# Patient Record
Sex: FEMALE | Race: WHITE | NOT HISPANIC OR LATINO | Employment: FULL TIME | URBAN - METROPOLITAN AREA
[De-identification: names, ages, dates, MRNs, and addresses within clinical notes are randomized per-mention and may not be internally consistent; named-entity substitution may affect disease eponyms.]

---

## 2023-09-05 PROBLEM — M76.62 ACHILLES TENDINITIS OF LEFT LOWER EXTREMITY: Status: ACTIVE | Noted: 2023-09-05

## 2023-09-05 PROBLEM — M22.2X1 RIGHT PATELLOFEMORAL SYNDROME: Status: ACTIVE | Noted: 2023-09-05

## 2023-09-05 PROBLEM — G40.209 COMPLEX PARTIAL SEIZURE EVOLVING TO GENERALIZED SEIZURE (MULTI): Status: ACTIVE | Noted: 2023-09-05

## 2023-09-05 PROBLEM — S52.502A CLOSED FRACTURE OF LEFT DISTAL RADIUS: Status: RESOLVED | Noted: 2023-09-05 | Resolved: 2023-09-05

## 2023-09-05 PROBLEM — R92.30 DENSE BREAST TISSUE ON MAMMOGRAM: Status: ACTIVE | Noted: 2023-09-05

## 2023-09-05 PROBLEM — S82.832A CLOSED FRACTURE OF FIBULA, PROXIMAL, LEFT: Status: ACTIVE | Noted: 2023-09-05

## 2023-09-05 PROBLEM — M72.0 DUPUYTREN CONTRACTURE: Status: ACTIVE | Noted: 2023-09-05

## 2023-09-05 PROBLEM — F32.A CHRONIC DEPRESSION: Status: ACTIVE | Noted: 2023-09-05

## 2023-09-05 PROBLEM — M81.0 OSTEOPOROSIS: Status: ACTIVE | Noted: 2023-09-05

## 2023-09-05 PROBLEM — S82.002A LEFT PATELLA FRACTURE: Status: RESOLVED | Noted: 2023-09-05 | Resolved: 2023-09-05

## 2023-09-06 ENCOUNTER — OFFICE VISIT (OUTPATIENT)
Dept: PRIMARY CARE | Facility: CLINIC | Age: 63
End: 2023-09-06
Payer: COMMERCIAL

## 2023-09-06 VITALS
SYSTOLIC BLOOD PRESSURE: 134 MMHG | WEIGHT: 149.8 LBS | DIASTOLIC BLOOD PRESSURE: 75 MMHG | HEIGHT: 62 IN | BODY MASS INDEX: 27.57 KG/M2 | OXYGEN SATURATION: 97 % | HEART RATE: 62 BPM

## 2023-09-06 DIAGNOSIS — Z00.00 HEALTHCARE MAINTENANCE: Primary | ICD-10-CM

## 2023-09-06 DIAGNOSIS — M25.474 SWELLING OF FOOT JOINT, RIGHT: ICD-10-CM

## 2023-09-06 DIAGNOSIS — Z13.21 ENCOUNTER FOR VITAMIN DEFICIENCY SCREENING: ICD-10-CM

## 2023-09-06 DIAGNOSIS — Z13.6 SCREENING FOR CARDIOVASCULAR CONDITION: ICD-10-CM

## 2023-09-06 DIAGNOSIS — G40.209 COMPLEX PARTIAL SEIZURE EVOLVING TO GENERALIZED SEIZURE (MULTI): ICD-10-CM

## 2023-09-06 DIAGNOSIS — L03.039 PARONYCHIA OF GREAT TOE: ICD-10-CM

## 2023-09-06 DIAGNOSIS — M54.30 SCIATIC LEG PAIN: ICD-10-CM

## 2023-09-06 DIAGNOSIS — M81.0 OSTEOPOROSIS, UNSPECIFIED OSTEOPOROSIS TYPE, UNSPECIFIED PATHOLOGICAL FRACTURE PRESENCE: ICD-10-CM

## 2023-09-06 DIAGNOSIS — F32.A CHRONIC DEPRESSION: ICD-10-CM

## 2023-09-06 PROCEDURE — 1036F TOBACCO NON-USER: CPT | Performed by: INTERNAL MEDICINE

## 2023-09-06 PROCEDURE — 99386 PREV VISIT NEW AGE 40-64: CPT | Performed by: INTERNAL MEDICINE

## 2023-09-06 RX ORDER — IBUPROFEN 100 MG/5ML
SUSPENSION, ORAL (FINAL DOSE FORM) ORAL
COMMUNITY

## 2023-09-06 RX ORDER — MULTIVITAMIN/IRON/FOLIC ACID 18MG-0.4MG
TABLET ORAL 2 TIMES DAILY
COMMUNITY
End: 2023-10-25 | Stop reason: ENTERED-IN-ERROR

## 2023-09-06 RX ORDER — PHENOBARBITAL 64.8 MG/1
TABLET ORAL 2 TIMES DAILY
COMMUNITY
End: 2023-10-25 | Stop reason: ENTERED-IN-ERROR

## 2023-09-06 RX ORDER — MULTIVITAMIN/IRON/FOLIC ACID 18MG-0.4MG
TABLET ORAL
COMMUNITY
End: 2023-10-25 | Stop reason: ENTERED-IN-ERROR

## 2023-09-06 RX ORDER — CARBAMAZEPINE 100 MG/1
100 TABLET, EXTENDED RELEASE ORAL 2 TIMES DAILY
COMMUNITY
End: 2023-10-25 | Stop reason: ENTERED-IN-ERROR

## 2023-09-06 RX ORDER — CEPHALEXIN 500 MG/1
500 CAPSULE ORAL 2 TIMES DAILY
Qty: 20 CAPSULE | Refills: 0 | Status: SHIPPED | OUTPATIENT
Start: 2023-09-06 | End: 2023-09-06 | Stop reason: SDUPTHER

## 2023-09-06 RX ORDER — CEPHALEXIN 500 MG/1
500 CAPSULE ORAL 2 TIMES DAILY
Qty: 20 CAPSULE | Refills: 0 | Status: SHIPPED | OUTPATIENT
Start: 2023-09-06 | End: 2023-09-07 | Stop reason: SDUPTHER

## 2023-09-06 RX ORDER — CARBAMAZEPINE 400 MG/1
400 TABLET, EXTENDED RELEASE ORAL 2 TIMES DAILY
COMMUNITY
End: 2023-10-25 | Stop reason: ENTERED-IN-ERROR

## 2023-09-06 ASSESSMENT — ENCOUNTER SYMPTOMS
LOSS OF SENSATION IN FEET: 0
SEIZURES: 1
DEPRESSION: 0
ARTHRALGIAS: 1
CONSTITUTIONAL NEGATIVE: 1
MYALGIAS: 1
DYSPHORIC MOOD: 1
OCCASIONAL FEELINGS OF UNSTEADINESS: 0

## 2023-09-06 ASSESSMENT — PROMIS GLOBAL HEALTH SCALE
CARRYOUT_SOCIAL_ACTIVITIES: VERY GOOD
RATE_SOCIAL_SATISFACTION: GOOD
RATE_GENERAL_HEALTH: GOOD
RATE_QUALITY_OF_LIFE: VERY GOOD
EMOTIONAL_PROBLEMS: SOMETIMES
RATE_AVERAGE_FATIGUE: MODERATE
CARRYOUT_PHYSICAL_ACTIVITIES: MODERATELY
RATE_PHYSICAL_HEALTH: GOOD
RATE_AVERAGE_PAIN: 2
RATE_MENTAL_HEALTH: GOOD

## 2023-09-06 ASSESSMENT — COLUMBIA-SUICIDE SEVERITY RATING SCALE - C-SSRS
1. IN THE PAST MONTH, HAVE YOU WISHED YOU WERE DEAD OR WISHED YOU COULD GO TO SLEEP AND NOT WAKE UP?: NO
2. HAVE YOU ACTUALLY HAD ANY THOUGHTS OF KILLING YOURSELF?: NO
6. HAVE YOU EVER DONE ANYTHING, STARTED TO DO ANYTHING, OR PREPARED TO DO ANYTHING TO END YOUR LIFE?: NO

## 2023-09-06 ASSESSMENT — PATIENT HEALTH QUESTIONNAIRE - PHQ9
SUM OF ALL RESPONSES TO PHQ9 QUESTIONS 1 AND 2: 0
2. FEELING DOWN, DEPRESSED OR HOPELESS: NOT AT ALL
1. LITTLE INTEREST OR PLEASURE IN DOING THINGS: NOT AT ALL

## 2023-09-06 ASSESSMENT — LIFESTYLE VARIABLES
SKIP TO QUESTIONS 9-10: 0
HOW MANY STANDARD DRINKS CONTAINING ALCOHOL DO YOU HAVE ON A TYPICAL DAY: 1 OR 2
HOW OFTEN DO YOU HAVE A DRINK CONTAINING ALCOHOL: MONTHLY OR LESS
HOW OFTEN DO YOU HAVE SIX OR MORE DRINKS ON ONE OCCASION: LESS THAN MONTHLY
AUDIT-C TOTAL SCORE: 2

## 2023-09-06 NOTE — PROGRESS NOTES
"Subjective   Patient ID: Rhoda Pena is a 63 y.o. female who presents for Annual Exam (Physical ).    HPI     NPV.   FEELING WELL.   SAW  ORTHO ABOUT  BACK.  SHE    HAS  L5- S1  DEGEN. SPONDYLOLYSIS AND DJD SPINE.     SMOKER.     WORKS  [employer].      Lump   right  foot.       Review of Systems   Constitutional: Negative.    Musculoskeletal:  Positive for arthralgias and myalgias.   Neurological:  Positive for seizures.   Psychiatric/Behavioral:  Positive for dysphoric mood.    All other systems reviewed and are negative.      Objective   /75   Pulse 62   Ht 1.575 m (5' 2\")   Wt 67.9 kg (149 lb 12.8 oz)   SpO2 97%   BMI 27.40 kg/m²     Physical Exam  Vitals and nursing note reviewed.   Constitutional:       Appearance: Normal appearance.   HENT:      Head: Normocephalic.      Right Ear: External ear normal.      Left Ear: External ear normal.   Eyes:      Conjunctiva/sclera: Conjunctivae normal.   Cardiovascular:      Rate and Rhythm: Normal rate and regular rhythm.      Pulses: Normal pulses.      Heart sounds: Normal heart sounds.   Pulmonary:      Effort: Pulmonary effort is normal.      Breath sounds: Normal breath sounds.   Musculoskeletal:         General: Normal range of motion.      Cervical back: Normal range of motion and neck supple.   Skin:     General: Skin is warm and dry.      Capillary Refill: Capillary refill takes less than 2 seconds.   Neurological:      General: No focal deficit present.      Mental Status: She is alert and oriented to person, place, and time. Mental status is at baseline.   Psychiatric:         Mood and Affect: Mood normal.         Behavior: Behavior normal.         Assessment/Plan   Problem List Items Addressed This Visit          Medium    Chronic depression    Complex partial seizure evolving to generalized seizure (CMS/HCC)    Osteoporosis     Other Visit Diagnoses       Healthcare maintenance    -  Primary    Relevant Orders    CBC    Comprehensive " Metabolic Panel    Lipid Panel    Thyroxine, Free    Sciatic leg pain        Screening for cardiovascular condition        Relevant Orders    Lipid Panel    CT cardiac scoring wo IV contrast    Encounter for vitamin deficiency screening        Relevant Orders    Vitamin D 1,25 Dihydroxy (for eval of hypercalcemia)    Swelling of foot joint, right        Relevant Orders    XR foot right 3+ views (Completed)    Referral to Podiatry    Paronychia of great toe        Relevant Medications    cephalexin (Keflex) 500 mg capsule    Other Relevant Orders    Referral to Podiatry

## 2023-09-07 DIAGNOSIS — L03.039 PARONYCHIA OF GREAT TOE: ICD-10-CM

## 2023-09-07 RX ORDER — CEPHALEXIN 500 MG/1
500 CAPSULE ORAL 2 TIMES DAILY
Qty: 20 CAPSULE | Refills: 0 | Status: SHIPPED | OUTPATIENT
Start: 2023-09-07 | End: 2023-09-07

## 2023-09-13 ENCOUNTER — LAB (OUTPATIENT)
Dept: LAB | Facility: LAB | Age: 63
End: 2023-09-13
Payer: COMMERCIAL

## 2023-09-13 DIAGNOSIS — Z13.6 SCREENING FOR CARDIOVASCULAR CONDITION: ICD-10-CM

## 2023-09-13 DIAGNOSIS — R22.41 LOCALIZED SWELLING OF RIGHT FOOT: ICD-10-CM

## 2023-09-13 DIAGNOSIS — Z00.00 HEALTHCARE MAINTENANCE: ICD-10-CM

## 2023-09-13 DIAGNOSIS — D48.7: Primary | ICD-10-CM

## 2023-09-13 DIAGNOSIS — Z13.21 ENCOUNTER FOR VITAMIN DEFICIENCY SCREENING: ICD-10-CM

## 2023-09-13 LAB
ALANINE AMINOTRANSFERASE (SGPT) (U/L) IN SER/PLAS: 20 U/L (ref 7–45)
ALBUMIN (G/DL) IN SER/PLAS: 4.2 G/DL (ref 3.4–5)
ALKALINE PHOSPHATASE (U/L) IN SER/PLAS: 42 U/L (ref 33–136)
ANION GAP IN SER/PLAS: 12 MMOL/L (ref 10–20)
ASPARTATE AMINOTRANSFERASE (SGOT) (U/L) IN SER/PLAS: 19 U/L (ref 9–39)
BILIRUBIN TOTAL (MG/DL) IN SER/PLAS: 0.3 MG/DL (ref 0–1.2)
CALCIUM (MG/DL) IN SER/PLAS: 9.1 MG/DL (ref 8.6–10.3)
CARBON DIOXIDE, TOTAL (MMOL/L) IN SER/PLAS: 27 MMOL/L (ref 21–32)
CHLORIDE (MMOL/L) IN SER/PLAS: 101 MMOL/L (ref 98–107)
CHOLESTEROL (MG/DL) IN SER/PLAS: 223 MG/DL (ref 0–199)
CHOLESTEROL IN HDL (MG/DL) IN SER/PLAS: 77.5 MG/DL
CHOLESTEROL/HDL RATIO: 2.9
CREATININE (MG/DL) IN SER/PLAS: 0.73 MG/DL (ref 0.5–1.05)
ERYTHROCYTE DISTRIBUTION WIDTH (RATIO) BY AUTOMATED COUNT: 13.2 % (ref 11.5–14.5)
ERYTHROCYTE MEAN CORPUSCULAR HEMOGLOBIN CONCENTRATION (G/DL) BY AUTOMATED: 32.1 G/DL (ref 32–36)
ERYTHROCYTE MEAN CORPUSCULAR VOLUME (FL) BY AUTOMATED COUNT: 96 FL (ref 80–100)
ERYTHROCYTES (10*6/UL) IN BLOOD BY AUTOMATED COUNT: 3.93 X10E12/L (ref 4–5.2)
GFR FEMALE: >90 ML/MIN/1.73M2
GLUCOSE (MG/DL) IN SER/PLAS: 89 MG/DL (ref 74–99)
HEMATOCRIT (%) IN BLOOD BY AUTOMATED COUNT: 37.7 % (ref 36–46)
HEMOGLOBIN (G/DL) IN BLOOD: 12.1 G/DL (ref 12–16)
LDL: 133 MG/DL (ref 0–99)
LEUKOCYTES (10*3/UL) IN BLOOD BY AUTOMATED COUNT: 4.2 X10E9/L (ref 4.4–11.3)
PLATELETS (10*3/UL) IN BLOOD AUTOMATED COUNT: 232 X10E9/L (ref 150–450)
POTASSIUM (MMOL/L) IN SER/PLAS: 4.1 MMOL/L (ref 3.5–5.3)
PROTEIN TOTAL: 6.1 G/DL (ref 6.4–8.2)
SODIUM (MMOL/L) IN SER/PLAS: 136 MMOL/L (ref 136–145)
THYROXINE (T4) FREE (NG/DL) IN SER/PLAS: 0.82 NG/DL (ref 0.61–1.12)
TRIGLYCERIDE (MG/DL) IN SER/PLAS: 63 MG/DL (ref 0–149)
UREA NITROGEN (MG/DL) IN SER/PLAS: 19 MG/DL (ref 6–23)
VLDL: 13 MG/DL (ref 0–40)

## 2023-09-13 PROCEDURE — 84439 ASSAY OF FREE THYROXINE: CPT

## 2023-09-13 PROCEDURE — 85027 COMPLETE CBC AUTOMATED: CPT

## 2023-09-13 PROCEDURE — 36415 COLL VENOUS BLD VENIPUNCTURE: CPT

## 2023-09-13 PROCEDURE — 80053 COMPREHEN METABOLIC PANEL: CPT

## 2023-09-13 PROCEDURE — 83036 HEMOGLOBIN GLYCOSYLATED A1C: CPT

## 2023-09-13 PROCEDURE — 82652 VIT D 1 25-DIHYDROXY: CPT

## 2023-09-13 PROCEDURE — 80061 LIPID PANEL: CPT

## 2023-09-14 LAB
ESTIMATED AVERAGE GLUCOSE FOR HBA1C: 108 MG/DL
HEMOGLOBIN A1C/HEMOGLOBIN TOTAL IN BLOOD: 5.4 %

## 2023-09-16 LAB — VITAMIN D 1,25-DIHYDROXY: 21.3 PG/ML (ref 19.9–79.3)

## 2023-09-19 ENCOUNTER — TELEPHONE (OUTPATIENT)
Dept: PRIMARY CARE | Facility: CLINIC | Age: 63
End: 2023-09-19
Payer: COMMERCIAL

## 2023-09-19 DIAGNOSIS — R22.41 LOCALIZED SWELLING OF RIGHT FOOT: ICD-10-CM

## 2023-09-19 DIAGNOSIS — R22.42 LOCALIZED SWELLING OF LEFT FOOT: Primary | ICD-10-CM

## 2023-09-27 ENCOUNTER — HOSPITAL ENCOUNTER (OUTPATIENT)
Dept: DATA CONVERSION | Facility: HOSPITAL | Age: 63
Discharge: HOME | End: 2023-09-27
Payer: COMMERCIAL

## 2023-09-27 DIAGNOSIS — M46.1 SACROILIITIS, NOT ELSEWHERE CLASSIFIED (CMS-HCC): ICD-10-CM

## 2023-09-27 DIAGNOSIS — M54.16 RADICULOPATHY, LUMBAR REGION: ICD-10-CM

## 2023-09-27 DIAGNOSIS — M54.30 SCIATICA, UNSPECIFIED SIDE: ICD-10-CM

## 2023-09-27 DIAGNOSIS — M54.50 LOW BACK PAIN, UNSPECIFIED: ICD-10-CM

## 2023-10-25 ENCOUNTER — HOSPITAL ENCOUNTER (OUTPATIENT)
Dept: RADIOLOGY | Facility: HOSPITAL | Age: 63
Discharge: HOME | End: 2023-10-25
Payer: COMMERCIAL

## 2023-10-25 DIAGNOSIS — R22.41 LOCALIZED SWELLING OF RIGHT FOOT: ICD-10-CM

## 2023-10-25 DIAGNOSIS — M25.474 SWELLING OF FOOT JOINT, RIGHT: Primary | ICD-10-CM

## 2023-10-25 PROCEDURE — 76882 US LMTD JT/FCL EVL NVASC XTR: CPT

## 2023-10-28 PROBLEM — Z78.0 POST-MENOPAUSAL: Status: ACTIVE | Noted: 2023-10-28

## 2023-10-28 PROBLEM — K58.0 IRRITABLE BOWEL SYNDROME WITH DIARRHEA: Status: ACTIVE | Noted: 2023-10-28

## 2023-10-28 PROBLEM — R79.89 HIGH SERUM CHOLESTANOL: Status: ACTIVE | Noted: 2023-10-28

## 2023-10-28 PROBLEM — M46.1 SACROILIITIS (CMS-HCC): Status: ACTIVE | Noted: 2023-10-28

## 2023-10-28 PROBLEM — N95.2 POSTMENOPAUSAL ATROPHIC VAGINITIS: Status: ACTIVE | Noted: 2023-10-28

## 2023-10-28 PROBLEM — K21.9 ESOPHAGEAL REFLUX: Status: ACTIVE | Noted: 2023-10-28

## 2023-10-28 PROBLEM — S92.425A CLOSED NONDISPLACED FRACTURE OF DISTAL PHALANX OF LEFT GREAT TOE: Status: ACTIVE | Noted: 2023-10-28

## 2023-10-28 PROBLEM — S69.90XA INJURY OF WRIST: Status: ACTIVE | Noted: 2023-10-28

## 2023-10-28 PROBLEM — M70.22 OLECRANON BURSITIS OF LEFT ELBOW: Status: ACTIVE | Noted: 2023-10-28

## 2023-10-28 PROBLEM — Z04.9 CONDITION NOT FOUND: Status: ACTIVE | Noted: 2023-10-28

## 2023-10-28 PROBLEM — S52.552A: Status: ACTIVE | Noted: 2023-10-28

## 2023-10-28 PROBLEM — R11.0 NAUSEA IN ADULT: Status: ACTIVE | Noted: 2023-10-28

## 2023-10-28 PROBLEM — R07.9 CHEST PAIN: Status: ACTIVE | Noted: 2019-01-11

## 2023-10-28 PROBLEM — H43.813 POSTERIOR VITREOUS DETACHMENT, BOTH EYES: Status: ACTIVE | Noted: 2023-10-28

## 2023-10-28 PROBLEM — L82.1 OTHER SEBORRHEIC KERATOSIS: Status: ACTIVE | Noted: 2022-03-14

## 2023-10-28 PROBLEM — S83.422A SPRAIN OF LATERAL COLLATERAL LIGAMENT OF LEFT KNEE: Status: ACTIVE | Noted: 2023-10-28

## 2023-10-28 PROBLEM — S49.92XA INJURY OF LEFT SHOULDER: Status: ACTIVE | Noted: 2023-10-28

## 2023-10-28 PROBLEM — L91.8 OTHER HYPERTROPHIC DISORDERS OF THE SKIN: Status: ACTIVE | Noted: 2022-03-14

## 2023-10-28 PROBLEM — M54.16 LUMBAR RADICULOPATHY: Status: ACTIVE | Noted: 2023-10-28

## 2023-10-28 PROBLEM — S89.92XA LEFT KNEE INJURY: Status: ACTIVE | Noted: 2023-10-28

## 2023-10-28 PROBLEM — M12.812 ROTATOR CUFF ARTHROPATHY OF LEFT SHOULDER: Status: ACTIVE | Noted: 2023-10-28

## 2023-10-28 RX ORDER — ZOLEDRONIC ACID 5 MG/100ML
INJECTION, SOLUTION INTRAVENOUS
COMMUNITY
End: 2024-02-05 | Stop reason: ALTCHOICE

## 2023-10-28 RX ORDER — PNV NO.95/FERROUS FUM/FOLIC AC 28MG-0.8MG
TABLET ORAL
COMMUNITY

## 2023-10-28 RX ORDER — CHOLECALCIFEROL (VITAMIN D3) 50 MCG
50 TABLET ORAL DAILY
COMMUNITY

## 2023-10-30 ENCOUNTER — OFFICE VISIT (OUTPATIENT)
Dept: ORTHOPEDIC SURGERY | Facility: CLINIC | Age: 63
End: 2023-10-30
Payer: COMMERCIAL

## 2023-10-30 DIAGNOSIS — M25.474 SWELLING OF FOOT JOINT, RIGHT: ICD-10-CM

## 2023-10-30 DIAGNOSIS — R22.41 FOOT MASS, RIGHT: ICD-10-CM

## 2023-10-30 PROCEDURE — 1036F TOBACCO NON-USER: CPT | Performed by: ORTHOPAEDIC SURGERY

## 2023-10-30 PROCEDURE — 99214 OFFICE O/P EST MOD 30 MIN: CPT | Performed by: ORTHOPAEDIC SURGERY

## 2023-10-30 PROCEDURE — 2500000005 HC RX 250 GENERAL PHARMACY W/O HCPCS: Performed by: ORTHOPAEDIC SURGERY

## 2023-10-30 RX ORDER — LIDOCAINE HYDROCHLORIDE 10 MG/ML
2 INJECTION INFILTRATION; PERINEURAL
Status: COMPLETED | OUTPATIENT
Start: 2023-10-30 | End: 2023-10-30

## 2023-10-30 RX ADMIN — LIDOCAINE HYDROCHLORIDE 2 ML: 10 INJECTION, SOLUTION INFILTRATION; PERINEURAL at 16:52

## 2023-10-30 NOTE — LETTER
October 30, 2023     Elis Solares MD  [address]   [place], [address]  [place] [address]    Patient: Rhoda Pena   YOB: 1960   Date of Visit: 10/30/2023       Dear Dr. Elis Solares MD:    Thank you for referring Rhoda Pena to me for evaluation. Below are my notes for this consultation.  If you have questions, please do not hesitate to call me. I look forward to following your patient along with you.       Sincerely,     Mauro Gutierres MD      CC: No Recipients  ______________________________________________________________________________________    Patient ID: Rhoda Pena is a 63 y.o. female.    M Inj/Asp on 10/30/2023 4:52 PM  Indications: diagnostic evaluation  Medications: 2 mL lidocaine 10 mg/mL (1 %)        This 63-year-old woman complains of a mass over the right midfoot which she noted last spring.  She notes it can be tender to the touch but more importantly when she presses on the mass or anything rubs on it it causes tingling and numbness in her second and third toes.    The patient denies any trauma or any changes in the size of the mass.    The patient's past medical history further complicates her situation.  She has history of chronic depression, complex partial seizure evolving to generalized seizure, chest pain and irritable bowel syndrome.    Review of systems:  A complete review of the patient's past medical history and review of 30 systems and all medications and allergies was performed. Please see adult medical record for details.    A Family history for genetic or familial inheritance issues and Social history including smoking history, alcohol consumption and exercise activities was also reviewed and significant findings noted in the patients history of present illness.    Physical Exam:  The patient is well-nourished and well-developed and in no acute distress. The patient displayed normal mood and affect. The patient's pupils were  equal, round sclerae are white. Patient's respirations appear to be regular and unlabored.    Examination of the patient's right foot and ankle revealed the following. The patient's gait and stance revealed mild pes planus and pronation.    There was a 2 x 2 centimeter soft tissue mass over the medial midfoot area.  The mass appeared to be somewhat tender to touch.  The mass transilluminated.  There did not appear to be any other skin abnormalities or lymphangitis. There was no swelling noted and no erythema.  There was no deformity.    There was no other tenderness to palpation.  Ankle range of motion was full. Subtalar motion was full and midtarsal motion was full. The Silfverskiold test was positive.   The neurovascular examination was intact. The neurological exam including motor and sensory exam was performed. The vascular examination including palpation of pulses and capillary refill of the foot was performed and determined to be intact.    Imaging:  I personally reviewed and measured the radiographs including AP and lateral views of the extremity and they revealed essentially normal bony architecture with what appears to be a soft tissue mass over the medial midfoot.  Ultrasound examination revealed what appeared to be a cystic mass.    Impression & Plan:    It is my impression this probably is a cystic mass possibly ganglion cyst that may be compressing a sensory nerve.    After adequate informed consent I attempted to aspirate the cyst under local anesthesia and did not return any fluid.    Because this does not appear to be a ganglion cyst based on aspiration in spite of the transillumination I believe we need further imaging in order to determine the etiology of the mass.    I have ordered an MRI with and without contrast to further image this mass.  The patient should contact me following the MRI in order to plan her course of action.      Note dictated with [employer] software.   Completed without full type editing and sent to avoid delay.

## 2023-10-30 NOTE — PROGRESS NOTES
This 63-year-old woman complains of a mass over the right midfoot which she noted last spring.  She notes it can be tender to the touch but more importantly when she presses on the mass or anything rubs on it it causes tingling and numbness in her second and third toes.    The patient denies any trauma or any changes in the size of the mass.    The patient's past medical history further complicates her situation.  She has history of chronic depression, complex partial seizure evolving to generalized seizure, chest pain and irritable bowel syndrome.    Review of systems:  A complete review of the patient's past medical history and review of 30 systems and all medications and allergies was performed. Please see adult medical record for details.    A Family history for genetic or familial inheritance issues and Social history including smoking history, alcohol consumption and exercise activities was also reviewed and significant findings noted in the patients history of present illness.    Physical Exam:  The patient is well-nourished and well-developed and in no acute distress. The patient displayed normal mood and affect. The patient's pupils were equal, round sclerae are white. Patient's respirations appear to be regular and unlabored.    Examination of the patient's right foot and ankle revealed the following. The patient's gait and stance revealed mild pes planus and pronation.    There was a 2 x 2 centimeter soft tissue mass over the medial midfoot area.  The mass appeared to be somewhat tender to touch.  The mass transilluminated.  There did not appear to be any other skin abnormalities or lymphangitis. There was no swelling noted and no erythema.  There was no deformity.    There was no other tenderness to palpation.  Ankle range of motion was full. Subtalar motion was full and midtarsal motion was full. The Silfverskiold test was positive.   The neurovascular examination was intact. The neurological exam  including motor and sensory exam was performed. The vascular examination including palpation of pulses and capillary refill of the foot was performed and determined to be intact.    Imaging:  I personally reviewed and measured the radiographs including AP and lateral views of the extremity and they revealed essentially normal bony architecture with what appears to be a soft tissue mass over the medial midfoot.  Ultrasound examination revealed what appeared to be a cystic mass.    Impression & Plan:    It is my impression this probably is a cystic mass possibly ganglion cyst that may be compressing a sensory nerve.    After adequate informed consent I attempted to aspirate the cyst under local anesthesia and did not return any fluid.    Because this does not appear to be a ganglion cyst based on aspiration in spite of the transillumination I believe we need further imaging in order to determine the etiology of the mass.    I have ordered an MRI with and without contrast to further image this mass.  The patient should contact me following the MRI in order to plan her course of action.      Note dictated with [email or web address] software.  Completed without full type editing and sent to avoid delay.

## 2023-10-30 NOTE — PROGRESS NOTES
Patient ID: Rhoda Pena is a 63 y.o. female.    M Inj/Asp on 10/30/2023 4:52 PM  Indications: diagnostic evaluation  Medications: 2 mL lidocaine 10 mg/mL (1 %)

## 2023-11-13 ENCOUNTER — LAB (OUTPATIENT)
Dept: LAB | Facility: LAB | Age: 63
End: 2023-11-13
Payer: COMMERCIAL

## 2023-11-13 DIAGNOSIS — R22.41 FOOT MASS, RIGHT: ICD-10-CM

## 2023-11-13 DIAGNOSIS — M25.474 SWELLING OF FOOT JOINT, RIGHT: ICD-10-CM

## 2023-11-13 LAB
CREAT SERPL-MCNC: 0.71 MG/DL (ref 0.5–1.05)
GFR SERPL CREATININE-BSD FRML MDRD: >90 ML/MIN/1.73M*2

## 2023-11-13 PROCEDURE — 82565 ASSAY OF CREATININE: CPT

## 2023-11-13 PROCEDURE — 36415 COLL VENOUS BLD VENIPUNCTURE: CPT

## 2023-11-15 ENCOUNTER — HOSPITAL ENCOUNTER (OUTPATIENT)
Dept: RADIOLOGY | Facility: HOSPITAL | Age: 63
Discharge: HOME | End: 2023-11-15
Payer: COMMERCIAL

## 2023-11-15 DIAGNOSIS — R22.41 FOOT MASS, RIGHT: ICD-10-CM

## 2023-11-15 DIAGNOSIS — M25.474 SWELLING OF FOOT JOINT, RIGHT: ICD-10-CM

## 2023-11-15 PROCEDURE — A9575 INJ GADOTERATE MEGLUMI 0.1ML: HCPCS | Performed by: ORTHOPAEDIC SURGERY

## 2023-11-15 PROCEDURE — 73720 MRI LWR EXTREMITY W/O&W/DYE: CPT | Mod: RT

## 2023-11-15 PROCEDURE — 2550000001 HC RX 255 CONTRASTS: Performed by: ORTHOPAEDIC SURGERY

## 2023-11-15 RX ORDER — GADOTERATE MEGLUMINE 376.9 MG/ML
15 INJECTION INTRAVENOUS
Status: COMPLETED | OUTPATIENT
Start: 2023-11-15 | End: 2023-11-15

## 2023-11-15 RX ADMIN — GADOTERATE MEGLUMINE 14 ML: 376.9 INJECTION INTRAVENOUS at 15:27

## 2023-11-22 ENCOUNTER — TELEPHONE (OUTPATIENT)
Dept: ORTHOPEDIC SURGERY | Facility: CLINIC | Age: 63
End: 2023-11-22
Payer: COMMERCIAL

## 2023-11-22 NOTE — TELEPHONE ENCOUNTER
Telephone conversation with the patient November 22, 2023 regarding the patient's MRI.  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  Because of this enhancement of the lesion it is difficult to tell exactly what it is although it may be a plantar fibroma.  I have referred the patient to Dr. Faisal Boland our orthopedic oncology specialist for evaluation and treatment.  Patient should contact me if she has any problems getting into see Dr. Rowland.      Note dictated with [employer] software.  Completed without full type editing and sent to avoid delay.

## 2023-12-01 ENCOUNTER — PHARMACY VISIT (OUTPATIENT)
Dept: PHARMACY | Facility: CLINIC | Age: 63
End: 2023-12-01
Payer: COMMERCIAL

## 2023-12-01 ENCOUNTER — OFFICE VISIT (OUTPATIENT)
Dept: NEUROLOGY | Facility: CLINIC | Age: 63
End: 2023-12-01
Payer: COMMERCIAL

## 2023-12-01 DIAGNOSIS — G40.209 COMPLEX PARTIAL SEIZURE EVOLVING TO GENERALIZED SEIZURE (MULTI): Primary | ICD-10-CM

## 2023-12-01 PROCEDURE — 1036F TOBACCO NON-USER: CPT | Performed by: PSYCHIATRY & NEUROLOGY

## 2023-12-01 PROCEDURE — RXMED WILLOW AMBULATORY MEDICATION CHARGE

## 2023-12-01 PROCEDURE — 99214 OFFICE O/P EST MOD 30 MIN: CPT | Performed by: PSYCHIATRY & NEUROLOGY

## 2023-12-01 RX ORDER — CARBAMAZEPINE 400 MG/1
TABLET, EXTENDED RELEASE ORAL 2 TIMES DAILY
Qty: 180 TABLET | Refills: 1 | Status: SHIPPED | OUTPATIENT
Start: 2023-12-01 | End: 2024-02-05 | Stop reason: ALTCHOICE

## 2023-12-01 RX ORDER — CARBAMAZEPINE 100 MG/1
100 TABLET, EXTENDED RELEASE ORAL 2 TIMES DAILY
Qty: 180 TABLET | Refills: 1 | Status: SHIPPED | OUTPATIENT
Start: 2023-12-01 | End: 2024-11-30

## 2023-12-01 RX ORDER — PHENOBARBITAL 64.8 MG/1
TABLET ORAL 2 TIMES DAILY
Qty: 270 TABLET | Refills: 1 | Status: SHIPPED | OUTPATIENT
Start: 2023-12-01 | End: 2024-06-14

## 2023-12-01 NOTE — PATIENT INSTRUCTIONS
You continue to remain seizure-free on phenobarbital and tegretol. Please continue generic carbamazepine  mg twice a day and 3 phenobarbitals a day. Continue Tegretol  mg twice a day. Please get blood tests to check your phenobarbital and carbamazepine levels in the morning, ideally before your morning dose next week. After that, we will prescribe generic carbamazepine  mg, which you will take at night after continuing Tegretol  mg in the morning. Continue this for 6 months. Please call if you develop a seizure aura or side effects. If you feel fine after 6 months, we can switch you to generic carbamazepine entirely. I will see you back in July, 2024.

## 2023-12-01 NOTE — PROGRESS NOTES
Chief complaint:    Seizure disorder    History of Present Illness:   Rhoda continues to do well on phenobarbital and carbamazepine for her seizures. She has switched to generic carbamazepine  mg twice a day with no issues and continues name brand Tegretol  mg bid.     She noticed a bump on her right medial foot this summer. It does not hurt but if she presses on it she gets numbness of two toes. She had an MRI of the foot, and there was an enhancing lesion, so she will see an oncologist next week.      Physical examination:  She is a cheerful, talkative woman who looks well.     Neurologic Exam     Mental Status   Her speech was clear, fluent and appropriate.      Cranial Nerves     CN III, IV, VI   Extraocular motions are normal.     CN V   Facial sensation intact.     CN VII   Facial expression full, symmetric.     Motor Exam   Muscle bulk: normal    Sensory Exam   Light touch normal.   Vibration normal.   Pinprick normal.     Gait, Coordination, and Reflexes     Reflexes   Right brachioradialis: 1+  Left brachioradialis: 1+  Right biceps: 1+  Left biceps: 1+  Right triceps: 1+  Left triceps: 1+  Right patellar: 1+  Left patellar: 1+  Right achilles: 1+  Left achilles: 1+  Right plantar: normal  Left plantar: normal  Her gait was narrow-based and steady and she could perform a tandem gait.          No diagnosis found.     1. Complex partial seizure evolving to generalized seizure (CMS/HCC)  Phenobarbital level    Carbamazepine         No orders of the defined types were placed in this encounter.         Impression and Plan:  Rhoda continues to remain seizure-free on a combination of phenobarbital and carbamazepine. She will have her levels checked. She has switched to generic carbamazepine  mg bid and will see how she does with name brand Tegretol XR in the morning and generic carbamazepine  mg at night for 6 months. If she continues to do well, she will switch to generic carbamazepine  ER completely and recheck her carbamazepine level after that.     She has had some funny sensation on the balls of her feet and has developed a rubbery mass on the side of her right foot. On exam today she does not show signs of a neuropathy although pushing on the mass causes two toes to tingle. She will consult an oncologist and will let us know what is going on with her foot. Follow up in July, 2024.    Iman Baker MD

## 2023-12-02 ENCOUNTER — PHARMACY VISIT (OUTPATIENT)
Dept: PHARMACY | Facility: CLINIC | Age: 63
End: 2023-12-02
Payer: COMMERCIAL

## 2023-12-02 PROCEDURE — RXMED WILLOW AMBULATORY MEDICATION CHARGE

## 2023-12-07 ENCOUNTER — OFFICE VISIT (OUTPATIENT)
Dept: ORTHOPEDIC SURGERY | Facility: CLINIC | Age: 63
End: 2023-12-07
Payer: COMMERCIAL

## 2023-12-07 VITALS — HEIGHT: 63 IN | WEIGHT: 146 LBS | BODY MASS INDEX: 25.87 KG/M2

## 2023-12-07 DIAGNOSIS — R22.41 FOOT MASS, RIGHT: Primary | ICD-10-CM

## 2023-12-07 PROCEDURE — 1036F TOBACCO NON-USER: CPT | Performed by: STUDENT IN AN ORGANIZED HEALTH CARE EDUCATION/TRAINING PROGRAM

## 2023-12-07 PROCEDURE — 99214 OFFICE O/P EST MOD 30 MIN: CPT | Performed by: STUDENT IN AN ORGANIZED HEALTH CARE EDUCATION/TRAINING PROGRAM

## 2023-12-07 ASSESSMENT — ENCOUNTER SYMPTOMS
DEPRESSION: 0
LOSS OF SENSATION IN FEET: 0
OCCASIONAL FEELINGS OF UNSTEADINESS: 0

## 2023-12-07 ASSESSMENT — PAIN - FUNCTIONAL ASSESSMENT: PAIN_FUNCTIONAL_ASSESSMENT: NO/DENIES PAIN

## 2023-12-07 NOTE — PROGRESS NOTES
"Orthopaedic Surgery Clinic Progress Note:    CC: Right foot mass    S: 63 y.o. female with a history of a right foot mass which she originally noticed sometime in the spring 2023.  She states that it has been relatively stable  in terms of size over the last 9 months but she eventually got it worked up by her primary care physician who performed an ultrasound followed by an MRI.  She did see Dr. Gutierres who also tried to aspirate it assuming it was a cyst however got no fluid back.  She states is not on painful and nontender.  She does have some numbness in her middle toes however she is not sure if it actually related to the mass.  She states that she can touch or palpate the mass but when other people do it \"bothers her\" but does not necessarily cause pain.  Again, denies any significant growth.  No other significant medical history to note.    Objective:    Exam:  Gen: alert, appropriately conversational, no apparent distress  Chest: symmetric chest rise, non-labored breathing  Heart: RRR to peripheral palpation    Physical exam of the right lower extremity shows an approximately 2 cm mass over the medial foot near the midportion of the metatarsal.  It is nontender to palpation and has a negative Tinel's.  There is no obvious transillumination.  It mobile and relatively firm.  No obvious adenopathy.  No other plantar masses palpable.  Sensation is intact light touch in all distributions.  She has palpable pulses brisk capillary refill.  EHL, dorsiflexion and plantarflexion are intact.    Imaging:  I was able to review the patient's previous radiographs which were interpreted myself of the right foot demonstrate a soft tissue prominence over the medial forefoot near the midportion of the first metatarsal but no other obvious bony lesions or abnormalities.  MRI was performed also which shows some nonenhancing areas of what appears to be fibromatosis near the head of the first metatarsal as well as a secondary lesion " in the area of palpable prominence which appears to be gadolinium enhancing.    A/P:    Discussed the lesion as well as options we have at this point moving forward.  She states the right now she really is not unsure what to do but really she has not had much in terms of symptoms right now.  I told her if it is asymptomatic we can certainly observe with continued surveillance and a repeat MRI in the future to monitor for any sort of growth or change.  If she wants to be a bit more proactive and certainly we could either get a needle biopsy to tell us what the exact etiology of the mass is so she can make a better informed decision about which direction she would want to go in terms of observation or surgical excision or alternatively we can go directly to surgical excisional biopsy which I believe would be reasonable because we would not ultimately affect her long-term outcome or any future surgical options should this come back as a malignancy which I still think is less likely especially given the long-term stability over the last 9 months.  Ultimately after discussing continued observation, going directly to surgical excision or needle biopsy to obtain diagnosis she ultimately decided that he wanted to proceed with needle biopsy.  For that reason we placed an order for an ultrasound-guided biopsy of the lesion we can then have her follow-up with us afterwards to discuss the results as well as how she would like to proceed moving forward.

## 2023-12-09 ENCOUNTER — LAB (OUTPATIENT)
Dept: LAB | Facility: LAB | Age: 63
End: 2023-12-09
Payer: COMMERCIAL

## 2023-12-09 DIAGNOSIS — G40.209 COMPLEX PARTIAL SEIZURE EVOLVING TO GENERALIZED SEIZURE (MULTI): ICD-10-CM

## 2023-12-09 LAB
CARBAMAZEPINE SERPL-MCNC: 7.8 UG/ML (ref 4–12)
PHENOBARB SERPL-MCNC: 36.5 UG/ML (ref 10–40)

## 2023-12-09 PROCEDURE — 36415 COLL VENOUS BLD VENIPUNCTURE: CPT

## 2023-12-09 PROCEDURE — 80184 ASSAY OF PHENOBARBITAL: CPT

## 2023-12-09 PROCEDURE — 80156 ASSAY CARBAMAZEPINE TOTAL: CPT

## 2023-12-11 ENCOUNTER — TELEPHONE (OUTPATIENT)
Dept: NEUROLOGY | Facility: CLINIC | Age: 63
End: 2023-12-11
Payer: COMMERCIAL

## 2023-12-11 NOTE — TELEPHONE ENCOUNTER
I spoke to Rhoda after reviewing her carbamazepine and phenobarbital levels, which are steady and in the therapeutic range. I will send in a prescription for carbamazepine  mg, which she will alternate daily with brand name Tegretol  mg to see if she tolerates the generic form. I asked her to call if she notices side effects, or if she gets a hint of seizure aura.

## 2023-12-14 ENCOUNTER — ANCILLARY PROCEDURE (OUTPATIENT)
Dept: RADIOLOGY | Facility: CLINIC | Age: 63
End: 2023-12-14
Payer: COMMERCIAL

## 2023-12-14 DIAGNOSIS — Z13.6 ENCOUNTER FOR SCREENING FOR CARDIOVASCULAR DISORDERS: ICD-10-CM

## 2023-12-14 PROCEDURE — 75571 CT HRT W/O DYE W/CA TEST: CPT

## 2023-12-18 ENCOUNTER — ANCILLARY PROCEDURE (OUTPATIENT)
Dept: RADIOLOGY | Facility: HOSPITAL | Age: 63
End: 2023-12-18
Payer: COMMERCIAL

## 2023-12-18 VITALS
RESPIRATION RATE: 20 BRPM | HEART RATE: 78 BPM | DIASTOLIC BLOOD PRESSURE: 70 MMHG | OXYGEN SATURATION: 100 % | SYSTOLIC BLOOD PRESSURE: 140 MMHG

## 2023-12-18 DIAGNOSIS — R22.41 FOOT MASS, RIGHT: ICD-10-CM

## 2023-12-18 PROCEDURE — 76942 ECHO GUIDE FOR BIOPSY: CPT | Performed by: RADIOLOGY

## 2023-12-18 PROCEDURE — 20206 BIOPSY MUSCLE PERQ NEEDLE: CPT | Performed by: RADIOLOGY

## 2023-12-18 PROCEDURE — 76942 ECHO GUIDE FOR BIOPSY: CPT

## 2023-12-18 PROCEDURE — 88305 TISSUE EXAM BY PATHOLOGIST: CPT | Performed by: STUDENT IN AN ORGANIZED HEALTH CARE EDUCATION/TRAINING PROGRAM

## 2023-12-18 PROCEDURE — 88342 IMHCHEM/IMCYTCHM 1ST ANTB: CPT | Performed by: STUDENT IN AN ORGANIZED HEALTH CARE EDUCATION/TRAINING PROGRAM

## 2023-12-18 PROCEDURE — 88342 IMHCHEM/IMCYTCHM 1ST ANTB: CPT | Mod: TC,SUR,GEALAB | Performed by: STUDENT IN AN ORGANIZED HEALTH CARE EDUCATION/TRAINING PROGRAM

## 2023-12-18 PROCEDURE — 88341 IMHCHEM/IMCYTCHM EA ADD ANTB: CPT | Performed by: STUDENT IN AN ORGANIZED HEALTH CARE EDUCATION/TRAINING PROGRAM

## 2023-12-18 PROCEDURE — 2720000007 HC OR 272 NO HCPCS

## 2023-12-18 ASSESSMENT — PAIN SCALES - GENERAL: PAINLEVEL_OUTOF10: 0 - NO PAIN

## 2023-12-18 NOTE — PRE-PROCEDURE NOTE
Pre-procedure Note:    Allergies and medications reviewed.    ASA Classification: ASA 1 - Normal health patient    Sedation: Minimal

## 2023-12-18 NOTE — POST-PROCEDURE NOTE
Post Procedure Note:    Procedure Data and Time: [unfilled] at [unfilled]     Pre-Procedure Time Out Performed    Post-Procedure Hudmiya Performed    Pre-Procedure Diagnosis:  Right foot mass    Post-Procedure Diagnosis:  Right foot mass    Procedure:  Ultrasound guided biopsy of right foot mass    : Robin Mehta MD    Assistant: None    Blood Loss: Minimal    Specimen: Sent Three x 20 gauge core samples    Attestation: I performed the procedure and  without a residnet.

## 2023-12-19 DIAGNOSIS — R93.89 ABNORMAL CT OF THE CHEST: Primary | ICD-10-CM

## 2023-12-21 LAB
LAB AP ASR DISCLAIMER: NORMAL
LABORATORY COMMENT REPORT: NORMAL
PATH REPORT.COMMENTS IMP SPEC: NORMAL
PATH REPORT.FINAL DX SPEC: NORMAL
PATH REPORT.GROSS SPEC: NORMAL
PATH REPORT.TOTAL CANCER: NORMAL

## 2024-01-05 ENCOUNTER — OFFICE VISIT (OUTPATIENT)
Dept: ORTHOPEDIC SURGERY | Facility: HOSPITAL | Age: 64
End: 2024-01-05
Payer: COMMERCIAL

## 2024-01-05 VITALS — BODY MASS INDEX: 25.87 KG/M2 | WEIGHT: 146 LBS | HEIGHT: 63 IN

## 2024-01-05 DIAGNOSIS — R22.41 FOOT MASS, RIGHT: Primary | ICD-10-CM

## 2024-01-05 PROCEDURE — 99213 OFFICE O/P EST LOW 20 MIN: CPT | Performed by: STUDENT IN AN ORGANIZED HEALTH CARE EDUCATION/TRAINING PROGRAM

## 2024-01-05 PROCEDURE — 1036F TOBACCO NON-USER: CPT | Performed by: STUDENT IN AN ORGANIZED HEALTH CARE EDUCATION/TRAINING PROGRAM

## 2024-01-05 ASSESSMENT — ENCOUNTER SYMPTOMS
OCCASIONAL FEELINGS OF UNSTEADINESS: 0
LOSS OF SENSATION IN FEET: 0
DEPRESSION: 0

## 2024-01-05 NOTE — PROGRESS NOTES
Orthopaedic Surgery Clinic Progress Note:    CC: Follow-up biopsy results    S: 63 y.o. female with previously evaluated right foot mass who elected to receive a biopsy.  The biopsy was performed and she is here to go over the results.  She tolerated the biopsy well.  Denies any growth or change in size of the mass.  She is otherwise doing well.    Objective:    Exam:  Gen: alert, appropriately conversational, no apparent distress  Chest: symmetric chest rise, non-labored breathing  Heart: RRR to peripheral palpation    Physical exam of the right lower extremity shows an approximately 2 cm mass over the medial foot near the midportion of the metatarsal with healed biopsy tract.  Sensation is intact light touch in all distributions.  She has palpable pulses brisk capillary refill.  EHL, dorsiflexion and plantarflexion are intact.     Imaging:  I was able to review the patient's previous radiographs which were interpreted myself of the right foot demonstrate a soft tissue prominence over the medial forefoot near the midportion of the first metatarsal but no other obvious bony lesions or abnormalities.  MRI was performed also which shows some nonenhancing areas of what appears to be fibromatosis near the head of the first metatarsal as well as a secondary lesion in the area of palpable prominence which appears to be gadolinium enhancing.     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  Right now she states that the mass really is not bothering her all that much, at least on that that she would consider having surgery to remove it.  For that reason I believe this needs no further workup at this time.  If she does begin to have pain or issues from the lesion or if she feels that it is growing I told her she can call the office and we can actually  get a surgery scheduled on an outpatient basis for mass removal.  We went over with the surgery would entail and right now she wants to wait on that for now.  If she does develop any issues or symptoms beyond what she is experiencing she will call to them potentially schedule an operative date.

## 2024-01-08 ENCOUNTER — HOSPITAL ENCOUNTER (OUTPATIENT)
Dept: RADIOLOGY | Facility: HOSPITAL | Age: 64
Discharge: HOME | End: 2024-01-08
Payer: COMMERCIAL

## 2024-01-08 DIAGNOSIS — R93.89 ABNORMAL CT OF THE CHEST: ICD-10-CM

## 2024-01-08 PROCEDURE — 71250 CT THORAX DX C-: CPT

## 2024-01-08 PROCEDURE — 71250 CT THORAX DX C-: CPT | Performed by: RADIOLOGY

## 2024-01-09 ENCOUNTER — APPOINTMENT (OUTPATIENT)
Dept: ORTHOPEDIC SURGERY | Facility: HOSPITAL | Age: 64
End: 2024-01-09
Payer: COMMERCIAL

## 2024-01-09 ENCOUNTER — TELEPHONE (OUTPATIENT)
Dept: PRIMARY CARE | Facility: CLINIC | Age: 64
End: 2024-01-09
Payer: COMMERCIAL

## 2024-01-09 ENCOUNTER — TELEMEDICINE (OUTPATIENT)
Dept: PRIMARY CARE | Facility: CLINIC | Age: 64
End: 2024-01-09
Payer: COMMERCIAL

## 2024-01-09 DIAGNOSIS — R91.1 LUNG NODULE: Primary | ICD-10-CM

## 2024-01-09 DIAGNOSIS — R91.8 OPACITY OF LUNG ON IMAGING STUDY: ICD-10-CM

## 2024-01-09 PROCEDURE — 99442 PR PHYS/QHP TELEPHONE EVALUATION 11-20 MIN: CPT | Performed by: INTERNAL MEDICINE

## 2024-01-09 ASSESSMENT — PATIENT HEALTH QUESTIONNAIRE - PHQ9
SUM OF ALL RESPONSES TO PHQ9 QUESTIONS 1 AND 2: 0
1. LITTLE INTEREST OR PLEASURE IN DOING THINGS: NOT AT ALL
2. FEELING DOWN, DEPRESSED OR HOPELESS: NOT AT ALL

## 2024-01-09 ASSESSMENT — COLUMBIA-SUICIDE SEVERITY RATING SCALE - C-SSRS: 1. IN THE PAST MONTH, HAVE YOU WISHED YOU WERE DEAD OR WISHED YOU COULD GO TO SLEEP AND NOT WAKE UP?: NO

## 2024-01-09 NOTE — PROGRESS NOTES
Subjective   Patient ID: Rhoda Pena is a 63 y.o. female who presents for Follow-up.    HPI patient was requested to do a phone visit to discuss abnormal CT of the chest results.  She is otherwise asymptomatic and denies any hemoptysis, weight loss, fever chills and shortness of breath.  She has history of osteoporosis, depression and complex partial seizure.  CT of the chest done revealed mixed consolidation and groundglass opacity in the medial right lower lobe and additional 0.7 cm solid nodule in the left lower lung without any evidence of mediastinal lymphadenopathy or pleural effusion.    Review of Systems   Constitutional: Negative.    HENT: Negative.     Eyes: Negative.    Respiratory: Negative.     Cardiovascular: Negative.    Gastrointestinal: Negative.    Endocrine: Negative.    Genitourinary: Negative.    Musculoskeletal: Negative.    Skin: Negative.    Allergic/Immunologic: Negative.    Neurological: Negative.    Hematological: Negative.    Psychiatric/Behavioral: Negative.         Objective   There were no vitals taken for this visit.    Physical Examnot done    Assessment/Plan    patient will be referred to pulmonary clinic regarding further evaluation of abnormal CT of the chest.  She wants to discuss with pulmonologist regarding need for PET scan versus bronchoscopy.  She will continue other home medication and will follow-up with Dr. Solares for her scheduled appointment

## 2024-01-09 NOTE — PROGRESS NOTES
Subjective   Patient ID: Rhoda Pena is a 63 y.o. female who presents for Follow-up.    HPI     Review of Systems    Objective   There were no vitals taken for this visit.    Physical Exam    Assessment/Plan

## 2024-01-12 ENCOUNTER — TELEPHONE (OUTPATIENT)
Dept: PULMONOLOGY | Facility: HOSPITAL | Age: 64
End: 2024-01-12
Payer: COMMERCIAL

## 2024-01-12 ASSESSMENT — ENCOUNTER SYMPTOMS
CARDIOVASCULAR NEGATIVE: 1
MUSCULOSKELETAL NEGATIVE: 1
GASTROINTESTINAL NEGATIVE: 1
ALLERGIC/IMMUNOLOGIC NEGATIVE: 1
EYES NEGATIVE: 1
NEUROLOGICAL NEGATIVE: 1
PSYCHIATRIC NEGATIVE: 1
ENDOCRINE NEGATIVE: 1
CONSTITUTIONAL NEGATIVE: 1
RESPIRATORY NEGATIVE: 1
HEMATOLOGIC/LYMPHATIC NEGATIVE: 1

## 2024-01-16 ENCOUNTER — APPOINTMENT (OUTPATIENT)
Dept: PULMONOLOGY | Facility: HOSPITAL | Age: 64
End: 2024-01-16
Payer: COMMERCIAL

## 2024-01-17 ENCOUNTER — APPOINTMENT (OUTPATIENT)
Dept: PULMONOLOGY | Facility: HOSPITAL | Age: 64
End: 2024-01-17
Payer: COMMERCIAL

## 2024-01-17 ENCOUNTER — TELEPHONE (OUTPATIENT)
Dept: NEUROLOGY | Facility: CLINIC | Age: 64
End: 2024-01-17
Payer: COMMERCIAL

## 2024-01-17 DIAGNOSIS — G40.209 COMPLEX PARTIAL SEIZURE EVOLVING TO GENERALIZED SEIZURE (MULTI): ICD-10-CM

## 2024-01-17 RX ORDER — CARBAMAZEPINE 400 MG/1
400 TABLET, EXTENDED RELEASE ORAL 2 TIMES DAILY
Qty: 180 TABLET | Refills: 1 | Status: SHIPPED | OUTPATIENT
Start: 2024-01-17 | End: 2025-01-16

## 2024-01-24 ENCOUNTER — OFFICE VISIT (OUTPATIENT)
Dept: PULMONOLOGY | Facility: HOSPITAL | Age: 64
End: 2024-01-24
Payer: COMMERCIAL

## 2024-01-24 VITALS
DIASTOLIC BLOOD PRESSURE: 84 MMHG | WEIGHT: 147 LBS | TEMPERATURE: 97.3 F | OXYGEN SATURATION: 97 % | SYSTOLIC BLOOD PRESSURE: 127 MMHG | BODY MASS INDEX: 26.04 KG/M2

## 2024-01-24 DIAGNOSIS — R91.8 LUNG NODULE, MULTIPLE: Primary | ICD-10-CM

## 2024-01-24 PROCEDURE — 1036F TOBACCO NON-USER: CPT | Performed by: INTERNAL MEDICINE

## 2024-01-24 PROCEDURE — 99204 OFFICE O/P NEW MOD 45 MIN: CPT | Performed by: INTERNAL MEDICINE

## 2024-01-24 PROCEDURE — 99214 OFFICE O/P EST MOD 30 MIN: CPT | Performed by: INTERNAL MEDICINE

## 2024-01-24 SDOH — ECONOMIC STABILITY: FOOD INSECURITY: WITHIN THE PAST 12 MONTHS, THE FOOD YOU BOUGHT JUST DIDN'T LAST AND YOU DIDN'T HAVE MONEY TO GET MORE.: NEVER TRUE

## 2024-01-24 SDOH — ECONOMIC STABILITY: FOOD INSECURITY: WITHIN THE PAST 12 MONTHS, YOU WORRIED THAT YOUR FOOD WOULD RUN OUT BEFORE YOU GOT MONEY TO BUY MORE.: NEVER TRUE

## 2024-01-24 ASSESSMENT — LIFESTYLE VARIABLES: HOW OFTEN DO YOU HAVE A DRINK CONTAINING ALCOHOL: MONTHLY OR LESS

## 2024-01-24 ASSESSMENT — PATIENT HEALTH QUESTIONNAIRE - PHQ9
1. LITTLE INTEREST OR PLEASURE IN DOING THINGS: NOT AT ALL
SUM OF ALL RESPONSES TO PHQ9 QUESTIONS 1 & 2: 0
2. FEELING DOWN, DEPRESSED OR HOPELESS: NOT AT ALL

## 2024-01-24 ASSESSMENT — PAIN SCALES - GENERAL: PAINLEVEL: 0-NO PAIN

## 2024-01-24 NOTE — PATIENT INSTRUCTIONS
Close thanks for visiting with us in the pulmonary clinic today  We have to evaluate your lung nodules as well as the changes in the lung parenchyma on your CT scan of the chest  Pulmonary function tests ordered  6-minute walk  Lab work to screen for connective tissue disease and hypersensitivity pneumonitis  CT scan of the chest is to be repeated in 6 weeks from the last 1  Follow-up in this clinic in 6 weeks

## 2024-02-05 ENCOUNTER — OFFICE VISIT (OUTPATIENT)
Dept: RHEUMATOLOGY | Facility: CLINIC | Age: 64
End: 2024-02-05
Payer: COMMERCIAL

## 2024-02-05 VITALS
BODY MASS INDEX: 25.52 KG/M2 | WEIGHT: 144 LBS | SYSTOLIC BLOOD PRESSURE: 137 MMHG | DIASTOLIC BLOOD PRESSURE: 77 MMHG | HEIGHT: 63 IN | HEART RATE: 59 BPM

## 2024-02-05 DIAGNOSIS — M81.0 AGE RELATED OSTEOPOROSIS, UNSPECIFIED PATHOLOGICAL FRACTURE PRESENCE: Primary | ICD-10-CM

## 2024-02-05 PROCEDURE — 99213 OFFICE O/P EST LOW 20 MIN: CPT | Performed by: INTERNAL MEDICINE

## 2024-02-05 PROCEDURE — 1036F TOBACCO NON-USER: CPT | Performed by: INTERNAL MEDICINE

## 2024-02-05 RX ORDER — ZOLEDRONIC ACID 5 MG/100ML
5 INJECTION, SOLUTION INTRAVENOUS ONCE
Status: CANCELLED | OUTPATIENT
Start: 2024-02-14

## 2024-02-05 RX ORDER — HEPARIN 100 UNIT/ML
500 SYRINGE INTRAVENOUS AS NEEDED
OUTPATIENT
Start: 2024-02-26

## 2024-02-05 RX ORDER — HEPARIN SODIUM,PORCINE/PF 10 UNIT/ML
50 SYRINGE (ML) INTRAVENOUS AS NEEDED
OUTPATIENT
Start: 2024-02-26

## 2024-02-05 RX ORDER — DIPHENHYDRAMINE HYDROCHLORIDE 50 MG/ML
50 INJECTION INTRAMUSCULAR; INTRAVENOUS AS NEEDED
Status: CANCELLED | OUTPATIENT
Start: 2024-02-26

## 2024-02-05 RX ORDER — ALBUTEROL SULFATE 0.83 MG/ML
3 SOLUTION RESPIRATORY (INHALATION) AS NEEDED
Status: CANCELLED | OUTPATIENT
Start: 2024-02-26

## 2024-02-05 RX ORDER — EPINEPHRINE 0.3 MG/.3ML
0.3 INJECTION SUBCUTANEOUS EVERY 5 MIN PRN
Status: CANCELLED | OUTPATIENT
Start: 2024-02-26

## 2024-02-05 RX ORDER — ZOLEDRONIC ACID 5 MG/100ML
5 INJECTION, SOLUTION INTRAVENOUS ONCE
Qty: 100 ML | Refills: 0 | OUTPATIENT
Start: 2024-02-05 | End: 2024-02-05

## 2024-02-05 RX ORDER — FAMOTIDINE 10 MG/ML
20 INJECTION INTRAVENOUS ONCE AS NEEDED
Status: CANCELLED | OUTPATIENT
Start: 2024-02-26

## 2024-02-05 ASSESSMENT — ENCOUNTER SYMPTOMS: FATIGUE: 1

## 2024-02-05 NOTE — PROGRESS NOTES
Subjective   Patient ID: Rhoda Pena is a 63 y.o. female who presents for Follow-up (6 month follow up).  HPI  Patient with history of osteoporosis.  Has been on Reclast and has had infusions in 2014, 2015, 2016, 2020, and 2021.  Previous to that had been on Fosamax for maybe 4 years.    Her last bone density was 1/6/2023.  Left total hip -1.4, left femoral neck -1.8, lumbar spine -1.7    We had last seen her 11/30/2022.  She has not had a fall since we last saw her  We had decided to go on a drug holiday and skip giving her Reclast infusions.    She continues to be on Tegretol for her history of seizure.  She has slight confusion on what to take for calcium supplementation.    Denies any new fractures since last seen    She has had issues with sacroiliac pain.  Had done physical therapy and it was helpful for symptoms.  Review of Systems   Constitutional:  Positive for fatigue.   Eyes:         No dental issues       Objective   Physical Exam  GEN: NAD A&O x3 appropriate affect    Assessment/Plan        osteoporosis -DEXA 1/6/2023 left total hip -1.4, left femoral neck -1.8, lumbar spine -1.7.  Improvement from previous bone density from October 2020.  History of fibular head fracture in February 2022.  Has had Reclast infusions in 2014, 2015, 2016, 2020, 2021. Previous to that had been on Fosamax for maybe 4 years.   Still at risk of fracture with her history of Tegretol.  Discussed about calcium supplementation and weightbearing activity.  She is having some sacroiliac pain.  Will do another infusion of Reclast at [facility].  Will be due for another bone density in January 2025    Damaris Jhaveri MD 02/05/24 12:24 PM

## 2024-02-07 ENCOUNTER — DOCUMENTATION (OUTPATIENT)
Dept: INFUSION THERAPY | Facility: CLINIC | Age: 64
End: 2024-02-07
Payer: COMMERCIAL

## 2024-02-07 DIAGNOSIS — M81.0 OSTEOPOROSIS, UNSPECIFIED OSTEOPOROSIS TYPE, UNSPECIFIED PATHOLOGICAL FRACTURE PRESENCE: Primary | ICD-10-CM

## 2024-02-07 NOTE — PROGRESS NOTES
Patient to be scheduled for ( new start ) of Reclast infusions  For Diagnosis: Osteoporosis   Labs…  CMP drawn: 02/12/2024  CrCl: 81.88 ml/min  Serum Calcium: 10.0  Calcium and Vitamin D supplement noted on medication list? No   (if no nurse to encourage discussion of supplementation at visit)  Is the patient receiving or have an allergy to Zometa? No  No obvious recent dental work per chart review. Nurse to confirm no dental within past/next 4 weeks at encounter.  Last infusion received: ___ (if continuation)    Due: Feb 2024  This result meets treatment criteria.

## 2024-02-12 ENCOUNTER — LAB (OUTPATIENT)
Dept: LAB | Facility: LAB | Age: 64
End: 2024-02-12
Payer: COMMERCIAL

## 2024-02-12 DIAGNOSIS — M81.0 OSTEOPOROSIS, UNSPECIFIED OSTEOPOROSIS TYPE, UNSPECIFIED PATHOLOGICAL FRACTURE PRESENCE: ICD-10-CM

## 2024-02-12 DIAGNOSIS — R91.8 LUNG NODULE, MULTIPLE: ICD-10-CM

## 2024-02-12 LAB — CCP IGG SERPL-ACNC: <1 U/ML

## 2024-02-12 PROCEDURE — 86200 CCP ANTIBODY: CPT

## 2024-02-12 PROCEDURE — 86038 ANTINUCLEAR ANTIBODIES: CPT

## 2024-02-12 PROCEDURE — 80053 COMPREHEN METABOLIC PANEL: CPT

## 2024-02-12 PROCEDURE — 36415 COLL VENOUS BLD VENIPUNCTURE: CPT

## 2024-02-12 PROCEDURE — 86140 C-REACTIVE PROTEIN: CPT

## 2024-02-12 PROCEDURE — 86331 IMMUNODIFFUSION OUCHTERLONY: CPT

## 2024-02-12 PROCEDURE — 86606 ASPERGILLUS ANTIBODY: CPT

## 2024-02-13 LAB
ALBUMIN SERPL BCP-MCNC: 4.5 G/DL (ref 3.4–5)
ALP SERPL-CCNC: 49 U/L (ref 33–136)
ALT SERPL W P-5'-P-CCNC: 16 U/L (ref 7–45)
ANA SER QL HEP2 SUBST: NEGATIVE
ANION GAP SERPL CALC-SCNC: 14 MMOL/L (ref 10–20)
AST SERPL W P-5'-P-CCNC: 18 U/L (ref 9–39)
BILIRUB SERPL-MCNC: 0.4 MG/DL (ref 0–1.2)
BUN SERPL-MCNC: 10 MG/DL (ref 6–23)
CALCIUM SERPL-MCNC: 10 MG/DL (ref 8.6–10.3)
CHLORIDE SERPL-SCNC: 94 MMOL/L (ref 98–107)
CO2 SERPL-SCNC: 28 MMOL/L (ref 21–32)
CREAT SERPL-MCNC: 0.71 MG/DL (ref 0.5–1.05)
CRP SERPL-MCNC: 0.1 MG/DL
EGFRCR SERPLBLD CKD-EPI 2021: >90 ML/MIN/1.73M*2
GLUCOSE SERPL-MCNC: 103 MG/DL (ref 74–99)
POTASSIUM SERPL-SCNC: 4.8 MMOL/L (ref 3.5–5.3)
PROT SERPL-MCNC: 6.8 G/DL (ref 6.4–8.2)
SODIUM SERPL-SCNC: 131 MMOL/L (ref 136–145)

## 2024-02-14 ENCOUNTER — INFUSION (OUTPATIENT)
Dept: INFUSION THERAPY | Facility: CLINIC | Age: 64
End: 2024-02-14
Payer: COMMERCIAL

## 2024-02-14 VITALS
RESPIRATION RATE: 16 BRPM | DIASTOLIC BLOOD PRESSURE: 77 MMHG | HEART RATE: 54 BPM | SYSTOLIC BLOOD PRESSURE: 118 MMHG | OXYGEN SATURATION: 99 % | WEIGHT: 144 LBS | TEMPERATURE: 97.2 F | BODY MASS INDEX: 25.51 KG/M2

## 2024-02-14 DIAGNOSIS — M81.0 AGE RELATED OSTEOPOROSIS, UNSPECIFIED PATHOLOGICAL FRACTURE PRESENCE: ICD-10-CM

## 2024-02-14 PROCEDURE — 96374 THER/PROPH/DIAG INJ IV PUSH: CPT | Performed by: NURSE PRACTITIONER

## 2024-02-14 RX ORDER — ZOLEDRONIC ACID 5 MG/100ML
5 INJECTION, SOLUTION INTRAVENOUS ONCE
Status: CANCELLED | OUTPATIENT
Start: 2024-02-14

## 2024-02-14 RX ORDER — ALBUTEROL SULFATE 0.83 MG/ML
3 SOLUTION RESPIRATORY (INHALATION) AS NEEDED
OUTPATIENT
Start: 2024-02-14

## 2024-02-14 RX ORDER — EPINEPHRINE 0.3 MG/.3ML
0.3 INJECTION SUBCUTANEOUS EVERY 5 MIN PRN
OUTPATIENT
Start: 2024-02-14

## 2024-02-14 RX ORDER — ZOLEDRONIC ACID 5 MG/100ML
5 INJECTION, SOLUTION INTRAVENOUS ONCE
Status: COMPLETED | OUTPATIENT
Start: 2024-02-14 | End: 2024-02-14

## 2024-02-14 RX ORDER — DIPHENHYDRAMINE HYDROCHLORIDE 50 MG/ML
50 INJECTION INTRAMUSCULAR; INTRAVENOUS AS NEEDED
OUTPATIENT
Start: 2024-02-14

## 2024-02-14 RX ORDER — FAMOTIDINE 10 MG/ML
20 INJECTION INTRAVENOUS ONCE AS NEEDED
OUTPATIENT
Start: 2024-02-14

## 2024-02-14 RX ADMIN — ZOLEDRONIC ACID 5 MG: 5 INJECTION, SOLUTION INTRAVENOUS at 09:14

## 2024-02-14 ASSESSMENT — PAIN SCALES - GENERAL: PAINLEVEL: 0-NO PAIN

## 2024-02-14 NOTE — PATIENT INSTRUCTIONS
Today :We administered zoledronic acid.     For:   1. Age related osteoporosis, unspecified pathological fracture presence         Your next appointment is due in:  One Year        Please read the  Medication Guide that was given to you and reviewed during todays visit.     (Tell all doctors including dentists that you are taking this medication)     Go to the emergency room or call 911 if:  -You have signs of allergic reaction:   -Rash, hives, itching.   -Swollen, blistered, peeling skin.   -Swelling of face, lips, mouth, tongue or throat.   -Tightness of chest, trouble breathing, swallowing or talking     Call your doctor:  - If IV / injection site gets red, warm, swollen, itchy or leaks fluid or pus.     (Leave dressing on your IV site for at least 2 hours and keep area clean and dry  - If you get sick or have symptoms of infection or are not feeling well for any reason.    (Wash your hands often, stay away from people who are sick)  - If you have side effects from your medication that do not go away or are bothersome.     (Refer to the teaching your nurse gave you for side effects to call your doctor about)    - Common side effects may include:  stuffy nose, headache, feeling tired, muscle aches, upset stomach  - Before receiving any vaccines     - Call the [facility] at   If:  - You get sick, are on antibiotics, have had a recent vaccine, have surgery or dental work and your doctor wants your visit rescheduled.  - You need to cancel and reschedule your visit for any reason. Call at least 2 days before your visit if you need to cancel.   - Your insurance changes before your next visit.    (We will need to get approval from your new insurance. This can take up to two weeks.)     The [facility] is opened Monday thru Friday. We are closed on weekends and holidays.   Voice mail will take your call if the center is closed. If you leave a message please allow 24 hours for a call back  during weekdays. If you leave a message on a weekend/holiday, we will call you back the next business day.

## 2024-02-14 NOTE — PROGRESS NOTES
[facility]   infusion Clinic Note   Date: 2024   Name: Rhoda Pena  : 1960   MRN: [id number]         Reason for Visit: OP Infusion (Reclast)         Visit Type: INFUSION       Ordered By: Dr Jhaveri      Accompanied by:Self      Diagnosis: Age related osteoporosis, unspecified pathological fracture presence       Allergies:   Allergies as of 2024 - Reviewed 2024   Allergen Reaction Noted    Phenytoin Other 2022    Codeine Hallucinations 2022         Current Medications has a current medication list which includes the following prescription(s): ascorbic acid, calcium carb-mag hydrox-simeth, carbamazepine xr, carbamazepine xr, cholecalciferol, cyanocobalamin, estradiol, phenobarbital, vitamin d3-vitamin k2, and zoledronic acid.       Vitals:   Vitals:    24 0848   BP: 150/81   Pulse: 62   Resp: 18   Temp: 36.2 °C (97.2 °F)   TempSrc: Temporal   SpO2: 99%   Weight: 65.3 kg (144 lb)   PainSc: 0-No pain             Infusion Pre-procedure Checklist:   - Allergies reviewed: yes   - Medications reviewed: yes       - Previous reaction to current treatment: no      Assess patient for the concerns below. Document provider notification as appropriate.  - Active or recent infection with/without current antibiotic use: no  - Recent or planned invasive dental work: no  - Recent or planned surgeries: no  - Recently received or plans to receive vaccinations: no  - Has treatment related toxicities: no  - Is pregnant:  no      Pain: 0   - Is the pain different from normal: no   - Is the pain tolerable: no   - Is your Doctor aware:  n/a      Labs: N/A         Fall Risk Screening: Mcwilliams Fall Risk  History of Falling, Immediate or Within 3 Months: No  Secondary Diagnosis: No  Ambulatory Aid: Walks without aid/bedrest/nurse assist  Intravenous Therapy/Heparin Lock: Yes  Gait/Transferring: Normal/bedrest/immobile  Mental Status: Oriented to own ability  Mcwilliams Fall  "Risk Score: 20       Review Of Systems:  Review of Systems   All other systems reviewed and are negative.        Infusion Readiness:   - Assessment Concerns Related to Infusion: No  - Provider notified: no      Document Below Only If Indicated:   New Patient Education:    N/A (returning patient for continuation of therapy. Ongoing education provided as needed.)        Treatment Conditions & Drug Specific Questions:    Zoledronic Acid  (RECLAST)    (Unless otherwise specified on patient specific therapy plan):     TREATMENT CONDITIONS:  Unless otherwise specified on patient specific therapy plan HOLD and notify provider prior to proceeding with treatment if:   o Creatinine clearance LESS THAN 35 mL/Minute  o Corrected serum calcium LESS THAN 8.6 mg/dL   OR   Ionized calcium LESS THAN 1.1 mmol  o Recent (within 4 weeks) or planned invasive dental procedure.    Lab Results   Component Value Date    CREATININE 0.71 02/12/2024      Lab Results   Component Value Date    CALCIUM 10.0 02/12/2024      No results found for: \"CAION\"    CrCl: 81.88  Serum calcium: 10    Labs reviewed and patient meets treatment conditions? Yes    DRUG SPECIFIC QUESTIONS:  Is the patient taking a Calcium and Vitamin D supplement?  Yes  (Recommended)    Is the patient receiving Zometa or do they have an allergy to Zometa?  Yes    Is the patient having jaw pain? No  (Educate regarding risk of: osteonecrosis of the jaw)      REMINDERS:  PREGNANCY CATEGORY X DRUG. OBTAIN NEGATIVE PREGNANCY TEST PRIOR TO FIRST INFUSION FOR WOMEN OF CHILDBEARING ABILITY     Recommended Vitals/Observation:  Monitor vital signs before infusion, at the end of the infusion and as needed        Weight Based Drug Calculations:    WEIGHT BASED DRUGS: NOT APPLICABLE / FLAT DOSE          Initiated By: Priscila Gonzalez RN   Time: 9:36 AM     We administered zoledronic acid. Pt received Reclast in the past with no adverse reactions.     "

## 2024-02-16 LAB
A FUMIGATUS1 AB SER QL ID: NORMAL
A FUMIGATUS6 AB SER QL ID: NORMAL
A PULLULANS AB SER QL ID: NORMAL
PIGEON SERUM AB QL ID: NORMAL
S RECTIVIRGULA AB SER QL ID: NORMAL

## 2024-03-04 ENCOUNTER — APPOINTMENT (OUTPATIENT)
Dept: RADIOLOGY | Facility: HOSPITAL | Age: 64
End: 2024-03-04
Payer: COMMERCIAL

## 2024-03-04 ENCOUNTER — HOSPITAL ENCOUNTER (OUTPATIENT)
Dept: RADIOLOGY | Facility: HOSPITAL | Age: 64
Discharge: HOME | End: 2024-03-04
Payer: COMMERCIAL

## 2024-03-04 DIAGNOSIS — R91.8 LUNG NODULE, MULTIPLE: ICD-10-CM

## 2024-03-04 PROCEDURE — 71250 CT THORAX DX C-: CPT

## 2024-03-04 PROCEDURE — RXMED WILLOW AMBULATORY MEDICATION CHARGE

## 2024-03-04 PROCEDURE — 71250 CT THORAX DX C-: CPT | Performed by: RADIOLOGY

## 2024-03-05 ENCOUNTER — HOSPITAL ENCOUNTER (OUTPATIENT)
Dept: RESPIRATORY THERAPY | Facility: HOSPITAL | Age: 64
Discharge: HOME | End: 2024-03-05
Payer: COMMERCIAL

## 2024-03-05 ENCOUNTER — APPOINTMENT (OUTPATIENT)
Dept: RADIOLOGY | Facility: HOSPITAL | Age: 64
End: 2024-03-05
Payer: COMMERCIAL

## 2024-03-05 ENCOUNTER — OFFICE VISIT (OUTPATIENT)
Dept: PULMONOLOGY | Facility: HOSPITAL | Age: 64
End: 2024-03-05
Payer: COMMERCIAL

## 2024-03-05 VITALS
OXYGEN SATURATION: 97 % | HEART RATE: 63 BPM | BODY MASS INDEX: 25.3 KG/M2 | TEMPERATURE: 98.1 F | DIASTOLIC BLOOD PRESSURE: 74 MMHG | WEIGHT: 142.8 LBS | HEIGHT: 63 IN | SYSTOLIC BLOOD PRESSURE: 121 MMHG

## 2024-03-05 DIAGNOSIS — R91.8 LUNG NODULE, MULTIPLE: ICD-10-CM

## 2024-03-05 DIAGNOSIS — R91.1 LUNG NODULE: ICD-10-CM

## 2024-03-05 DIAGNOSIS — R93.89 ABNORMAL HIGH RESOLUTION COMPUTED TOMOGRAPHY OF CHEST: Primary | ICD-10-CM

## 2024-03-05 PROCEDURE — 94726 PLETHYSMOGRAPHY LUNG VOLUMES: CPT | Performed by: INTERNAL MEDICINE

## 2024-03-05 PROCEDURE — 94010 BREATHING CAPACITY TEST: CPT | Performed by: INTERNAL MEDICINE

## 2024-03-05 PROCEDURE — 99213 OFFICE O/P EST LOW 20 MIN: CPT | Performed by: INTERNAL MEDICINE

## 2024-03-05 PROCEDURE — 1036F TOBACCO NON-USER: CPT | Performed by: INTERNAL MEDICINE

## 2024-03-05 PROCEDURE — 94799 UNLISTED PULMONARY SVC/PX: CPT | Performed by: INTERNAL MEDICINE

## 2024-03-05 PROCEDURE — 94726 PLETHYSMOGRAPHY LUNG VOLUMES: CPT

## 2024-03-05 PROCEDURE — 94618 PULMONARY STRESS TESTING: CPT | Performed by: INTERNAL MEDICINE

## 2024-03-05 PROCEDURE — 94729 DIFFUSING CAPACITY: CPT | Performed by: INTERNAL MEDICINE

## 2024-03-05 ASSESSMENT — PAIN SCALES - GENERAL: PAINLEVEL: 0-NO PAIN

## 2024-03-05 ASSESSMENT — PATIENT HEALTH QUESTIONNAIRE - PHQ9
SUM OF ALL RESPONSES TO PHQ9 QUESTIONS 1 & 2: 0
1. LITTLE INTEREST OR PLEASURE IN DOING THINGS: NOT AT ALL
2. FEELING DOWN, DEPRESSED OR HOPELESS: NOT AT ALL

## 2024-03-05 ASSESSMENT — LIFESTYLE VARIABLES: HOW OFTEN DO YOU HAVE A DRINK CONTAINING ALCOHOL: MONTHLY OR LESS

## 2024-03-05 NOTE — PROGRESS NOTES
Rhoda Pena is a 63 y.o. female on day 0 of admission presenting with No Principal Problem: There is no principal problem currently on the Problem List. Please update the Problem List and refresh..    Subjective   ***       Objective     Physical Exam    Last Recorded Vitals  There were no vitals taken for this visit.  Intake/Output last 3 Shifts:  No intake/output data recorded.    Relevant Results  {If you would like to pull in Medications, type .meds     If you would like to pull in Lab results for the last 24 hours, type .[id number]    If you would like to pull in Imaging results, type .imgrslt :99}    {Link to Stroke Scoring tools - Link :99}                       Assessment/Plan   Active Problems:  There are no active Hospital Problems.    ***     {This patient does not have an ACP note on file for this encounter, please fill one out - Advance Care Planning Activity :99}      Madison Godoy, RRT

## 2024-03-05 NOTE — PROGRESS NOTES
Department of Medicine  Division of Pulmonary, Critical Care, and Sleep Medicine  Follow-Up Visit    Date of visit: 03/05/2024  Patient: Rhoda Pena    MRN: [id number]  YOB: 1960   Gender: female  ========================================================================  Reason for this visit  Rhoda Pena is a 63 y.o. female former smoker who presents today for follow-up on  abnormal chest CT  ========================================================================  IMPRESSION and RECOMMENDATIONS  Ms. Pena is a 63 y.o. female with the following respiratory problems  1. Lung nodules - two of them in LLL; 7mm conglomerate of GGO and a smaller 2mm  2. Abnormal high resolution computed tomography of chest  These represent scattered areas of subpleural reticulation most pronounced in the lower lobes - most dependent portion of RLL >> LLL - etiology and significance are to be determined. Normal functional capacity and lung function are reassuring - close monitoring is warranted  Dense area of GGO in the superior segment of the RLL is a concern as well. It is not clear if this is of the same nature or different. Given its stability over the past few month a short observation period is reasonable.  It is important to note, that bronchoscopy maybe reasonable to obtain a BAL and assess the cellular makeup of the alveoli, if there were a clear clinical indication for it. However, it will be of poor diagnostic yield for tissue sampling for pathologic determinations of abnormalities seen.   ========================================================================  Physician HPI (3/18/2024):  Ms. Pena is a 63 y.o. female known with history of seizures since childhood, well controlled for many years, underwent a coronary calcium scoring CT scan on 12-14-23 that showed few abnormalities on the lung windows. Chest CT followed and completed on January 8, 2024 that some interstitial changes in the bases  predominantly in the right lung that were patchy.  The patchy consolidative area in the superior segment of the right lower lobe that was reported as nodule.  Few other nodules were seen the largest of which was in the left lower lobe 7 mm.   Since last visit she remains practically asymptomatic except for some shortness of breath with heavy exertion, like climbing few flights of stairs at a time. She has been trying to stay more active by walking and using stairs since last visit. The patient was Covid positive in August, 2022 but did not have a major illness then.   No exposures remembered. She sometimes uses Vicks on her nose when sick, not on the inside of her nose.   Workup completed include -  - Complete Pulmonary Function Test done today - No obstruction. Normal lung volumes and DLCO  - Pulmonary Stress Test (6 Min. Walk); distance walked was at 102% predicted with SpO2 staying above 95%. No symptoms reported during exercise  - YORDY with Reflex to ONELIA; negative  - Citrulline Antibody, IgG; negative  - C-reactive protein; negative  - CT chest high resolution; continues to show few abnormalities including  LLL nodule - 7 mm focal GGO no solid component that is unchanged from last scan, needs monitoring. There is also a smaller 2 mm nodule in LLL as well  Scattered areas of subpleural reticulation - most prominent in the RLL, lesser degree in the LLL and BENEDICT - No honey combing or bronchiectasis. Maybe a small area of bronchiolaractasis  Densest GGO in the superior segment of the RLL with airbronchogram - no solid component to it. Larger inspiration on current scan,   ========================================================================  Review of Pertinent Systems:  Constitutional: no chills, no fever, no night sweats, Fatigue  Eyes: no blurred vision and no dryness of the eyes.   ENT: no nasal congestion, no hoarseness, no sore throat, no postnasal drip, no rhinorrhea, no sinus pressure, no bleeding gums,  "  Cardiovascular: as per HPI   Respiratory: as per HPI  Gastrointestinal: no nausea and no vomiting. No diarrhea or constipation. No reflux  Musculoskeletal: no arthralgias, no myalgias, no localized joint pain,   Integumentary: no rashes.    ========================================================================  Physical Examination:  /74   Pulse 63   Temp 36.7 °C (98.1 °F)   Ht 1.594 m (5' 2.75\")   Wt 64.8 kg (142 lb 12.8 oz)   SpO2 97% Comment: FLIP  BMI 25.50 kg/m²  Body mass index is 25.5 kg/m².  ========================================================================  Current Medications:  Current Outpatient Medications   Medication Instructions    ascorbic acid (Vitamin C) 1,000 mg tablet     calcium carb-mag hydrox-simeth 1,200 mg-270 mg -80 mg/10 mL suspension     carBAMazepine XR (TEGretol XR) 100 mg 12 hr tablet TAKE 1 TABLET BY MOUTH TWO TIMES A DAY    cholecalciferol (VITAMIN D-3) 50 mcg, oral, Daily    cyanocobalamin (Vitamin B-12) 100 mcg tablet No dose, route, or frequency recorded.    estradiol (Estrace) 0.01 % (0.1 mg/gram) vaginal cream APPLY 0.5 GM VAGINALLY TWO NIGHTS EACH WEEK    PHENobarbitaL 64.8 mg tablet TAKE 1 TABLET BY MOUTH EVERY MORNING AND TAKE 2 TABLETS BY MOUTH EVERY EVENING    Tegretol  mg, oral, 2 times daily, Do not crush, chew, or split.    vitamin D3-vitamin K2 1,250-200 mcg capsule oral, 2 times daily    zoledronic acid (Reclast) 5 mg/100 mL piggyback 5 mg, intravenous, Once      ========================================================================  Drug Allergies/Intolerances:  Allergies   Allergen Reactions    Phenytoin Other     double vision    Codeine Hallucinations     AUDITORY HALLUCINATIONS        Disclosure: Parts of this note may have been scribed or generated using voice dictation software, Dragon.  Homophonic errors may exist.  Please contact me directly if clarification is needed  Jimbo Griffith MD  03/05/2024  Scribe Attestation  By signing my " name below, I, Shahana Casarez   attest that this documentation has been prepared under the direction and in the presence of Jimbo Griffith MD.

## 2024-03-07 LAB
MGC ASCENT PFT - FEV1 - PRE: 2.43
MGC ASCENT PFT - FEV1 - PRE: 2.43
MGC ASCENT PFT - FEV1 - PREDICTED: 2.16
MGC ASCENT PFT - FEV1 - PREDICTED: 2.16
MGC ASCENT PFT - FVC - PRE: 3.06
MGC ASCENT PFT - FVC - PRE: 3.06
MGC ASCENT PFT - FVC - PREDICTED: 2.73
MGC ASCENT PFT - FVC - PREDICTED: 2.73

## 2024-03-10 NOTE — PROGRESS NOTES
Department of Medicine  Division of Pulmonary, Critical Care, and Sleep Medicine  New Patient Visit    Date of visit: 01/24/2024  Patient: Rhoda Pena    MRN: [id number]  YOB: 1960   Gender: female  ========================================================================  Reason for this visit  Rhoda Pena is a 63 y.o. female former smoker who presents today for evaluation of abnormal chest CT  ========================================================================  IMPRESSION and RECOMMENDATIONS  Ms. Pena is a 63 y.o. female with the following respiratory problems  1. Lung nodule, multiple  The ones reported in the superior segment of the right lower lobe seems to be more of the ground glass opacification with some air bronchograms.  Its location is giving it the nodular appearance but it is not a solid nodule  Scattered nodules few may represent old granulomatous disease  The irregular nodularity in the left lower lobe also seems to be old in nature.  Follow-up will be important  2.  Interstitial lung infiltrates that are subtle.   Interstitial prominence mainly in the most dependent portion of the lungs.  We are seen mostly in the right lower lobe in comparison to the left.  There are also some patchy changes in the periphery in the upper part of the lung.  Significance is unknown.  There is no obvious history of environmental exposures. No clinical evidence of connective tissue disease.  However, screening for any autoimmune abnormality is reasonable.  Also screening for occult hypersensitivity reactions is also reasonable  Management plans to include   - Complete Pulmonary Function Test (Spirometry/DLCO/Lung Volumes); Future  - Respiratory Muscle Force (MIP/MEP); Future  - Pulmonary Stress Test (6 Min. Walk); Future  - CT chest high resolution; Future  - YORDY with Reflex to ONELIA; Future  - Citrulline Antibody, IgG; Future  - C-reactive protein; Future  - Follow Up In Pulmonology;  Future  - Hypersensitivity Pneumonitis Panel; Future  ========================================================================  Physician HPI (3/9/2024):  Ms. Pena is a 63 y.o. female known with history of seizures since childhood, well controlled for many years, underwent a coronary calcium scoring CT scan on 12-14-23 that showed few abnormalities on the lung windows.  A chest CT followed and completed on January 8, 2024 that some interstitial changes in the bases predominantly in the right lung that were patchy.  The patchy consolidative area in the superior segment of the right lower lobe that was reported as nodule.  Few other nodules were seen the largest of which was in the left lower lobe 7 mm    From a respiratory stand point    Dyspnea with heavy exertion.  Over the past few years she has not been very active.  She has gained about 20 pounds.  On a flat surface she is without any shortness of breath, however, when she climbs up stairs she is an it is within the third and fourth flight mMRC Grade 0  Cough is not present.  There is no sputum production nor hemoptysis.  Nocturnally she does not have any cough either.  She does not wake up with any respiratory difficulties  chest tightness and  wheezing are not present  Denies fever or chills, night sweats, energy change   denies problems swallowing or chocking on food or cough when eating, or reflux.   no chest pain, orthopnea, PND or LE edema  Snoring, day time somnolence, not present  Denies tight skin, Raynaud's, joint pain or swelling. No rashes or ulcers.   ========================================================================  Review of Pertinent Systems:  Constitutional: no chills, no fever, no night sweats,    Eyes: no blurred vision and no dryness of the eyes.   ENT: no nasal congestion, no hoarseness, no sore throat, no postnasal drip, no rhinorrhea, no sinus pressure, no bleeding gums, sometimes mouth ulcers that heal quickly  Cardiovascular:  as per HPI   Respiratory: as per HPI  Gastrointestinal: no nausea and no vomiting. No diarrhea or constipation. No reflux  Musculoskeletal: no arthralgias, no myalgias, no localized joint pain, no joint redness, minimal stiffness in morning, no joint swelling, knee injury intermittent pain. Broken writ with a plate. Rotator cuff injury  Integumentary: no rashes.    ========================================================================  Physical Examination:  /84 (BP Location: Left arm, Patient Position: Sitting)   Temp 36.3 °C (97.3 °F) (Temporal)   Wt 66.7 kg (147 lb)   SpO2 97%   BMI 26.04 kg/m²  Body mass index is 26.04 kg/m².  GENERAL: normal appearance. well nourished. No respiratory distress  HEAD/SINUSES: no sinus tenderness  OROPHARYNX: Moist mucosa, no thrush or lesions - Mallampati II (hard and soft palate, upper portion of tonsils anduvula visible)  NECK: no JVD, midline trachea without stridor. Thyroid not enlarged  LYMPH NODES : none felt in the cervical, submandibular or supraclavicular regions  LUNGS: Symmetric chest. Good excursion. Equal breath sounds. no wheezing. no crackles or rhonchi  CARDIAC: normal S1 and S2; no gallops, rubs or murmurs. Regular rate and rhythm  EXTREMITIES: No edema, no varicose veins  NEURO: grossly normal mental status, CN reflexes and motor strength.   SKIN: Skin turgor normal. No rashes or lesions.   PSYCH: Normal affect  ========================================================================  Available Data my personal interpretation   PFT   ordered   Six-minute walk ordered   Chest CT Scan Reviewed in HPI     Current Medications:  Current Outpatient Medications   Medication Instructions    ascorbic acid (Vitamin C) 1,000 mg tablet     calcium carb-mag hydrox-simeth 1,200 mg-270 mg -80 mg/10 mL suspension     carBAMazepine XR (TEGRETOL  XR) 400 mg, oral, 2 times daily, Do not crush, chew, or split.    carBAMazepine XR (TEGretol XR) 100 mg 12 hr tablet TAKE 1  TABLET BY MOUTH TWO TIMES A DAY    cholecalciferol (VITAMIN D-3) 50 mcg, oral, Daily    cyanocobalamin (Vitamin B-12) 100 mcg tablet No dose, route, or frequency recorded.    estradiol (Estrace) 0.01 % (0.1 mg/gram) vaginal cream APPLY 0.5 GM VAGINALLY TWO NIGHTS EACH WEEK    PHENobarbitaL 64.8 mg tablet TAKE 1 TABLET BY MOUTH EVERY MORNING AND TAKE 2 TABLETS BY MOUTH EVERY EVENING    vitamin D3-vitamin K2 1,250-200 mcg capsule oral, 2 times daily    zoledronic acid (Reclast) 5 mg/100 mL piggyback 5 mg, intravenous, Once      ========================================================================  Drug Allergies/Intolerances:  Allergies   Allergen Reactions    Phenytoin Other     double vision    Codeine Hallucinations     AUDITORY HALLUCINATIONS        Disclosure: Parts of this note may have been scribed or generated using voice dictation software, Dragon.  Homophonic errors may exist.  Please contact me directly if clarification is needed  Jimbo Griffith MD  01/24/2024

## 2024-03-16 ENCOUNTER — PHARMACY VISIT (OUTPATIENT)
Dept: PHARMACY | Facility: CLINIC | Age: 64
End: 2024-03-16
Payer: COMMERCIAL

## 2024-03-18 ENCOUNTER — TELEPHONE (OUTPATIENT)
Dept: PULMONOLOGY | Facility: HOSPITAL | Age: 64
End: 2024-03-18
Payer: COMMERCIAL

## 2024-03-18 PROBLEM — R93.89: Status: ACTIVE | Noted: 2024-03-18

## 2024-03-18 NOTE — TELEPHONE ENCOUNTER
Kamran Duong  Hope all is good  Went over your scans with David Robison and True. We all agreed that  The simplist thing is the litle nodule in the left lung - we will follow that  The scattered changes on the scan in the periphery that seem most prominent in the bases are to be monitored and may represent early interstitial lung disease. - you have normal lung function and exercise capacity - very reassuring. So monitoring is reasonable  The dense small area in the right lung, may very well be similar to the others, but denser because of its location - but we are not certain. So lets take a look at it in 3 months and reassess if there is any need for tissue sampling - I ordered the scan for June  In the meantime - it would be great if you re-start exercising daily - 20-30 min at least 3 times per week  Please call me if there is any change  Best  RH

## 2024-03-18 NOTE — PATIENT INSTRUCTIONS
Thank you for allowing me to participate in your health care today.    The primary respiratory problem that we discussed is the abnormal chest CT  We reviewed together the abnormalities seen. The nodule seen is the simplest and will need a re-check CT in 3-6 months  The interstitial changes, are not changed. Your lung function and exercise capacity is normal, which is very reassuring. However, we have to monitor closely   So lets repeat the chest CT in 3 months and determine if we need to pursue any lung biopsy    Please call [phone] ([facility]) to get your tests scheduled.     Please call [phone] to schedule a follow up appointment with me.    Unless deemed urgent or emergent, the result(s) of any tests ordering during this clinic visit will be reviewed during your follow-up visit. Depending on the urgency of the result(s), I may call or send you a [employer] message.

## 2024-05-13 ENCOUNTER — TELEPHONE (OUTPATIENT)
Dept: PRIMARY CARE | Facility: CLINIC | Age: 64
End: 2024-05-13
Payer: COMMERCIAL

## 2024-05-13 ENCOUNTER — PHARMACY VISIT (OUTPATIENT)
Dept: PHARMACY | Facility: CLINIC | Age: 64
End: 2024-05-13
Payer: COMMERCIAL

## 2024-05-13 ENCOUNTER — TELEMEDICINE (OUTPATIENT)
Dept: PRIMARY CARE | Facility: CLINIC | Age: 64
End: 2024-05-13
Payer: COMMERCIAL

## 2024-05-13 DIAGNOSIS — R05.1 ACUTE COUGH: ICD-10-CM

## 2024-05-13 DIAGNOSIS — B34.9 ACUTE VIRAL SYNDROME: Primary | ICD-10-CM

## 2024-05-13 DIAGNOSIS — R06.2 WHEEZING: ICD-10-CM

## 2024-05-13 PROCEDURE — 1036F TOBACCO NON-USER: CPT | Performed by: STUDENT IN AN ORGANIZED HEALTH CARE EDUCATION/TRAINING PROGRAM

## 2024-05-13 PROCEDURE — 99214 OFFICE O/P EST MOD 30 MIN: CPT | Performed by: STUDENT IN AN ORGANIZED HEALTH CARE EDUCATION/TRAINING PROGRAM

## 2024-05-13 PROCEDURE — RXMED WILLOW AMBULATORY MEDICATION CHARGE

## 2024-05-13 RX ORDER — ALBUTEROL SULFATE 90 UG/1
2 AEROSOL, METERED RESPIRATORY (INHALATION) EVERY 4 HOURS PRN
Qty: 18 G | Refills: 0 | Status: SHIPPED | OUTPATIENT
Start: 2024-05-13 | End: 2025-05-13

## 2024-05-13 RX ORDER — BENZONATATE 100 MG/1
100 CAPSULE ORAL 3 TIMES DAILY PRN
Qty: 42 CAPSULE | Refills: 0 | Status: SHIPPED | OUTPATIENT
Start: 2024-05-13 | End: 2024-06-12

## 2024-05-13 NOTE — PROGRESS NOTES
Outpatient Visit Note    No chief complaint on file.      With patient's permission, this is a Telemedicine visit with video and audio. The provider and patient participated in this telemedicine encounter.    HPI:  Rhoda Pena is a 63 y.o. female here  for persistent cough    Sx started Friday, went to  on Sat (5/11/2024), told it was viral.Was not prescribed any medications, told to drink hot water/tea with honey, use over-the-counter cough medicine.  Feels like she will be doing okay but then cough becomes uncontrollable.  Also feels like she has some wheezing with cough.No shortness of breath when not having a coughing episode.    No baseline history of asthma or COPD.  Former smoker, quit in 1990.      [facility]-Colds And Flu    Question 5/13/2024  2:47 PM EDT - Filed by Patient   When did your symptoms begin? 5/9/2024   Are you experiencing stiffness or severe pain in your neck or pain that travels down your back or inability to touch chin to chest? No   Are you experiencing any chest tightness, shortness of breath or wheezing? No   Are you experiencing SEVERE pain in your throat or are unable to swallow your saliva? No Abnormal    Have your symptoms been present for two weeks or more? No   Are you experiencing any of the following symptoms? Chills    Sore Throat    Congestion    Runny Nose    Cough    Muscle Aches    Sneezing   Is your temperature greater than 101° F (38.3° C) or not responding to Tylenol or Ibuprofen? No             Current Medications  Current Outpatient Medications   Medication Instructions    ascorbic acid (Vitamin C) 1,000 mg tablet     calcium carb-mag hydrox-simeth 1,200 mg-270 mg -80 mg/10 mL suspension     carBAMazepine XR (TEGretol XR) 100 mg 12 hr tablet TAKE 1 TABLET BY MOUTH TWO TIMES A DAY    cholecalciferol (VITAMIN D-3) 50 mcg, oral, Daily    cyanocobalamin (Vitamin B-12) 100 mcg tablet No dose, route, or frequency recorded.    estradiol (Estrace) 0.01 % (0.1 mg/gram)  vaginal cream APPLY 0.5 GM VAGINALLY TWO NIGHTS EACH WEEK    PHENobarbitaL 64.8 mg tablet TAKE 1 TABLET BY MOUTH EVERY MORNING AND TAKE 2 TABLETS BY MOUTH EVERY EVENING    Tegretol  mg, oral, 2 times daily, Do not crush, chew, or split.    vitamin D3-vitamin K2 1,250-200 mcg capsule oral, 2 times daily    zoledronic acid (Reclast) 5 mg/100 mL piggyback 5 mg, intravenous, Once        Allergies  Allergies   Allergen Reactions    Phenytoin Other     double vision    Codeine Hallucinations     AUDITORY HALLUCINATIONS        Past Medical History:   Diagnosis Date    Achilles tendinitis, unspecified leg     Achilles tendinitis    Acute maxillary sinusitis, unspecified 2018    Acute maxillary sinusitis    Acute nasopharyngitis (common cold) 2016    Common cold    Age-related osteoporosis without current pathological fracture 2015    Encounter for ongoing osteoporosis therapy, bisphosphonates    Complete or unspecified spontaneous  without complication ([facility])         Dyspnea, unspecified 2019    Paroxysmal dyspnea    Encounter for general adult medical examination without abnormal findings 2018    Annual physical exam    Encounter for health counseling related to travel 2018    Counseling about travel    Encounter for screening for lipoid disorders 2019    Screening cholesterol level    Epigastric pain 2020    Dyspepsia    Left patella fracture 2023    Mastodynia 2021    Breast pain, right    Otalgia, right ear 2020    Otalgia, right    Other general symptoms and signs 2018    Flu-like symptoms    Other microscopic hematuria 2017    Hematuria, microscopic    Pain in right hand 2019    Pain of right hand    Pain in right knee 2019    Knee pain, right    Pain in unspecified shoulder     Shoulder pain    Personal history of (healed) traumatic fracture 10/30/2020    History of fracture of phalanx of toe    Personal  history of other diseases of the digestive system 08/16/2018    History of gastroenteritis    Personal history of other diseases of the musculoskeletal system and connective tissue 12/27/2016    History of low back pain    Personal history of other diseases of the musculoskeletal system and connective tissue 10/19/2020    History of osteopenia    Personal history of other diseases of the nervous system and sense organs 08/05/2019    Personal history of seizure disorder    Personal history of other diseases of the respiratory system 05/13/2022    History of acute sinusitis    Personal history of other diseases of the respiratory system 10/30/2020    History of chronic rhinitis    Personal history of other diseases of the respiratory system 04/19/2017    History of influenza    Personal history of other diseases of the respiratory system 03/07/2020    History of viral pharyngitis    Personal history of other infectious and parasitic diseases     History of varicella    Personal history of other medical treatment 11/02/2018    History of screening mammography    Personal history of other specified conditions     History of headache    Personal history of other specified conditions 03/24/2020    History of abnormal mammogram    Personal history of other specified conditions 03/02/2018    History of fatigue    Personal history of other specified conditions 09/19/2017    History of dysuria    Personal history of other specified conditions 08/26/2020    History of diarrhea    Personal history of other specified conditions 03/03/2020    History of abdominal pain    Personal history of other specified conditions 01/11/2019    History of chest pain    Personal history of urinary (tract) infections 11/07/2016    History of urinary tract infection    Personal history of urinary (tract) infections 07/02/2018    History of urinary tract infection    Strain of muscle(s) and tendon(s) of peroneal muscle group at lower leg level,  left leg, initial encounter 2020    Strain of peroneal tendon of left foot, initial encounter    Unspecified fracture of left toe(s), initial encounter for closed fracture 10/30/2020    Fracture of proximal phalanx of toe of left foot    Unspecified injury of left wrist, hand and finger(s), initial encounter 2021    Left wrist injury    Unspecified injury of unspecified foot, initial encounter     Foot injury    Urinary tract infection, site not specified 2018    Acute UTI    Urinary tract infection, site not specified 2016    Acute UTI      Past Surgical History:   Procedure Laterality Date    APPENDECTOMY  10/24/2013    Appendectomy    LAPAROSCOPY DIAGNOSTIC / BIOPSY / ASPIRATION / LYSIS  2014    Exploratory Laparoscopy    US GUIDED SOFT TISSUE BIOPSY  2023    US GUIDED SOFT TISSUE BIOPSY 2023 [facility] US     Family History   Problem Relation Name Age of Onset    Dementia Mother      Hypertension Mother      Diabetes type II Mother      Coronary artery disease Father      Other (Congestive heart failure) Father      Breast cancer Sister       Social History     Tobacco Use    Smoking status: Former     Current packs/day: 0.00     Types: Cigarettes     Quit date: 1990     Years since quittin.8    Smokeless tobacco: Never   Vaping Use    Vaping status: Never Used   Substance Use Topics    Alcohol use: Yes     Comment: social    Drug use: Never     Tobacco Use: Medium Risk (3/5/2024)    Patient History     Smoking Tobacco Use: Former     Smokeless Tobacco Use: Never     Passive Exposure: Not on file        ROS  All pertinent positive symptoms are included in the history of present illness.  All other systems have been reviewed and are negative and noncontributory to this patient's current ailments.    VITAL SIGNS  There were no vitals filed for this visit.  There were no vitals filed for this visit.   There is no height or weight on file to calculate BMI.   Patient is  unable to proivide    PHYSICAL EXAM  GENERAL APPEARANCE:  Alert and oriented x 3, Pleasant and cooperative, No acute distress.   LUNGS:  Cough with audible wheezing during conversation.  No conversational dyspnea during encounter.   PSYCH:  appropriate mood and affect, no difficulty with speech.   Telemedicine visit, no other exam component done.      Assessment/Plan   Problem List Items Addressed This Visit    None  Visit Diagnoses         Codes    Acute viral syndrome    -  Primary B34.9    Relevant Medications    benzonatate (Tessalon) 100 mg capsule    Acute cough     R05.1    Relevant Medications    benzonatate (Tessalon) 100 mg capsule    Wheezing     R06.2    Relevant Medications    albuterol (Ventolin HFA) 90 mcg/actuation inhaler        Counseling:   Medication education:    Education: The patient is counseled regarding potential side effects of all new medications.    Understanding:  Patient expressed understanding.    Adherence:  No barriers to adherence identified.      Additional Visit Plans:  Supportive care measures discussed.  Encouraged rest, fluids, over-the-counter cough medications as needed.  Benzonatate prescribed for as needed cough relief, albuterol inhaler prescribed for wheezing.      Francie Jacobs MD   05/13/24   3:25 PM

## 2024-05-14 ENCOUNTER — PATIENT MESSAGE (OUTPATIENT)
Dept: PRIMARY CARE | Facility: CLINIC | Age: 64
End: 2024-05-14
Payer: COMMERCIAL

## 2024-05-14 NOTE — LETTER
May 15, 2024     Rhoda Pena    Patient: Rhoda Pena   YOB: 1960   Date of Visit: 5/14/2024       To Whom It May Concern:    Due to ongoing illness symptoms, please excuse Rhoda Pena from work through 5/16/2024.    Please do not hesitate to contact me with any additional questions or concerns regarding this issue.       Sincerely,     Francie Jacobs MD  _______________________________________________________

## 2024-06-04 ENCOUNTER — HOSPITAL ENCOUNTER (OUTPATIENT)
Dept: RADIOLOGY | Facility: HOSPITAL | Age: 64
End: 2024-06-04
Payer: COMMERCIAL

## 2024-06-24 ENCOUNTER — APPOINTMENT (OUTPATIENT)
Dept: RADIOLOGY | Facility: HOSPITAL | Age: 64
End: 2024-06-24
Payer: COMMERCIAL

## 2024-06-25 ENCOUNTER — HOSPITAL ENCOUNTER (OUTPATIENT)
Dept: RADIOLOGY | Facility: HOSPITAL | Age: 64
Discharge: HOME | End: 2024-06-25
Payer: COMMERCIAL

## 2024-06-25 DIAGNOSIS — R91.1 LUNG NODULE: ICD-10-CM

## 2024-06-25 DIAGNOSIS — R93.89 ABNORMAL HIGH RESOLUTION COMPUTED TOMOGRAPHY OF CHEST: ICD-10-CM

## 2024-06-25 PROCEDURE — 71250 CT THORAX DX C-: CPT

## 2024-06-29 DIAGNOSIS — G40.209 COMPLEX PARTIAL SEIZURE EVOLVING TO GENERALIZED SEIZURE (MULTI): ICD-10-CM

## 2024-06-29 PROCEDURE — RXMED WILLOW AMBULATORY MEDICATION CHARGE

## 2024-07-01 ENCOUNTER — PHARMACY VISIT (OUTPATIENT)
Dept: PHARMACY | Facility: CLINIC | Age: 64
End: 2024-07-01
Payer: COMMERCIAL

## 2024-07-01 PROCEDURE — RXMED WILLOW AMBULATORY MEDICATION CHARGE

## 2024-07-01 RX ORDER — CARBAMAZEPINE 100 MG/1
100 TABLET, EXTENDED RELEASE ORAL 2 TIMES DAILY
Qty: 180 TABLET | Refills: 3 | Status: SHIPPED | OUTPATIENT
Start: 2024-07-01 | End: 2025-07-01

## 2024-07-01 RX ORDER — PHENOBARBITAL 64.8 MG/1
TABLET ORAL 2 TIMES DAILY
Qty: 270 TABLET | Refills: 1 | Status: SHIPPED | OUTPATIENT
Start: 2024-07-01 | End: 2024-12-28

## 2024-07-02 ENCOUNTER — PHARMACY VISIT (OUTPATIENT)
Dept: PHARMACY | Facility: CLINIC | Age: 64
End: 2024-07-02
Payer: COMMERCIAL

## 2024-07-02 ENCOUNTER — OFFICE VISIT (OUTPATIENT)
Dept: PULMONOLOGY | Facility: HOSPITAL | Age: 64
End: 2024-07-02
Payer: COMMERCIAL

## 2024-07-02 VITALS
BODY MASS INDEX: 24.82 KG/M2 | WEIGHT: 139 LBS | DIASTOLIC BLOOD PRESSURE: 82 MMHG | OXYGEN SATURATION: 99 % | HEART RATE: 60 BPM | TEMPERATURE: 97.6 F | SYSTOLIC BLOOD PRESSURE: 149 MMHG

## 2024-07-02 DIAGNOSIS — R91.1 LUNG NODULE: Primary | ICD-10-CM

## 2024-07-02 DIAGNOSIS — Z01.818 PRE-OP TESTING: ICD-10-CM

## 2024-07-02 DIAGNOSIS — R93.89 ABNORMAL CT OF THE CHEST: Primary | ICD-10-CM

## 2024-07-02 PROCEDURE — 99213 OFFICE O/P EST LOW 20 MIN: CPT | Performed by: INTERNAL MEDICINE

## 2024-07-02 ASSESSMENT — PAIN SCALES - GENERAL: PAINLEVEL: 0-NO PAIN

## 2024-07-02 ASSESSMENT — LIFESTYLE VARIABLES: HOW OFTEN DO YOU HAVE A DRINK CONTAINING ALCOHOL: MONTHLY OR LESS

## 2024-07-02 NOTE — PROGRESS NOTES
Bronchoscopy Scheduling Request    Pre-bronchoscopy visit: Not needed   Please schedule procedure: Next available    Cytology on-site:  Yes  Location:  [facility]  Performing physician:  Alex Hernandez MD   Referring physician:  Jimbo Griffith MD, Elis Solares MD  Indication:  RLL GGO  Sedation / Anesthesia:  GA  Procedure:  Staging EBUS  Time:  Tier 2  Fluorscopy:   No  Imaging needed:  None  Labs:  CBC, BMP  Meds:  None  Special Considerations:  None  Reviewed by:  Alex Hernandez MD

## 2024-07-02 NOTE — PATIENT INSTRUCTIONS
Thank you for allowing me to participate in your health care today.    The primary respiratory problem that we discussed is the abnormal chest CT  We reviewed together the abnormalities seen. The abnormality in the right lung base persists.  Biopsy is recommended.  2 possibilities  Bronchoscopy with transbronchial biopsy of the right lower lobe.     fine-needle aspiration  CT-guided  Both procedures are technically difficult for the location of the abnormality.  I will discuss with IP as well as radiology then get back to in regards to the best technique  We will check a PET scan to assess for any activity in the chest to optimize sampling at the time of bronchoscopy  The nodule seen on the left side is very stable will need a re-check CT in 6 months  The interstitial changes, are not changed. However, we have to monitor closely   Please enjoy your vacation next week.  I will text you through [employer] for the next steps    Please call [phone] ([facility] ) to get your tests scheduled.     Please call [phone] to schedule a follow up appointment with me.    Unless deemed urgent or emergent, the result(s) of any tests ordering during this clinic visit will be reviewed during your follow-up visit. Depending on the urgency of the result(s), I may call or send you a [employer] message.

## 2024-07-02 NOTE — PROGRESS NOTES
Subjective   Patient ID: Rhoda Pena is a 63 y.o. female former smoker who presents for Pt. here today for FUV    HPI  Last visit in this clinic was 03/05/24.    Rhoda Pena is a 63 y.o. female patient with a history of seizures and abnormal chest CT here for follow up    Rhoda encountered URTI end of April early may. She experienced persistent, severe cough and congestion on 05/08/24. She visited urgent care who diagnosed her with acute viral syndrome with cough and prescribed her benzonatate 100 mg and albuterol for wheezing. Flu and Covid-19 test was found negative. She is currently improving, but her cough and congestion continues to linger. She denies hemoptysis, chest pain, and significant SOB. She is unable to walk long distances and go up/down many stairs. She does work around the house and takes many breaks, but does not feel this is due to SOB. She reports loss of appetite while sick, was 142 lbs on her last visit now 139 lbs. No reported chest pain or hemoptysis    We discussed her CT-chest done on 6/25/24 today to follow-up on her lung nodule and infiltrate.  It can be summarized as follows  The small left lower lobe nodule in the LLL measuring 8 mm is unchanged  There is a 5 mm nodule in the right lower lobe that appears more prominent but not increased in size  There continues to be a focal area of dense GGO in the RLL, that is similar to previous  bilateral mild and faint subpleural reticular changes etiology of which is unclear but could be related to old smoking history    Review of Systems  Review of Pertinent Systems:  Constitutional: no chills, no fever, no fatigue.  Eyes: no blurred vision and no dryness of the eyes.   ENT: Congestion. no hoarseness, no sore throat, no postnasal drip.  Cardiovascular: as per HPI   Respiratory: as per HPI  Gastrointestinal: no nausea and no vomiting. No diarrhea or constipation. No reflux.  Integumentary: no rashes.      Objective   Physical Exam  Visit  Vitals  /82   Pulse 60   Temp 36.4 °C (97.6 °F)   GENERAL: normal appearance. well nourished. No respiratory distress  HEAD/SINUSES: no sinus tenderness  OROPHARYNX: Moist mucosa, no thrush or lesions - Mallampati II   NECK: no JVD, midline trachea without stridor. Thyroid not enlarged  LYMPH NODES : none felt in the cervical, submandibular or supraclavicular regions  LUNGS: Equal breath sounds. no wheezing. no crackles or rhonchi  CARDIAC: normal S1 and S2; no gallops, rubs or murmurs. Regular rate and rhythm  EXTREMITIES: No edema, no varicose veins  NEURO: grossly normal mental status, CN reflexes and motor strength.   SKIN: Skin turgor normal. No rashes or lesions.   PSYCH: Normal affect    AVAILABLE DATA - this represents my personal interpretation.   (06/25/24) CT Chest   IMPRESSION:  1. Similar appearance of a focal right lower lobe ground-glass and consolidative opacity with associated bronchiectasis.   2. Increased conspicuity of a 5 mm right lower lobe nodular ground-glass opacity and stable appearance of additional bilateral  ground-glass nodular opacities measuring up to 8 mm in the left lower lobe, which may be followed on repeat imaging.  3. Similar diffuse bilateral faint subpleural reticulations, which may represent chronic interstitial changes related to smoking,     Assessment/Plan   Diagnoses and all orders for this visit:  1. Lung nodules - two of them in LLL; 8mm conglomerate of GGO and a smaller 2mm  2. Abnormal high resolution computed tomography of chest  These represent scattered areas of subpleural reticulation most pronounced in the lower lobes - most dependent portion of RLL >> LLL - etiology and significance are to be determined. This may represent smoking related lung disease. Normal functional capacity and lung function are reassuring - close monitoring is warranted  Dense area of GGO in the superior segment of the RLL is a concern as well. It is not clear if this is of the same  nature or different. Given the absence of any improvement and the prominence of the  mm nodule in the vicinity, tissue sampling is warranted  We reviewed together the abnormalities seen. The abnormality in the right lung base persists.  Biopsy is recommended.  2 possibilities  Bronchoscopy with transbronchial biopsy of the right lower lobe.     fine-needle aspiration  CT-guided  Both procedures are technically difficult for the location of the abnormality.  However transbronchial biopsy will be more complete and may show the lung interstitium and architecture to a better degree  We will check a PET scan to assess for any activity in the chest to optimize sampling at the time of bronchoscopy  Additionally, It is important to note, that bronchoscopy maybe reasonable to obtain a BAL and assess the cellular makeup of the alveoli, if there were a clear clinical indication for it. However, it will be of poor diagnostic yield for tissue sampling for pathologic determinations of abnormalities seen  Scribe Attestation  By signing my name below, Vanessa BROOKS Scribe   attest that this documentation has been prepared under the direction and in the presence of Jimbo Griffith MD.    Disclosure: Parts of this note may have been scribed or generated using voice dictation software, Dragon.  Homophonic errors may exist.  Please contact me directly if clarification is needed

## 2024-07-10 ENCOUNTER — APPOINTMENT (OUTPATIENT)
Dept: NEUROLOGY | Facility: CLINIC | Age: 64
End: 2024-07-10
Payer: COMMERCIAL

## 2024-07-24 ENCOUNTER — APPOINTMENT (OUTPATIENT)
Dept: NEUROLOGY | Facility: CLINIC | Age: 64
End: 2024-07-24
Payer: COMMERCIAL

## 2024-07-24 DIAGNOSIS — G40.209 COMPLEX PARTIAL SEIZURE EVOLVING TO GENERALIZED SEIZURE (MULTI): ICD-10-CM

## 2024-07-24 PROCEDURE — 1159F MED LIST DOCD IN RCRD: CPT | Performed by: PSYCHIATRY & NEUROLOGY

## 2024-07-24 PROCEDURE — 99214 OFFICE O/P EST MOD 30 MIN: CPT | Performed by: PSYCHIATRY & NEUROLOGY

## 2024-07-24 PROCEDURE — 1160F RVW MEDS BY RX/DR IN RCRD: CPT | Performed by: PSYCHIATRY & NEUROLOGY

## 2024-07-24 RX ORDER — CARBAMAZEPINE 400 MG/1
400 TABLET, EXTENDED RELEASE ORAL 2 TIMES DAILY
Qty: 180 TABLET | Refills: 3 | Status: SHIPPED | OUTPATIENT
Start: 2024-07-24 | End: 2025-07-24

## 2024-07-24 RX ORDER — CARBAMAZEPINE 100 MG/1
100 TABLET, EXTENDED RELEASE ORAL 2 TIMES DAILY
Qty: 180 TABLET | Refills: 3 | Status: SHIPPED | OUTPATIENT
Start: 2024-07-24 | End: 2025-07-24

## 2024-07-24 NOTE — PATIENT INSTRUCTIONS
You continue to well on carbamazepine  mg twice a day, Tegretol  mg twice a day plus 3 phenobarbitals a day. Please see how you do with switching to generic carbamazepine  mg twice a day. After 2-3 weeks, get blood tests to check your phenobarbital and carbamazepine levels.    I will retire at the end of September. Please see an epilepsy specialist of your choice before the end of the year since phenobarbital can be prescribed for only 6 months at most. Call me with any questions.

## 2024-07-24 NOTE — PROGRESS NOTES
Chief complaint:    Seizure disorder    History of Present Illness:   Rhoda has not had a seizure in 12 years. She is doing fine on generic carbamazepine  mg bid plus name brand Tegretol  mg bid plus three phenobarbitals a day. She has no side effects. She would like to switch to all generic carbamazepine due to cost. She tried generic carbamazepine decades ago and felt off and has been on name brand for years.     She just turned 64 and continues to work full time. She was found to have a lung nodule on a cardiac calcium test and will have a bronchoscopy and biopsy August 8. Other than chronic left shoulder pain, she feels fine.      Physical examination:  She is a pleasant, healthy-appearing woman.     Neurologic Exam     Mental Status   Her speech was clear, fluent and appropriate.      Cranial Nerves     CN III, IV, VI   Extraocular motions are normal.     CN V   Facial sensation intact.     CN VII   Facial expression full, symmetric.     Motor Exam No abnormal movements.     Sensory Exam   Light touch normal.   Pinprick normal.     Gait, Coordination, and Reflexes     Reflexes   Right brachioradialis: 1+  Left brachioradialis: 1+  Right biceps: 1+  Left biceps: 1+  Right triceps: 1+  Left triceps: 1+  Right patellar: 1+  Left patellar: 1+  Right achilles: 1+  Left achilles: 1+  Right plantar: normal  Left plantar: normal  Her gait was narrow-based and steady. She had no difficulty performing a tandem gait.           1. Complex partial seizure evolving to generalized seizure (Multi)  carBAMazepine XR (TEGretol  XR) 400 mg 12 hr tablet    carBAMazepine XR (TEGretol XR) 100 mg 12 hr tablet    Carbamazepine    Phenobarbital level             Orders Placed This Encounter   Procedures    Carbamazepine     Standing Status:   Future     Standing Expiration Date:   7/24/2025     Order Specific Question:   Release result to [facility]     Answer:   Immediate [1]    Phenobarbital level     Standing Status:    Future     Standing Expiration Date:   7/24/2025     Order Specific Question:   Release result to [employer]     Answer:   Immediate [1]          Impression and Plan:  Rhoda has remained seizure-free for years on her present regimen of carbamazepine and phenobarbital. She will see how she does with a switch to all generic carbamazepine XR. She will have blood tests to check her carbamazepine and phenobarbital levels several weeks after switching. She will call with questions.     We discussed the downsides of these older anticonvulsants, which may have a role in her osteoporosis for example. Switching anticonvulsants would not be easy, especially with phenobarbital. I recommended that she follow up with a neurologist who specializes in epilepsy after I retire in September.     Iman Baker MD

## 2024-07-25 ENCOUNTER — HOSPITAL ENCOUNTER (OUTPATIENT)
Dept: RADIOLOGY | Facility: HOSPITAL | Age: 64
Discharge: HOME | End: 2024-07-25
Payer: COMMERCIAL

## 2024-07-25 DIAGNOSIS — R91.1 LUNG NODULE: ICD-10-CM

## 2024-07-25 LAB — GLUCOSE BLD MANUAL STRIP-MCNC: 94 MG/DL (ref 74–99)

## 2024-07-25 PROCEDURE — 78815 PET IMAGE W/CT SKULL-THIGH: CPT | Mod: PI

## 2024-07-25 PROCEDURE — A9552 F18 FDG: HCPCS | Performed by: INTERNAL MEDICINE

## 2024-07-25 PROCEDURE — 3430000001 HC RX 343 DIAGNOSTIC RADIOPHARMACEUTICALS: Performed by: INTERNAL MEDICINE

## 2024-07-25 PROCEDURE — 82947 ASSAY GLUCOSE BLOOD QUANT: CPT

## 2024-07-25 RX ORDER — FLUDEOXYGLUCOSE F 18 200 MCI/ML
13.5 INJECTION, SOLUTION INTRAVENOUS
Status: COMPLETED | OUTPATIENT
Start: 2024-07-25 | End: 2024-07-25

## 2024-08-01 ENCOUNTER — TELEPHONE (OUTPATIENT)
Dept: OBSTETRICS AND GYNECOLOGY | Facility: CLINIC | Age: 64
End: 2024-08-01
Payer: COMMERCIAL

## 2024-08-01 DIAGNOSIS — Z12.39 BREAST SCREENING: ICD-10-CM

## 2024-08-05 ENCOUNTER — LAB (OUTPATIENT)
Dept: LAB | Facility: LAB | Age: 64
End: 2024-08-05
Payer: COMMERCIAL

## 2024-08-05 DIAGNOSIS — R93.89 ABNORMAL CT OF THE CHEST: ICD-10-CM

## 2024-08-05 DIAGNOSIS — Z01.818 PRE-OP TESTING: ICD-10-CM

## 2024-08-05 DIAGNOSIS — M81.0 AGE RELATED OSTEOPOROSIS, UNSPECIFIED PATHOLOGICAL FRACTURE PRESENCE: ICD-10-CM

## 2024-08-05 LAB
ALBUMIN SERPL BCP-MCNC: 4.2 G/DL (ref 3.4–5)
ALP SERPL-CCNC: 45 U/L (ref 33–136)
ALT SERPL W P-5'-P-CCNC: 12 U/L (ref 7–45)
ANION GAP SERPL CALC-SCNC: 11 MMOL/L (ref 10–20)
ANION GAP SERPL CALC-SCNC: 12 MMOL/L (ref 10–20)
AST SERPL W P-5'-P-CCNC: 16 U/L (ref 9–39)
BILIRUB SERPL-MCNC: 0.3 MG/DL (ref 0–1.2)
BUN SERPL-MCNC: 16 MG/DL (ref 6–23)
BUN SERPL-MCNC: 16 MG/DL (ref 6–23)
CALCIUM SERPL-MCNC: 9.4 MG/DL (ref 8.6–10.6)
CALCIUM SERPL-MCNC: 9.4 MG/DL (ref 8.6–10.6)
CHLORIDE SERPL-SCNC: 97 MMOL/L (ref 98–107)
CHLORIDE SERPL-SCNC: 98 MMOL/L (ref 98–107)
CO2 SERPL-SCNC: 28 MMOL/L (ref 21–32)
CO2 SERPL-SCNC: 28 MMOL/L (ref 21–32)
CREAT SERPL-MCNC: 0.7 MG/DL (ref 0.5–1.05)
CREAT SERPL-MCNC: 0.71 MG/DL (ref 0.5–1.05)
EGFRCR SERPLBLD CKD-EPI 2021: >90 ML/MIN/1.73M*2
EGFRCR SERPLBLD CKD-EPI 2021: >90 ML/MIN/1.73M*2
ERYTHROCYTE [DISTWIDTH] IN BLOOD BY AUTOMATED COUNT: 13.2 % (ref 11.5–14.5)
GLUCOSE SERPL-MCNC: 93 MG/DL (ref 74–99)
GLUCOSE SERPL-MCNC: 95 MG/DL (ref 74–99)
HCT VFR BLD AUTO: 36.3 % (ref 36–46)
HGB BLD-MCNC: 12 G/DL (ref 12–16)
MCH RBC QN AUTO: 31.3 PG (ref 26–34)
MCHC RBC AUTO-ENTMCNC: 33.1 G/DL (ref 32–36)
MCV RBC AUTO: 95 FL (ref 80–100)
NRBC BLD-RTO: 0 /100 WBCS (ref 0–0)
PLATELET # BLD AUTO: 235 X10*3/UL (ref 150–450)
POTASSIUM SERPL-SCNC: 3.6 MMOL/L (ref 3.5–5.3)
POTASSIUM SERPL-SCNC: 3.8 MMOL/L (ref 3.5–5.3)
PROT SERPL-MCNC: 6.2 G/DL (ref 6.4–8.2)
RBC # BLD AUTO: 3.83 X10*6/UL (ref 4–5.2)
SODIUM SERPL-SCNC: 133 MMOL/L (ref 136–145)
SODIUM SERPL-SCNC: 133 MMOL/L (ref 136–145)
WBC # BLD AUTO: 4.6 X10*3/UL (ref 4.4–11.3)

## 2024-08-05 PROCEDURE — 85027 COMPLETE CBC AUTOMATED: CPT

## 2024-08-05 PROCEDURE — 80053 COMPREHEN METABOLIC PANEL: CPT

## 2024-08-05 PROCEDURE — 36415 COLL VENOUS BLD VENIPUNCTURE: CPT

## 2024-08-05 PROCEDURE — 80048 BASIC METABOLIC PNL TOTAL CA: CPT

## 2024-08-07 ENCOUNTER — APPOINTMENT (OUTPATIENT)
Dept: RHEUMATOLOGY | Facility: CLINIC | Age: 64
End: 2024-08-07
Payer: COMMERCIAL

## 2024-08-07 ENCOUNTER — HOSPITAL ENCOUNTER (OUTPATIENT)
Dept: RADIOLOGY | Facility: CLINIC | Age: 64
Discharge: HOME | End: 2024-08-07
Payer: COMMERCIAL

## 2024-08-07 ENCOUNTER — ANESTHESIA EVENT (OUTPATIENT)
Dept: GASTROENTEROLOGY | Facility: HOSPITAL | Age: 64
End: 2024-08-07
Payer: COMMERCIAL

## 2024-08-07 VITALS
SYSTOLIC BLOOD PRESSURE: 126 MMHG | HEART RATE: 60 BPM | WEIGHT: 137.8 LBS | HEIGHT: 63 IN | OXYGEN SATURATION: 97 % | BODY MASS INDEX: 24.41 KG/M2 | DIASTOLIC BLOOD PRESSURE: 78 MMHG

## 2024-08-07 DIAGNOSIS — M81.0 AGE RELATED OSTEOPOROSIS, UNSPECIFIED PATHOLOGICAL FRACTURE PRESENCE: ICD-10-CM

## 2024-08-07 DIAGNOSIS — M12.812 ROTATOR CUFF ARTHROPATHY OF LEFT SHOULDER: Primary | ICD-10-CM

## 2024-08-07 DIAGNOSIS — M12.812 ROTATOR CUFF ARTHROPATHY OF LEFT SHOULDER: ICD-10-CM

## 2024-08-07 PROCEDURE — 73030 X-RAY EXAM OF SHOULDER: CPT | Mod: LT

## 2024-08-07 PROCEDURE — 99213 OFFICE O/P EST LOW 20 MIN: CPT | Performed by: INTERNAL MEDICINE

## 2024-08-07 PROCEDURE — 1159F MED LIST DOCD IN RCRD: CPT | Performed by: INTERNAL MEDICINE

## 2024-08-07 PROCEDURE — 3008F BODY MASS INDEX DOCD: CPT | Performed by: INTERNAL MEDICINE

## 2024-08-07 ASSESSMENT — ENCOUNTER SYMPTOMS: FATIGUE: 1

## 2024-08-07 NOTE — PROGRESS NOTES
Subjective   Patient ID: Rhoda Pena is a 64 y.o. female who presents for Follow-up (3 mo fuv).  HPI  Patient with history of osteoporosis.  Has been on Reclast and has had infusions in 2014, 2015, 2016, 2020, and 2021.  Previous to that had been on Fosamax for maybe 4 years.    Her last bone density was 1/6/2023.  Left total hip -1.4, left femoral neck -1.8, lumbar spine -1.7    She continues to be on Tegretol for her history of seizure.  She has slight confusion on what to take for calcium supplementation.    Denies any new fractures since last seen    She has had issues with sacroiliac pain.  Had done physical therapy and it was helpful for symptoms.    Patient reports an injury with associated left shoulder and knee pain from 2 years ago. States she fell on ice with arms out-stretched behind her hyper-extending her arm. Clicking in shoulder joint. Said she saw an orthopedic doctor who referred her to physical therapy. Has not tried any medication for left shoulder pain, nothing makes it better. Pain just throughout the day. Can't hold her phone. Can raise her shoulder but not without pain.      Review of Systems   Constitutional:  Positive for fatigue.   Eyes:         No dental issues       Objective   Physical Exam  GEN: NAD A&O x3 appropriate affect  MSK:   POP to left glenohumeral joint posteriorly and inferiorly  Inward rotation of arm results in pain. Cannot raise arm against resistance.     Assessment/Plan       Osteoporosis -DEXA 1/6/2023 left total hip -1.4, left femoral neck -1.8, lumbar spine -1.7.  Improvement from previous bone density from October 2020.  History of fibular head fracture in February 2022.  Has had Reclast infusions in 2014, 2015, 2016, 2020, 2021. Previous to that had been on Fosamax for maybe 4 years.   Still at risk of fracture with her history of Tegretol.  Discussed about calcium supplementation and weightbearing activity.  She is having some sacroiliac pain.  Will do another  inf[facility] of [facility].  Will be due for another bone density in January 2025.    -Possible rotator cuff injury  -Will consider steroid injection if no relief with PT and MRI if steroid injection is not effective   -PT ordered  -Left shoulder XR ordered  -Recommended increasing weight-bearing activity exercises  -RTC     This patient was seen and evaluated by Dr. Emilio Fitzpatrick DPM 08/07/24 10:26 AM   Podiatric Medicine and Surgery

## 2024-08-08 ENCOUNTER — ANESTHESIA (OUTPATIENT)
Dept: GASTROENTEROLOGY | Facility: HOSPITAL | Age: 64
End: 2024-08-08
Payer: COMMERCIAL

## 2024-08-08 ENCOUNTER — HOSPITAL ENCOUNTER (OUTPATIENT)
Dept: GASTROENTEROLOGY | Facility: HOSPITAL | Age: 64
Setting detail: OUTPATIENT SURGERY
Discharge: HOME | End: 2024-08-08
Payer: COMMERCIAL

## 2024-08-08 VITALS
DIASTOLIC BLOOD PRESSURE: 75 MMHG | HEART RATE: 60 BPM | OXYGEN SATURATION: 96 % | RESPIRATION RATE: 16 BRPM | TEMPERATURE: 96.8 F | SYSTOLIC BLOOD PRESSURE: 135 MMHG

## 2024-08-08 DIAGNOSIS — R93.89 ABNORMAL CT OF THE CHEST: ICD-10-CM

## 2024-08-08 PROBLEM — R07.9 CHEST PAIN: Status: RESOLVED | Noted: 2019-01-11 | Resolved: 2024-08-08

## 2024-08-08 PROCEDURE — A31653 PR BRNCHSC EBUS GUIDED SAMPL 3/> NODE STATION/STRUX: Performed by: ANESTHESIOLOGIST ASSISTANT

## 2024-08-08 PROCEDURE — A31653 PR BRNCHSC EBUS GUIDED SAMPL 3/> NODE STATION/STRUX: Performed by: STUDENT IN AN ORGANIZED HEALTH CARE EDUCATION/TRAINING PROGRAM

## 2024-08-08 PROCEDURE — 7100000001 HC RECOVERY ROOM TIME - INITIAL BASE CHARGE

## 2024-08-08 PROCEDURE — 7100000009 HC PHASE TWO TIME - INITIAL BASE CHARGE

## 2024-08-08 PROCEDURE — 31628 BRONCHOSCOPY/LUNG BX EACH: CPT | Performed by: INTERNAL MEDICINE

## 2024-08-08 PROCEDURE — 3700000001 HC GENERAL ANESTHESIA TIME - INITIAL BASE CHARGE

## 2024-08-08 PROCEDURE — 31629 BRONCHOSCOPY/NEEDLE BX EACH: CPT | Performed by: INTERNAL MEDICINE

## 2024-08-08 PROCEDURE — 2500000004 HC RX 250 GENERAL PHARMACY W/ HCPCS (ALT 636 FOR OP/ED): Performed by: ANESTHESIOLOGIST ASSISTANT

## 2024-08-08 PROCEDURE — 3700000002 HC GENERAL ANESTHESIA TIME - EACH INCREMENTAL 1 MINUTE

## 2024-08-08 PROCEDURE — 7100000002 HC RECOVERY ROOM TIME - EACH INCREMENTAL 1 MINUTE

## 2024-08-08 PROCEDURE — 7100000010 HC PHASE TWO TIME - EACH INCREMENTAL 1 MINUTE

## 2024-08-08 PROCEDURE — 2500000005 HC RX 250 GENERAL PHARMACY W/O HCPCS: Performed by: ANESTHESIOLOGIST ASSISTANT

## 2024-08-08 PROCEDURE — 31653 BRONCH EBUS SAMPLNG 3/> NODE: CPT | Performed by: INTERNAL MEDICINE

## 2024-08-08 PROCEDURE — 31654 BRONCH EBUS IVNTJ PERPH LES: CPT | Performed by: INTERNAL MEDICINE

## 2024-08-08 PROCEDURE — 2720000007 HC OR 272 NO HCPCS

## 2024-08-08 RX ORDER — ROCURONIUM BROMIDE 10 MG/ML
INJECTION, SOLUTION INTRAVENOUS AS NEEDED
Status: DISCONTINUED | OUTPATIENT
Start: 2024-08-08 | End: 2024-08-08

## 2024-08-08 RX ORDER — ONDANSETRON HYDROCHLORIDE 2 MG/ML
INJECTION, SOLUTION INTRAVENOUS AS NEEDED
Status: DISCONTINUED | OUTPATIENT
Start: 2024-08-08 | End: 2024-08-08

## 2024-08-08 RX ORDER — PROPOFOL 10 MG/ML
INJECTION, EMULSION INTRAVENOUS AS NEEDED
Status: DISCONTINUED | OUTPATIENT
Start: 2024-08-08 | End: 2024-08-08

## 2024-08-08 RX ORDER — MIDAZOLAM HYDROCHLORIDE 1 MG/ML
INJECTION INTRAMUSCULAR; INTRAVENOUS AS NEEDED
Status: DISCONTINUED | OUTPATIENT
Start: 2024-08-08 | End: 2024-08-08

## 2024-08-08 RX ORDER — SODIUM CHLORIDE, SODIUM LACTATE, POTASSIUM CHLORIDE, CALCIUM CHLORIDE 600; 310; 30; 20 MG/100ML; MG/100ML; MG/100ML; MG/100ML
100 INJECTION, SOLUTION INTRAVENOUS CONTINUOUS
Status: DISCONTINUED | OUTPATIENT
Start: 2024-08-08 | End: 2024-08-09 | Stop reason: HOSPADM

## 2024-08-08 RX ORDER — METOPROLOL TARTRATE 1 MG/ML
INJECTION, SOLUTION INTRAVENOUS AS NEEDED
Status: DISCONTINUED | OUTPATIENT
Start: 2024-08-08 | End: 2024-08-08

## 2024-08-08 RX ORDER — LIDOCAINE HCL/PF 100 MG/5ML
SYRINGE (ML) INTRAVENOUS AS NEEDED
Status: DISCONTINUED | OUTPATIENT
Start: 2024-08-08 | End: 2024-08-08

## 2024-08-08 SDOH — HEALTH STABILITY: MENTAL HEALTH: CURRENT SMOKER: 0

## 2024-08-08 ASSESSMENT — ENCOUNTER SYMPTOMS
WHEEZING: 0
ABDOMINAL PAIN: 0
FEVER: 0
NAUSEA: 0
SHORTNESS OF BREATH: 0
CHILLS: 0
COUGH: 0
ABDOMINAL DISTENTION: 0
FATIGUE: 0
VOMITING: 0

## 2024-08-08 ASSESSMENT — PAIN - FUNCTIONAL ASSESSMENT
PAIN_FUNCTIONAL_ASSESSMENT: 0-10
PAIN_FUNCTIONAL_ASSESSMENT: 0-10

## 2024-08-08 ASSESSMENT — PAIN SCALES - GENERAL
PAINLEVEL_OUTOF10: 0 - NO PAIN
PAIN_LEVEL: 0

## 2024-08-08 NOTE — DISCHARGE INSTRUCTIONS
The anesthetics, sedatives or narcotics which were given to you today will be acting in your body for the next 24 hours, so you might feel a little sleepy or groggy. This feeling should slowly wear off.   Carefully read and follow the instructions below:   You received sedation today.   Do not drive or operate machinery or power tools of any kind.   No alcoholic beverages today, not even beer or wine.   No over the counter medications that contain alcohol or may cause drowsiness.   Do not make important decisions or sign legal documents.     Do not use Aspirin containing products or non-steroidal medications for the next 24 hours.  (Examples of these types of medications include: Advil, Aleve, Ecotrin, Ibuprofen, Motrin or Naprosyn.  This list is not all-inclusive.  Check with your physician or pharmacist before resuming these medications.)  Tylenol, cough medicine, cough drops or throat lozenges may be used when you are allowed to resume eating and drinking.     Call your physician if any of these symptoms occur:   High fever over 101 degrees or chills (a low grade fever is common for 24 hours)   Rash or hives   Persistent nausea or vomiting   Inability to urinate within 8 hours after the procedure  Go directly to the emergency room if you notice any of the following:   Shortness of breath   Chest pain  Coughing up large amounts of bright red blood greater than a teaspoonful of blood clots (about a teaspoonful for the next 24-48 hours is normal, especially if you had a biopsy)  Resume all normal medications unless directed otherwise by your doctor.       Follow up with your referring physician as previously scheduled.    If you experience any problems or have any questions following discharge, please call:   Before 5 pm: [phone]   After 5pm and on weekends: [phone] / [phone] and ask for the Pulmonary Fellow on-call (Pager Number: [phone])

## 2024-08-08 NOTE — ANESTHESIA PROCEDURE NOTES
Airway  Date/Time: 8/8/2024 8:14 AM  Urgency: elective    Airway not difficult    Staffing  Performed: ALIREZA   Authorized by: Mendy Mcmullen DO    Performed by: ROBERT Hsieh  Patient location during procedure: OR    Indications and Patient Condition  Indications for airway management: anesthesia  Spontaneous ventilation: present  Sedation level: deep  Preoxygenated: yes  Patient position: sniffing  MILS not maintained throughout  Mask difficulty assessment: 1 - vent by mask  Planned trial extubation    Final Airway Details  Final airway type: endotracheal airway      Successful airway: ETT  Cuffed: yes   Successful intubation technique: direct laryngoscopy  Endotracheal tube insertion site: oral  Blade: Wai  Blade size: #3  ETT size (mm): 8.5  Cormack-Lehane Classification: grade I - full view of glottis  Placement verified by: chest auscultation and capnometry   Measured from: lips  ETT to lips (cm): 23  Number of attempts at approach: 1  Ventilation between attempts: none  Number of other approaches attempted: 0

## 2024-08-08 NOTE — ANESTHESIA PREPROCEDURE EVALUATION
Patient: Rhoda Pena    Procedure Information       Date/Time: 08/08/24 0730    Scheduled providers: Alex Hernandez MD; Robin Choe RN    Procedure: BRONCHOSCOPY    Location: [facility]            Relevant Problems   Anesthesia (within normal limits)      Cardiac (within normal limits)   (+) Chest pain (Resolved)      Pulmonary  Lung lesion      Neuro   (+) Chronic depression   (+) Complex partial seizure evolving to generalized seizure (Multi) (Well controlled, last seizure more than decade ago)   (+) Lumbar radiculopathy      GI   (+) Esophageal reflux   (+) Irritable bowel syndrome with diarrhea      /Renal (within normal limits)      Liver (within normal limits)      Endocrine (within normal limits)      Hematology (within normal limits)      Musculoskeletal  Chronic left shoulder pain, metal left wrist       Clinical information reviewed:   Tobacco  Allergies  Meds   Med Hx  Surg Hx   Fam Hx  Soc Hx        NPO Detail:  NPO/Void Status  Date of Last Liquid: 08/08/24 (sip with meds)  Time of Last Liquid: 0000  Date of Last Solid: 08/07/24  Time of Last Solid: 1830  Last Intake Type: Clear fluids         Physical Exam    Airway  Mallampati: I  TM distance: >3 FB  Neck ROM: full     Cardiovascular    Dental    Pulmonary    Abdominal            Anesthesia Plan    History of general anesthesia?: yes  History of complications of general anesthesia?: no    ASA 3     general   (GETA)  The patient is not a current smoker.    intravenous induction   Postoperative administration of opioids is intended.  Trial extubation is planned.  Anesthetic plan and risks discussed with patient.  Use of blood products discussed with patient who consented to blood products.    Plan discussed with CAA and attending.

## 2024-08-08 NOTE — H&P
History Of Present Illness  Rhoda Pena is a 64 y.o. female presenting for bronchoscopy.    Denies any symptoms.     Past Medical History  Past Medical History:   Diagnosis Date    Achilles tendinitis, unspecified leg     Achilles tendinitis    Acute maxillary sinusitis, unspecified 2018    Acute maxillary sinusitis    Acute nasopharyngitis (common cold) 2016    Common cold    Age-related osteoporosis without current pathological fracture 2015    Encounter for ongoing osteoporosis therapy, bisphosphonates    Complete or unspecified spontaneous  without complication ([facility])         Dyspnea, unspecified 2019    Paroxysmal dyspnea    Encounter for general adult medical examination without abnormal findings 2018    Annual physical exam    Encounter for health counseling related to travel 2018    Counseling about travel    Encounter for screening for lipoid disorders 2019    Screening cholesterol level    Epigastric pain 2020    Dyspepsia    Left patella fracture 2023    Mastodynia 2021    Breast pain, right    Otalgia, right ear 2020    Otalgia, right    Other general symptoms and signs 2018    Flu-like symptoms    Other microscopic hematuria 2017    Hematuria, microscopic    Pain in right hand 2019    Pain of right hand    Pain in right knee 2019    Knee pain, right    Pain in unspecified shoulder     Shoulder pain    Personal history of (healed) traumatic fracture 10/30/2020    History of fracture of phalanx of toe    Personal history of other diseases of the digestive system 2018    History of gastroenteritis    Personal history of other diseases of the musculoskeletal system and connective tissue 2016    History of low back pain    Personal history of other diseases of the musculoskeletal system and connective tissue 10/19/2020    History of osteopenia    Personal history of other diseases of  the nervous system and sense organs 08/05/2019    Personal history of seizure disorder    Personal history of other diseases of the respiratory system 05/13/2022    History of acute sinusitis    Personal history of other diseases of the respiratory system 10/30/2020    History of chronic rhinitis    Personal history of other diseases of the respiratory system 04/19/2017    History of influenza    Personal history of other diseases of the respiratory system 03/07/2020    History of viral pharyngitis    Personal history of other infectious and parasitic diseases     History of varicella    Personal history of other medical treatment 11/02/2018    History of screening mammography    Personal history of other specified conditions     History of headache    Personal history of other specified conditions 03/24/2020    History of abnormal mammogram    Personal history of other specified conditions 03/02/2018    History of fatigue    Personal history of other specified conditions 09/19/2017    History of dysuria    Personal history of other specified conditions 08/26/2020    History of diarrhea    Personal history of other specified conditions 03/03/2020    History of abdominal pain    Personal history of other specified conditions 01/11/2019    History of chest pain    Personal history of urinary (tract) infections 11/07/2016    History of urinary tract infection    Personal history of urinary (tract) infections 07/02/2018    History of urinary tract infection    Strain of muscle(s) and tendon(s) of peroneal muscle group at lower leg level, left leg, initial encounter 11/05/2020    Strain of peroneal tendon of left foot, initial encounter    Unspecified fracture of left toe(s), initial encounter for closed fracture 10/30/2020    Fracture of proximal phalanx of toe of left foot    Unspecified injury of left wrist, hand and finger(s), initial encounter 01/05/2021    Left wrist injury    Unspecified injury of unspecified  foot, initial encounter     Foot injury    Urinary tract infection, site not specified 07/02/2018    Acute UTI    Urinary tract infection, site not specified 11/07/2016    Acute UTI       Surgical History  Past Surgical History:   Procedure Laterality Date    APPENDECTOMY  10/24/2013    Appendectomy    LAPAROSCOPY DIAGNOSTIC / BIOPSY / ASPIRATION / LYSIS  12/18/2014    Exploratory Laparoscopy    US GUIDED SOFT TISSUE BIOPSY  12/18/2023    US GUIDED SOFT TISSUE BIOPSY 12/18/2023 [facility] US        Social History  She reports that she quit smoking about 34 years ago. Her smoking use included cigarettes. She has never used smokeless tobacco. She reports current alcohol use. She reports that she does not use drugs.    Family History  Family History   Problem Relation Name Age of Onset    Dementia Mother      Hypertension Mother      Diabetes type II Mother      Coronary artery disease Father      Other (Congestive heart failure) Father      Breast cancer Sister          Allergies  Phenytoin and Codeine    Review of Systems   Constitutional:  Negative for chills, fatigue and fever.   Respiratory:  Negative for cough, shortness of breath and wheezing.    Cardiovascular:  Negative for chest pain and leg swelling.   Gastrointestinal:  Negative for abdominal distention, abdominal pain, nausea and vomiting.        Physical Exam  Constitutional:       Appearance: Normal appearance.   HENT:      Head: Normocephalic and atraumatic.   Eyes:      Extraocular Movements: Extraocular movements intact.      Pupils: Pupils are equal, round, and reactive to light.   Cardiovascular:      Rate and Rhythm: Normal rate and regular rhythm.   Pulmonary:      Effort: Pulmonary effort is normal.      Breath sounds: Normal breath sounds.   Abdominal:      General: Abdomen is flat.      Palpations: Abdomen is soft.   Neurological:      Mental Status: She is alert.        Last Recorded Vitals  Blood pressure 132/70, pulse 59, temperature 36.4 °C (97.5  °F), temperature source Temporal, resp. rate 18, SpO2 98%.       Assessment/Plan   Active Problems:  There are no active Hospital Problems.      Ms. Pena presents today for diagnostic bronchoscopy under GA for evaluation of RLL GGO.  No contraindication to proceeding.       Alex Hernandez MD

## 2024-08-08 NOTE — ANESTHESIA POSTPROCEDURE EVALUATION
Patient: Rhoda Pena    Procedure Summary       Date: 08/08/24 Room / Location: [facility]    Anesthesia Start: 0801 Anesthesia Stop: 0958    Procedure: BRONCHOSCOPY Diagnosis: Abnormal CT of the chest    Scheduled Providers: Alex Hernandez MD; Robin Choe, RN Responsible Provider: Mendy Mcmullen DO    Anesthesia Type: general ASA Status: 3            Anesthesia Type: general    Vitals Value Taken Time   /71 08/08/24 0955   Temp 36 °C (96.8 °F) 08/08/24 0955   Pulse 63 08/08/24 0955   Resp 17 08/08/24 0955   SpO2 100 % 08/08/24 0955       Anesthesia Post Evaluation    Patient location during evaluation: PACU  Patient participation: complete - patient participated  Level of consciousness: awake and alert  Pain score: 0  Pain management: adequate  Airway patency: patent  Cardiovascular status: acceptable, blood pressure returned to baseline and hemodynamically stable  Respiratory status: acceptable, room air, nonlabored ventilation and spontaneous ventilation  Hydration status: acceptable  Postoperative Nausea and Vomiting: none    There were no known notable events for this encounter.

## 2024-08-09 ENCOUNTER — APPOINTMENT (OUTPATIENT)
Dept: GASTROENTEROLOGY | Facility: HOSPITAL | Age: 64
End: 2024-08-09
Payer: COMMERCIAL

## 2024-08-09 NOTE — ADDENDUM NOTE
Encounter addended by: Sheyla Wood RN on: 8/9/2024 9:28 AM   Actions taken: Contacts section saved, Flowsheet accepted

## 2024-08-13 LAB
LABORATORY COMMENT REPORT: NORMAL
PATH REPORT.FINAL DX SPEC: NORMAL
PATH REPORT.GROSS SPEC: NORMAL
PATH REPORT.TOTAL CANCER: NORMAL

## 2024-08-15 LAB
LAB AP ASR DISCLAIMER: NORMAL
LABORATORY COMMENT REPORT: NORMAL
LABORATORY COMMENT REPORT: NORMAL
PATH REPORT.FINAL DX SPEC: NORMAL
PATH REPORT.GROSS SPEC: NORMAL
PATH REPORT.INTRAOP OBS SPEC DOC: NORMAL
PATH REPORT.TOTAL CANCER: NORMAL

## 2024-08-16 ENCOUNTER — HOSPITAL ENCOUNTER (OUTPATIENT)
Dept: RADIOLOGY | Facility: HOSPITAL | Age: 64
Discharge: HOME | End: 2024-08-16
Payer: COMMERCIAL

## 2024-08-16 VITALS — HEIGHT: 63 IN | WEIGHT: 135 LBS | BODY MASS INDEX: 23.92 KG/M2

## 2024-08-16 DIAGNOSIS — Z12.39 BREAST SCREENING: ICD-10-CM

## 2024-08-16 PROCEDURE — 77067 SCR MAMMO BI INCL CAD: CPT

## 2024-08-19 ENCOUNTER — OFFICE VISIT (OUTPATIENT)
Dept: CARDIAC SURGERY | Facility: CLINIC | Age: 64
End: 2024-08-19
Payer: COMMERCIAL

## 2024-08-19 VITALS
BODY MASS INDEX: 23.92 KG/M2 | RESPIRATION RATE: 18 BRPM | OXYGEN SATURATION: 98 % | WEIGHT: 135 LBS | SYSTOLIC BLOOD PRESSURE: 122 MMHG | HEIGHT: 63 IN | DIASTOLIC BLOOD PRESSURE: 62 MMHG | HEART RATE: 62 BPM

## 2024-08-19 DIAGNOSIS — C34.31 MALIGNANT NEOPLASM OF LOWER LOBE OF RIGHT LUNG (MULTI): Primary | ICD-10-CM

## 2024-08-19 LAB
LAB AP ASR DISCLAIMER: NORMAL
LABORATORY COMMENT REPORT: NORMAL
LABORATORY COMMENT REPORT: NORMAL
PATH REPORT.ADDENDUM SPEC: NORMAL
PATH REPORT.FINAL DX SPEC: NORMAL
PATH REPORT.GROSS SPEC: NORMAL
PATH REPORT.INTRAOP OBS SPEC DOC: NORMAL
PATH REPORT.TOTAL CANCER: NORMAL

## 2024-08-19 PROCEDURE — 1036F TOBACCO NON-USER: CPT | Performed by: THORACIC SURGERY (CARDIOTHORACIC VASCULAR SURGERY)

## 2024-08-19 PROCEDURE — 99205 OFFICE O/P NEW HI 60 MIN: CPT | Performed by: THORACIC SURGERY (CARDIOTHORACIC VASCULAR SURGERY)

## 2024-08-19 PROCEDURE — 99215 OFFICE O/P EST HI 40 MIN: CPT | Performed by: THORACIC SURGERY (CARDIOTHORACIC VASCULAR SURGERY)

## 2024-08-19 PROCEDURE — 1126F AMNT PAIN NOTED NONE PRSNT: CPT | Performed by: THORACIC SURGERY (CARDIOTHORACIC VASCULAR SURGERY)

## 2024-08-19 PROCEDURE — 3008F BODY MASS INDEX DOCD: CPT | Performed by: THORACIC SURGERY (CARDIOTHORACIC VASCULAR SURGERY)

## 2024-08-19 PROCEDURE — 1159F MED LIST DOCD IN RCRD: CPT | Performed by: THORACIC SURGERY (CARDIOTHORACIC VASCULAR SURGERY)

## 2024-08-19 ASSESSMENT — LIFESTYLE VARIABLES
HOW MANY STANDARD DRINKS CONTAINING ALCOHOL DO YOU HAVE ON A TYPICAL DAY: 1 OR 2
AUDIT-C TOTAL SCORE: 1
HOW OFTEN DO YOU HAVE A DRINK CONTAINING ALCOHOL: MONTHLY OR LESS
SKIP TO QUESTIONS 9-10: 1
HOW OFTEN DO YOU HAVE SIX OR MORE DRINKS ON ONE OCCASION: NEVER

## 2024-08-19 ASSESSMENT — ENCOUNTER SYMPTOMS
MUSCULOSKELETAL NEGATIVE: 1
NAUSEA: 0
CHILLS: 0
PSYCHIATRIC NEGATIVE: 1
OCCASIONAL FEELINGS OF UNSTEADINESS: 0
LOSS OF SENSATION IN FEET: 0
ENDOCRINE NEGATIVE: 1
FATIGUE: 0
VOMITING: 0
STRIDOR: 0
CONSTIPATION: 0
UNEXPECTED WEIGHT CHANGE: 0
CHOKING: 0
CHEST TIGHTNESS: 0
DEPRESSION: 0
EYES NEGATIVE: 1
DIARRHEA: 0
ALLERGIC/IMMUNOLOGIC NEGATIVE: 1
ABDOMINAL DISTENTION: 0
COUGH: 0
PALPITATIONS: 0
SHORTNESS OF BREATH: 0
WHEEZING: 0
APPETITE CHANGE: 0
FEVER: 0
HEMATOLOGIC/LYMPHATIC NEGATIVE: 1
ABDOMINAL PAIN: 0
NEUROLOGICAL NEGATIVE: 1
DIAPHORESIS: 0

## 2024-08-19 ASSESSMENT — PATIENT HEALTH QUESTIONNAIRE - PHQ9
1. LITTLE INTEREST OR PLEASURE IN DOING THINGS: NOT AT ALL
SUM OF ALL RESPONSES TO PHQ9 QUESTIONS 1 AND 2: 0
2. FEELING DOWN, DEPRESSED OR HOPELESS: NOT AT ALL

## 2024-08-19 ASSESSMENT — PAIN SCALES - GENERAL: PAINLEVEL: 0-NO PAIN

## 2024-08-19 NOTE — H&P (VIEW-ONLY)
Subjective   Patient ID: Rhoda Pena is a 64 y.o. female who presents for Consult (RLL nodule).  HPI    64-year-old woman who was found to have a right lower lobe consolidation suspicious for malignancy.  She underwent a PET scan showing an SUV max of 2.7.  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  She underwent PFTs showing an FEV1 of 112% of predicted and a DLCO of 80% predicted.  I discussion with her about the best next step.  I think surgery will be the best option here we discussed the alternative of radiation but I do not think this is a good option for her and she is interested in the surgical option.  We discussed the mechanics of a robotic assisted right lower lobe lobectomy with mediastinal lymphadenectomy.  On imaging the area of consolidation is close to the takeoff of the right lower lobe.  On bronchoscopic images it does not look like there is any endobronchial or any mucosal changes on the bronchus but I discussed with her that if from frozen the margin on the bronchus is compromised we can try to remove more versus doing a bilobectomy to really achieve a negative margin.  She has a nodule in the left lower lobe that is not PET avid and might be just a granuloma.  Will proceed with an operation at [facility] on August 29.  We discussed length of stay potential complications and expected recovery.    Review of Systems   Constitutional:  Negative for appetite change, chills, diaphoresis, fatigue, fever and unexpected weight change.   HENT: Negative.     Eyes: Negative.    Respiratory:  Negative for cough, choking, chest tightness, shortness of breath, wheezing and stridor.    Cardiovascular:  Negative for chest pain, palpitations and leg swelling.   Gastrointestinal:  Negative for abdominal distention, abdominal pain, constipation, diarrhea,  nausea and vomiting.   Endocrine: Negative.    Genitourinary: Negative.    Musculoskeletal: Negative.    Skin: Negative.    Allergic/Immunologic: Negative.    Neurological: Negative.    Hematological: Negative.    Psychiatric/Behavioral: Negative.     All other systems reviewed and are negative.      Objective   Physical Exam  Constitutional:       Appearance: Normal appearance.   HENT:      Head: Normocephalic and atraumatic.      Nose: Nose normal.      Mouth/Throat:      Mouth: Mucous membranes are moist.      Pharynx: Oropharynx is clear.   Eyes:      Extraocular Movements: Extraocular movements intact.      Conjunctiva/sclera: Conjunctivae normal.      Pupils: Pupils are equal, round, and reactive to light.   Cardiovascular:      Rate and Rhythm: Normal rate and regular rhythm.      Pulses: Normal pulses.      Heart sounds: Normal heart sounds.   Pulmonary:      Effort: Pulmonary effort is normal. No respiratory distress.      Breath sounds: Normal breath sounds. No stridor. No wheezing, rhonchi or rales.   Chest:      Chest wall: No tenderness.   Abdominal:      General: Abdomen is flat. Bowel sounds are normal.      Palpations: Abdomen is soft.   Musculoskeletal:         General: Normal range of motion.      Cervical back: Normal range of motion and neck supple.   Skin:     General: Skin is warm and dry.      Capillary Refill: Capillary refill takes less than 2 seconds.   Neurological:      General: No focal deficit present.      Mental Status: She is alert and oriented to person, place, and time.         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

## 2024-08-19 NOTE — PROGRESS NOTES
Subjective   Patient ID: Rhoda Pena is a 64 y.o. female who presents for Consult (RLL nodule).  HPI    64-year-old woman who was found to have a right lower lobe consolidation suspicious for malignancy.  She underwent a PET scan showing an SUV max of 2.7.  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  She underwent PFTs showing an FEV1 of 112% of predicted and a DLCO of 80% predicted.  I discussion with her about the best next step.  I think surgery will be the best option here we discussed the alternative of radiation but I do not think this is a good option for her and she is interested in the surgical option.  We discussed the mechanics of a robotic assisted right lower lobe lobectomy with mediastinal lymphadenectomy.  On imaging the area of consolidation is close to the takeoff of the right lower lobe.  On bronchoscopic images it does not look like there is any endobronchial or any mucosal changes on the bronchus but I discussed with her that if from frozen the margin on the bronchus is compromised we can try to remove more versus doing a bilobectomy to really achieve a negative margin.  She has a nodule in the left lower lobe that is not PET avid and might be just a granuloma.  Will proceed with an operation at [facility] on August 29.  We discussed length of stay potential complications and expected recovery.    Review of Systems   Constitutional:  Negative for appetite change, chills, diaphoresis, fatigue, fever and unexpected weight change.   HENT: Negative.     Eyes: Negative.    Respiratory:  Negative for cough, choking, chest tightness, shortness of breath, wheezing and stridor.    Cardiovascular:  Negative for chest pain, palpitations and leg swelling.   Gastrointestinal:  Negative for abdominal distention, abdominal pain, constipation, diarrhea,  nausea and vomiting.   Endocrine: Negative.    Genitourinary: Negative.    Musculoskeletal: Negative.    Skin: Negative.    Allergic/Immunologic: Negative.    Neurological: Negative.    Hematological: Negative.    Psychiatric/Behavioral: Negative.     All other systems reviewed and are negative.      Objective   Physical Exam  Constitutional:       Appearance: Normal appearance.   HENT:      Head: Normocephalic and atraumatic.      Nose: Nose normal.      Mouth/Throat:      Mouth: Mucous membranes are moist.      Pharynx: Oropharynx is clear.   Eyes:      Extraocular Movements: Extraocular movements intact.      Conjunctiva/sclera: Conjunctivae normal.      Pupils: Pupils are equal, round, and reactive to light.   Cardiovascular:      Rate and Rhythm: Normal rate and regular rhythm.      Pulses: Normal pulses.      Heart sounds: Normal heart sounds.   Pulmonary:      Effort: Pulmonary effort is normal. No respiratory distress.      Breath sounds: Normal breath sounds. No stridor. No wheezing, rhonchi or rales.   Chest:      Chest wall: No tenderness.   Abdominal:      General: Abdomen is flat. Bowel sounds are normal.      Palpations: Abdomen is soft.   Musculoskeletal:         General: Normal range of motion.      Cervical back: Normal range of motion and neck supple.   Skin:     General: Skin is warm and dry.      Capillary Refill: Capillary refill takes less than 2 seconds.   Neurological:      General: No focal deficit present.      Mental Status: She is alert and oriented to person, place, and time.         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

## 2024-08-20 DIAGNOSIS — Z09 S/P LUNG SURGERY, FOLLOW-UP EXAM: ICD-10-CM

## 2024-08-21 ENCOUNTER — PHARMACY VISIT (OUTPATIENT)
Dept: PHARMACY | Facility: CLINIC | Age: 64
End: 2024-08-21
Payer: COMMERCIAL

## 2024-08-21 ENCOUNTER — OFFICE VISIT (OUTPATIENT)
Dept: OBSTETRICS AND GYNECOLOGY | Facility: CLINIC | Age: 64
End: 2024-08-21
Payer: COMMERCIAL

## 2024-08-21 VITALS
WEIGHT: 135 LBS | HEIGHT: 63 IN | DIASTOLIC BLOOD PRESSURE: 76 MMHG | BODY MASS INDEX: 23.92 KG/M2 | HEART RATE: 66 BPM | SYSTOLIC BLOOD PRESSURE: 130 MMHG

## 2024-08-21 DIAGNOSIS — R39.9 UTI SYMPTOMS: ICD-10-CM

## 2024-08-21 DIAGNOSIS — N95.2 VAGINAL ATROPHY: ICD-10-CM

## 2024-08-21 DIAGNOSIS — Z12.4 CERVICAL CANCER SCREENING: ICD-10-CM

## 2024-08-21 DIAGNOSIS — Z01.419 WELL WOMAN EXAM: Primary | ICD-10-CM

## 2024-08-21 LAB
POC APPEARANCE, URINE: CLEAR
POC BILIRUBIN, URINE: NEGATIVE
POC BLOOD, URINE: ABNORMAL
POC COLOR, URINE: YELLOW
POC GLUCOSE, URINE: NEGATIVE MG/DL
POC KETONES, URINE: NEGATIVE MG/DL
POC LEUKOCYTES, URINE: NEGATIVE
POC NITRITE,URINE: NEGATIVE
POC PH, URINE: 7 PH
POC PROTEIN, URINE: NEGATIVE MG/DL
POC SPECIFIC GRAVITY, URINE: 1.01
POC UROBILINOGEN, URINE: 0.2 EU/DL

## 2024-08-21 PROCEDURE — 99397 PER PM REEVAL EST PAT 65+ YR: CPT | Performed by: NURSE PRACTITIONER

## 2024-08-21 PROCEDURE — RXMED WILLOW AMBULATORY MEDICATION CHARGE

## 2024-08-21 PROCEDURE — 88142 CYTOPATH C/V THIN LAYER: CPT | Mod: TC,GCY | Performed by: NURSE PRACTITIONER

## 2024-08-21 PROCEDURE — 87624 HPV HI-RISK TYP POOLED RSLT: CPT | Performed by: NURSE PRACTITIONER

## 2024-08-21 PROCEDURE — 81003 URINALYSIS AUTO W/O SCOPE: CPT | Performed by: NURSE PRACTITIONER

## 2024-08-21 PROCEDURE — 3008F BODY MASS INDEX DOCD: CPT | Performed by: NURSE PRACTITIONER

## 2024-08-21 PROCEDURE — 1159F MED LIST DOCD IN RCRD: CPT | Performed by: NURSE PRACTITIONER

## 2024-08-21 PROCEDURE — 1126F AMNT PAIN NOTED NONE PRSNT: CPT | Performed by: NURSE PRACTITIONER

## 2024-08-21 RX ORDER — ESTRADIOL 0.1 MG/G
1 CREAM VAGINAL 2 TIMES WEEKLY
Qty: 42.5 G | Refills: 3 | Status: SHIPPED | OUTPATIENT
Start: 2024-08-22 | End: 2025-08-22

## 2024-08-21 ASSESSMENT — PAIN SCALES - GENERAL: PAINLEVEL: 0-NO PAIN

## 2024-08-21 NOTE — PROGRESS NOTES
Subjective   Patient ID: Rhoda Pena is a 64 y.o. female who presents for Annual.  HPI    Record Review:  - 7/2/2024 Dr. Griffith note RE: Discussed CT-chest done on 6/25/2024 today to follow-up on her lung nodule and infiltrate. Summary to follow - The small left lower lobe nodule measuring 8 mm is unchanged. There is a 5 mm nodule in the right lower lobe that appears more prominent but not increased in size. There continues to be a focal area of dense GGO in the RLL, that is similar to previous. Bilateral mild and faint subpleural reticular changes of etiology of which is unclear but could be related to old smoking history.  - 3/5/2024 Dr. Griffith note RE: Lung nodules - 2 of them in LL; 7 mm conglomerate of GGO and a smaller 2 mm.  - 5/24/2023 Anjana Tovar APRN-CNP note RE: Rx refill for estrogen cream. Last Pap was normal.    Labs:  - UA: negative    Interval History:  - A recent lung biopsy revealed malignant cells, specifically adenocarcinoma, mucinous carcinoma.  - She has been following up with a pulmonologist after a calcium score scan in December 2023 revealed a lung nodule.  - Multiple CT scans and a bronchoscopy were performed, leading to the diagnosis of lung cancer.  - She is scheduled for surgery to remove the right lower lobe of her lung.    Urinary Symptoms:  - She left a urine sample due to suspicions of a UTI but her UA was negative.    Vaginal Symptoms:  - She is not using her estrogen cream.  - She denies burning and itching.     Family History:  - Significant for cancer.    Social History:  - She is considering prison depending on the outcome of her surgery and any subsequent treatments.    Review of Systems   Constitutional: Negative.    HENT: Negative.     Eyes: Negative.    Respiratory: Negative.     Cardiovascular: Negative.    Gastrointestinal:  Positive for constipation.   Endocrine: Negative.    Genitourinary:         Reports vaginal dryness   Musculoskeletal: Negative.     Neurological: Negative.    Psychiatric/Behavioral: Negative.         Objective   Physical Exam  Genitourinary:     Comments: External genitalia normal. Vaginal atrophy noted, vaginal opening small. Cervix not visualized due to atrophy.        Assessment/Plan     64-year-old female being assessed for a well woman exam and vaginal atrophy.    Diagnoses:  #1 Well woman exam  #2 Vaginal atrophy    Plan:  Well woman exam  - Last mammogram normal, continue annual screenings.  - Pap smear attempted. Unable to visualize cervix due to severe vaginal atrophy and scarring. Minimal bleeding noted due to atrophic tissues.  - Encouraged hydration to alleviate any potential urinary discomfort.    2. Vaginal atrophy  - Continue vaginal estrogen cream.  - New Rx for vaginal estrogen cream sent to the patient's preferred pharmacy.    Follow-up with DRU Ambriz in 6 months to reassess vaginal atrophy and ensure compliance with treatment.     Scribe Attestation  By signing my name below, Kirit BROOKS Scribe, attest that this documentation has been prepared under the direction and in the presence of DRU Olsen on 08/21/2024 at 9:34 AM.  I, DRU Olsen, personally performed the services described in this documentation which was scribed virtually and I confirm that it is both accurate and complete.     DRU Olsen 08/21/24 4:23 PM

## 2024-08-22 ENCOUNTER — APPOINTMENT (OUTPATIENT)
Dept: OCCUPATIONAL THERAPY | Facility: HOSPITAL | Age: 64
End: 2024-08-22
Payer: COMMERCIAL

## 2024-08-22 ENCOUNTER — HOSPITAL ENCOUNTER (OUTPATIENT)
Dept: RADIOLOGY | Facility: HOSPITAL | Age: 64
Discharge: HOME | End: 2024-08-22
Payer: COMMERCIAL

## 2024-08-22 ENCOUNTER — HOSPITAL ENCOUNTER (OUTPATIENT)
Dept: CARDIOLOGY | Facility: HOSPITAL | Age: 64
Discharge: HOME | End: 2024-08-22
Payer: COMMERCIAL

## 2024-08-22 ENCOUNTER — PRE-ADMISSION TESTING (OUTPATIENT)
Dept: PREADMISSION TESTING | Facility: HOSPITAL | Age: 64
End: 2024-08-22
Payer: COMMERCIAL

## 2024-08-22 VITALS
HEART RATE: 59 BPM | BODY MASS INDEX: 24.99 KG/M2 | OXYGEN SATURATION: 99 % | WEIGHT: 135.8 LBS | HEIGHT: 62 IN | SYSTOLIC BLOOD PRESSURE: 124 MMHG | DIASTOLIC BLOOD PRESSURE: 76 MMHG | TEMPERATURE: 97.6 F

## 2024-08-22 DIAGNOSIS — Z01.818 PREOPERATIVE CLEARANCE: Primary | ICD-10-CM

## 2024-08-22 DIAGNOSIS — Z01.818 PREOPERATIVE CLEARANCE: ICD-10-CM

## 2024-08-22 DIAGNOSIS — Z09 S/P LUNG SURGERY, FOLLOW-UP EXAM: ICD-10-CM

## 2024-08-22 DIAGNOSIS — C34.31 MALIGNANT NEOPLASM OF LOWER LOBE OF RIGHT LUNG (MULTI): ICD-10-CM

## 2024-08-22 LAB
APTT PPP: 38 SECONDS (ref 27–38)
ERYTHROCYTE [DISTWIDTH] IN BLOOD BY AUTOMATED COUNT: 13.4 % (ref 11.5–14.5)
HCT VFR BLD AUTO: 40.6 % (ref 36–46)
HGB BLD-MCNC: 13.1 G/DL (ref 12–16)
INR PPP: 1 (ref 0.9–1.1)
MCH RBC QN AUTO: 31.1 PG (ref 26–34)
MCHC RBC AUTO-ENTMCNC: 32.3 G/DL (ref 32–36)
MCV RBC AUTO: 96 FL (ref 80–100)
NRBC BLD-RTO: 0 /100 WBCS (ref 0–0)
PLATELET # BLD AUTO: 297 X10*3/UL (ref 150–450)
PROTHROMBIN TIME: 11.5 SECONDS (ref 9.8–12.8)
RBC # BLD AUTO: 4.21 X10*6/UL (ref 4–5.2)
WBC # BLD AUTO: 5.4 X10*3/UL (ref 4.4–11.3)

## 2024-08-22 PROCEDURE — 36415 COLL VENOUS BLD VENIPUNCTURE: CPT

## 2024-08-22 PROCEDURE — 85610 PROTHROMBIN TIME: CPT

## 2024-08-22 PROCEDURE — 85027 COMPLETE CBC AUTOMATED: CPT

## 2024-08-22 PROCEDURE — 71046 X-RAY EXAM CHEST 2 VIEWS: CPT

## 2024-08-22 PROCEDURE — 86901 BLOOD TYPING SEROLOGIC RH(D): CPT

## 2024-08-22 PROCEDURE — 87081 CULTURE SCREEN ONLY: CPT

## 2024-08-22 PROCEDURE — 93010 ELECTROCARDIOGRAM REPORT: CPT | Performed by: INTERNAL MEDICINE

## 2024-08-22 PROCEDURE — 93005 ELECTROCARDIOGRAM TRACING: CPT

## 2024-08-22 PROCEDURE — 99245 OFF/OP CONSLTJ NEW/EST HI 55: CPT | Performed by: NURSE ANESTHETIST, CERTIFIED REGISTERED

## 2024-08-22 RX ORDER — CHLORHEXIDINE GLUCONATE ORAL RINSE 1.2 MG/ML
15 SOLUTION DENTAL AS NEEDED
Qty: 473 ML | Refills: 0 | Status: SHIPPED | OUTPATIENT
Start: 2024-08-22 | End: 2024-08-31 | Stop reason: HOSPADM

## 2024-08-22 RX ORDER — CHLORHEXIDINE GLUCONATE 40 MG/ML
SOLUTION TOPICAL DAILY PRN
Qty: 473 ML | Refills: 0 | Status: SHIPPED | OUTPATIENT
Start: 2024-08-22 | End: 2024-08-31 | Stop reason: HOSPADM

## 2024-08-22 ASSESSMENT — ENCOUNTER SYMPTOMS
CONSTITUTIONAL NEGATIVE: 1
CARDIOVASCULAR NEGATIVE: 1
NEUROLOGICAL NEGATIVE: 1
RESPIRATORY NEGATIVE: 1
ENDOCRINE NEGATIVE: 1
EYES NEGATIVE: 1
CONSTITUTIONAL NEGATIVE: 1
NEUROLOGICAL NEGATIVE: 1
MUSCULOSKELETAL NEGATIVE: 1
MUSCULOSKELETAL NEGATIVE: 1
CONSTIPATION: 1
PSYCHIATRIC NEGATIVE: 1
GASTROINTESTINAL NEGATIVE: 1
RESPIRATORY NEGATIVE: 1
NECK NEGATIVE: 1
CARDIOVASCULAR NEGATIVE: 1
ENDOCRINE NEGATIVE: 1

## 2024-08-22 ASSESSMENT — DUKE ACTIVITY SCORE INDEX (DASI)
TOTAL_SCORE: 58.2
CAN YOU TAKE CARE OF YOURSELF (EAT, DRESS, BATHE, OR USE TOILET): YES
CAN YOU RUN A SHORT DISTANCE: YES
CAN YOU PARTICIPATE IN MODERATE RECREATIONAL ACTIVITIES LIKE GOLF, BOWLING, DANCING, DOUBLES TENNIS OR THROWING A BASEBALL OR FOOTBALL: YES
CAN YOU DO HEAVY WORK AROUND THE HOUSE LIKE SCRUBBING FLOORS OR LIFTING AND MOVING HEAVY FURNITURE: YES
CAN YOU DO MODERATE WORK AROUND THE HOUSE LIKE VACUUMING, SWEEPING FLOORS OR CARRYING GROCERIES: YES
CAN YOU PARTICIPATE IN STRENOUS SPORTS LIKE SWIMMING, SINGLES TENNIS, FOOTBALL, BASKETBALL, OR SKIING: YES
CAN YOU HAVE SEXUAL RELATIONS: YES
CAN YOU DO YARD WORK LIKE RAKING LEAVES, WEEDING OR PUSHING A MOWER: YES
CAN YOU DO LIGHT WORK AROUND THE HOUSE LIKE DUSTING OR WASHING DISHES: YES
DASI METS SCORE: 9.9
CAN YOU WALK A BLOCK OR TWO ON LEVEL GROUND: YES
CAN YOU CLIMB A FLIGHT OF STAIRS OR WALK UP A HILL: YES
CAN YOU WALK INDOORS, SUCH AS AROUND YOUR HOUSE: YES

## 2024-08-22 ASSESSMENT — LIFESTYLE VARIABLES: SMOKING_STATUS: NONSMOKER

## 2024-08-22 NOTE — CPM/PAT H&P
CPM/PAT Evaluation       Name: Rhoda Pena (Rhoda Pena)  /Age: 1960/64 y.o.     Visit Type:   In-Person         Past Medical History:   Diagnosis Date    Achilles tendinitis, unspecified leg     Achilles tendinitis    Acute maxillary sinusitis, unspecified 2018    Acute maxillary sinusitis    Acute nasopharyngitis (common cold) 2016    Common cold    Adenocarcinoma of left lung (Multi)     Age-related osteoporosis without current pathological fracture 2015    Encounter for ongoing osteoporosis therapy, bisphosphonates    Complete or unspecified spontaneous  without complication ([facility])         Dyspnea, unspecified 2019    Paroxysmal dyspnea    Encounter for general adult medical examination without abnormal findings 2018    Annual physical exam    Encounter for health counseling related to travel 2018    Counseling about travel    Encounter for screening for lipoid disorders 2019    Screening cholesterol level    Epigastric pain 2020    Dyspepsia    GERD (gastroesophageal reflux disease)     Irritable bowel syndrome     Left patella fracture 2023    Mastodynia 2021    Breast pain, right    Otalgia, right ear 2020    Otalgia, right    Other general symptoms and signs 2018    Flu-like symptoms    Other microscopic hematuria 2017    Hematuria, microscopic    Pain in right hand 2019    Pain of right hand    Pain in right knee 2019    Knee pain, right    Pain in unspecified shoulder     Shoulder pain    Personal history of (healed) traumatic fracture 10/30/2020    History of fracture of phalanx of toe    Personal history of other diseases of the digestive system 2018    History of gastroenteritis    Personal history of other diseases of the musculoskeletal system and connective tissue 2016    History of low back pain    Personal history of other diseases of the musculoskeletal system and  connective tissue 10/19/2020    History of osteopenia    Personal history of other diseases of the nervous system and sense organs 08/05/2019    Personal history of seizure disorder    Personal history of other diseases of the respiratory system 05/13/2022    History of acute sinusitis    Personal history of other diseases of the respiratory system 10/30/2020    History of chronic rhinitis    Personal history of other diseases of the respiratory system 04/19/2017    History of influenza    Personal history of other diseases of the respiratory system 03/07/2020    History of viral pharyngitis    Personal history of other infectious and parasitic diseases     History of varicella    Personal history of other medical treatment 11/02/2018    History of screening mammography    Personal history of other specified conditions     History of headache    Personal history of other specified conditions 03/24/2020    History of abnormal mammogram    Personal history of other specified conditions 03/02/2018    History of fatigue    Personal history of other specified conditions 09/19/2017    History of dysuria    Personal history of other specified conditions 08/26/2020    History of diarrhea    Personal history of other specified conditions 03/03/2020    History of abdominal pain    Personal history of other specified conditions 01/11/2019    History of chest pain    Personal history of urinary (tract) infections 11/07/2016    History of urinary tract infection    Personal history of urinary (tract) infections 07/02/2018    History of urinary tract infection    Seizure disorder (Multi)     Strain of muscle(s) and tendon(s) of peroneal muscle group at lower leg level, left leg, initial encounter 11/05/2020    Strain of peroneal tendon of left foot, initial encounter    Unspecified fracture of left toe(s), initial encounter for closed fracture 10/30/2020    Fracture of proximal phalanx of toe of left foot    Unspecified injury of  left wrist, hand and finger(s), initial encounter 01/05/2021    Left wrist injury    Unspecified injury of unspecified foot, initial encounter     Foot injury    Urinary tract infection, site not specified 07/02/2018    Acute UTI    Urinary tract infection, site not specified 11/07/2016    Acute UTI       Past Surgical History:   Procedure Laterality Date    APPENDECTOMY  10/24/2013    Appendectomy    BREAST CYST ASPIRATION  1993    LAPAROSCOPY DIAGNOSTIC / BIOPSY / ASPIRATION / LYSIS  12/18/2014    Exploratory Laparoscopy    PELVIC LAPAROSCOPY      endometriosis    US GUIDED SOFT TISSUE BIOPSY  12/18/2023    US GUIDED SOFT TISSUE BIOPSY 12/18/2023 [facility] US       Patient  has no history on file for sexual activity.    Family History   Problem Relation Name Age of Onset    Dementia Mother      Hypertension Mother      Diabetes type II Mother      Coronary artery disease Father      Other (Congestive heart failure) Father      Breast cancer Sister Linda Guzman     Breast cancer Sister Linda     Breast cancer Sister Pat        Allergies   Allergen Reactions    Phenytoin Other     double vision    Codeine Hallucinations     AUDITORY HALLUCINATIONS       Prior to Admission medications    Medication Sig Start Date End Date Taking? Authorizing Provider   ascorbic acid (Vitamin C) 1,000 mg tablet     Historical Provider, MD   calcium carb-mag hydrox-simeth 1,200 mg-270 mg -80 mg/10 mL suspension once daily as needed.    Historical Provider, MD   carBAMazepine XR (TEGretol  XR) 400 mg 12 hr tablet Take 1 tablet (400 mg) by mouth 2 times a day. Do not crush, chew, or split. 7/24/24 7/24/25  Iman Baker MD   carBAMazepine XR (TEGretol XR) 100 mg 12 hr tablet Take 1 tablet (100 mg) by mouth 2 times a day. 7/24/24 7/24/25  Iman Baker MD   chlorhexidine (Hibiclens) 4 % external liquid Apply topically once daily as needed (Shower with for 3 days prior to surgery, and morning of surgery) for up to 3 days. 8/22/24 8/25/24   JAMES Ramirez-CRNA   chlorhexidine (Peridex) 0.12 % solution Use 15 mL in the mouth or throat if needed (after brushing teeth, rinse mouth with mouthwash for 30 seconds, swish and spit.  Do not swallow) for up to 2 days. 8/22/24 8/24/24  VALARIE RamirezCRNA   cholecalciferol (Vitamin D-3) 50 MCG (2000 UT) tablet Take 1 tablet (50 mcg) by mouth once daily.    Historical Provider, MD   cyanocobalamin (Vitamin B-12) 100 mcg tablet     Historical Provider, MD   estradiol (Estrace) 0.01 % (0.1 mg/gram) vaginal cream Insert 0.25 Applicatorful (1 g) into the vagina 2 times a week. 8/22/24 8/22/25  DRU Olsen   PHENobarbitaL 64.8 mg tablet TAKE 1 TABLET BY MOUTH EVERY MORNING AND TAKE 2 TABLETS BY MOUTH EVERY EVENING 7/1/24 12/28/24  Iman Baker MD   zoledronic acid (Reclast) 5 mg/100 mL piggyback Infuse 100 mL (5 mg) at 400 mL/hr over 15 minutes into a venous catheter 1 time for 1 dose.  Patient not taking: Reported on 8/19/2024 2/5/24 2/5/24  Damaris Jhaveri MD        [facility] ROS:   Constitutional:   neg    Neuro/Psych:   neg    Eyes:    use of corrective lenses  Ears:   neg    Nose:   neg    Mouth:   neg    Throat:   neg    Neck:   neg    Cardio:   neg    Respiratory:   neg    Endocrine:   neg    GI:   neg    :   neg    Musculoskeletal:   neg    Hematologic:   neg    Skin:  neg        Physical Exam  Vitals and nursing note reviewed.   Constitutional:       General: She is awake.      Appearance: Normal appearance. She is normal weight.   HENT:      Head: Normocephalic and atraumatic.   Eyes:      General: Lids are normal.      Conjunctiva/sclera: Conjunctivae normal.   Neck:      Trachea: Trachea normal.   Cardiovascular:      Rate and Rhythm: Normal rate and regular rhythm.      Pulses: Normal pulses.      Heart sounds: Normal heart sounds.   Pulmonary:      Effort: Pulmonary effort is normal.      Breath sounds: Normal breath sounds and air entry.   Abdominal:      General: Abdomen is  protuberant.      Palpations: Abdomen is soft.   Musculoskeletal:      Cervical back: Full passive range of motion without pain and normal range of motion.      Right lower leg: No edema.      Left lower leg: No edema.   Skin:     General: Skin is warm and dry.      Capillary Refill: Capillary refill takes less than 2 seconds.   Neurological:      General: No focal deficit present.      Mental Status: She is alert and oriented to person, place, and time.      GCS: GCS eye subscore is 4. GCS verbal subscore is 5. GCS motor subscore is 6.      Cranial Nerves: Cranial nerves 2-12 are intact.      Sensory: Sensation is intact.      Motor: Motor function is intact.      Coordination: Coordination is intact.      Gait: Gait is intact.   Psychiatric:         Attention and Perception: Attention and perception normal.         Mood and Affect: Mood and affect normal.         Speech: Speech normal.         Behavior: Behavior normal. Behavior is cooperative.         Thought Content: Thought content normal.         Cognition and Memory: Cognition and memory normal.         Judgment: Judgment normal.          PAT AIRWAY:   Airway:     Mallampati::  I    TM distance::  >3 FB    Neck ROM::  Full  normal        Visit Vitals  /76   Pulse 59   Temp 36.4 °C (97.6 °F) (Temporal)       DASI Risk Score      Flowsheet Row Most Recent Value   DASI SCORE 58.2   METS Score (Will be calculated only when all the questions are answered) 9.9          Caprini DVT Assessment      Flowsheet Row Most Recent Value   DVT Score 10   Current Status Major surgery planned, lasting over 3 hours, Present cancer or chemotherapy   Age 60-75 years   BMI 30 or less          Modified Frailty Index      Flowsheet Row Most Recent Value   Modified Frailty Index Calculator 0          CHADS2 Stroke Risk  Current as of 20 minutes ago        N/A 3 to 100%: High Risk   2 to < 3%: Medium Risk   0 to < 2%: Low Risk     Last Change: N/A          This score determines  the patient's risk of having a stroke if the patient has atrial fibrillation.        This score is not applicable to this patient. Components are not calculated.          Revised Cardiac Risk Index      Flowsheet Row Most Recent Value   Revised Cardiac Risk Calculator 1          Apfel Simplified Score      Flowsheet Row Most Recent Value   Apfel Simplified Score Calculator 3          Risk Analysis Index Results This Encounter    No data found in the last 1 encounters.       Stop Bang Score      Flowsheet Row Most Recent Value   Do you snore loudly? 0   Do you often feel tired or fatigued after your sleep? 0   Has anyone ever observed you stop breathing in your sleep? 0   Do you have or are you being treated for high blood pressure? 0   Recent BMI (Calculated) 24.3   Is BMI greater than 35 kg/m2? 0=No   Age older than 50 years old? 1=Yes   Is your neck circumference greater than 17 inches (Male) or 16 inches (Female)? 0   Gender - Male 0=No   STOP-BANG Total Score 1            Results for orders placed or performed in visit on 08/21/24 (from the past 24 hour(s))   POCT UA Automated manually resulted   Result Value Ref Range    POC Color, Urine Yellow Straw, Yellow, Light-Yellow    POC Appearance, Urine Clear Clear    POC Glucose, Urine NEGATIVE NEGATIVE mg/dl    POC Bilirubin, Urine NEGATIVE NEGATIVE    POC Ketones, Urine NEGATIVE NEGATIVE mg/dl    POC Specific Gravity, Urine 1.015 1.005 - 1.035    POC Blood, Urine TRACE-Intact (A) NEGATIVE    POC PH, Urine 7.0 No Reference Range Established PH    POC Protein, Urine NEGATIVE NEGATIVE, 30 (1+) mg/dl    POC Urobilinogen, Urine 0.2 0.2, 1.0 EU/DL    Poc Nitrite, Urine NEGATIVE NEGATIVE    POC Leukocytes, Urine NEGATIVE NEGATIVE        Assessment & Plan:    64 y.o.  female  scheduled for VATs on 8/29/24 with Dr. Husain for lung cancer.  PMHX includes seizures (last over a decade ago), and osteoporosis with hx of multiple bone fractures, most recent was Left wrist last  "year (had Reclast infusions last year).  Recent bronchoscopy positive for adenocarcinoma of lung.  Presents to [facility] today for perioperative risk stratification and optimization    From pulmonology note:  \"Physician HPI (3/18/2024):  Ms. Pena is a 63 y.o. female known with history of seizures since childhood, well controlled for many years, underwent a coronary calcium scoring CT scan on 12-14-23 that showed few abnormalities on the lung windows. Chest CT followed and completed on January 8, 2024 that some interstitial changes in the bases predominantly in the right lung that were patchy.  The patchy consolidative area in the superior segment of the right lower lobe that was reported as nodule.  Few other nodules were seen the largest of which was in the left lower lobe 7 mm.   Since last visit she remains practically asymptomatic except for some shortness of breath with heavy exertion, like climbing few flights of stairs at a time. She has been trying to stay more active by walking and using stairs since last visit. The patient was Covid positive in August, 2022 but did not have a major illness then.   No exposures remembered. She sometimes uses Vicks on her nose when sick, not on the inside of her nose.   Workup completed include -  - Complete Pulmonary Function Test done today - No obstruction. Normal lung volumes and DLCO  - Pulmonary Stress Test (6 Min. Walk); distance walked was at 102% predicted with SpO2 staying above 95%. No symptoms reported during exercise  - YORDY with Reflex to ONELIA; negative  - Citrulline Antibody, IgG; negative  - C-reactive protein; negative  - CT chest high resolution; continues to show few abnormalities including  LLL nodule - 7 mm focal GGO no solid component that is unchanged from last scan, needs monitoring. There is also a smaller 2 mm nodule in LLL as well  Scattered areas of subpleural reticulation - most prominent in the RLL, lesser degree in the LLL and BENEDICT - No honey combing " "or bronchiectasis. Maybe a small area of bronchiolaractasis  Densest GGO in the superior segment of the RLL with airbronchogram - no solid component to it. Larger inspiration on current scan\"    Neuro:  Pt with history os seizures, well controlled, reports no seizure activity for last decade.  Pt at increased risk for perioperative neurological complications due to:  age, type and duration of surgery, Patient instructed on and provided cognitive exercises  Patient is at increased risk for perioperative CVA secondary to  increased age, operative time > 2.5 hours    HEENT:  No HEENT diagnosis or significant findings on chart review or clinical presentation and evaluation. No further preoperative testing/intervention indicated at this time.    Cardiovascular:  No CV diagnosis or significant findings on chart review or clinical presentation and evaluation. No further preoperative testing or intervention is indicated at this time.  METS: 9.9  RCRI: 1 point, 6.0% risk for postoperative MACE   CARMEN: 0.4% risk for 30 day postoperative MACE  EKG - from 8/22/24 NSR    Pulmonary:  Pt with recently diagnosed lung CA and at increased risk for perioperative pulmonary complications due to:  age > 60, site of surgery, major surgery, duration of surgery > 2 hours, types of anesthetic  Stop Bang score is 1 placing patient at low risk for STANISLAV  ARISCAT: >45 points, 42.1% risk of in-hospital postoperative pulmonary complication  PRODIGY: Moderate risk for opioid induced respiratory depression  Pumonary toilet education discussed, patient also provided deep breathing exercises and incentive spirometry educational handout    PET scan of lung 7/2024  IMPRESSION:  1. Minimal FDG avid paravertebral consolidative/ground-glass opacity  in the right lower lobe, favored to represent infectious/inflammatory  process or atelectasis.  6 mm and 8 mm pulmonary nodules in the right  and left lower lobes respectively are non FDG avid. Follow-up " with  dedicated CT of chest is recommended.  2. No other concerning FDG avid disease identified.    Renal:   No renal diagnosis, however patient is at increase risk for perioperative renal complications secondary to  Age equal to or greater than 56  Pt at Low risk for perioperative KENNY based on Dynamic Predictive Scoring Tool for Perioperative KENNY     Endocrine:  No endocrine diagnosis or significant findings on chart review or clinical presentation and evaluation. No further testing or intervention is indicated at this time.    Hematologic:  No hematologic diagnosis, however patient is at an increased risk for DVT  Caprini Score 10, patient at Moderate risk for perioperative DVT.  Patient provided with VTE education/handout.  Pt consents to blood and blood products, type and screen collected.    Gastrointestinal:   No GI diagnosis or significant findings on chart review or clinical presentation and evaluation.   Eat-10 score 0  Apfel 3    Infectious disease:   No infectious diagnosis or significant findings on chart review or clinical presentation and evaluation.   Prescription provided for CHG body wash and dental rinse. CHG use instructions reviewed and provided to patient.  Staph screen collected    Musculoskeletal:   No diagnosis or significant findings on chart review or clinical presentation and evaluation.     Anesthesia/Airway:  No anesthesia complications      Medication instructions and NPO guidelines reviewed with the patient.  All questions or concerns discussed and addressed.

## 2024-08-22 NOTE — PREPROCEDURE INSTRUCTIONS
Thank you for visiting [facility] ([facility]) today for your pre-procedure evaluation.    This summary includes instructions and information to aid you during your perioperative period.  Please read carefully. If you have any questions about your visit today, please call the number listed above.  If you become ill or have any changes to your health before your surgery, please contact your primary care provider and alert your surgeon.    Preparing for your Surgery       Exercises  Preoperative Deep Breathing Exercises  Why it is important to do deep breathing exercises before my surgery?  Deep breathing exercises strengthen your breathing muscles.  This helps you to recover after your surgery and decreases the chance of breathing complications.  How are the deep breathing exercises done?  Sit straight with your back supported.  Breathe in deeply and slowly through your nose. Your lower rib cage should expand and your abdomen may move forward.  Hold that breath for 3 to 5 seconds.  Breathe out through pursed lips, slowly and completely.  Rest and repeat 10 times every hour while awake.  Rest longer if you become dizzy or lightheaded.       Incentive Spirometer   You were provided with an incentive spirometer in [facility], please follow the below instructions.   You were not provided an incentive spirometer in [facility], please disregard the incentive spirometer instructions  What is an incentive spirometer?  An incentive spirometer is a device used before and after surgery to “exercise” your lungs.  It helps you to take deeper breaths to expand your lungs.  Below is an example of a basic incentive spirometer.  The device you receive may differ slightly but they all function the same.    Why do I need to use an incentive spirometer?  Using your incentive spirometer prepares your lungs for surgery and helps prevent lung problems after surgery.  How do I use my incentive spirometer?  When you're using  your incentive spirometer, make sure to breathe through your mouth. If you breathe through your nose, the incentive spirometer won't work properly. You can hold your nose if you have trouble.  If you feel dizzy at any time, stop and rest. Try again at a later time.  Follow the steps below:  Set up your incentive spirometer, expand the flexible tubing and connect to the outlet.  Sit upright in a chair or bed. Hold the incentive spirometer at eye level.   Put the mouthpiece in your mouth and close your lips tightly around it. Slowly breathe out (exhale) completely.  Breathe in (inhale) slowly through your mouth as deeply as you can. As you take a breath, you will see the piston rise inside the large column. While the piston rises, the indicator should move upwards. It should stay in between the 2 arrows (see Figure).  Try to get the piston as high as you can, while keeping the indicator between the arrows.   If the indicator doesn't stay between the arrows, you're breathing either too fast or too slow.  When you get it as high as you can, hold your breath for 10 seconds, or as long as possible. While you're holding your breath, the piston will slowly fall to the base of the spirometer.  Once the piston reaches the bottom of the spirometer, breathe out slowly through your mouth. Rest for a few seconds.  Repeat 10 times. Try to get the piston to the same level with each breath.  Repeat every hour while awake  You can carefully clean the outside of the mouthpiece with an alcohol wipe or soap and water.      Preoperative Brain Exercises    What are brain exercises?  A brain exercise is any activity that engages your thinking (cognitive) skills.    What types of activities are considered brain exercises?  Jigsaw puzzles, crossword puzzles, word jumble, memory games, word search, and many more.  Many can be found free online or on your phone via a mobile sariah.    Why should I do brain exercises before my surgery?  More  recent research has shown brain exercise before surgery can lower the risk of postoperative delirium (confusion) which can be especially important for older adults.  Patients who did brain exercises for 5 to 10 hours the days before surgery, cut their risk of postoperative delirium in half up to 1 week after surgery.    Sit-to-Stand Exercise    What is the sit-to-stand exercise?  The sit-to-stand exercise strengthens the muscles of your lower body and muscles in the center of your body (core muscles for stability) helping to maintain and improve your strength and mobility.  How do I do the sit-to-stand exercise?  The goal is to do this exercise without using your arms or hands.  If this is too difficult, use your arms and hands or a chair with armrests to help slowly push yourself to the standing position and lower yourself back to the sitting position. As the movement becomes easier use your arms and hands less.    Steps to the sit-to-stand exercise  Sit up tall in a sturdy chair, knees bent, feet flat on the floor shoulder-width apart.  Shift your hips/pelvis forward in the chair to correctly position yourself for the next movement.  Lean forward at your hips.  Stand up straight putting equal weight on both feet.  Check to be sure you are properly aligned with the chair, in a slow controlled movement sit back down.  Repeat this exercise 10-15 times.  If needed you can do it fewer times until your strength improves.  Rest for 1 minute.  Do another 10-15 sit-to-stand exercises.  Try to do this in the morning and evening.        Instructions    Preoperative Fasting Guidelines    Why must I stop eating and drinking near surgery time?  With sedation, food or liquid in your stomach can enter your lungs causing serious complications  Food can increase nausea and vomiting  When do I need to stop eating and drinking before my surgery?      Do not eat any food after midnight the night before your surgery/procedure. You may  have up to 13.5 ounces of clear liquid until TWO hours before your instructed arrival time to the hospital.  This includes water, black tea/coffee, (no milk or cream) apple juice, and electrolyte drinks (Gatorade). You may chew gum until TWO hours before your surgery/procedure , Do not eat any food or drink any liquids after midnight the night before your surgery/procedure.  You may have sips of water to take medications.            Simple things you can do to help prevent blood clots     Blood clots are blockages that can form in the body's veins. When a blood clot forms in your deep veins, it may be called a deep vein thrombosis, or DVT for short. Blood clots can happen in any part of the body where blood flows, but they are most common in the arms and legs. If a piece of a blood clot breaks free and travels to the lungs, it is called a pulmonary embolus (PE). A PE can be a very serious problem.         Being in the hospital or having surgery can raise your chances of getting a blood clot because you may not be well enough to move around as much as you normally do.         Ways you can help prevent blood clots in the hospital       Wearing SCDs  SCDs stands for Sequential Compression Devices.   SCDs are special sleeves that wrap around your legs. They attach to a pump that fills them with air to gently squeeze your legs every few minutes.  This helps return the blood in your legs to your heart.   SCDs should only be taken off when walking or bathing. SCDs may not be comfortable, but they can help save your life.              Pump SCD leg sleeves  Wearing compression stockings - if your doctor orders them. These special snug-fitting stockings gently squeeze your legs to help blood flow.       Walking. Walking helps move the blood in your legs.   If your doctor says it is ok, try walking the halls at least   5 times a day. Ask us to help you get up, so you don't fall.      Taking any blood-thinning medicines your  doctor orders.              Ways you can help prevent blood clots at home         Wearing compression stockings - if your doctor orders them.   Walking - to help move the blood in your legs.    Taking any blood-thinning medicines your doctor orders.      Signs of a blood clot or PE    Tell your doctor or nurse right away if you have any of the problems listed below.         If you are at home, seek medical care right away. Call 911 for chest pain or problems breathing.            Signs of a blood clot (DVT) - such as pain, swelling, redness, or warmth in your arm or legs.  Signs of a pulmonary embolism (PE) - such as chest pain or feeling short of breath      Tobacco and Alcohol;  Do not drink alcohol or smoke within 24 hours of surgery.  It is best to quit smoking for as long as possible before any surgery or procedure.    Quitting Smoking; Recognizing Dangerous Situations:  1-Alcohol use during the first month after quitting, 2-Being around smoke or someone who smokes 3-Times situation routinely smoked  4-Triggers-car, breaks, coffee, when awakening, social events  Coping Skills: 1-Learning new ways to manage stress 2-Exercising 3-Relaxation breathing   4-Change routines 5-Distraction techniques    Websites:  Smoke-Free - offers free text messages and an sariah to help you quit. Info includes eating and mood issues that may come with quitting. There is a Live Helpline to talk to an expert. Go to smokefree.gov; Become an Ex-Smoker - the free EX Plan is based on scientific research and useful advice from ex-smokers. It isn't just about quitting smoking.  It's about re-learning life without cigarettes using a 3-step program.  Go to becomeanex.org   Centers for Disease Control offer many suggestions for helping you quit. Includes a Quit Guide and real-life stories. There are sections for specific groups such as LGBT, , different ethnic groups, and pregnant women.  Go to cdc.gov/tobacco/campaign/tips    Other  "Resources:  [place] Tobacco Quit Line - call 1-800-QUIT-NOW or 4-917-300-8059.  [facility] 2-1-1 - to find local programs and resources. Call 211 or go to SupplyBid.GogoCoin.  [facility] Tobacco Cessation Program - call 317-894-1734.  American Lung Association offers classes for quitting smoking. Some places may charge a fee. For a list of classes, go to lung.org or call 1-800-LUNG-[place].     Some things to think about:; The health benefits of quitting smoking can help most of the major parts of your body. There is no safe amount of cigarette smoke. Quitting smoking can add years to your life. When you quit, you'll also protect your loved ones from dangerous secondhand smoke. Make a plan, join a support group, and talk to your physician to assist in quitting smoking.                                                                                                                   Other Instructions  Why did I have my nose, under my arms, and groin swabbed? The purpose of the swab is to identify Staphylococcus aureus inside your nose or on your skin.  The swab was sent to the laboratory for culture.  A positive swab/culture for Staphylococcus aureus is called colonization or carriage.   What is Staphylococcus aureus? Staphylococcus aureus, also known as \"staph\", is a germ found on the skin or in the nose of healthy people.  Sometimes Staphylococcus aureus can get into the body and cause an infection.  This can be minor (such as pimples, boils, or other skin problems).  It might also be serious (such as a blood infection, pneumonia, or a surgical site infection). What is Staphylococcus aureus colonization or carriage? Colonization or carriage means that a person has the germ but is not sick from it.  These bacteria can be spread on the hands or when breathing or sneezing. How is Staphylococcus aureus spread? It is most often spread by close contact with a person or item that carries it. What happens if my culture is positive " for Staphylococcus aureus? Your doctor/medical team will use this information to guide any antibiotic treatment which may be necessary.  Regardless of the culture results, we will clean the inside of your nose with a betadine swab just before you have your surgery. Will I get an infection if I have Staphylococcus aureus in my nose or on my skin? Anyone can get an infection with Staphylococcus aureus.  However, the best way to reduce your risk of infection is to follow the instructions provided to you for the use of your CHG soap and dental rinse.  , Body Wash; What is a home preoperative antibacterial shower? This shower is a way of cleaning the skin with a germ-killing solution before surgery.  The solution contains chlorhexidine, commonly known as CHG.  CHG is a skin cleanser with germ-killing ability.  Let your doctor know if you are allergic to chlorhexidine. Why do I need to take a preoperative antibacterial shower? Skin is not sterile.  It is best to try to make your skin as free of germs as possible before surgery.  Proper cleansing with a germ-killing soap before surgery can lower the number of germs on your skin.  This helps to reduce the risk of infection at the surgical site.  Following the instructions listed below will help you prepare your skin for surgery.   How do I use the solution? Steps:  Begin using your CHG soap 5 days before your scheduled surgery on ___________.   First, wash and rinse your hair using the CHG soap. Keep CHG soap away from ear canals and eyes.  Rinse completely, do not condition.  Hair extensions should be removed. , Oral/Dental Rinse: What is oral/dental rinse?  It is a mouthwash. It is a way of cleaning the mouth with a germ-killing solution before your surgery.  The solution contains chlorhexidine, commonly known as CHG. It is used inside the mouth to kill a bacteria known as Staphylococcus aureus.  Let your doctor know if you are allergic to Chlorhexidine. Why do I need to  use CHG oral/dental rinse? The CHG oral/dental rinse helps to kill a bacteria in your mouth known as Staphylococcus aureus.  This reduces the risk of infection at the surgical site.  Using your CHG oral/dental rinse STEPS: Use your CHG oral/dental rinse after you brush your teeth the night before (at bedtime) and the morning of your surgery.  Follow all directions on your prescription label.  Use the cap on the container to measure 15 ml.  Swish (gargle if you can) the mouthwash in your mouth for at least 30 seconds, (do not swallow) and spit out.  After you use your CHG rinse, do not rinse your mouth with water, drink or eat.  Please refer to the prescription label for the appropriate time to resume oral intake What side effects might I have using the CHG oral/dental rinse? CHG rinse will stick to plaque on the teeth.  Brush and floss just before use.  Teeth brushing will help avoid staining of plaque during use.     Ensure Surgery Immuno-Nutrition Shake; This shake provides specialized nutrients to help prepare your body for surgery. Your surgeon's office may send it to your home, or you may receive it from [facility] if you are scheduled for an appointment.  If your surgeon has instructed you to begin the shakes and you have not received them at least 7 days before your scheduled surgery, please contact your surgeon's office. You should begin drinking your shakes 6 days before your surgery date, start on _______.  You should drink 1 carton three times a day, you can have one carton with breakfast, lunch, and dinner.  If your surgeon has given you instructions to drink clear liquids the day before surgery, you can continue to drink your Ensure Surgery immune-nutrition shakes.         The Week before Surgery        Seven days before Surgery  Check your [facility] medication instructions  Do the exercises provided to you by [facility]   Arrange for a responsible, adult licensed  to take you  home after surgery and stay with you for 24 hours.  You will not be permitted to drive yourself home if you have received any anesthetic/sedation  Six days before surgery  Check your [facility] medication instructions  Do the exercises provided to you by [facility]   Start using Chlorhexidene (CHG) body wash if prescribed  Five days before surgery  Check your [facility] medication instructions  Do the exercises provided to you by [facility]   Continue to use CHG body wash if prescribed  Three days before surgery  Check your [facility] medication instructions  Do the exercises provided to you by [facility]   Continue to use CHG body wash if prescribed  Two days before surgery  Check your [facility] medication instructions  Do the exercises provided to you by [facility]   Continue to use CHG body wash if prescribed    The Day before Surgery       Check your [facility] medication and all other [facility] instructions including when to stop eating and drinking  You will be called with your arrival time for surgery in the late afternoon.  If you do not receive a call please reach out to your surgeon's office.  Do not smoke or drink 24 hours before surgery  Prepare items to bring with you to the hospital  Shower with your chlorhexidine wash if prescribed  Brush your teeth and use your chlorhexidine dental rinse if prescribed    The Day of Surgery       Check your [facility] medication instructions  Ensure you follow the instructions for when to stop eating and drinking  Shower, if prescribed use CHG.  Do not apply any lotions, creams, moisturizers, perfume or deodorant  Brush your teeth and use your CHG dental rinse if prescribed  Wear loose comfortable clothing  Avoid make-up  Remove  jewelry and piercings, consider professional piercing removal with a plastic spacer if needed  Bring photo ID and Insurance card  Bring an accurate medication list that includes medication dose, frequency and allergies  Bring a copy of your advanced directives (will, health care power of )  Bring any devices  and controllers as well as medical devices you have been provided with for surgery (CPAP, slings, braces, etc.)  Dentures, eyeglasses, and contacts will be removed before surgery, please bring cases for contacts or glasses

## 2024-08-24 LAB — STAPHYLOCOCCUS SPEC CULT: NORMAL

## 2024-08-26 LAB
ATRIAL RATE: 60 BPM
P AXIS: 16 DEGREES
P OFFSET: 179 MS
P ONSET: 121 MS
PR INTERVAL: 194 MS
Q ONSET: 218 MS
QRS COUNT: 10 BEATS
QRS DURATION: 86 MS
QT INTERVAL: 396 MS
QTC CALCULATION(BAZETT): 396 MS
QTC FREDERICIA: 396 MS
R AXIS: 23 DEGREES
T AXIS: 51 DEGREES
T OFFSET: 416 MS
VENTRICULAR RATE: 60 BPM

## 2024-08-26 PROCEDURE — 93005 ELECTROCARDIOGRAM TRACING: CPT

## 2024-08-28 ENCOUNTER — ANESTHESIA EVENT (OUTPATIENT)
Dept: OPERATING ROOM | Facility: HOSPITAL | Age: 64
End: 2024-08-28
Payer: COMMERCIAL

## 2024-08-28 NOTE — PROGRESS NOTES
Pharmacy Medication History Review    Rhoda Pena is a 64 y.o. female who is planned to be admitted for Malignant neoplasm of lower lobe of right lung (Multi). Pharmacy called the patient prior to their scheduled procedure and reviewed the patient's [id number] medications for accuracy.    Medications ADDED:  Calcium 600mg  Medications CHANGED:  none  Medications REMOVED:   Calcium carb-mag hydrox-simeth [id number]/10ml susp.    Please review updated prior to admission medication list and comments regarding how patient may be taking medications differently by going to Admission tab --> Admission Orders --> Admit Orders / Review prior to admission medications.     Preferred pharmacy and allergies to be confirmed with patient by nursing the day of procedure.     Sources used to complete the med history include spoke with patient, surescripts, oarrs, care everywhere    Below are additional concerns with the patient's PTA list.  Patient states they take BRAND tegretol  Patient confirmed they take both tegretol 100mg and 400mg BID    Sary Xiao    [facility]  Please reach out via Secure Chat for questions

## 2024-08-29 ENCOUNTER — HOSPITAL ENCOUNTER (INPATIENT)
Facility: HOSPITAL | Age: 64
LOS: 2 days | Discharge: HOME | End: 2024-08-31
Attending: THORACIC SURGERY (CARDIOTHORACIC VASCULAR SURGERY) | Admitting: THORACIC SURGERY (CARDIOTHORACIC VASCULAR SURGERY)
Payer: COMMERCIAL

## 2024-08-29 ENCOUNTER — ANESTHESIA (OUTPATIENT)
Dept: OPERATING ROOM | Facility: HOSPITAL | Age: 64
End: 2024-08-29
Payer: COMMERCIAL

## 2024-08-29 ENCOUNTER — APPOINTMENT (OUTPATIENT)
Dept: RADIOLOGY | Facility: HOSPITAL | Age: 64
End: 2024-08-29
Payer: COMMERCIAL

## 2024-08-29 ENCOUNTER — APPOINTMENT (OUTPATIENT)
Dept: CARDIOLOGY | Facility: HOSPITAL | Age: 64
End: 2024-08-29
Payer: COMMERCIAL

## 2024-08-29 DIAGNOSIS — C34.31 MALIGNANT NEOPLASM OF LOWER LOBE OF RIGHT LUNG (MULTI): Primary | ICD-10-CM

## 2024-08-29 DIAGNOSIS — G89.18 POSTOPERATIVE PAIN: ICD-10-CM

## 2024-08-29 LAB
ABO GROUP (TYPE) IN BLOOD: NORMAL
ANTIBODY SCREEN: NORMAL
PHENOBARB SERPL-MCNC: 44.5 UG/ML (ref 10–40)
RH FACTOR (ANTIGEN D): NORMAL

## 2024-08-29 PROCEDURE — 86901 BLOOD TYPING SEROLOGIC RH(D): CPT | Performed by: ANESTHESIOLOGY

## 2024-08-29 PROCEDURE — 2500000005 HC RX 250 GENERAL PHARMACY W/O HCPCS: Performed by: THORACIC SURGERY (CARDIOTHORACIC VASCULAR SURGERY)

## 2024-08-29 PROCEDURE — 2500000001 HC RX 250 WO HCPCS SELF ADMINISTERED DRUGS (ALT 637 FOR MEDICARE OP): Performed by: ANESTHESIOLOGY

## 2024-08-29 PROCEDURE — 2500000001 HC RX 250 WO HCPCS SELF ADMINISTERED DRUGS (ALT 637 FOR MEDICARE OP): Performed by: PHYSICIAN ASSISTANT

## 2024-08-29 PROCEDURE — 81458 SO GSAP DNA CPY NMBR&MCRSTL: CPT | Performed by: THORACIC SURGERY (CARDIOTHORACIC VASCULAR SURGERY)

## 2024-08-29 PROCEDURE — 7100000002 HC RECOVERY ROOM TIME - EACH INCREMENTAL 1 MINUTE: Performed by: THORACIC SURGERY (CARDIOTHORACIC VASCULAR SURGERY)

## 2024-08-29 PROCEDURE — 88305 TISSUE EXAM BY PATHOLOGIST: CPT | Mod: TC,SUR | Performed by: THORACIC SURGERY (CARDIOTHORACIC VASCULAR SURGERY)

## 2024-08-29 PROCEDURE — 96372 THER/PROPH/DIAG INJ SC/IM: CPT | Performed by: ANESTHESIOLOGIST ASSISTANT

## 2024-08-29 PROCEDURE — 71045 X-RAY EXAM CHEST 1 VIEW: CPT

## 2024-08-29 PROCEDURE — 93005 ELECTROCARDIOGRAM TRACING: CPT

## 2024-08-29 PROCEDURE — 2500000004 HC RX 250 GENERAL PHARMACY W/ HCPCS (ALT 636 FOR OP/ED): Performed by: ANESTHESIOLOGY

## 2024-08-29 PROCEDURE — 2780000003 HC OR 278 NO HCPCS: Performed by: THORACIC SURGERY (CARDIOTHORACIC VASCULAR SURGERY)

## 2024-08-29 PROCEDURE — 71045 X-RAY EXAM CHEST 1 VIEW: CPT | Performed by: RADIOLOGY

## 2024-08-29 PROCEDURE — 32674 THORACOSCOPY LYMPH NODE EXC: CPT | Performed by: THORACIC SURGERY (CARDIOTHORACIC VASCULAR SURGERY)

## 2024-08-29 PROCEDURE — 3700000001 HC GENERAL ANESTHESIA TIME - INITIAL BASE CHARGE: Performed by: THORACIC SURGERY (CARDIOTHORACIC VASCULAR SURGERY)

## 2024-08-29 PROCEDURE — 80184 ASSAY OF PHENOBARBITAL: CPT | Performed by: PHYSICIAN ASSISTANT

## 2024-08-29 PROCEDURE — 0BTF4ZZ RESECTION OF RIGHT LOWER LUNG LOBE, PERCUTANEOUS ENDOSCOPIC APPROACH: ICD-10-PCS | Performed by: THORACIC SURGERY (CARDIOTHORACIC VASCULAR SURGERY)

## 2024-08-29 PROCEDURE — 32674 THORACOSCOPY LYMPH NODE EXC: CPT | Performed by: PHYSICIAN ASSISTANT

## 2024-08-29 PROCEDURE — 32663 THORACOSCOPY W/LOBECTOMY: CPT | Performed by: PHYSICIAN ASSISTANT

## 2024-08-29 PROCEDURE — 36415 COLL VENOUS BLD VENIPUNCTURE: CPT | Performed by: ANESTHESIOLOGIST ASSISTANT

## 2024-08-29 PROCEDURE — 3600000017 HC OR TIME - EACH INCREMENTAL 1 MINUTE - PROCEDURE LEVEL SIX: Performed by: THORACIC SURGERY (CARDIOTHORACIC VASCULAR SURGERY)

## 2024-08-29 PROCEDURE — 8E0W4CZ ROBOTIC ASSISTED PROCEDURE OF TRUNK REGION, PERCUTANEOUS ENDOSCOPIC APPROACH: ICD-10-PCS | Performed by: THORACIC SURGERY (CARDIOTHORACIC VASCULAR SURGERY)

## 2024-08-29 PROCEDURE — 07T74ZZ RESECTION OF THORAX LYMPHATIC, PERCUTANEOUS ENDOSCOPIC APPROACH: ICD-10-PCS | Performed by: THORACIC SURGERY (CARDIOTHORACIC VASCULAR SURGERY)

## 2024-08-29 PROCEDURE — 2500000004 HC RX 250 GENERAL PHARMACY W/ HCPCS (ALT 636 FOR OP/ED): Performed by: ANESTHESIOLOGIST ASSISTANT

## 2024-08-29 PROCEDURE — 32663 THORACOSCOPY W/LOBECTOMY: CPT | Performed by: THORACIC SURGERY (CARDIOTHORACIC VASCULAR SURGERY)

## 2024-08-29 PROCEDURE — 36415 COLL VENOUS BLD VENIPUNCTURE: CPT | Performed by: ANESTHESIOLOGY

## 2024-08-29 PROCEDURE — 2500000005 HC RX 250 GENERAL PHARMACY W/O HCPCS: Performed by: PHYSICIAN ASSISTANT

## 2024-08-29 PROCEDURE — 0BJ08ZZ INSPECTION OF TRACHEOBRONCHIAL TREE, VIA NATURAL OR ARTIFICIAL OPENING ENDOSCOPIC: ICD-10-PCS | Performed by: THORACIC SURGERY (CARDIOTHORACIC VASCULAR SURGERY)

## 2024-08-29 PROCEDURE — 2720000007 HC OR 272 NO HCPCS: Performed by: THORACIC SURGERY (CARDIOTHORACIC VASCULAR SURGERY)

## 2024-08-29 PROCEDURE — 3700000002 HC GENERAL ANESTHESIA TIME - EACH INCREMENTAL 1 MINUTE: Performed by: THORACIC SURGERY (CARDIOTHORACIC VASCULAR SURGERY)

## 2024-08-29 PROCEDURE — 2500000005 HC RX 250 GENERAL PHARMACY W/O HCPCS: Performed by: ANESTHESIOLOGIST ASSISTANT

## 2024-08-29 PROCEDURE — 1200000002 HC GENERAL ROOM WITH TELEMETRY DAILY

## 2024-08-29 PROCEDURE — 7100000001 HC RECOVERY ROOM TIME - INITIAL BASE CHARGE: Performed by: THORACIC SURGERY (CARDIOTHORACIC VASCULAR SURGERY)

## 2024-08-29 PROCEDURE — 3600000018 HC OR TIME - INITIAL BASE CHARGE - PROCEDURE LEVEL SIX: Performed by: THORACIC SURGERY (CARDIOTHORACIC VASCULAR SURGERY)

## 2024-08-29 PROCEDURE — 2500000004 HC RX 250 GENERAL PHARMACY W/ HCPCS (ALT 636 FOR OP/ED): Performed by: PHYSICIAN ASSISTANT

## 2024-08-29 RX ORDER — ONDANSETRON HYDROCHLORIDE 2 MG/ML
4 INJECTION, SOLUTION INTRAVENOUS EVERY 8 HOURS PRN
Status: DISCONTINUED | OUTPATIENT
Start: 2024-08-29 | End: 2024-08-31 | Stop reason: HOSPADM

## 2024-08-29 RX ORDER — CEFAZOLIN 1 G/1
INJECTION, POWDER, FOR SOLUTION INTRAVENOUS AS NEEDED
Status: DISCONTINUED | OUTPATIENT
Start: 2024-08-29 | End: 2024-08-29

## 2024-08-29 RX ORDER — PHENYLEPHRINE HCL IN 0.9% NACL 0.4MG/10ML
SYRINGE (ML) INTRAVENOUS AS NEEDED
Status: DISCONTINUED | OUTPATIENT
Start: 2024-08-29 | End: 2024-08-29

## 2024-08-29 RX ORDER — PHENOBARBITAL 32.4 MG/1
64.8 TABLET ORAL EVERY MORNING
Status: DISCONTINUED | OUTPATIENT
Start: 2024-08-29 | End: 2024-08-29

## 2024-08-29 RX ORDER — FENTANYL CITRATE 50 UG/ML
INJECTION, SOLUTION INTRAMUSCULAR; INTRAVENOUS
Status: COMPLETED
Start: 2024-08-29 | End: 2024-08-29

## 2024-08-29 RX ORDER — ONDANSETRON 4 MG/1
4 TABLET, ORALLY DISINTEGRATING ORAL EVERY 8 HOURS PRN
Status: DISCONTINUED | OUTPATIENT
Start: 2024-08-29 | End: 2024-08-31 | Stop reason: HOSPADM

## 2024-08-29 RX ORDER — BUPIVACAINE HCL/EPINEPHRINE 0.25-.0005
VIAL (ML) INJECTION AS NEEDED
Status: DISCONTINUED | OUTPATIENT
Start: 2024-08-29 | End: 2024-08-29 | Stop reason: HOSPADM

## 2024-08-29 RX ORDER — METOPROLOL TARTRATE 1 MG/ML
5 INJECTION, SOLUTION INTRAVENOUS
Status: DISCONTINUED | OUTPATIENT
Start: 2024-08-29 | End: 2024-08-29

## 2024-08-29 RX ORDER — NALOXONE HYDROCHLORIDE 0.4 MG/ML
0.2 INJECTION, SOLUTION INTRAMUSCULAR; INTRAVENOUS; SUBCUTANEOUS AS NEEDED
Status: DISCONTINUED | OUTPATIENT
Start: 2024-08-29 | End: 2024-08-31 | Stop reason: HOSPADM

## 2024-08-29 RX ORDER — ACETAMINOPHEN 325 MG/1
650 TABLET ORAL EVERY 4 HOURS PRN
Status: DISCONTINUED | OUTPATIENT
Start: 2024-08-29 | End: 2024-08-31 | Stop reason: HOSPADM

## 2024-08-29 RX ORDER — PHENOBARBITAL 32.4 MG/1
129.6 TABLET ORAL NIGHTLY
Status: DISCONTINUED | OUTPATIENT
Start: 2024-08-29 | End: 2024-08-31 | Stop reason: HOSPADM

## 2024-08-29 RX ORDER — CARBAMAZEPINE 100 MG/1
100 TABLET, EXTENDED RELEASE ORAL 2 TIMES DAILY
Status: DISCONTINUED | OUTPATIENT
Start: 2024-08-29 | End: 2024-08-31 | Stop reason: HOSPADM

## 2024-08-29 RX ORDER — GLYCOPYRROLATE 0.2 MG/ML
INJECTION INTRAMUSCULAR; INTRAVENOUS AS NEEDED
Status: DISCONTINUED | OUTPATIENT
Start: 2024-08-29 | End: 2024-08-29

## 2024-08-29 RX ORDER — HEPARIN SODIUM 5000 [USP'U]/ML
INJECTION, SOLUTION INTRAVENOUS; SUBCUTANEOUS AS NEEDED
Status: DISCONTINUED | OUTPATIENT
Start: 2024-08-29 | End: 2024-08-29

## 2024-08-29 RX ORDER — ONDANSETRON HYDROCHLORIDE 2 MG/ML
INJECTION, SOLUTION INTRAVENOUS AS NEEDED
Status: DISCONTINUED | OUTPATIENT
Start: 2024-08-29 | End: 2024-08-29

## 2024-08-29 RX ORDER — PROPOFOL 10 MG/ML
INJECTION, EMULSION INTRAVENOUS
Status: COMPLETED
Start: 2024-08-29 | End: 2024-08-29

## 2024-08-29 RX ORDER — CARBAMAZEPINE 400 MG/1
400 TABLET, EXTENDED RELEASE ORAL 2 TIMES DAILY
Status: DISCONTINUED | OUTPATIENT
Start: 2024-08-29 | End: 2024-08-31 | Stop reason: HOSPADM

## 2024-08-29 RX ORDER — DOCUSATE SODIUM 100 MG/1
100 CAPSULE, LIQUID FILLED ORAL 2 TIMES DAILY
Status: DISCONTINUED | OUTPATIENT
Start: 2024-08-29 | End: 2024-08-31 | Stop reason: HOSPADM

## 2024-08-29 RX ORDER — HYDROMORPHONE HYDROCHLORIDE 1 MG/ML
0.5 INJECTION, SOLUTION INTRAMUSCULAR; INTRAVENOUS; SUBCUTANEOUS EVERY 5 MIN PRN
Status: DISCONTINUED | OUTPATIENT
Start: 2024-08-29 | End: 2024-08-29 | Stop reason: HOSPADM

## 2024-08-29 RX ORDER — PHENOBARBITAL 32.4 MG/1
64.8 TABLET ORAL EVERY MORNING
Status: DISCONTINUED | OUTPATIENT
Start: 2024-08-30 | End: 2024-08-31 | Stop reason: HOSPADM

## 2024-08-29 RX ORDER — LIDOCAINE HYDROCHLORIDE 20 MG/ML
INJECTION, SOLUTION INFILTRATION; PERINEURAL AS NEEDED
Status: DISCONTINUED | OUTPATIENT
Start: 2024-08-29 | End: 2024-08-29

## 2024-08-29 RX ORDER — PROPOFOL 10 MG/ML
INJECTION, EMULSION INTRAVENOUS AS NEEDED
Status: DISCONTINUED | OUTPATIENT
Start: 2024-08-29 | End: 2024-08-29

## 2024-08-29 RX ORDER — HYDROMORPHONE HCL/0.9% NACL/PF 15 MG/30ML
PATIENT CONTROLLED ANALGESIA SYRINGE INTRAVENOUS CONTINUOUS
Status: DISCONTINUED | OUTPATIENT
Start: 2024-08-29 | End: 2024-08-30

## 2024-08-29 RX ORDER — SODIUM CHLORIDE 0.9 G/100ML
IRRIGANT IRRIGATION AS NEEDED
Status: DISCONTINUED | OUTPATIENT
Start: 2024-08-29 | End: 2024-08-29 | Stop reason: HOSPADM

## 2024-08-29 RX ORDER — CEFAZOLIN SODIUM 2 G/100ML
2 INJECTION, SOLUTION INTRAVENOUS ONCE
Status: DISCONTINUED | OUTPATIENT
Start: 2024-08-29 | End: 2024-08-29 | Stop reason: HOSPADM

## 2024-08-29 RX ORDER — SODIUM CHLORIDE, SODIUM LACTATE, POTASSIUM CHLORIDE, CALCIUM CHLORIDE 600; 310; 30; 20 MG/100ML; MG/100ML; MG/100ML; MG/100ML
100 INJECTION, SOLUTION INTRAVENOUS CONTINUOUS
Status: DISCONTINUED | OUTPATIENT
Start: 2024-08-29 | End: 2024-08-29 | Stop reason: HOSPADM

## 2024-08-29 RX ORDER — HYDROMORPHONE HYDROCHLORIDE 1 MG/ML
INJECTION, SOLUTION INTRAMUSCULAR; INTRAVENOUS; SUBCUTANEOUS
Status: DISCONTINUED
Start: 2024-08-29 | End: 2024-08-29 | Stop reason: WASHOUT

## 2024-08-29 RX ORDER — CEFAZOLIN SODIUM 1 G/50ML
1 SOLUTION INTRAVENOUS EVERY 8 HOURS
Status: COMPLETED | OUTPATIENT
Start: 2024-08-29 | End: 2024-08-30

## 2024-08-29 RX ORDER — HEPARIN SODIUM 5000 [USP'U]/ML
5000 INJECTION, SOLUTION INTRAVENOUS; SUBCUTANEOUS EVERY 8 HOURS
Status: DISCONTINUED | OUTPATIENT
Start: 2024-08-29 | End: 2024-08-31 | Stop reason: HOSPADM

## 2024-08-29 RX ORDER — LIDOCAINE HYDROCHLORIDE 10 MG/ML
0.1 INJECTION INFILTRATION; PERINEURAL ONCE
Status: DISCONTINUED | OUTPATIENT
Start: 2024-08-29 | End: 2024-08-29 | Stop reason: HOSPADM

## 2024-08-29 RX ORDER — METOPROLOL TARTRATE 1 MG/ML
5 INJECTION, SOLUTION INTRAVENOUS EVERY 6 HOURS
Status: DISCONTINUED | OUTPATIENT
Start: 2024-08-30 | End: 2024-08-30

## 2024-08-29 RX ORDER — ROCURONIUM BROMIDE 10 MG/ML
INJECTION, SOLUTION INTRAVENOUS AS NEEDED
Status: DISCONTINUED | OUTPATIENT
Start: 2024-08-29 | End: 2024-08-29

## 2024-08-29 RX ORDER — FENTANYL CITRATE 50 UG/ML
INJECTION, SOLUTION INTRAMUSCULAR; INTRAVENOUS AS NEEDED
Status: DISCONTINUED | OUTPATIENT
Start: 2024-08-29 | End: 2024-08-29

## 2024-08-29 RX ORDER — OXYCODONE HYDROCHLORIDE 5 MG/1
5 TABLET ORAL EVERY 4 HOURS PRN
Status: DISCONTINUED | OUTPATIENT
Start: 2024-08-29 | End: 2024-08-29 | Stop reason: HOSPADM

## 2024-08-29 RX ORDER — ONDANSETRON HYDROCHLORIDE 2 MG/ML
4 INJECTION, SOLUTION INTRAVENOUS ONCE AS NEEDED
Status: DISCONTINUED | OUTPATIENT
Start: 2024-08-29 | End: 2024-08-29 | Stop reason: HOSPADM

## 2024-08-29 RX ORDER — HYDROMORPHONE HYDROCHLORIDE 1 MG/ML
0.2 INJECTION, SOLUTION INTRAMUSCULAR; INTRAVENOUS; SUBCUTANEOUS EVERY 5 MIN PRN
Status: DISCONTINUED | OUTPATIENT
Start: 2024-08-29 | End: 2024-08-29 | Stop reason: HOSPADM

## 2024-08-29 SDOH — SOCIAL STABILITY: SOCIAL NETWORK: ARE YOU MARRIED, WIDOWED, DIVORCED, SEPARATED, NEVER MARRIED, OR LIVING WITH A PARTNER?: MARRIED

## 2024-08-29 SDOH — HEALTH STABILITY: MENTAL HEALTH
STRESS IS WHEN SOMEONE FEELS TENSE, NERVOUS, ANXIOUS, OR CAN'T SLEEP AT NIGHT BECAUSE THEIR MIND IS TROUBLED. HOW STRESSED ARE YOU?: NOT AT ALL

## 2024-08-29 SDOH — ECONOMIC STABILITY: HOUSING INSECURITY: AT ANY TIME IN THE PAST 12 MONTHS, WERE YOU HOMELESS OR LIVING IN A SHELTER (INCLUDING NOW)?: NO

## 2024-08-29 SDOH — SOCIAL STABILITY: SOCIAL INSECURITY: DO YOU FEEL ANYONE HAS EXPLOITED OR TAKEN ADVANTAGE OF YOU FINANCIALLY OR OF YOUR PERSONAL PROPERTY?: NO

## 2024-08-29 SDOH — ECONOMIC STABILITY: INCOME INSECURITY: IN THE LAST 12 MONTHS, WAS THERE A TIME WHEN YOU WERE NOT ABLE TO PAY THE MORTGAGE OR RENT ON TIME?: NO

## 2024-08-29 SDOH — SOCIAL STABILITY: SOCIAL INSECURITY: HAS ANYONE EVER THREATENED TO HURT YOUR FAMILY OR YOUR PETS?: NO

## 2024-08-29 SDOH — ECONOMIC STABILITY: TRANSPORTATION INSECURITY
IN THE PAST 12 MONTHS, HAS THE LACK OF TRANSPORTATION KEPT YOU FROM MEDICAL APPOINTMENTS OR FROM GETTING MEDICATIONS?: NO

## 2024-08-29 SDOH — SOCIAL STABILITY: SOCIAL NETWORK
DO YOU BELONG TO ANY CLUBS OR ORGANIZATIONS SUCH AS CHURCH GROUPS UNIONS, FRATERNAL OR ATHLETIC GROUPS, OR SCHOOL GROUPS?: NO

## 2024-08-29 SDOH — SOCIAL STABILITY: SOCIAL INSECURITY
WITHIN THE LAST YEAR, HAVE TO BEEN RAPED OR FORCED TO HAVE ANY KIND OF SEXUAL ACTIVITY BY YOUR PARTNER OR EX-PARTNER?: NO

## 2024-08-29 SDOH — SOCIAL STABILITY: SOCIAL NETWORK: HOW OFTEN DO YOU ATTEND CHURCH OR RELIGIOUS SERVICES?: 1 TO 4 TIMES PER YEAR

## 2024-08-29 SDOH — SOCIAL STABILITY: SOCIAL INSECURITY: DO YOU FEEL UNSAFE GOING BACK TO THE PLACE WHERE YOU ARE LIVING?: NO

## 2024-08-29 SDOH — ECONOMIC STABILITY: FOOD INSECURITY: WITHIN THE PAST 12 MONTHS, YOU WORRIED THAT YOUR FOOD WOULD RUN OUT BEFORE YOU GOT MONEY TO BUY MORE.: NEVER TRUE

## 2024-08-29 SDOH — SOCIAL STABILITY: SOCIAL NETWORK: IN A TYPICAL WEEK, HOW MANY TIMES DO YOU TALK ON THE PHONE WITH FAMILY, FRIENDS, OR NEIGHBORS?: NEVER

## 2024-08-29 SDOH — ECONOMIC STABILITY: FOOD INSECURITY: WITHIN THE PAST 12 MONTHS, THE FOOD YOU BOUGHT JUST DIDN'T LAST AND YOU DIDN'T HAVE MONEY TO GET MORE.: NEVER TRUE

## 2024-08-29 SDOH — ECONOMIC STABILITY: TRANSPORTATION INSECURITY
IN THE PAST 12 MONTHS, HAS LACK OF TRANSPORTATION KEPT YOU FROM MEETINGS, WORK, OR FROM GETTING THINGS NEEDED FOR DAILY LIVING?: NO

## 2024-08-29 SDOH — SOCIAL STABILITY: SOCIAL INSECURITY: ARE YOU OR HAVE YOU BEEN THREATENED OR ABUSED PHYSICALLY, EMOTIONALLY, OR SEXUALLY BY ANYONE?: NO

## 2024-08-29 SDOH — SOCIAL STABILITY: SOCIAL INSECURITY: WITHIN THE LAST YEAR, HAVE YOU BEEN AFRAID OF YOUR PARTNER OR EX-PARTNER?: NO

## 2024-08-29 SDOH — SOCIAL STABILITY: SOCIAL INSECURITY
WITHIN THE LAST YEAR, HAVE YOU BEEN KICKED, HIT, SLAPPED, OR OTHERWISE PHYSICALLY HURT BY YOUR PARTNER OR EX-PARTNER?: NO

## 2024-08-29 SDOH — HEALTH STABILITY: MENTAL HEALTH
HOW OFTEN DO YOU NEED TO HAVE SOMEONE HELP YOU WHEN YOU READ INSTRUCTIONS, PAMPHLETS, OR OTHER WRITTEN MATERIAL FROM YOUR DOCTOR OR PHARMACY?: RARELY

## 2024-08-29 SDOH — SOCIAL STABILITY: SOCIAL INSECURITY: ARE THERE ANY APPARENT SIGNS OF INJURIES/BEHAVIORS THAT COULD BE RELATED TO ABUSE/NEGLECT?: NO

## 2024-08-29 SDOH — SOCIAL STABILITY: SOCIAL NETWORK: HOW OFTEN DO YOU GET TOGETHER WITH FRIENDS OR RELATIVES?: TWICE A WEEK

## 2024-08-29 SDOH — ECONOMIC STABILITY: INCOME INSECURITY: HOW HARD IS IT FOR YOU TO PAY FOR THE VERY BASICS LIKE FOOD, HOUSING, MEDICAL CARE, AND HEATING?: NOT VERY HARD

## 2024-08-29 SDOH — SOCIAL STABILITY: SOCIAL INSECURITY: WERE YOU ABLE TO COMPLETE ALL THE BEHAVIORAL HEALTH SCREENINGS?: YES

## 2024-08-29 SDOH — HEALTH STABILITY: PHYSICAL HEALTH: ON AVERAGE, HOW MANY DAYS PER WEEK DO YOU ENGAGE IN MODERATE TO STRENUOUS EXERCISE (LIKE A BRISK WALK)?: 3 DAYS

## 2024-08-29 SDOH — SOCIAL STABILITY: SOCIAL NETWORK: HOW OFTEN DO YOU ATTENT MEETINGS OF THE CLUB OR ORGANIZATION YOU BELONG TO?: 1 TO 4 TIMES PER YEAR

## 2024-08-29 SDOH — ECONOMIC STABILITY: HOUSING INSECURITY: IN THE PAST 12 MONTHS, HOW MANY TIMES HAVE YOU MOVED WHERE YOU WERE LIVING?: 1

## 2024-08-29 SDOH — SOCIAL STABILITY: SOCIAL INSECURITY: DOES ANYONE TRY TO KEEP YOU FROM HAVING/CONTACTING OTHER FRIENDS OR DOING THINGS OUTSIDE YOUR HOME?: NO

## 2024-08-29 SDOH — SOCIAL STABILITY: SOCIAL INSECURITY: ABUSE: ADULT

## 2024-08-29 SDOH — SOCIAL STABILITY: SOCIAL INSECURITY: HAVE YOU HAD THOUGHTS OF HARMING ANYONE ELSE?: NO

## 2024-08-29 SDOH — SOCIAL STABILITY: SOCIAL INSECURITY: HAVE YOU HAD ANY THOUGHTS OF HARMING ANYONE ELSE?: NO

## 2024-08-29 SDOH — HEALTH STABILITY: PHYSICAL HEALTH: ON AVERAGE, HOW MANY MINUTES DO YOU ENGAGE IN EXERCISE AT THIS LEVEL?: 30 MIN

## 2024-08-29 ASSESSMENT — COGNITIVE AND FUNCTIONAL STATUS - GENERAL
PATIENT BASELINE BEDBOUND: NO
DRESSING REGULAR LOWER BODY CLOTHING: A LITTLE
TURNING FROM BACK TO SIDE WHILE IN FLAT BAD: A LITTLE
CLIMB 3 TO 5 STEPS WITH RAILING: A LOT
PERSONAL GROOMING: A LITTLE
DAILY ACTIVITIY SCORE: 17
STANDING UP FROM CHAIR USING ARMS: A LITTLE
WALKING IN HOSPITAL ROOM: A LOT
DRESSING REGULAR UPPER BODY CLOTHING: A LITTLE
HELP NEEDED FOR BATHING: A LITTLE
MOBILITY SCORE: 16
MOVING TO AND FROM BED TO CHAIR: A LITTLE
TOILETING: A LOT
MOVING FROM LYING ON BACK TO SITTING ON SIDE OF FLAT BED WITH BEDRAILS: A LITTLE
EATING MEALS: A LITTLE

## 2024-08-29 ASSESSMENT — LIFESTYLE VARIABLES
HOW OFTEN DO YOU HAVE 6 OR MORE DRINKS ON ONE OCCASION: NEVER
HOW OFTEN DO YOU HAVE A DRINK CONTAINING ALCOHOL: MONTHLY OR LESS
AUDIT-C TOTAL SCORE: 1
SKIP TO QUESTIONS 9-10: 1
PRESCIPTION_ABUSE_PAST_12_MONTHS: NO
AUDIT-C TOTAL SCORE: 1
SUBSTANCE_ABUSE_PAST_12_MONTHS: NO
HOW MANY STANDARD DRINKS CONTAINING ALCOHOL DO YOU HAVE ON A TYPICAL DAY: 1 OR 2

## 2024-08-29 ASSESSMENT — COLUMBIA-SUICIDE SEVERITY RATING SCALE - C-SSRS
2. HAVE YOU ACTUALLY HAD ANY THOUGHTS OF KILLING YOURSELF?: NO
2. HAVE YOU ACTUALLY HAD ANY THOUGHTS OF KILLING YOURSELF?: NO
6. HAVE YOU EVER DONE ANYTHING, STARTED TO DO ANYTHING, OR PREPARED TO DO ANYTHING TO END YOUR LIFE?: NO
1. IN THE PAST MONTH, HAVE YOU WISHED YOU WERE DEAD OR WISHED YOU COULD GO TO SLEEP AND NOT WAKE UP?: NO
1. IN THE PAST MONTH, HAVE YOU WISHED YOU WERE DEAD OR WISHED YOU COULD GO TO SLEEP AND NOT WAKE UP?: NO
6. HAVE YOU EVER DONE ANYTHING, STARTED TO DO ANYTHING, OR PREPARED TO DO ANYTHING TO END YOUR LIFE?: NO

## 2024-08-29 ASSESSMENT — ACTIVITIES OF DAILY LIVING (ADL)
BATHING: INDEPENDENT
PATIENT'S MEMORY ADEQUATE TO SAFELY COMPLETE DAILY ACTIVITIES?: YES
HEARING - RIGHT EAR: FUNCTIONAL
LACK_OF_TRANSPORTATION: NO
WALKS IN HOME: INDEPENDENT
TOILETING: INDEPENDENT
DRESSING YOURSELF: INDEPENDENT
GROOMING: INDEPENDENT
HEARING - LEFT EAR: FUNCTIONAL
FEEDING YOURSELF: INDEPENDENT
ADEQUATE_TO_COMPLETE_ADL: YES
ASSISTIVE_DEVICE: EYEGLASSES
JUDGMENT_ADEQUATE_SAFELY_COMPLETE_DAILY_ACTIVITIES: YES

## 2024-08-29 ASSESSMENT — PAIN SCALES - GENERAL
PAINLEVEL_OUTOF10: 0 - NO PAIN
PAINLEVEL_OUTOF10: 0 - NO PAIN
PAINLEVEL_OUTOF10: 7
PAINLEVEL_OUTOF10: 4
PAINLEVEL_OUTOF10: 0 - NO PAIN
PAINLEVEL_OUTOF10: 4
PAINLEVEL_OUTOF10: 4
PAINLEVEL_OUTOF10: 0 - NO PAIN
PAINLEVEL_OUTOF10: 4
PAINLEVEL_OUTOF10: 7
PAINLEVEL_OUTOF10: 0 - NO PAIN
PAINLEVEL_OUTOF10: 6
PAINLEVEL_OUTOF10: 0 - NO PAIN
PAINLEVEL_OUTOF10: 7
PAINLEVEL_OUTOF10: 0 - NO PAIN
PAINLEVEL_OUTOF10: 0 - NO PAIN

## 2024-08-29 NOTE — ANESTHESIA POSTPROCEDURE EVALUATION
Patient: Rhoda Pena    Procedure Summary       Date: 08/29/24 Room / Location: [facility] OR 14 / [facility]    Anesthesia Start: 0722 Anesthesia Stop: 0957    Procedure: Resection Robot-Assisted Lung Lobe Resection Robot-Assisted Lung Lobe [id number], Excision Tissue Robot-Assisted Mediastinum [id number] (Right: Chest) Diagnosis:       Malignant neoplasm of lower lobe of right lung (Multi)      (Malignant neoplasm of lower lobe of right lung (Multi) [C34.31])    Surgeons: Luis Husain MD Responsible Provider: Иван Raya MD    Anesthesia Type: general ASA Status: 3            Anesthesia Type: general    Vitals Value Taken Time   /63 08/29/24 1115   Temp 35.7 °C (96.3 °F) 08/29/24 1000   Pulse 59 08/29/24 1121   Resp 12 08/29/24 1121   SpO2 93 % 08/29/24 1121   Vitals shown include unfiled device data.    Anesthesia Post Evaluation    Patient location during evaluation: PACU  Patient participation: complete - patient participated  Level of consciousness: awake and alert  Pain management: adequate  Airway patency: patent  Cardiovascular status: acceptable  Respiratory status: acceptable  Hydration status: acceptable  Postoperative Nausea and Vomiting: none        No notable events documented.

## 2024-08-29 NOTE — OP NOTE
Resection Robot-Assisted Lung Lobe Resection Robot-Assisted Lung Lobe [id number], Excision Tissue Robot-Assisted Mediastinum [id number] (R) Operative Note     Date: 2024  OR Location: [facility] OR    Name: Rhoda Pena, : 1960, Age: 64 y.o., MRN: [id number], Sex: female    Diagnosis  Pre-op Diagnosis      * Malignant neoplasm of lower lobe of right lung (Multi) [C34.31] Post-op Diagnosis     * Malignant neoplasm of lower lobe of right lung (Multi) [C34.31]     Procedures  Resection Robot-Assisted Lung Lobe Resection Robot-Assisted Lung Lobe [id number], Excision Tissue Robot-Assisted Mediastinum [id number]  [id number] - MA THORACOSCOPY W/LOBECTOMY SINGLE LOBE  Flexible bronchoscopy  Robotic assisted right lower lobe lobectomy  Robotic assisted mediastinal lymphadenectomy  Multilevel rib blocks    Surgeons      * Luis Husain - Primary    Resident/Fellow/Other Assistant:  Surgeons and Role:     * Zhanna Dukes, PABernardC - ATTILA First Assist    Procedure Summary  Anesthesia: General  ASA: III  Anesthesia Staff: Anesthesiologist: Иван Raya MD  CRNA: Tommy Solano, APRN-CRNA  C-AA: ROBERT Baxter  ALIREZA: Jordan Shea  Estimated Blood Loss: 20 mL  Intra-op Medications:   Administrations occurring from 0645 to 1050 on 24:   Medication Name Total Dose   sodium chloride 0.9 % irrigation solution 1,000 mL   BUPivacaine-EPINEPHrine (Marcaine w/EPI) 0.25 %-1:200,000 injection 30 mL   HYDROmorphone (Dilaudid) injection 0.2 mg 0.2 mg   PHENobarbitaL (Luminal) tablet 129.6 mg Cannot be calculated   PHENobarbitaL (Luminal) tablet 64.8 mg Cannot be calculated   fentaNYL PF (Sublimaze) injection  - Omnicell Override Pull Cannot be calculated   fentaNYL PF (Sublimaze) injection  - Omnicell Override Pull Cannot be calculated   propofol (Diprivan) injection  - Omnicell Override Pull Cannot be calculated   sugammadex (Bridion) injection  - Omnicell Override Pull Cannot be calculated               Anesthesia Record               Intraprocedure I/O Totals          Intake    LR bolus 800.00 mL    Total Intake 800 mL       Output    Urine 115 mL    Est. Blood Loss 20 mL    Total Output 135 mL       Net    Net Volume 665 mL          Specimen:   ID Type Source Tests Collected by Time   1 : LEVEL 10 LYMPH NODE Tissue LYMPH NODE PULMONARY (SPECIFY SITE) SURGICAL PATHOLOGY EXAM Luis Husain MD 8/29/2024 0819   2 : LEVEL 7 LYMPH NODE Tissue LYMPH NODE PULMONARY (SPECIFY SITE) SURGICAL PATHOLOGY EXAM Luis Husain MD 8/29/2024 0834   3 : LEVEL 11 LYMPH NODE Tissue LYMPH NODE PULMONARY (SPECIFY SITE) SURGICAL PATHOLOGY EXAM Luis Husain MD 8/29/2024 0834   4 : LEVEL 4R LYMPH NODE Tissue LYMPH NODE PULMONARY (SPECIFY SITE) SURGICAL PATHOLOGY EXAM Luis Husain MD 8/29/2024 0839   5 : RIGHT LOWER LOBE Tissue LUNG LOBECTOMY/SEGMENTECTOMY RIGHT (SPECIFY SITE) SURGICAL PATHOLOGY EXAM Luis Husain MD 8/29/2024 0854        Staff:   Circulator: Leigh Ann Doranub Person: Delon Doranub Person: Мария         Drains and/or Catheters:   Chest Tube 1 Right Pleural 28 Fr (Active)   Function -20 cm H2O 08/29/24 0950   Chest Tube Air Leak No 08/29/24 0950   Dressing Status Clean;Dry;Occlusive 08/29/24 0950   Site Assessment Clean;Dry;Intact 08/29/24 0950   Surrounding Skin Intact 08/29/24 0950   Output (mL) 45 mL 08/29/24 1300       Urethral Catheter Non-latex 16 Fr. (Active)   Site Assessment Clean;Skin intact 08/29/24 0950   Collection Container Standard drainage bag 08/29/24 0950   Securement Method Securing device (Describe) 08/29/24 0950   Output (mL) 30 mL 08/29/24 1300       Tourniquet Times:         Implants:     Findings: Adequate right lower lobectomy.  Frozen section of bronchial margin negative for malignancy.    Indications: Rhoda Pena is an 64 y.o. female who is having surgery for Malignant neoplasm of lower lobe of right lung (Multi) [C34.31].   Biopsy-proven adenocarcinoma the right lower lobe here for resection.    The patient was seen in the preoperative area. The risks, benefits, complications, treatment options, non-operative alternatives, expected recovery and outcomes were discussed with the patient. The possibilities of reaction to medication, pulmonary aspiration, injury to surrounding structures, bleeding, recurrent infection, the need for additional procedures, failure to diagnose a condition, and creating a complication requiring transfusion or operation were discussed with the patient. The patient concurred with the proposed plan, giving informed consent.  The site of surgery was properly noted/marked if necessary per policy. The patient has been actively warmed in preoperative area. Preoperative antibiotics have been ordered and given within 1 hours of incision. Venous thrombosis prophylaxis have been ordered including bilateral sequential compression devices and chemical prophylaxis    Procedure Details: After identifying the patient in the preoperative area, the patient was brought to the room.  Patient placed in the supine position.  Timeout performed.  General anesthesia induced with a double-lumen tube.  Flexible bronchoscopy performed to assess position of endotracheal tube and also to assess the airway.  No endobronchial lesions were identified.  Patient placed in the left lateral decubitus with the right side up.  All bony prominences were padded.  Chest was prepped and draped in surgical fashion.  A 1 cm incision was performed in the eighth intercostal space with midaxillary line.  After gaining access to the chest cavity, multilevel rib blocks with Marcaine were done under direct visualization.  1 to 2 cc of quarter percent Marcaine were injected into each interspace from spaces 3-8.  Additional ports were placed in the same interspace 1 anterior and 2 posterior.  An assist port was placed between arms 2 and 3.  Robot was docked. I  proceeded to scrub out and moved to the console.  Attention was turned to the inferior pulmonary ligament which was dissected after exploration no Station 9 or 8 lymph nodes were found so none were harvested.  The dissection was taken posteriorly to the level of the hilum and also the subcarinal space lymph nodes were harvested and sent for permanent section.  Station 4R lymph nodes were harvested and sent for permanent section.  At this point attention was turned to the fissure where the pulmonary artery was identified.  After cautious dissection the superior segmental branch was identified circumferentially dissected and transected with a white load of the robotic stapler.  Next, the basilar trunk was circumferentially dissected and transected with a white load of the robotic stapler.  Attention was turned to the inferior vein that was circumferentially dissected and transected with a white load of the robotic stapler.  Finally the bronchus was cleaned out from all the tissue and a black load was placed to transected.  Specimen was sent for frozen section where the bronchus was negative for malignancy.  Hemostasis was achieved at all times.  28 Kosovan chest tube placed.  Robot undocked.  Lung insufflated under direct visualization.  All wounds were closed in layers.  Patient tolerated procedure well and transferred to the postanesthesia care unit in adequate conditions.  All counts were correct.  No qualified resident or fellow to bedside assist during this robotic case so Wanda Dukes acted as a first assistant during this robotic case.  Complications:  None; patient tolerated the procedure well.    Disposition: PACU - hemodynamically stable.  Condition: stable         Attending Attestation: I was present and scrubbed for the entire procedure.    Luis Husain  Phone Number: [phone]

## 2024-08-29 NOTE — ANESTHESIA PROCEDURE NOTES
Arterial Line:    Date/Time: 8/29/2024 7:46 AM    Staffing  Performed: ALIREZA, attending and CAA   Authorized by: Иван Raya MD    Performed by: ROBERT Baxter    An arterial line was placed. Procedure performed using surface landmarks.in the OR for the following indication(s): continuous blood pressure monitoring.    A 20 gauge (size), 1 and 3/8 inch (length), Angiocath (type) catheter was placed into the Right radial artery, secured by Tegaderm,   Seldinger technique not used.  Events:  patient tolerated procedure well with no complications.

## 2024-08-29 NOTE — BRIEF OP NOTE
Date: 2024  OR Location: [facility] OR    Name: Rhoda Pena, : 1960, Age: 64 y.o., MRN: [id number], Sex: female    Diagnosis  Pre-op Diagnosis      * Malignant neoplasm of lower lobe of right lung (Multi) [C34.31] Post-op Diagnosis     * Malignant neoplasm of lower lobe of right lung (Multi) [C34.31]     Procedures  Resection Robot-Assisted Lung Lobe Resection Robot-Assisted Lung Lobe [id number], Excision Tissue Robot-Assisted Mediastinum [id number]  04691 - AR THORACOSCOPY W/LOBECTOMY SINGLE LOBE  Right lower lobectomy with mediastinal lymph node dissection     Surgeons      * Luis Husain - Primary    Resident/Fellow/Other Assistant:  Surgeons and Role:     * Zhanna Dukes PA-C - ATTILA First Assist    Procedure Summary  Anesthesia: General  ASA: III  Anesthesia Staff: Anesthesiologist: Иван Raya MD  CRNA: Tommy Solano APRN-CRNA  C-AA: ROBERT Baxter  ALIREZA: Jordan Shea  Estimated Blood Loss: 20mL  Intra-op Medications:   Administrations occurring from 0645 to 1050 on 24:   Medication Name Total Dose   sodium chloride 0.9 % irrigation solution 1,000 mL   BUPivacaine-EPINEPHrine (Marcaine w/EPI) 0.25 %-1:200,000 injection 30 mL   PHENobarbitaL (Luminal) tablet 129.6 mg Cannot be calculated   PHENobarbitaL (Luminal) tablet 64.8 mg Cannot be calculated   fentaNYL PF (Sublimaze) injection  - Omnicell Override Pull Cannot be calculated   fentaNYL PF (Sublimaze) injection  - Omnicell Override Pull Cannot be calculated   propofol (Diprivan) injection  - Omnicell Override Pull Cannot be calculated   sugammadex (Bridion) injection  - Omnicell Override Pull Cannot be calculated              Anesthesia Record               Intraprocedure I/O Totals          Intake    LR bolus 800.00 mL    Total Intake 800 mL       Output    Urine 115 mL    Est. Blood Loss 20 mL    Total Output 135 mL       Net    Net Volume 665 mL          Specimen:   ID Type Source Tests  Collected by Time   1 : LEVEL 10 LYMPH NODE Tissue LYMPH NODE PULMONARY (SPECIFY SITE) SURGICAL PATHOLOGY EXAM Luis Husain MD 8/29/2024 0819   2 : LEVEL 7 LYMPH NODE Tissue LYMPH NODE PULMONARY (SPECIFY SITE) SURGICAL PATHOLOGY EXAM Luis Husain MD 8/29/2024 0834   3 : LEVEL 11 LYMPH NODE Tissue LYMPH NODE PULMONARY (SPECIFY SITE) SURGICAL PATHOLOGY EXAM Luis Husain MD 8/29/2024 0834   4 : LEVEL 4R LYMPH NODE Tissue LYMPH NODE PULMONARY (SPECIFY SITE) SURGICAL PATHOLOGY EXAM Luis Husain MD 8/29/2024 0839   5 : RIGHT LOWER LOBE Tissue LUNG LOBECTOMY/SEGMENTECTOMY RIGHT (SPECIFY SITE) SURGICAL PATHOLOGY EXAM Luis Husain MD 8/29/2024 0854        Staff:   Tammieulator: Leigh Ann You Person: Delon Doranub Person: Мария          Findings: Mass in right lower lobe     Complications:  None; patient tolerated the procedure well.     Disposition: PACU - hemodynamically stable.  Condition: stable  Specimens Collected:   ID Type Source Tests Collected by Time   1 : LEVEL 10 LYMPH NODE Tissue LYMPH NODE PULMONARY (SPECIFY SITE) SURGICAL PATHOLOGY EXAM Luis Husain MD 8/29/2024 0819   2 : LEVEL 7 LYMPH NODE Tissue LYMPH NODE PULMONARY (SPECIFY SITE) SURGICAL PATHOLOGY EXAM Luis Husain MD 8/29/2024 0834   3 : LEVEL 11 LYMPH NODE Tissue LYMPH NODE PULMONARY (SPECIFY SITE) SURGICAL PATHOLOGY EXAM Luis Husain MD 8/29/2024 0834   4 : LEVEL 4R LYMPH NODE Tissue LYMPH NODE PULMONARY (SPECIFY SITE) SURGICAL PATHOLOGY EXAM Luis Husain MD 8/29/2024 0839   5 : RIGHT LOWER LOBE Tissue LUNG LOBECTOMY/SEGMENTECTOMY RIGHT (SPECIFY SITE) SURGICAL PATHOLOGY EXAM Luis Husain MD 8/29/2024 0854     Wanda Dukes PA-C

## 2024-08-29 NOTE — ANESTHESIA PROCEDURE NOTES
Peripheral IV  Date/Time: 8/29/2024 7:29 AM      Placement  Needle size: 16 G  Laterality: right  Location: hand  Local anesthetic: none  Site prep: alcohol  Technique: anatomical landmarks  Attempts: 1

## 2024-08-29 NOTE — ANESTHESIA PREPROCEDURE EVALUATION
Patient: Rhoda Pena    Procedure Information       Date/Time: 24 0645    Procedure: Resection Robot-Assisted Lung Lobe Resection Robot-Assisted Lung Lobe [id number], Excision Tissue Robot-Assisted Mediastinum [id number] (Right: Chest) - Robotic assisted right lower lobe lobectomy with mediastinal lymphadenectomy; ROBOT approved by [employer].    Location: [facility] OR 14 / Virtual [facility] OR    Surgeons: Luis Husain MD        ALLERGIES:  Allergies   Allergen Reactions    Phenytoin Other     double vision    Codeine Hallucinations     AUDITORY HALLUCINATIONS        MEDICAL HISTORY:  Past Medical History:   Diagnosis Date    Achilles tendinitis, unspecified leg     Achilles tendinitis    Acute maxillary sinusitis, unspecified 2018    Acute maxillary sinusitis    Acute nasopharyngitis (common cold) 2016    Common cold    Adenocarcinoma of lung (Multi)     Age-related osteoporosis without current pathological fracture 2015    Encounter for ongoing osteoporosis therapy, bisphosphonates    Complete or unspecified spontaneous  without complication ([facility])         Dyspnea, unspecified 2019    Paroxysmal dyspnea    Encounter for general adult medical examination without abnormal findings 2018    Annual physical exam    Encounter for health counseling related to travel 2018    Counseling about travel    Encounter for screening for lipoid disorders 2019    Screening cholesterol level    Epigastric pain 2020    Dyspepsia    GERD (gastroesophageal reflux disease)     Irritable bowel syndrome     Left patella fracture 2023    Mastodynia 2021    Breast pain, right    Otalgia, right ear 2020    Otalgia, right    Other general symptoms and signs 2018    Flu-like symptoms    Other microscopic hematuria 2017    Hematuria, microscopic    Pain in right hand 2019    Pain of right hand    Pain in right knee  08/05/2019    Knee pain, right    Pain in unspecified shoulder     Shoulder pain    Personal history of (healed) traumatic fracture 10/30/2020    History of fracture of phalanx of toe    Personal history of other diseases of the digestive system 08/16/2018    History of gastroenteritis    Personal history of other diseases of the musculoskeletal system and connective tissue 12/27/2016    History of low back pain    Personal history of other diseases of the musculoskeletal system and connective tissue 10/19/2020    History of osteopenia    Personal history of other diseases of the nervous system and sense organs 08/05/2019    Personal history of seizure disorder    Personal history of other diseases of the respiratory system 05/13/2022    History of acute sinusitis    Personal history of other diseases of the respiratory system 10/30/2020    History of chronic rhinitis    Personal history of other diseases of the respiratory system 04/19/2017    History of influenza    Personal history of other diseases of the respiratory system 03/07/2020    History of viral pharyngitis    Personal history of other infectious and parasitic diseases     History of varicella    Personal history of other medical treatment 11/02/2018    History of screening mammography    Personal history of other specified conditions     History of headache    Personal history of other specified conditions 03/24/2020    History of abnormal mammogram    Personal history of other specified conditions 03/02/2018    History of fatigue    Personal history of other specified conditions 09/19/2017    History of dysuria    Personal history of other specified conditions 08/26/2020    History of diarrhea    Personal history of other specified conditions 03/03/2020    History of abdominal pain    Personal history of other specified conditions 01/11/2019    History of chest pain    Personal history of urinary (tract) infections 11/07/2016    History of urinary  tract infection    Personal history of urinary (tract) infections 07/02/2018    History of urinary tract infection    Pulmonary nodules     Bilateral    Seizure disorder (Multi)     Strain of muscle(s) and tendon(s) of peroneal muscle group at lower leg level, left leg, initial encounter 11/05/2020    Strain of peroneal tendon of left foot, initial encounter    Unspecified fracture of left toe(s), initial encounter for closed fracture 10/30/2020    Fracture of proximal phalanx of toe of left foot    Unspecified injury of left wrist, hand and finger(s), initial encounter 01/05/2021    Left wrist injury    Unspecified injury of unspecified foot, initial encounter     Foot injury    Urinary tract infection, site not specified 07/02/2018    Acute UTI    Urinary tract infection, site not specified 11/07/2016    Acute UTI        Relevant Problems   Pulmonary   (+) Malignant neoplasm of lower lobe of right lung (Multi)      Neuro   (+) Chronic depression   (+) Complex partial seizure evolving to generalized seizure (Multi)   (+) Lumbar radiculopathy      GI   (+) Esophageal reflux   (+) Irritable bowel syndrome with diarrhea        SURGICAL HISTORY:  Past Surgical History:   Procedure Laterality Date    APPENDECTOMY  10/24/2013    Appendectomy    BREAST CYST ASPIRATION  1993    LAPAROSCOPY DIAGNOSTIC / BIOPSY / ASPIRATION / LYSIS  12/18/2014    Exploratory Laparoscopy    PELVIC LAPAROSCOPY      endometriosis    US GUIDED SOFT TISSUE BIOPSY  12/18/2023    US GUIDED SOFT TISSUE BIOPSY 12/18/2023 [facility] US        MEDICATIONS:  Current Outpatient Medications   Medication Instructions    ascorbic acid (Vitamin C) 1,000 mg tablet Take 1 tablet (1,000 mg) by mouth once daily.    calcium carbonate (CALCIUM 600 ORAL) 1 tablet, oral, Daily    carBAMazepine XR (TEGRETOL  XR) 400 mg, oral, 2 times daily, Do not crush, chew, or split.    carBAMazepine XR (TEGRETOL XR) 100 mg, oral, 2 times daily    cholecalciferol (VITAMIN D-3) 50 mcg,  "oral, Daily    cyanocobalamin (VITAMIN B-12) 100 mcg, oral, Daily    estradiol (Estrace) 0.01 % (0.1 mg/gram) vaginal cream Insert 0.25 Applicatorful (1 g) into the vagina 2 times a week.    PHENobarbitaL 64.8 mg tablet TAKE 1 TABLET BY MOUTH EVERY MORNING AND TAKE 2 TABLETS BY MOUTH EVERY EVENING    zoledronic acid (Reclast) 5 mg/100 mL piggyback 5 mg, intravenous, Once        VITALS:      8/29/2024     6:35 AM 8/22/2024     9:28 AM 8/21/2024     3:51 PM   Vitals   Systolic 119 124 130   Diastolic 74 76 76   Heart Rate 65 59 66   Temp 36 °C (96.8 °F) 36.4 °C (97.6 °F)    Resp 18     Height (in)  1.575 m (5' 2\") 1.588 m (5' 2.5\")   Weight (lb)  135.8 135   BMI  24.84 kg/m2 24.3 kg/m2   BSA (m2)  1.64 m2 1.64 m2   Visit Report   Report       LABS:   BMP   Lab Results   Component Value Date    GLUCOSE 95 08/05/2024    GLUCOSE 93 08/05/2024    CALCIUM 9.4 08/05/2024    CALCIUM 9.4 08/05/2024     (L) 08/05/2024     (L) 08/05/2024    K 3.8 08/05/2024    K 3.6 08/05/2024    CO2 28 08/05/2024    CO2 28 08/05/2024    CL 97 (L) 08/05/2024    CL 98 08/05/2024    BUN 16 08/05/2024    BUN 16 08/05/2024    CREATININE 0.70 08/05/2024    CREATININE 0.71 08/05/2024   , LFT   Lab Results   Component Value Date    ALT 12 08/05/2024    AST 16 08/05/2024    ALKPHOS 45 08/05/2024    BILITOT 0.3 08/05/2024   , CBC  Lab Results   Component Value Date    WBC 5.4 08/22/2024    HGB 13.1 08/22/2024    HCT 40.6 08/22/2024    MCV 96 08/22/2024     08/22/2024    , Coags   Lab Results   Component Value Date/Time    PROTIME 11.5 08/22/2024 1024    INR 1.0 08/22/2024 1024    APTT 38 08/22/2024 1024    , A1C   Lab Results   Component Value Date    HGBA1C 5.4 09/13/2023       IMAGES:  EKG          Encounter Date: 08/22/24   ECG 12 lead   Result Value    Ventricular Rate 60    Atrial Rate 60    HI Interval 194    QRS Duration 86    QT Interval 396    QTC Calculation(Bazett) 396    P Axis 16    R Axis 23    T Axis 51    QRS Count 10 "    Q Onset 218    P Onset 121    P Offset 179    T Offset 416    QTC Fredericia 396    Narrative    Normal sinus rhythm  Normal ECG  When compared with ECG of 11-JAN-2019 13:37,  No significant change was found  Confirmed by Seymour Kwan ([id number]) on 8/26/2024 9:46:35 PM       , CXR       XR chest 2 views 08/22/2024    Narrative  Interpreted By:  Lm Gonzalez,  STUDY:  XR CHEST 2 VIEWS; 8/22/2024 10:50 am    INDICATION:  Signs/Symptoms:s/p lung surgery.    COMPARISON:  01/09/2019    ACCESSION NUMBER(S):  [id number]    ORDERING CLINICIAN:  KASSIDY HAHN    FINDINGS:      CARDIOMEDIASTINAL SILHOUETTE:  Cardiomediastinal silhouette is normal in size and configuration.    LUNGS:  Lungs are clear of focal airspace disease or edema. No evidence of  effusions. No pneumothorax.    ABDOMEN:  No remarkable upper abdominal findings.    BONES:  No acute osseous changes.    Impression  1.  No active airspace disease.    , CT Chest        CT chest wo IV contrast 06/25/2024    Narrative  Interpreted By:  Sophie Bardales,  and Delmar Patterson  STUDY:  CT CHEST WO IV CONTRAST;  6/25/2024 3:11 pm    INDICATION:  Signs/Symptoms:follow up on lung nodule and infiltrate. 63-year-old  female former smoker quit 1990 who presents for follow-up of lung  nodules and infiltrates. Normal lung function tests.    COMPARISON:  CT chest 03/04/2024, 01/08/2024. Calcium score 12/14/2023. CT abdomen  12/08/2008.    ACCESSION NUMBER(S):  [id number]    ORDERING CLINICIAN:  FRANCINE BROWN    TECHNIQUE:  Helical data acquisition of the chest was obtained  without IV  contrast material.  Images were reformatted in axial, coronal, and  sagittal planes.    FINDINGS:  LUNGS AND AIRWAYS:  The trachea and central airways are patent. No endobronchial lesion.    A similar focal ground-glass and consolidative opacity with  associated bronchiectasis is noted in the medial right lower lobe.  Diffuse bilateral faint subpleural reticulations are  noted  bilaterally, similar to prior. No pleural effusion or pneumothorax.    A 5 mm nodular ground-glass opacity in the right lower lobe is more  conspicuous compared to prior (series 3, image 190). Unchanged 5 mm  nodular opacity in the right lower lobe (series 3, image 161). Stable  8 mm ground-glass nodule in the left lower lobe (series 3, image 225).    MEDIASTINUM AND ISAURO, LOWER NECK AND AXILLA:  The visualized thyroid gland is within normal limits.    No evidence of thoracic lymphadenopathy by CT criteria.    Esophagus appears within normal limits as seen.    HEART AND VESSELS:  The thoracic aorta is of normal course and caliber with mild vascular  calcifications.    Main pulmonary artery and its branches are normal in caliber.    Mild coronary artery calcifications are seen. The study is not  optimized for evaluation of coronary arteries.    The cardiac chambers are not enlarged.    No evidence of pericardial effusion.    UPPER ABDOMEN:  Multiple parapelvic cysts are noted within the left kidney.  Otherwise, the visualized subdiaphragmatic structures demonstrate no  remarkable findings.    CHEST WALL AND OSSEOUS STRUCTURES:  There are no suspicious osseous lesions. Multilevel degenerative  changes of the thoracic spine are noted.    Impression  1. Similar appearance of a focal right lower lobe ground-glass and  consolidative opacity with associated bronchiectasis. A slow growing  neoplasm can not be excluded. Attention on follow-up is recommended.  2. Increased conspicuity of a 5 mm right lower lobe nodular  ground-glass opacity and stable appearance of additional bilateral  ground-glass nodular opacities measuring up to 8 mm in the left lower  lobe, which may be followed on repeat imaging.  3. Similar diffuse bilateral faint subpleural reticulations, which  may represent chronic interstitial changes related to smoking, given  patient's history.    SOCIAL:  Social History     Tobacco Use   Smoking Status  Former    Current packs/day: 0.00    Types: Cigarettes    Quit date: 1990    Years since quittin.1   Smokeless Tobacco Never      Social History     Substance and Sexual Activity   Alcohol Use Yes    Comment: social      Social History     Substance and Sexual Activity   Drug Use Never        NPO STATUS:  NPO/Void Status  Date of Last Liquid: 24  Time of Last Liquid: 0000  Date of Last Solid: 24  Time of Last Solid: 0000        Clinical Areas Reviewed:   Tobacco  Allergies  Meds   Med Hx  Surg Hx   Fam Hx  Soc Hx        Anesthesia Assessment:    Physical Exam    Airway  Mallampati: II  TM distance: >3 FB  Neck ROM: full     Cardiovascular - normal exam     Dental - normal exam     Pulmonary - normal exam     Abdominal - normal exam             Anesthesia Plan    History of general anesthesia?: yes  History of complications of general anesthesia?: no    ASA 3     general     intravenous induction   Postoperative administration of opioids is intended.  Anesthetic plan and risks discussed with patient.  Use of blood products discussed with patient who.    Plan discussed with CAA and attending.

## 2024-08-29 NOTE — ANESTHESIA PROCEDURE NOTES
Airway  Date/Time: 8/29/2024 7:28 AM  Urgency: elective    Airway not difficult    Staffing  Performed: ROBERT, ALIREZA and attending   Authorized by: Иван Raya MD    Performed by: ROBERT Baxter  Patient location during procedure: OR    Indications and Patient Condition  Indications for airway management: anesthesia and airway protection  Spontaneous Ventilation: absent  Sedation level: deep  Preoxygenated: yes  Patient position: sniffing  MILS not maintained throughout  Mask difficulty assessment: 1 - vent by mask  No planned trial extubation    Final Airway Details  Final airway type: endotracheal airway      Successful airway: ETT - double lumen left  Cuffed: yes   Successful intubation technique: video laryngoscopy  Facilitating devices/methods: intubating stylet  Endotracheal tube insertion site: oral  Blade: Wai  Blade size: #3  ETT DL size (fr): 37  Cormack-Lehane Classification: grade I - full view of glottis  Placement verified by: chest auscultation, bronchoscopy, capnometry and single lung ventilation   Measured from: teeth  ETT to teeth (cm): 29  Number of attempts at approach: 1  Ventilation between attempts: none           The patient is a 66 year old female with a history of GERD, COPD who presents with abdominal pain, currently comatose with hypercapnic respiratory failure, elevated cardiac enzymes, possible PE.    Plan:  - Troponin mildly elevated at 0.130 in the setting of respiratory failure, PE and trended down  - Echocardiogram with normal LV systolic function, dilated RV with significantly reduced function  - CTA C/A/P with right sided PE. The abdominal aorta was noted to have a severe stenosis vs. mural thrombus.  - LE arterial duplex without stenosis  - LE venous duplex negative for DVT  - Continue rivaroxaban 15 mg bid for 21 days then on 12/1 to start 20 mg daily for PE  - Outpatient vascular follow-up  - Completed course of antibiotics for PNA  - Pulm follow-up for ongoing hypoxia

## 2024-08-30 ENCOUNTER — APPOINTMENT (OUTPATIENT)
Dept: RADIOLOGY | Facility: HOSPITAL | Age: 64
End: 2024-08-30
Payer: COMMERCIAL

## 2024-08-30 LAB
ANION GAP SERPL CALC-SCNC: 12 MMOL/L (ref 10–20)
BUN SERPL-MCNC: 11 MG/DL (ref 6–23)
CALCIUM SERPL-MCNC: 8.8 MG/DL (ref 8.6–10.6)
CHLORIDE SERPL-SCNC: 100 MMOL/L (ref 98–107)
CO2 SERPL-SCNC: 30 MMOL/L (ref 21–32)
CREAT SERPL-MCNC: 0.65 MG/DL (ref 0.5–1.05)
EGFRCR SERPLBLD CKD-EPI 2021: >90 ML/MIN/1.73M*2
ERYTHROCYTE [DISTWIDTH] IN BLOOD BY AUTOMATED COUNT: 13.6 % (ref 11.5–14.5)
GLUCOSE SERPL-MCNC: 97 MG/DL (ref 74–99)
HCT VFR BLD AUTO: 34.8 % (ref 36–46)
HGB BLD-MCNC: 11.2 G/DL (ref 12–16)
MAGNESIUM SERPL-MCNC: 2.02 MG/DL (ref 1.6–2.4)
MCH RBC QN AUTO: 31.5 PG (ref 26–34)
MCHC RBC AUTO-ENTMCNC: 32.2 G/DL (ref 32–36)
MCV RBC AUTO: 98 FL (ref 80–100)
NRBC BLD-RTO: 0 /100 WBCS (ref 0–0)
PLATELET # BLD AUTO: 236 X10*3/UL (ref 150–450)
POTASSIUM SERPL-SCNC: 4.3 MMOL/L (ref 3.5–5.3)
RBC # BLD AUTO: 3.56 X10*6/UL (ref 4–5.2)
SODIUM SERPL-SCNC: 138 MMOL/L (ref 136–145)
WBC # BLD AUTO: 8 X10*3/UL (ref 4.4–11.3)

## 2024-08-30 PROCEDURE — 71045 X-RAY EXAM CHEST 1 VIEW: CPT | Performed by: RADIOLOGY

## 2024-08-30 PROCEDURE — 2500000001 HC RX 250 WO HCPCS SELF ADMINISTERED DRUGS (ALT 637 FOR MEDICARE OP): Performed by: PHYSICIAN ASSISTANT

## 2024-08-30 PROCEDURE — 82310 ASSAY OF CALCIUM: CPT | Performed by: PHYSICIAN ASSISTANT

## 2024-08-30 PROCEDURE — 2500000005 HC RX 250 GENERAL PHARMACY W/O HCPCS: Performed by: PHYSICIAN ASSISTANT

## 2024-08-30 PROCEDURE — 71045 X-RAY EXAM CHEST 1 VIEW: CPT

## 2024-08-30 PROCEDURE — 83735 ASSAY OF MAGNESIUM: CPT | Performed by: PHYSICIAN ASSISTANT

## 2024-08-30 PROCEDURE — 2500000005 HC RX 250 GENERAL PHARMACY W/O HCPCS: Performed by: NURSE PRACTITIONER

## 2024-08-30 PROCEDURE — 99024 POSTOP FOLLOW-UP VISIT: CPT | Performed by: PHYSICIAN ASSISTANT

## 2024-08-30 PROCEDURE — 85027 COMPLETE CBC AUTOMATED: CPT | Performed by: PHYSICIAN ASSISTANT

## 2024-08-30 PROCEDURE — 2500000004 HC RX 250 GENERAL PHARMACY W/ HCPCS (ALT 636 FOR OP/ED): Performed by: PHYSICIAN ASSISTANT

## 2024-08-30 PROCEDURE — 36415 COLL VENOUS BLD VENIPUNCTURE: CPT | Performed by: PHYSICIAN ASSISTANT

## 2024-08-30 PROCEDURE — 1200000002 HC GENERAL ROOM WITH TELEMETRY DAILY

## 2024-08-30 RX ORDER — OXYCODONE HYDROCHLORIDE 5 MG/1
5 TABLET ORAL EVERY 4 HOURS PRN
Status: DISCONTINUED | OUTPATIENT
Start: 2024-08-30 | End: 2024-08-31 | Stop reason: HOSPADM

## 2024-08-30 RX ORDER — METOPROLOL TARTRATE 25 MG/1
12.5 TABLET, FILM COATED ORAL 2 TIMES DAILY
Status: DISCONTINUED | OUTPATIENT
Start: 2024-08-30 | End: 2024-08-31 | Stop reason: HOSPADM

## 2024-08-30 RX ORDER — LIDOCAINE 560 MG/1
1 PATCH PERCUTANEOUS; TOPICAL; TRANSDERMAL DAILY
Status: DISCONTINUED | OUTPATIENT
Start: 2024-08-30 | End: 2024-08-31 | Stop reason: HOSPADM

## 2024-08-30 RX ORDER — OXYCODONE HYDROCHLORIDE 10 MG/1
10 TABLET ORAL EVERY 4 HOURS PRN
Status: DISCONTINUED | OUTPATIENT
Start: 2024-08-30 | End: 2024-08-31 | Stop reason: HOSPADM

## 2024-08-30 ASSESSMENT — PAIN - FUNCTIONAL ASSESSMENT
PAIN_FUNCTIONAL_ASSESSMENT: 0-10

## 2024-08-30 ASSESSMENT — PAIN SCALES - GENERAL
PAINLEVEL_OUTOF10: 7
PAINLEVEL_OUTOF10: 5 - MODERATE PAIN
PAINLEVEL_OUTOF10: 7
PAINLEVEL_OUTOF10: 3

## 2024-08-30 NOTE — ASSESSMENT & PLAN NOTE
Rhoda Pena is a 64 y.o. female on day 1 of admission with a past medical history of right lower lobe adenocarcinoma, seizures, IBS, GERD, lumbar radiculopathy, and depression who is now s/p a bronchoscopy, right robotic assisted lower lobectomy, mediastinal lymph node dissection, and multilevel rib blocks with Dr. Falk on 8/29/24. Postoperatively, the patient maintained a single right chest tube and a koo catheter. Koo removed on POD#1 with a successful spontaneous void. Chest tube with low output and no air leak on POD#1. Chest tube subsequently removed without complications, and an occlusive dressing was applied. A post pull CXR is pending.

## 2024-08-30 NOTE — PROGRESS NOTES
08/30/24 1012   Discharge Planning   Living Arrangements Spouse/significant other   Support Systems Spouse/significant other   Type of Residence Private residence   Number of Stairs to Enter Residence 4   Number of Stairs Within Residence 0   Do you have animals or pets at home? Yes   Type of Animals or Pets 1DOG   Who is requesting discharge planning? Provider   Home or Post Acute Services None   Expected Discharge Disposition Home   Does the patient need discharge transport arranged? No     Met with patient to introduce myself,role and discuss discharge planning.  Patient lives with spouse.  Independent in all adl's.  Requires no assist devices for mobility.  Patient denies active home care or home care needs.  Patient denies any recent fall.  Family will transport patient home.  Patient feel safe at home and denies any issues with making follow up appointments or getting his medications.  Patient expressed no questions and no social work concerns.  PCP>  Dr. Elis Solares  Pharmacy:   [facility]  Home Care:  N/A  DME:  N/A

## 2024-08-30 NOTE — PROGRESS NOTES
Rhoda Pena is a 64 y.o. female on day 1 of admission with a past medical history of right lower lobe adenocarcinoma, seizures, IBS, GERD, lumbar radiculopathy, and depression who is now s/p a bronchoscopy, right robotic assisted lower lobectomy, mediastinal lymph node dissection, and multilevel rib blocks with Dr. Falk.     Subjective   Pt feeling okay this morning. Reports difficulty taking deep breaths secondary to pain around her right sided chest tube. Also notes pain around her right shoulder/neck with some improvement with the pain medication. Denies fevers, nausea, or vomiting.     Objective     Physical Exam  Constitutional:       General: She is not in acute distress.     Comments: Oriented x3, resting comfortably in bed   HENT:      Head: Normocephalic and atraumatic.   Neck:      Comments: No JVD or tracheal deviation  Cardiovascular:      Comments: Regular rate and rhythm on telemetry   Pulmonary:      Comments: No accessory muscle use to breathe or crepitus appreciated. On room air. One right chest tube to atrium and maintained to waterseal. ~370ml of serosanguinous output in the past 24 hours (~70cc in the last shift). No air leak appreciated.   Abdominal:      General: There is no distension.      Palpations: Abdomen is soft.      Tenderness: There is no abdominal tenderness. There is no guarding or rebound.   Musculoskeletal:         General: No tenderness.      Right lower leg: No edema.      Left lower leg: No edema.   Skin:     Comments: Warm and dry. Surgical incisions without surrounding erythema, edema, or exudates. Occlusive dressing intact around chest tube site without strikethrough.    Neurological:      Mental Status: She is alert.   Psychiatric:      Comments: Appropriate mood and behavior       Last Recorded Vitals  Blood pressure 94/60, pulse 57, temperature 37.1 °C (98.8 °F), temperature source Temporal, resp. rate 17, SpO2 94%.  Intake/Output last 3 Shifts:  I/O last 3 completed  shifts:  In: 1225 [I.V.:425; IV Piggyback:800]  Out: 1190 [Urine:880; Blood:20; Chest Tube:290]    Relevant Results  Scheduled medications  carBAMazepine XR, 400 mg, oral, BID  carBAMazepine XR, 100 mg, oral, BID  docusate sodium, 100 mg, oral, BID  heparin (porcine), 5,000 Units, subcutaneous, q8h  lidocaine, 1 patch, transdermal, Daily  metoprolol tartrate, 12.5 mg, oral, BID  oxygen, , inhalation, Continuous - 02/gases  PHENobarbitaL, 129.6 mg, oral, Nightly  PHENobarbitaL, 64.8 mg, oral, q AM      Continuous medications     PRN medications  PRN medications: acetaminophen, benzocaine-menthol, naloxone, ondansetron ODT **OR** ondansetron, oxyCODONE, oxyCODONE     Results for orders placed or performed during the hospital encounter of 08/29/24 (from the past 24 hour(s))   Phenobarbital level   Result Value Ref Range    Phenobarbital  44.5 (H) 10.0 - 40.0 ug/mL   CBC   Result Value Ref Range    WBC 8.0 4.4 - 11.3 x10*3/uL    nRBC 0.0 0.0 - 0.0 /100 WBCs    RBC 3.56 (L) 4.00 - 5.20 x10*6/uL    Hemoglobin 11.2 (L) 12.0 - 16.0 g/dL    Hematocrit 34.8 (L) 36.0 - 46.0 %    MCV 98 80 - 100 fL    MCH 31.5 26.0 - 34.0 pg    MCHC 32.2 32.0 - 36.0 g/dL    RDW 13.6 11.5 - 14.5 %    Platelets 236 150 - 450 x10*3/uL   Basic metabolic panel   Result Value Ref Range    Glucose 97 74 - 99 mg/dL    Sodium 138 136 - 145 mmol/L    Potassium 4.3 3.5 - 5.3 mmol/L    Chloride 100 98 - 107 mmol/L    Bicarbonate 30 21 - 32 mmol/L    Anion Gap 12 10 - 20 mmol/L    Urea Nitrogen 11 6 - 23 mg/dL    Creatinine 0.65 0.50 - 1.05 mg/dL    eGFR >90 >60 mL/min/1.73m*2    Calcium 8.8 8.6 - 10.6 mg/dL   Magnesium   Result Value Ref Range    Magnesium 2.02 1.60 - 2.40 mg/dL     8/30 morning CXR: Reviewed by this provider. Stable postop changes. Small apical pneumothorax on the right. Right chest tube in place.     Assessment/Plan   Assessment & Plan  Malignant neoplasm of lower lobe of right lung (Multi)    Rhoda Pena is a 64 y.o. female on day 1  of admission with a past medical history of right lower lobe adenocarcinoma, seizures, IBS, GERD, lumbar radiculopathy, and depression who is now s/p a bronchoscopy, right robotic assisted lower lobectomy, mediastinal lymph node dissection, and multilevel rib blocks with Dr. Falk on 8/29/24. Postoperatively, the patient maintained a single right chest tube and a koo catheter. Koo removed on POD#1 with a successful spontaneous void. Chest tube with low output and no air leak on POD#1. Chest tube subsequently removed without complications, and an occlusive dressing was applied. A post pull CXR is pending.     Plan:     Neuro: History of seizures, lumbar radiculopathy, and depression. On carbamazepine and phenobarbital at home. Postop pain.   -Out of bed to chair throughout the day  -Encourage ambulation as tolerated  -Discontinue PCA  -Begin oral regimen of pain control with oxycodone and Tylenol, monitor effectiveness, adjust dose as needed   -Lidocaine patches for incisional discomfort  -Continue home carbamazepine and phenobarbital    Cardiac:   -Continue telemetry  -Vital signs every four hours  -Continue metoprolol for postop arrhythmia prophylaxis   -Replace electrolytes as needed, goal Mg>2 and K>4    Pulm:  History of right lower lobe adenocarcinoma. Now s/p a bronchoscopy, right robotic assisted lower lobectomy, mediastinal lymph node dissection, and multilevel rib blocks with Dr. Falk.   -Encourage incentive spirometer use every hour  -Continue pulmonary hygiene  -Right chest tube with decreasing output and no air leak today. Chest tube removed without complications this afternoon, and an occlusive dressing was applied. A post pull CXR is pending.  -Daily CXR while hospitalized   -Nebulizers as needed for shortness of breath     GI:   -Continue regular diet  -Zofran available as needed for nausea  -Bowel regimen available for constipation secondary to pain medication    Renal:   -Koo removed  overnight, spontaneously voiding   -Monitor I&Os  -Monitor electrolytes with routine BMPs    ID: Afebrile, no leukocytosis  -Continue to trend daily temperatures and CBC to monitor WBC count for signs of infection   -Monitor surgical sites for signs of infection   -Preoperative Ancef completed    Heme:   -Continue to monitor for signs/symptoms of postop bleeding   -Daily CBC  -Continue subcutaneous heparin and SCDs for DVT prophylaxis     Dispo:   -Plan for discharge home tomorrow  -Continue to assess for discharge needs    Pt seen and evaluated. Pt discussed with attending physician Dr. Falk.     Kayla Patel PA-C

## 2024-08-31 ENCOUNTER — APPOINTMENT (OUTPATIENT)
Dept: RADIOLOGY | Facility: HOSPITAL | Age: 64
End: 2024-08-31
Payer: COMMERCIAL

## 2024-08-31 ENCOUNTER — PHARMACY VISIT (OUTPATIENT)
Dept: PHARMACY | Facility: CLINIC | Age: 64
End: 2024-08-31
Payer: COMMERCIAL

## 2024-08-31 VITALS
BODY MASS INDEX: 24.82 KG/M2 | DIASTOLIC BLOOD PRESSURE: 68 MMHG | HEART RATE: 65 BPM | RESPIRATION RATE: 17 BRPM | TEMPERATURE: 97.7 F | SYSTOLIC BLOOD PRESSURE: 119 MMHG | OXYGEN SATURATION: 94 % | WEIGHT: 135.69 LBS

## 2024-08-31 LAB
ANION GAP SERPL CALC-SCNC: 13 MMOL/L (ref 10–20)
BUN SERPL-MCNC: 9 MG/DL (ref 6–23)
CALCIUM SERPL-MCNC: 8.7 MG/DL (ref 8.6–10.6)
CHLORIDE SERPL-SCNC: 100 MMOL/L (ref 98–107)
CO2 SERPL-SCNC: 27 MMOL/L (ref 21–32)
CREAT SERPL-MCNC: 0.57 MG/DL (ref 0.5–1.05)
EGFRCR SERPLBLD CKD-EPI 2021: >90 ML/MIN/1.73M*2
ERYTHROCYTE [DISTWIDTH] IN BLOOD BY AUTOMATED COUNT: 13.5 % (ref 11.5–14.5)
GLUCOSE SERPL-MCNC: 82 MG/DL (ref 74–99)
HCT VFR BLD AUTO: 30.9 % (ref 36–46)
HGB BLD-MCNC: 9.8 G/DL (ref 12–16)
MAGNESIUM SERPL-MCNC: 2.05 MG/DL (ref 1.6–2.4)
MCH RBC QN AUTO: 30.8 PG (ref 26–34)
MCHC RBC AUTO-ENTMCNC: 31.7 G/DL (ref 32–36)
MCV RBC AUTO: 97 FL (ref 80–100)
NRBC BLD-RTO: 0 /100 WBCS (ref 0–0)
PLATELET # BLD AUTO: 205 X10*3/UL (ref 150–450)
POTASSIUM SERPL-SCNC: 3.3 MMOL/L (ref 3.5–5.3)
RBC # BLD AUTO: 3.18 X10*6/UL (ref 4–5.2)
SODIUM SERPL-SCNC: 137 MMOL/L (ref 136–145)
WBC # BLD AUTO: 5.4 X10*3/UL (ref 4.4–11.3)

## 2024-08-31 PROCEDURE — 2500000001 HC RX 250 WO HCPCS SELF ADMINISTERED DRUGS (ALT 637 FOR MEDICARE OP)

## 2024-08-31 PROCEDURE — RXMED WILLOW AMBULATORY MEDICATION CHARGE

## 2024-08-31 PROCEDURE — 71045 X-RAY EXAM CHEST 1 VIEW: CPT | Performed by: RADIOLOGY

## 2024-08-31 PROCEDURE — 82374 ASSAY BLOOD CARBON DIOXIDE: CPT | Performed by: PHYSICIAN ASSISTANT

## 2024-08-31 PROCEDURE — 83735 ASSAY OF MAGNESIUM: CPT | Performed by: PHYSICIAN ASSISTANT

## 2024-08-31 PROCEDURE — 36415 COLL VENOUS BLD VENIPUNCTURE: CPT | Performed by: PHYSICIAN ASSISTANT

## 2024-08-31 PROCEDURE — 2500000004 HC RX 250 GENERAL PHARMACY W/ HCPCS (ALT 636 FOR OP/ED): Performed by: PHYSICIAN ASSISTANT

## 2024-08-31 PROCEDURE — 2500000001 HC RX 250 WO HCPCS SELF ADMINISTERED DRUGS (ALT 637 FOR MEDICARE OP): Performed by: PHYSICIAN ASSISTANT

## 2024-08-31 PROCEDURE — 71045 X-RAY EXAM CHEST 1 VIEW: CPT

## 2024-08-31 PROCEDURE — 85027 COMPLETE CBC AUTOMATED: CPT | Performed by: PHYSICIAN ASSISTANT

## 2024-08-31 PROCEDURE — 2500000004 HC RX 250 GENERAL PHARMACY W/ HCPCS (ALT 636 FOR OP/ED)

## 2024-08-31 RX ORDER — ACETAMINOPHEN 325 MG/1
650 TABLET ORAL EVERY 6 HOURS PRN
Qty: 30 TABLET | Refills: 0 | Status: SHIPPED | OUTPATIENT
Start: 2024-08-31 | End: 2024-09-30

## 2024-08-31 RX ORDER — FUROSEMIDE 10 MG/ML
20 INJECTION INTRAMUSCULAR; INTRAVENOUS ONCE
Status: COMPLETED | OUTPATIENT
Start: 2024-08-31 | End: 2024-08-31

## 2024-08-31 RX ORDER — POTASSIUM CHLORIDE 1.5 G/1.58G
40 POWDER, FOR SOLUTION ORAL ONCE
Status: COMPLETED | OUTPATIENT
Start: 2024-08-31 | End: 2024-08-31

## 2024-08-31 RX ORDER — OXYCODONE HYDROCHLORIDE 5 MG/1
5 TABLET ORAL EVERY 6 HOURS PRN
Qty: 10 TABLET | Refills: 0 | Status: SHIPPED | OUTPATIENT
Start: 2024-08-31

## 2024-08-31 ASSESSMENT — COGNITIVE AND FUNCTIONAL STATUS - GENERAL
MOBILITY SCORE: 23
CLIMB 3 TO 5 STEPS WITH RAILING: A LITTLE
DAILY ACTIVITIY SCORE: 24

## 2024-08-31 ASSESSMENT — PAIN SCALES - GENERAL
PAINLEVEL_OUTOF10: 3
PAINLEVEL_OUTOF10: 5 - MODERATE PAIN

## 2024-08-31 ASSESSMENT — PAIN - FUNCTIONAL ASSESSMENT: PAIN_FUNCTIONAL_ASSESSMENT: 0-10

## 2024-08-31 NOTE — DISCHARGE SUMMARY
Discharge Diagnosis  Malignant neoplasm of lower lobe of right lung (Multi)    Issues Requiring Follow-Up  Postoperative follow up  Pathology results    Test Results Pending At Discharge  Pending Labs       Order Current Status    Surgical Pathology Exam In process    Type And Screen In process            Hospital Course  Rhoda Pena is a 64 y.o. year-old female who presented to [facility] OR on 8/29/2024 for planned with right robotic assisted lower lobectomy, mediastinal lymph node dissection, and multilevel rib blocks with Dr. Falk. Patient taken to the OR on 8/29. Patient tolerated procedure appropriately and patient was subsequently extubated and transferred to PACU for recovery, and then to [facility]. Postoperative course was uncomplicated, and patient progressed appropriately. Postoperatively, the patient maintained a single right chest tube and a koo catheter. Koo removed on POD#1 with a successful spontaneous void. Chest tube with low output and no air leak on POD#1. Chest tube subsequently removed without complications, and an occlusive dressing was applied. A post pull CXR demonstrated an expected tiny apical pneumothorax and significant re-expansion of lungs. On 08/31/24, patient was found to be tolerating diet, ambulating near baseline, had adequate pain control, and was voiding spontaneously. Her K+ was slightly low on RFP (3.3) the morning of discharge. However, this was repleted prior to her discharge. Patient was subsequently deemed appropriate for discharge to home. Medications were sent to [facility] pharmacy via [facility] to beds. She will need to follow up with thoracic surgery in about 2 weeks for a postoperative visit.     Pertinent Physical Exam At Time of Discharge  Heart Rate:  [60-68]   Temp:  [36.4 °C (97.5 °F)-36.7 °C (98.1 °F)]   Resp:  [16-18]   BP: ()/(57-68)   Weight:  [61.5 kg (135 lb 11.1 oz)]   SpO2:  [92 %-96 %]     General: awake, alert, no acute distress  HEENT: atraumatic,  normocephalic  CV: regular rate and rhythm  Pulm: non-labored breathing on room air; prior chest tube site without swelling, erythema, or drainage  GI: abdomen soft, non-distended, non-tender to palpation  : voiding independently  Skin: warm, dry  Extremities: SARMIENTO, ambulating  Psych: appropriate mood and affect    Home Medications     Medication List      START taking these medications     acetaminophen 325 mg tablet; Commonly known as: Tylenol; Take 2 tablets   (650 mg) by mouth every 6 hours if needed for mild pain (1 - 3) or   moderate pain (4 - 6).   oxyCODONE 5 mg immediate release tablet; Commonly known as: Roxicodone;   Take 1 tablet (5 mg) by mouth every 6 hours if needed for severe pain (7 -   10).     CONTINUE taking these medications     CALCIUM 600 ORAL   * carBAMazepine  mg 12 hr tablet; Commonly known as: TEGretol  XR;   Take 1 tablet (400 mg) by mouth 2 times a day. Do not crush, chew, or   split.   * carBAMazepine  mg 12 hr tablet; Commonly known as: TEGretol XR;   Take 1 tablet (100 mg) by mouth 2 times a day.   cholecalciferol 50 MCG (2000 UT) tablet; Commonly known as: Vitamin D-3   cyanocobalamin 100 mcg tablet; Commonly known as: Vitamin B-12   estradiol 0.01 % (0.1 mg/gram) vaginal cream; Commonly known as:   Estrace; Insert 0.25 Applicatorful (1 g) into the vagina 2 times a week.   PHENobarbitaL 64.8 mg tablet; TAKE 1 TABLET BY MOUTH EVERY MORNING AND   TAKE 2 TABLETS BY MOUTH EVERY EVENING   Vitamin C 1,000 mg tablet; Generic drug: ascorbic acid   zoledronic acid 5 mg/100 mL piggyback; Commonly known as: Reclast;   Infuse 100 mL (5 mg) at 400 mL/hr over 15 minutes into a venous catheter 1   time for 1 dose.  * This list has 2 medication(s) that are the same as other medications   prescribed for you. Read the directions carefully, and ask your doctor or   other care provider to review them with you.     STOP taking these medications     chlorhexidine 0.12 % solution; Commonly  known as: Peridex   chlorhexidine 4 % external liquid; Commonly known as: Hibiclens       Outpatient Follow-Up  Future Appointments   Date Time Provider Department Center   9/17/2024  1:15 PM Luis Husain MD [id number] [facility]   9/20/2024  8:00 AM Julisa Dean APRN-CNP [id number] [facility]   10/4/2024 11:15 AM Mayte Flynn, PT [id number] [facility]   2/5/2025  3:20 PM Damaris Jhaveri MD [id number] [facility]     Patient seen and discussed with attending Dr. Jourdan Dennis MD  General Surgery PGY-2  Thoracic Surgery Service [id number]

## 2024-08-31 NOTE — DISCHARGE INSTRUCTIONS
Post Operative Instructions  You had surgery on 8/29 at [facility] for robotic assisted right lower lobe lobectomy and mediastinal lymphadenectomy  with Dr. Falk of thoracic surgery. Included below are post-operative instructions and details regarding follow-up.     Thank you for allowing us to participate in your care and we wish you the best.    Best Regards,  [facility]    Activity:  Activity as tolerated. Continue ambulating as tolerated to improve lung function and pain control. It is okay to feel a little tired after surgery. You should get plenty of rest as well.     Surgical Site/Wound Care:  See attachment    Nutrition:  You may resume a regular diet following surgery. Ensure that you are drinking an adequate amount of fluids to maintain hydration as well as consuming a diet high in protein and low in sugar. You may consider increasing your fiber intake to avoid constipation.    Medication Instructions:  You may resume use of your home prescribed mediations as previously directed following discharge from the hospital. If you were taking medications prior to your surgery and they are not listed on your discharge homegoing instructions medications list, consult your MD before you resume these medications.    Some postoperative pain is not unusual. This is usually relieved by taking prescribed or over the counter Acetaminophen/Tylenol, Motrin/ibuprofen. In cases of severe pain, you may use prescribed oxycodone as directed. Severe pain despite administration of pain medication must be reported to your physician.    Remember when taking Acetaminophen, do NOT exceed more than 1000 milligrams (mg) per dose or more than 4000 mg total per day. Taking too much Acetaminophen at one time can damage your liver. The maximum amount of ibuprofen in adults is 800 mg per dose or 3200 mg per day. Call your MD if you have any questions about your medications. To  prevent constipation while taking narcotic pain medications, please utilize your prescribed bowel regimen, ensure that you drink plenty of water, eat fiber rich foods (a good source is fruit) and increase activity progressively.    DO NOT drive a car while utilizing narcotic pain medications and until cleared by MD at follow-up appointment. Driving or operating heavy machinery, lawnmowers or power tools while taking opiod/narcotic pain medications may impair your judgement.    Call Physician If:  Call your MD or seek immediate medical attention if you experience any of the following symptoms:  1. Fever of 101.5 (38.5 C) or greater  2. Pain not controlled with prescribed pain medications  3. Uncontrolled nausea and/or vomiting  4. Drainage or swelling around your incisions and/or surgical sites   5. Shortness of breath or difficulty breathing  6. Chest pain    Follow-up/Post Discharge Appointments:  Follow-up care is a key part of your treatment and safety. It is very important that you maintain follow-up care as directed so that your surgical site heals properly and does not lead to problems. Always carry a current medication list with you and bring it to ALL healthcare Provider visits. Be sure to maintain follow up with thoracic surgery at your scheduled appointment. If you are unable to keep your appointment, or need to reschedule please contact our office.

## 2024-08-31 NOTE — HOSPITAL COURSE
Rhoda Pena is a 64 y.o. year-old female who presented to [facility] OR on 8/29/2024 for planned with right robotic assisted lower lobectomy, mediastinal lymph node dissection, and multilevel rib blocks with Dr. Falk. Patient taken to the OR on 8/29. Patient tolerated procedure appropriately and patient was subsequently extubated and transferred to PACU for recovery, and then to [facility]. Postoperative course was uncomplicated, and patient progressed appropriately. Postoperatively, the patient maintained a single right chest tube and a koo catheter. Koo removed on POD#1 with a successful spontaneous void. Chest tube with low output and no air leak on POD#1. Chest tube subsequently removed without complications, and an occlusive dressing was applied. A post pull CXR demonstrated full re-expansion of lungs. On 08/31/24, patient was found to be tolerating diet, ambulating near baseline, had adequate pain control, and was voiding spontaneously. Her K+ was slightly low on RFP (3.3) the morning of discharge. However, this was repleted prior to her discharge. Patient was subsequently deemed appropriate for discharge to home. Medications were sent via meds to beds. She will need to follow up with thoracic surgery in 2 weeks for a postoperative visit.

## 2024-09-04 LAB
CYTOLOGY CMNT CVX/VAG CYTO-IMP: NORMAL
HPV HR 12 DNA GENITAL QL NAA+PROBE: NEGATIVE
HPV HR GENOTYPES PNL CVX NAA+PROBE: NEGATIVE
HPV16 DNA SPEC QL NAA+PROBE: NEGATIVE
HPV18 DNA SPEC QL NAA+PROBE: NEGATIVE
LAB AP HPV GENOTYPE QUESTION: YES
LAB AP HPV HR: NORMAL
LABORATORY COMMENT REPORT: NORMAL
LABORATORY COMMENT REPORT: NORMAL
MENSTRUAL HX REPORTED: NORMAL
PATH REPORT.TOTAL CANCER: NORMAL

## 2024-09-05 ENCOUNTER — TELEPHONE (OUTPATIENT)
Dept: CARDIAC SURGERY | Facility: CLINIC | Age: 64
End: 2024-09-05
Payer: COMMERCIAL

## 2024-09-09 PROCEDURE — RXMED WILLOW AMBULATORY MEDICATION CHARGE

## 2024-09-10 DIAGNOSIS — Z09 S/P LUNG SURGERY, FOLLOW-UP EXAM: ICD-10-CM

## 2024-09-10 LAB
ABO GROUP (TYPE) IN BLOOD: NORMAL
ANTIBODY SCREEN: NORMAL
RH FACTOR (ANTIGEN D): NORMAL

## 2024-09-11 PROCEDURE — RXMED WILLOW AMBULATORY MEDICATION CHARGE

## 2024-09-12 ENCOUNTER — PHARMACY VISIT (OUTPATIENT)
Dept: PHARMACY | Facility: CLINIC | Age: 64
End: 2024-09-12
Payer: COMMERCIAL

## 2024-09-17 ENCOUNTER — HOSPITAL ENCOUNTER (OUTPATIENT)
Dept: RADIOLOGY | Facility: HOSPITAL | Age: 64
Discharge: HOME | End: 2024-09-17
Payer: COMMERCIAL

## 2024-09-17 ENCOUNTER — OFFICE VISIT (OUTPATIENT)
Dept: CARDIAC SURGERY | Facility: CLINIC | Age: 64
End: 2024-09-17
Payer: COMMERCIAL

## 2024-09-17 VITALS — BODY MASS INDEX: 24.55 KG/M2 | HEIGHT: 62 IN | RESPIRATION RATE: 18 BRPM | WEIGHT: 133.4 LBS

## 2024-09-17 DIAGNOSIS — Z09 S/P LUNG SURGERY, FOLLOW-UP EXAM: ICD-10-CM

## 2024-09-17 DIAGNOSIS — C34.31 MALIGNANT NEOPLASM OF LOWER LOBE OF RIGHT LUNG (MULTI): Primary | ICD-10-CM

## 2024-09-17 LAB
LAB AP ASR DISCLAIMER: NORMAL
LAB AP BLOCK FOR ADDITIONAL STUDIES: NORMAL
LABORATORY COMMENT REPORT: NORMAL
Lab: NORMAL
PATH REPORT.COMMENTS IMP SPEC: NORMAL
PATH REPORT.FINAL DX SPEC: NORMAL
PATH REPORT.GROSS SPEC: NORMAL
PATH REPORT.RELEVANT HX SPEC: NORMAL
PATH REPORT.TOTAL CANCER: NORMAL
PATHOLOGY SYNOPTIC REPORT: NORMAL

## 2024-09-17 PROCEDURE — 71046 X-RAY EXAM CHEST 2 VIEWS: CPT | Performed by: RADIOLOGY

## 2024-09-17 PROCEDURE — 99213 OFFICE O/P EST LOW 20 MIN: CPT | Performed by: THORACIC SURGERY (CARDIOTHORACIC VASCULAR SURGERY)

## 2024-09-17 PROCEDURE — 1111F DSCHRG MED/CURRENT MED MERGE: CPT | Performed by: THORACIC SURGERY (CARDIOTHORACIC VASCULAR SURGERY)

## 2024-09-17 PROCEDURE — 1159F MED LIST DOCD IN RCRD: CPT | Performed by: THORACIC SURGERY (CARDIOTHORACIC VASCULAR SURGERY)

## 2024-09-17 PROCEDURE — 1126F AMNT PAIN NOTED NONE PRSNT: CPT | Performed by: THORACIC SURGERY (CARDIOTHORACIC VASCULAR SURGERY)

## 2024-09-17 PROCEDURE — 1036F TOBACCO NON-USER: CPT | Performed by: THORACIC SURGERY (CARDIOTHORACIC VASCULAR SURGERY)

## 2024-09-17 PROCEDURE — 3008F BODY MASS INDEX DOCD: CPT | Performed by: THORACIC SURGERY (CARDIOTHORACIC VASCULAR SURGERY)

## 2024-09-17 PROCEDURE — 71046 X-RAY EXAM CHEST 2 VIEWS: CPT

## 2024-09-17 ASSESSMENT — ENCOUNTER SYMPTOMS
LOSS OF SENSATION IN FEET: 0
DEPRESSION: 0
OCCASIONAL FEELINGS OF UNSTEADINESS: 0

## 2024-09-17 ASSESSMENT — LIFESTYLE VARIABLES
AUDIT-C TOTAL SCORE: 1
HOW MANY STANDARD DRINKS CONTAINING ALCOHOL DO YOU HAVE ON A TYPICAL DAY: 1 OR 2
HOW OFTEN DO YOU HAVE SIX OR MORE DRINKS ON ONE OCCASION: NEVER
SKIP TO QUESTIONS 9-10: 1
HOW OFTEN DO YOU HAVE A DRINK CONTAINING ALCOHOL: MONTHLY OR LESS

## 2024-09-17 ASSESSMENT — PAIN SCALES - GENERAL: PAINLEVEL: 0-NO PAIN

## 2024-09-17 NOTE — PROGRESS NOTES
"CARDIOTHORACIC SURGERY FOLLOW-UP PROGRESS NOTE    Subjective   Patient is a 64 y.o. female who presents for routine post-operative follow up. She comes in today complaining of incisional pain. Her activity level has been good.       Objective   Physical Exam  Resp 18   Ht 1.575 m (5' 2\")   Wt 60.5 kg (133 lb 6.4 oz)   BMI 24.40 kg/m²   Heart: regular rate and rhythm, no murmurs  Lungs: clear to auscultation bilaterally  Extremities: warm and well-perfused, peripheral pulses intact, no cyanosis, clubbing, or edema  Incision(s):  healing well.    Data Review:  X-ray: normal chest x-ray    Assessment/Plan   There are no diagnoses linked to this encounter.  Patient was found to have a right lower lobe consolidation suspicious for malignancy.  She underwent a PET scan showing an SUV max of 2.7.  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 She underwent a robot assisted RLL with lymph node sampling on 8/29/24. She did well postop. She states she feels pretty good,little tired and sore. Final pathology is pending. We will call patient with path when posted. We will follow up in 6 months with a CT scan of chest.  Deana Lambert PA-C  "

## 2024-09-18 ENCOUNTER — TELEPHONE VISIT (OUTPATIENT)
Dept: CARDIOTHORACIC SURGERY | Facility: HOSPITAL | Age: 64
End: 2024-09-18
Payer: COMMERCIAL

## 2024-09-18 DIAGNOSIS — C34.90 ADENOCARCINOMA, LUNG, UNSPECIFIED LATERALITY (MULTI): ICD-10-CM

## 2024-09-18 DIAGNOSIS — R91.1 LUNG NODULE: ICD-10-CM

## 2024-09-18 DIAGNOSIS — C34.31 MALIGNANT NEOPLASM OF LOWER LOBE OF RIGHT LUNG (MULTI): Primary | ICD-10-CM

## 2024-09-18 LAB
ELECTRONICALLY SIGNED BY: NORMAL
FOCUSED SOLID TUMOR DNA/RNA RESULTS: NORMAL

## 2024-09-18 NOTE — PROGRESS NOTES
Called to discuss pathology results.  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  All margins are negative for resection.  Intrapulmonary lymph nodes and mediastinal lymph nodes are negative for malignancy.  This is a pT3 N0.  I discussed with her next steps I would want her to have discussion with medical oncology about adjuvant therapy.  I will see her again in follow-up.

## 2024-09-20 ENCOUNTER — APPOINTMENT (OUTPATIENT)
Dept: PRIMARY CARE | Facility: CLINIC | Age: 64
End: 2024-09-20
Payer: COMMERCIAL

## 2024-09-25 NOTE — PROGRESS NOTES
Subjective:   Patient Name: Rhoda Pena    : 1960     MRN: [id number]     Age: 64 y.o.     Gender: female  Referring Provider: Dr. Santosh Falk (Thoracic Surgery)    CC: Management of Newly diagnosed stage IIB ([id number]) adenocarcinoma of lung, with multiple nodules within the RLL, PD-L1 5%. In-house NGS showed KRAS p.G12D ([id number] c.35G>A); TP53 p.S446Wzy*6 ([id number] c.626_627delGA); TP53 p.R306* ([id number] c.916C>T). MS status: cannot be determined. S/p RLL lobectomy on 2024.    Presenting History (2024): At time of initial presentation a 64 y.o. female, former smoker (started at age 14, 1 pack per day, quitted at age 30) with a past medical history of osteoporosis, hx of seizure on Carbamazepine (has not had any seizure for 1 decade) referred for management of newly diagnosed RLL lung adenocarcinoma s/p RLL lobectomy.     She was firstly found to have some abnormalities in the RLL on the CT cardiac scoring on 2023. Subsequent follow up CT Chest (-) 2024 showed Stable area of mixed consolidation and ground-glass opacity in the medial RLL. Additional 0.7 cm solid nodule in the left lower lobe. This is indeterminate and further attention on follow-up imaging is recommended.    CT chest (-) on 2024 imilar appearance of a focal right lower lobe ground-glass and consolidative opacity with associated bronchiectasis. A slow growing neoplasm can not be excluded. Increased conspicuity of a 5 mm right lower lobe nodular ground-glass opacity and stable appearance of additional bilateral ground-glass nodular opacities measuring up to 8 mm in the left lower lobe, which may be followed on repeat imaging. Similar diffuse bilateral faint subpleural reticulations, which may represent chronic interstitial changes related to smoking, given patient's history.    2024: PET/CT showed 1. Minimal FDG avid paravertebral consolidative/ground-glass opacity in the right lower lobe, favored to  represent infectious/inflammatory process or atelectasis.  6 mm and 8 mm pulmonary nodules in the right and left lower lobes respectively are non FDG avid. Follow-up with dedicated CT of chest is recommended. 2. No other concerning FDG avid disease identified.    8/8/2024: RLL bronchoscpy with EBUS: RLL FNA showed Malignant cells derived from adenocarcinoma consistent with mucinous carcinoma.  LN 4R, 7 were negative.     She underwent RLL lobectomy and mediastinal lymphadenectomy with Dr. Falk on 8/29/2024. Pathology showing  Multiple separate tumor nodules are identified with similar morphology.  The tumor demonstrates a heterogenous mixture of invasive mucinous adenocarcinoma and approximately 10-15% demonstrates higher grade nuclear features and less intracytoplasmic mucin. By immunohistochemical staining, the tumor cells are strongly positive for CK7, focally positive for CDX2 and negative for TTF-1. While immunohistochemical staining is not entirely specific, then the findings are compatible with adenocarcinoma of pulmonary origin if other primary sites of origin have been excluded clinically. involving lung parenchyma, largest nodule measuring 4.5cm, all margins negative, adenocarcinoma is 1mm to nearest parenchymal resection margin. 0/5 LN negative (4R, 10R, 11R, 12R and 7). No VPI nor LVI. PD-L1 5%. In-house NGS showed KRAS p.G12D ([id number] c.35G>A); TP53 p.A022Xss*6 ([id number] c.[id number]); TP53 p.R306* ([id number] c.916C>T). MS status: cannot be determined.    She presents for a consultation accompanied by her sister. She continues to have pain/discomfort from the surgical site but able to manage it at home. Otherwise denies any N/V. No fever or chills. No CP or SOB. Reports R ear pain.    Shx: no other drugs or EtOH. Works as [email or web address]. Lives with . She has 2 siblings.   Fhx: both sisters have breast cancer (at age of 60, the other sister at 27). Mother side - uncle has unknown cancer.    Surgical history: appendectomy in ; lap hysterectomy . L Wrist surgery in .     Treatment Summary:  - 2024: Robot-assisted RLL lobectomy and mediastinal lymphadenectomy (Dr. Falk)    Interval History (2024):  As above.  Initial Consultation.      ROS:  Patient denies the below review of systems, except for changes as noted in the interval history.    General:  Fatigue, anorexia, fevers, sweats, chills, heat/cold intolerance, weight changes.  HEENT:  Icterus, congestion, sore throat, rhinorrhea, taste change, voice changes.  Neurology:  Headache, dizziness, vision changes, hearing changes, other motor/sensory loss, gait instability, neuropathies.  Pulmonology:  Cough, wheezing, hemoptysis, dyspnea at rest, dyspnea on exertion, pleuritic chest pain.  Cardiology:  Chest pain, palpitations, orthopnea, paroxysmal nocturnal dyspnea.  Gastroenterology:  Nausea, vomiting, reflux symptoms, abdominal pain, constipation, diarrhea, melena, bright red blood per rectum.  Genitourinary:  Dysuria, polyuria, urine color change, urine smell change, hematuria.   Musculoskeletal:  Arthralgias, myalgias, joint swelling, focal weakness.  Skin:  Rash, pruritis, jaundice, petechiae, nail changes, skin nodules/growth.  Psychology:  Anxiety, depression, suicidal ideation, homicidal ideation.  Heme:  Easy bleeding or bruising.  Others:   All other systems reviewed and are negative.    Medical History:   Past Medical History:   Diagnosis Date    Achilles tendinitis, unspecified leg     Achilles tendinitis    Acute maxillary sinusitis, unspecified 2018    Acute maxillary sinusitis    Acute nasopharyngitis (common cold) 2016    Common cold    Adenocarcinoma of lung (Multi)     Age-related osteoporosis without current pathological fracture 2015    Encounter for ongoing osteoporosis therapy, bisphosphonates    Complete or unspecified spontaneous  without complication ([facility])          Dyspnea, unspecified 01/23/2019    Paroxysmal dyspnea    Encounter for general adult medical examination without abnormal findings 03/02/2018    Annual physical exam    Encounter for health counseling related to travel 09/06/2018    Counseling about travel    Encounter for screening for lipoid disorders 08/05/2019    Screening cholesterol level    Epigastric pain 08/26/2020    Dyspepsia    GERD (gastroesophageal reflux disease)     Irritable bowel syndrome     Left patella fracture 09/05/2023    Mastodynia 05/12/2021    Breast pain, right    Otalgia, right ear 03/07/2020    Otalgia, right    Other general symptoms and signs 01/18/2018    Flu-like symptoms    Other microscopic hematuria 09/19/2017    Hematuria, microscopic    Pain in right hand 03/04/2019    Pain of right hand    Pain in right knee 08/05/2019    Knee pain, right    Pain in unspecified shoulder     Shoulder pain    Personal history of (healed) traumatic fracture 10/30/2020    History of fracture of phalanx of toe    Personal history of other diseases of the digestive system 08/16/2018    History of gastroenteritis    Personal history of other diseases of the musculoskeletal system and connective tissue 12/27/2016    History of low back pain    Personal history of other diseases of the musculoskeletal system and connective tissue 10/19/2020    History of osteopenia    Personal history of other diseases of the nervous system and sense organs 08/05/2019    Personal history of seizure disorder    Personal history of other diseases of the respiratory system 05/13/2022    History of acute sinusitis    Personal history of other diseases of the respiratory system 10/30/2020    History of chronic rhinitis    Personal history of other diseases of the respiratory system 04/19/2017    History of influenza    Personal history of other diseases of the respiratory system 03/07/2020    History of viral pharyngitis    Personal history of other infectious and parasitic  diseases     History of varicella    Personal history of other medical treatment 11/02/2018    History of screening mammography    Personal history of other specified conditions     History of headache    Personal history of other specified conditions 03/24/2020    History of abnormal mammogram    Personal history of other specified conditions 03/02/2018    History of fatigue    Personal history of other specified conditions 09/19/2017    History of dysuria    Personal history of other specified conditions 08/26/2020    History of diarrhea    Personal history of other specified conditions 03/03/2020    History of abdominal pain    Personal history of other specified conditions 01/11/2019    History of chest pain    Personal history of urinary (tract) infections 11/07/2016    History of urinary tract infection    Personal history of urinary (tract) infections 07/02/2018    History of urinary tract infection    Pulmonary nodules     Bilateral    Seizure disorder (Multi)     Strain of muscle(s) and tendon(s) of peroneal muscle group at lower leg level, left leg, initial encounter 11/05/2020    Strain of peroneal tendon of left foot, initial encounter    Unspecified fracture of left toe(s), initial encounter for closed fracture 10/30/2020    Fracture of proximal phalanx of toe of left foot    Unspecified injury of left wrist, hand and finger(s), initial encounter 01/05/2021    Left wrist injury    Unspecified injury of unspecified foot, initial encounter     Foot injury    Urinary tract infection, site not specified 07/02/2018    Acute UTI    Urinary tract infection, site not specified 11/07/2016    Acute UTI       Surgical History:   Past Surgical History:   Procedure Laterality Date    APPENDECTOMY  10/24/2013    Appendectomy    BREAST CYST ASPIRATION  1993    LAPAROSCOPY DIAGNOSTIC / BIOPSY / ASPIRATION / LYSIS  12/18/2014    Exploratory Laparoscopy    PELVIC LAPAROSCOPY      endometriosis    US GUIDED SOFT TISSUE  BIOPSY  12/18/2023     GUIDED SOFT TISSUE BIOPSY 12/18/2023 [facility]        Family History:  Family History   Problem Relation Name Age of Onset    Dementia Mother      Hypertension Mother      Diabetes type II Mother      Coronary artery disease Father      Other (Congestive heart failure) Father      Breast cancer Sister Linda Guzman     Breast cancer Sister Linda     Breast cancer Sister Analilia        Allergies:  Allergies   Allergen Reactions    Phenytoin Other     double vision    Codeine Hallucinations     AUDITORY HALLUCINATIONS       Meds (Current):    Current Outpatient Medications:     acetaminophen (Tylenol) 325 mg tablet, Take 2 tablets (650 mg) by mouth every 6 hours if needed for mild pain (1 - 3) or moderate pain (4 - 6)., Disp: 30 tablet, Rfl: 0    ascorbic acid (Vitamin C) 1,000 mg tablet, Take 1 tablet (1,000 mg) by mouth once daily., Disp: , Rfl:     calcium carbonate (CALCIUM 600 ORAL), Take 1 tablet by mouth once daily., Disp: , Rfl:     carBAMazepine XR (TEGretol  XR) 400 mg 12 hr tablet, Take 1 tablet (400 mg) by mouth 2 times a day. Do not crush, chew, or split., Disp: 180 tablet, Rfl: 3    carBAMazepine XR (TEGretol XR) 100 mg 12 hr tablet, Take 1 tablet (100 mg) by mouth 2 times a day., Disp: 180 tablet, Rfl: 3    cholecalciferol (Vitamin D-3) 50 MCG (2000 UT) tablet, Take 1 tablet (50 mcg) by mouth once daily., Disp: , Rfl:     cyanocobalamin (Vitamin B-12) 100 mcg tablet, Take 1 tablet (100 mcg) by mouth once daily., Disp: , Rfl:     estradiol (Estrace) 0.01 % (0.1 mg/gram) vaginal cream, Insert 0.25 Applicatorful (1 g) into the vagina 2 times a week., Disp: 42.5 g, Rfl: 3    PHENobarbitaL 64.8 mg tablet, TAKE 1 TABLET BY MOUTH EVERY MORNING AND TAKE 2 TABLETS BY MOUTH EVERY EVENING, Disp: 270 tablet, Rfl: 1    dexAMETHasone (Decadron) 4 mg tablet, Take 4 mg (1 tablet) by mouth twice daily the day before treatment, once the evening of treatment, and twice daily the day after treatment.,  "Disp: 5 tablet, Rfl: 5    folic acid (Folvite) 1 mg tablet, Take 1 tablet (1,000 mcg) by mouth once daily., Disp: 30 tablet, Rfl: 11    ofloxacin (Floxin) 0.3 % otic solution, Administer 5 drops into the right ear once daily for 10 days., Disp: 5 mL, Rfl: 0    OLANZapine (ZyPREXA) 5 mg tablet, Take 1 tablet (5 mg) by mouth once daily at bedtime. For 4 days starting the evening of treatment, Disp: 4 tablet, Rfl: 3    ondansetron (Zofran) 8 mg tablet, Take 1 tablet (8 mg) by mouth every 8 hours if needed for nausea or vomiting., Disp: 30 tablet, Rfl: 5    prochlorperazine (Compazine) 10 mg tablet, Take 1 tablet (10 mg) by mouth every 6 hours if needed for nausea or vomiting., Disp: 30 tablet, Rfl: 5    zoledronic acid (Reclast) 5 mg/100 mL piggyback, Infuse 100 mL (5 mg) at 400 mL/hr over 15 minutes into a venous catheter 1 time for 1 dose. (Patient not taking: Reported on 2024), Disp: 100 mL, Rfl: 0    Pain Assessment:  Pain is: post-operative  Ratin  Pain control: well-controlled    Performance Status (ECOG): 0         ECOG Definition  0 Fully active; no performance restrictions.  1 Strenuous physical activity restricted; fully ambulatory and able to carry out light work.  2 Capable of all self-care but unable to carry out any work activities. Up and about >50% of waking hours.  3 Capable of only limited self-care; confined to bed or chair >50% of waking hours.  4 Completely disabled; cannot carry out any self-care; totally confined to bed or chair.      Exam:  /77 (BP Location: Left arm, Patient Position: Sitting, BP Cuff Size: Adult)   Pulse 64   Temp 36.3 °C (97.3 °F) (Temporal)   Resp 16   Ht (S) 1.594 m (5' 2.76\")   Wt 59.5 kg (131 lb 2.8 oz)   SpO2 96%   BMI 23.42 kg/m²   General: Well appearing, no acute distress  Neurology: Alert and oriented x3, CN II-XII grossly intact  Psychology: Affect appropriate  Eyes: PERRL, EOMI  ENT: Mucus membranes moist and intact, no ulcers. + dried blood " in R ear canal.  Lymphatics: No palpable adenopathy  Neck: Supple, no masses  Respiratory: Lungs clear bilaterally, no wheezes, no rales, no rhonchi  Cardiovascular: Normal rate and rhythm, no murmur  Abdomen: Soft, non-tender, non-distended, normoactive, no hepatosplenomegaly, no ascites  Musculoskeletal: Normal gait and stance  Extremities: No clubbing, no cyanosis, no edema  Skin: No rash  Genitourinary: Deferred  Rectal: Deferred   Pelvic: Deferred  Breasts: Deferred    Labs:  Results from last 7 days   Lab Units 09/26/24  1409   WBC AUTO x10*3/uL 4.7   HEMOGLOBIN g/dL 12.9   HEMATOCRIT % 39.4   PLATELETS AUTO x10*3/uL 220   NEUTROS ABS x10*3/uL 2.90   LYMPHS ABS AUTO x10*3/uL 1.38   MONOS ABS AUTO x10*3/uL 0.36   EOS ABS AUTO x10*3/uL 0.07   NEUTROS PCT AUTO % 61.2   LYMPHS PCT AUTO % 29.1   MONOS PCT AUTO % 7.6   EOS PCT AUTO % 1.5      Results from last 7 days   Lab Units 09/26/24  1409   GLUCOSE mg/dL 85   SODIUM mmol/L 134*   POTASSIUM mmol/L 4.1   CHLORIDE mmol/L 97*   CO2 mmol/L 28   BUN mg/dL 14   CREATININE mg/dL 0.59   EGFR mL/min/1.73m*2 >90   CALCIUM mg/dL 9.6   MAGNESIUM mg/dL 1.94   ALBUMIN g/dL 4.5   PROTEIN TOTAL g/dL 7.1   BILIRUBIN TOTAL mg/dL 0.3   ALK PHOS U/L 59   ALT U/L 12   AST U/L 16        Assessment/Plan:   64 y.o. female with past medical history as stated referred for management of newly diagnosed lung cancer discussing adjuvant treatment.     The patient's records and imaging have been reviewed in detail.  I have discussed with the patient the natural history of their disease at length.  The following has also been discussed with the patient:    # Cancer:  stage IIB ([id number]) adenocarcinoma of lung, with multiple nodules within the RLL, PD-L1 5%. In-house NGS showed KRAS p.G12D ([id number] c.35G>A); TP53 p.C178Fxx*6 (NM_000546 c.626_627delGA); TP53 p.R306* (NM_000546 c.916C>T). MS status: cannot be determined. Tumor cells are strongly positive for CK7, focally positive for CDX2 and  negative for TTF-1. While immunohistochemical staining is not entirely specific, then the findings are compatible with adenocarcinoma of pulmonary origin if other primary sites of origin have been excluded clinically.     S/p RLL lobectomy on 8/24/2024. We therefore discussed the concept of curative intent, adjuvant platinum doublet including risk/benefits of cisplatin vs. carboplatin and different paired chemotherapies within 4-6 weeks post surgery.  I favor cisplatin 60mg/m2 IV D#1 & pemetrexed (Alimta) 450 mg/m2 IV D#1 every 21 days for 4 planned cycles based on tolerance and disease response. This analysis included 5 studies with approximately 4600 patients having completely resected NSCLC and  showed that adjuvant chemotherapy is associated 5% decreased risk of death due to any cause at 5 years. (LACE meta-analysis Xu et al. JCO. 2016) Stage IB patients treated with chemotherapy demonstrated a hazard ratio of 0.93 (95% CI, 0.78 to 1.10).  CALGB 9633 showed survival advantage for tumors >4 cm treated with adjuvant carboplatin and paclitaxel (HR 0.82, 90% CI 0.65-1.04). However, the overall benefit on overall survival is a modest 3-4%.   Side effects including but not limited to pain, fatigue, anorexia, nausea, vomiting, diarrhea, stomatitis, electrolyte disturbances, anemia, neutropenia, thrombocytopenia, generalized weakness, ototoxicity, nephrotoxicity, and skin rash were reviewed.   Adequate hydration and monitoring of urine output and hearing reviewed.  We reviewed initiation of vitamin B12 1000mg IM injection q9 weeks, folic acid (folate) 1mg orally daily, and dexamethasone (Decadron) 4mg orally twice daily to start on the day before, day of, and day after chemotherapy to help prevent cytopenias and improve nausea control.  Written information provided.  All questions answered.  Informed consent signed.  Planned initiation in next few weeks.     Given disease stage status and positive PDL-1 expression  (>=1%), I recommend additional adjuvant atezolizumab following adjuvant chemotherapy.  We specifically reviewed 1200mg IV every 3 weeks for 1 year post chemotherapy.  This is based on phase III Impower 010 (John et al, Lancet ). Additional adjuvant atezolizumab decreased the risk of recurrence, new primary NSCLC or death by 34% compared to best supportive care in stage II-IIIA population whose tumor expressed PDL1 1% or more (HR=0.66; 0.50-0.88). Updated results also suggested OS benefit with (HR=0.71; 95%CI: 0.49-1.03) (John et al. .Lancet 202). Side effects including but not limited to fatigue, anorexia, nausea, vomiting, diarrhea, constipation, cytopenias, electrolyte abnormalities, liver and/or kidney dysfunction, skin reaction, pneumonitis, colitis, hyper/hypothyroidism, diabetes reviewed.  Will start after chemotherapy     - Interval Imagin2024: PET/CT showed 1. Minimal FDG avid paravertebral consolidative/ground-glass opacity in the right lower lobe, favored to represent infectious/inflammatory process or atelectasis.  6 mm and 8 mm pulmonary nodules in the right and left lower lobes respectively are non FDG avid. Follow-up with dedicated CT of chest is recommended. 2. No other concerning FDG avid disease identified. Brain Mri NOW. Will also obtain STAT CT chest as new baseline before starting treatment.     # Seizure disorder: firstly found when she was in her teens, and she has been on both Carbamazepine and Phenobarbital. Following with Dr. Baker who is going to retire soon.     # R ear pain: ofloxacin ear drop prescribed. Advised to follow up with ENT if not improving in the next week.    # Clinical Trials:  None currently.     # Access: Peripheral veins.    # Pain: Surgical related pain. Well controlled.     # Bone Health: No signs of bony disease.     # Psychosocial: Coping well, no acute issues.  Good support from family & friends.  Social work support offered.    # GI/CINV: No acute  issues.  Eating and voiding well.  Nutrition consult offered.    # Smoking: N/A, former smoker.    # Fertility: N/A.  Oncofertility support available as needed.      # Heme/ESAs: N/A.    # GOC: Patient is aware of curative intent goals of care.    # Language: English.    # Interdisciplinary Patient/Family Education Record:  Learner:  Patient.  Learning Needs Reviewed:  Treatments, Medications, Disease Process, Diagnostic Tests.  Barriers: None.  Teaching Method: Discussion, Handout(s).  Comprehension:  Verbalized Understanding.  Follow-up Plan:  Return to office as per MD.    # Dispo: RTC in 1 week to start treatment

## 2024-09-26 ENCOUNTER — OFFICE VISIT (OUTPATIENT)
Dept: HEMATOLOGY/ONCOLOGY | Facility: CLINIC | Age: 64
End: 2024-09-26
Payer: COMMERCIAL

## 2024-09-26 ENCOUNTER — SOCIAL WORK (OUTPATIENT)
Dept: CASE MANAGEMENT | Facility: HOSPITAL | Age: 64
End: 2024-09-26

## 2024-09-26 VITALS
HEIGHT: 63 IN | BODY MASS INDEX: 23.24 KG/M2 | SYSTOLIC BLOOD PRESSURE: 129 MMHG | HEART RATE: 64 BPM | RESPIRATION RATE: 16 BRPM | DIASTOLIC BLOOD PRESSURE: 77 MMHG | TEMPERATURE: 97.3 F | OXYGEN SATURATION: 96 % | WEIGHT: 131.17 LBS

## 2024-09-26 DIAGNOSIS — C34.90 ADENOCARCINOMA, LUNG, UNSPECIFIED LATERALITY (MULTI): Primary | ICD-10-CM

## 2024-09-26 DIAGNOSIS — H92.01 RIGHT EAR PAIN: ICD-10-CM

## 2024-09-26 LAB
ALBUMIN SERPL BCP-MCNC: 4.5 G/DL (ref 3.4–5)
ALP SERPL-CCNC: 59 U/L (ref 33–136)
ALT SERPL W P-5'-P-CCNC: 12 U/L (ref 7–45)
ANION GAP SERPL CALC-SCNC: 13 MMOL/L (ref 10–20)
AST SERPL W P-5'-P-CCNC: 16 U/L (ref 9–39)
BASOPHILS # BLD AUTO: 0.02 X10*3/UL (ref 0–0.1)
BASOPHILS NFR BLD AUTO: 0.4 %
BILIRUB SERPL-MCNC: 0.3 MG/DL (ref 0–1.2)
BUN SERPL-MCNC: 14 MG/DL (ref 6–23)
CALCIUM SERPL-MCNC: 9.6 MG/DL (ref 8.6–10.3)
CHLORIDE SERPL-SCNC: 97 MMOL/L (ref 98–107)
CO2 SERPL-SCNC: 28 MMOL/L (ref 21–32)
CREAT SERPL-MCNC: 0.59 MG/DL (ref 0.5–1.05)
EGFRCR SERPLBLD CKD-EPI 2021: >90 ML/MIN/1.73M*2
EOSINOPHIL # BLD AUTO: 0.07 X10*3/UL (ref 0–0.7)
EOSINOPHIL NFR BLD AUTO: 1.5 %
ERYTHROCYTE [DISTWIDTH] IN BLOOD BY AUTOMATED COUNT: 13.1 % (ref 11.5–14.5)
GLUCOSE SERPL-MCNC: 85 MG/DL (ref 74–99)
HBV CORE AB SER QL: NONREACTIVE
HBV SURFACE AB SER-ACNC: <3.1 MIU/ML
HBV SURFACE AG SERPL QL IA: NONREACTIVE
HCT VFR BLD AUTO: 39.4 % (ref 36–46)
HGB BLD-MCNC: 12.9 G/DL (ref 12–16)
IMM GRANULOCYTES # BLD AUTO: 0.01 X10*3/UL (ref 0–0.7)
IMM GRANULOCYTES NFR BLD AUTO: 0.2 % (ref 0–0.9)
LYMPHOCYTES # BLD AUTO: 1.38 X10*3/UL (ref 1.2–4.8)
LYMPHOCYTES NFR BLD AUTO: 29.1 %
MAGNESIUM SERPL-MCNC: 1.94 MG/DL (ref 1.6–2.4)
MCH RBC QN AUTO: 31.3 PG (ref 26–34)
MCHC RBC AUTO-ENTMCNC: 32.7 G/DL (ref 32–36)
MCV RBC AUTO: 96 FL (ref 80–100)
MONOCYTES # BLD AUTO: 0.36 X10*3/UL (ref 0.1–1)
MONOCYTES NFR BLD AUTO: 7.6 %
NEUTROPHILS # BLD AUTO: 2.9 X10*3/UL (ref 1.2–7.7)
NEUTROPHILS NFR BLD AUTO: 61.2 %
NRBC BLD-RTO: 0 /100 WBCS (ref 0–0)
PLATELET # BLD AUTO: 220 X10*3/UL (ref 150–450)
POTASSIUM SERPL-SCNC: 4.1 MMOL/L (ref 3.5–5.3)
PROT SERPL-MCNC: 7.1 G/DL (ref 6.4–8.2)
RBC # BLD AUTO: 4.12 X10*6/UL (ref 4–5.2)
SODIUM SERPL-SCNC: 134 MMOL/L (ref 136–145)
WBC # BLD AUTO: 4.7 X10*3/UL (ref 4.4–11.3)

## 2024-09-26 PROCEDURE — 80053 COMPREHEN METABOLIC PANEL: CPT | Performed by: STUDENT IN AN ORGANIZED HEALTH CARE EDUCATION/TRAINING PROGRAM

## 2024-09-26 PROCEDURE — RXMED WILLOW AMBULATORY MEDICATION CHARGE

## 2024-09-26 PROCEDURE — 1126F AMNT PAIN NOTED NONE PRSNT: CPT | Performed by: STUDENT IN AN ORGANIZED HEALTH CARE EDUCATION/TRAINING PROGRAM

## 2024-09-26 PROCEDURE — 1111F DSCHRG MED/CURRENT MED MERGE: CPT | Performed by: STUDENT IN AN ORGANIZED HEALTH CARE EDUCATION/TRAINING PROGRAM

## 2024-09-26 PROCEDURE — 86704 HEP B CORE ANTIBODY TOTAL: CPT | Mod: WESLAB | Performed by: STUDENT IN AN ORGANIZED HEALTH CARE EDUCATION/TRAINING PROGRAM

## 2024-09-26 PROCEDURE — 99205 OFFICE O/P NEW HI 60 MIN: CPT | Performed by: STUDENT IN AN ORGANIZED HEALTH CARE EDUCATION/TRAINING PROGRAM

## 2024-09-26 PROCEDURE — 86706 HEP B SURFACE ANTIBODY: CPT | Mod: WESLAB | Performed by: STUDENT IN AN ORGANIZED HEALTH CARE EDUCATION/TRAINING PROGRAM

## 2024-09-26 PROCEDURE — 87340 HEPATITIS B SURFACE AG IA: CPT | Mod: WESLAB | Performed by: STUDENT IN AN ORGANIZED HEALTH CARE EDUCATION/TRAINING PROGRAM

## 2024-09-26 PROCEDURE — 3008F BODY MASS INDEX DOCD: CPT | Performed by: STUDENT IN AN ORGANIZED HEALTH CARE EDUCATION/TRAINING PROGRAM

## 2024-09-26 PROCEDURE — 1159F MED LIST DOCD IN RCRD: CPT | Performed by: STUDENT IN AN ORGANIZED HEALTH CARE EDUCATION/TRAINING PROGRAM

## 2024-09-26 PROCEDURE — 85025 COMPLETE CBC W/AUTO DIFF WBC: CPT | Performed by: STUDENT IN AN ORGANIZED HEALTH CARE EDUCATION/TRAINING PROGRAM

## 2024-09-26 PROCEDURE — 83735 ASSAY OF MAGNESIUM: CPT | Performed by: STUDENT IN AN ORGANIZED HEALTH CARE EDUCATION/TRAINING PROGRAM

## 2024-09-26 PROCEDURE — 99215 OFFICE O/P EST HI 40 MIN: CPT | Performed by: STUDENT IN AN ORGANIZED HEALTH CARE EDUCATION/TRAINING PROGRAM

## 2024-09-26 RX ORDER — OFLOXACIN 3 MG/ML
5 SOLUTION AURICULAR (OTIC) DAILY
Qty: 5 ML | Refills: 0 | Status: SHIPPED | OUTPATIENT
Start: 2024-09-26 | End: 2024-10-13

## 2024-09-26 ASSESSMENT — PAIN SCALES - GENERAL: PAINLEVEL: 0-NO PAIN

## 2024-09-26 ASSESSMENT — PATIENT HEALTH QUESTIONNAIRE - PHQ9
1. LITTLE INTEREST OR PLEASURE IN DOING THINGS: NOT AT ALL
10. IF YOU CHECKED OFF ANY PROBLEMS, HOW DIFFICULT HAVE THESE PROBLEMS MADE IT FOR YOU TO DO YOUR WORK, TAKE CARE OF THINGS AT HOME, OR GET ALONG WITH OTHER PEOPLE: NOT DIFFICULT AT ALL
2. FEELING DOWN, DEPRESSED OR HOPELESS: SEVERAL DAYS
SUM OF ALL RESPONSES TO PHQ9 QUESTIONS 1 AND 2: 1

## 2024-09-26 NOTE — PROGRESS NOTES
Patient is here for initial consultation. Her sister is present as well.     Plan for Pemetrexed / Cisplatin next Thursday and a follow up with Shirley.     Red chemo bag and treatment specific information reviewed with patient. She placed the yellow fever card in her purse.     Labs were drawn today for treatment.     STAT MR brain and CT ordered. She is scheduled at [facility] tomorrow.

## 2024-09-27 ENCOUNTER — HOSPITAL ENCOUNTER (OUTPATIENT)
Dept: RADIOLOGY | Facility: HOSPITAL | Age: 64
Discharge: HOME | End: 2024-09-27
Payer: COMMERCIAL

## 2024-09-27 ENCOUNTER — PHARMACY VISIT (OUTPATIENT)
Dept: PHARMACY | Facility: CLINIC | Age: 64
End: 2024-09-27
Payer: COMMERCIAL

## 2024-09-27 DIAGNOSIS — C34.90 ADENOCARCINOMA, LUNG, UNSPECIFIED LATERALITY (MULTI): ICD-10-CM

## 2024-09-27 PROCEDURE — 70553 MRI BRAIN STEM W/O & W/DYE: CPT

## 2024-09-27 PROCEDURE — RXMED WILLOW AMBULATORY MEDICATION CHARGE

## 2024-09-27 PROCEDURE — 71250 CT THORAX DX C-: CPT

## 2024-09-27 PROCEDURE — 2550000001 HC RX 255 CONTRASTS: Performed by: STUDENT IN AN ORGANIZED HEALTH CARE EDUCATION/TRAINING PROGRAM

## 2024-09-27 PROCEDURE — A9575 INJ GADOTERATE MEGLUMI 0.1ML: HCPCS | Performed by: STUDENT IN AN ORGANIZED HEALTH CARE EDUCATION/TRAINING PROGRAM

## 2024-09-27 RX ORDER — PALONOSETRON 0.05 MG/ML
0.25 INJECTION, SOLUTION INTRAVENOUS ONCE
OUTPATIENT
Start: 2024-10-03

## 2024-09-27 RX ORDER — PROCHLORPERAZINE MALEATE 10 MG
10 TABLET ORAL EVERY 6 HOURS PRN
Qty: 30 TABLET | Refills: 5 | Status: SHIPPED | OUTPATIENT
Start: 2024-09-27

## 2024-09-27 RX ORDER — PROCHLORPERAZINE EDISYLATE 5 MG/ML
10 INJECTION INTRAMUSCULAR; INTRAVENOUS EVERY 6 HOURS PRN
OUTPATIENT
Start: 2024-10-03

## 2024-09-27 RX ORDER — ALBUTEROL SULFATE 0.83 MG/ML
3 SOLUTION RESPIRATORY (INHALATION) AS NEEDED
OUTPATIENT
Start: 2024-10-03

## 2024-09-27 RX ORDER — DIPHENHYDRAMINE HYDROCHLORIDE 50 MG/ML
50 INJECTION INTRAMUSCULAR; INTRAVENOUS AS NEEDED
OUTPATIENT
Start: 2024-10-03

## 2024-09-27 RX ORDER — FOLIC ACID 1 MG/1
1000 TABLET ORAL DAILY
Qty: 30 TABLET | Refills: 11 | Status: SHIPPED | OUTPATIENT
Start: 2024-09-27

## 2024-09-27 RX ORDER — ONDANSETRON HYDROCHLORIDE 8 MG/1
8 TABLET, FILM COATED ORAL EVERY 8 HOURS PRN
Qty: 30 TABLET | Refills: 5 | Status: SHIPPED | OUTPATIENT
Start: 2024-09-27

## 2024-09-27 RX ORDER — FAMOTIDINE 10 MG/ML
20 INJECTION INTRAVENOUS ONCE AS NEEDED
OUTPATIENT
Start: 2024-10-03

## 2024-09-27 RX ORDER — CYANOCOBALAMIN 1000 UG/ML
1000 INJECTION, SOLUTION INTRAMUSCULAR; SUBCUTANEOUS ONCE
OUTPATIENT
Start: 2024-10-03

## 2024-09-27 RX ORDER — DEXAMETHASONE 4 MG/1
TABLET ORAL
Qty: 5 TABLET | Refills: 5 | Status: SHIPPED | OUTPATIENT
Start: 2024-09-27

## 2024-09-27 RX ORDER — OLANZAPINE 5 MG/1
5 TABLET ORAL NIGHTLY
Qty: 4 TABLET | Refills: 3 | Status: SHIPPED | OUTPATIENT
Start: 2024-09-27

## 2024-09-27 RX ORDER — EPINEPHRINE 0.3 MG/.3ML
0.3 INJECTION SUBCUTANEOUS EVERY 5 MIN PRN
OUTPATIENT
Start: 2024-10-03

## 2024-09-27 RX ORDER — GADOTERATE MEGLUMINE 376.9 MG/ML
15 INJECTION INTRAVENOUS
Status: COMPLETED | OUTPATIENT
Start: 2024-09-27 | End: 2024-09-27

## 2024-09-27 RX ORDER — PROCHLORPERAZINE MALEATE 10 MG
10 TABLET ORAL EVERY 6 HOURS PRN
OUTPATIENT
Start: 2024-10-03

## 2024-10-03 ENCOUNTER — INFUSION (OUTPATIENT)
Dept: HEMATOLOGY/ONCOLOGY | Facility: CLINIC | Age: 64
End: 2024-10-03
Payer: COMMERCIAL

## 2024-10-03 ENCOUNTER — OFFICE VISIT (OUTPATIENT)
Dept: HEMATOLOGY/ONCOLOGY | Facility: CLINIC | Age: 64
End: 2024-10-03
Payer: COMMERCIAL

## 2024-10-03 VITALS
TEMPERATURE: 97.7 F | HEART RATE: 85 BPM | WEIGHT: 130.84 LBS | DIASTOLIC BLOOD PRESSURE: 74 MMHG | BODY MASS INDEX: 23.36 KG/M2 | RESPIRATION RATE: 16 BRPM | OXYGEN SATURATION: 96 % | SYSTOLIC BLOOD PRESSURE: 117 MMHG

## 2024-10-03 DIAGNOSIS — C34.90 ADENOCARCINOMA, LUNG, UNSPECIFIED LATERALITY (MULTI): ICD-10-CM

## 2024-10-03 DIAGNOSIS — Z51.11 ENCOUNTER FOR ANTINEOPLASTIC CHEMOTHERAPY: Primary | ICD-10-CM

## 2024-10-03 DIAGNOSIS — M81.0 AGE RELATED OSTEOPOROSIS, UNSPECIFIED PATHOLOGICAL FRACTURE PRESENCE: ICD-10-CM

## 2024-10-03 LAB
INR PPP: 1 (ref 0.9–1.1)
PROTHROMBIN TIME: 11.4 SECONDS (ref 9.8–12.8)

## 2024-10-03 PROCEDURE — 1036F TOBACCO NON-USER: CPT | Performed by: NURSE PRACTITIONER

## 2024-10-03 PROCEDURE — 96361 HYDRATE IV INFUSION ADD-ON: CPT | Mod: INF

## 2024-10-03 PROCEDURE — 2500000004 HC RX 250 GENERAL PHARMACY W/ HCPCS (ALT 636 FOR OP/ED): Performed by: STUDENT IN AN ORGANIZED HEALTH CARE EDUCATION/TRAINING PROGRAM

## 2024-10-03 PROCEDURE — 99214 OFFICE O/P EST MOD 30 MIN: CPT | Performed by: NURSE PRACTITIONER

## 2024-10-03 PROCEDURE — 96372 THER/PROPH/DIAG INJ SC/IM: CPT

## 2024-10-03 PROCEDURE — 85610 PROTHROMBIN TIME: CPT | Mod: WESLAB

## 2024-10-03 PROCEDURE — 96413 CHEMO IV INFUSION 1 HR: CPT

## 2024-10-03 PROCEDURE — 96375 TX/PRO/DX INJ NEW DRUG ADDON: CPT | Mod: INF

## 2024-10-03 PROCEDURE — 96411 CHEMO IV PUSH ADDL DRUG: CPT

## 2024-10-03 PROCEDURE — 96367 TX/PROPH/DG ADDL SEQ IV INF: CPT

## 2024-10-03 RX ORDER — HEPARIN 100 UNIT/ML
500 SYRINGE INTRAVENOUS AS NEEDED
OUTPATIENT
Start: 2024-10-03

## 2024-10-03 RX ORDER — CYANOCOBALAMIN 1000 UG/ML
1000 INJECTION, SOLUTION INTRAMUSCULAR; SUBCUTANEOUS ONCE
Status: COMPLETED | OUTPATIENT
Start: 2024-10-03 | End: 2024-10-03

## 2024-10-03 RX ORDER — PROCHLORPERAZINE EDISYLATE 5 MG/ML
10 INJECTION INTRAMUSCULAR; INTRAVENOUS EVERY 6 HOURS PRN
Status: DISCONTINUED | OUTPATIENT
Start: 2024-10-03 | End: 2024-10-03 | Stop reason: HOSPADM

## 2024-10-03 RX ORDER — HEPARIN SODIUM,PORCINE/PF 10 UNIT/ML
50 SYRINGE (ML) INTRAVENOUS AS NEEDED
OUTPATIENT
Start: 2024-10-03

## 2024-10-03 RX ORDER — HEPARIN SODIUM,PORCINE/PF 10 UNIT/ML
50 SYRINGE (ML) INTRAVENOUS AS NEEDED
Status: DISCONTINUED | OUTPATIENT
Start: 2024-10-03 | End: 2024-10-03 | Stop reason: HOSPADM

## 2024-10-03 RX ORDER — EPINEPHRINE 0.3 MG/.3ML
0.3 INJECTION SUBCUTANEOUS EVERY 5 MIN PRN
Status: DISCONTINUED | OUTPATIENT
Start: 2024-10-03 | End: 2024-10-03 | Stop reason: HOSPADM

## 2024-10-03 RX ORDER — ALBUTEROL SULFATE 0.83 MG/ML
3 SOLUTION RESPIRATORY (INHALATION) AS NEEDED
Status: DISCONTINUED | OUTPATIENT
Start: 2024-10-03 | End: 2024-10-03 | Stop reason: HOSPADM

## 2024-10-03 RX ORDER — HEPARIN 100 UNIT/ML
500 SYRINGE INTRAVENOUS AS NEEDED
Status: DISCONTINUED | OUTPATIENT
Start: 2024-10-03 | End: 2024-10-03 | Stop reason: HOSPADM

## 2024-10-03 RX ORDER — PROCHLORPERAZINE MALEATE 10 MG
10 TABLET ORAL EVERY 6 HOURS PRN
Status: DISCONTINUED | OUTPATIENT
Start: 2024-10-03 | End: 2024-10-03 | Stop reason: HOSPADM

## 2024-10-03 RX ORDER — FAMOTIDINE 10 MG/ML
20 INJECTION INTRAVENOUS ONCE AS NEEDED
Status: DISCONTINUED | OUTPATIENT
Start: 2024-10-03 | End: 2024-10-03 | Stop reason: HOSPADM

## 2024-10-03 RX ORDER — DIPHENHYDRAMINE HYDROCHLORIDE 50 MG/ML
50 INJECTION INTRAMUSCULAR; INTRAVENOUS AS NEEDED
Status: DISCONTINUED | OUTPATIENT
Start: 2024-10-03 | End: 2024-10-03 | Stop reason: HOSPADM

## 2024-10-03 RX ORDER — PALONOSETRON 0.05 MG/ML
0.25 INJECTION, SOLUTION INTRAVENOUS ONCE
Status: COMPLETED | OUTPATIENT
Start: 2024-10-03 | End: 2024-10-03

## 2024-10-03 ASSESSMENT — PAIN SCALES - GENERAL: PAINLEVEL: 0-NO PAIN

## 2024-10-03 ASSESSMENT — ENCOUNTER SYMPTOMS
MUSCULOSKELETAL NEGATIVE: 1
RESPIRATORY NEGATIVE: 1
CONSTITUTIONAL NEGATIVE: 1
HEMATOLOGIC/LYMPHATIC NEGATIVE: 1
CARDIOVASCULAR NEGATIVE: 1
EYES NEGATIVE: 1
PSYCHIATRIC NEGATIVE: 1
GASTROINTESTINAL NEGATIVE: 1

## 2024-10-03 NOTE — PROGRESS NOTES
Pt ambulatory in to infusion center for first dose pemetrexed and cisplatin. Pt has  present. PIV established with brisk blood return. IV fluids started per order. Orin Lewis is here to speak with patient. Continuing to monitor.   Pt stated that she could feel the fluids going in her arm. Verified blood return. Warm pack applied to site. Pt stated that it gave her relief.   Pt tolerated infusions well. INR drawn and sent post infusion per order. PIV removed and bandaged. Pt given AVS. Discussed her medication regimen for at home. Highlighted meds for easy reading. Pt verbalized understanding that she is to start zyprexa tonight. Pt left infusion ambulatory with  present.

## 2024-10-03 NOTE — PROGRESS NOTES
KULDEEP met with patient while she was here for a consultation. She is independent. She works at  but is currently on leave She plans to return to work soon but would like to take an intermittent leave with a work from home and 5 hour day restrictions.   She has had surgery. She is  and has good support. KULDEEP will continue to work with her for leave papers and any other support which becomes necessary.

## 2024-10-03 NOTE — PROGRESS NOTES
Ir Subjective:   Patient Name: Rhoda Pena    : 1960     MRN: [id number]     Age: 64 y.o.     Gender: female  Referring Provider: Dr. Santosh Falk (Thoracic Surgery)    CC: Management of Newly diagnosed stage IIB ([id number]) adenocarcinoma of lung, with multiple nodules within the RLL, PD-L1 5%. In-house NGS showed KRAS p.G12D ([id number] c.35G>A); TP53 p.L945Eck*6 ([id number] c.626_627delGA); TP53 p.R306* ([id number] c.916C>T). MS status: cannot be determined. S/p RLL lobectomy on 2024.    Presenting History (2024): At time of initial presentation a 64 y.o. female, former smoker (started at age 14, 1 pack per day, quitted at age 30) with a past medical history of osteoporosis, hx of seizure on Carbamazepine (has not had any seizure for 1 decade) referred for management of newly diagnosed RLL lung adenocarcinoma s/p RLL lobectomy.     She was firstly found to have some abnormalities in the RLL on the CT cardiac scoring on 2023. Subsequent follow up CT Chest (-) 2024 showed Stable area of mixed consolidation and ground-glass opacity in the medial RLL. Additional 0.7 cm solid nodule in the left lower lobe. This is indeterminate and further attention on follow-up imaging is recommended.    CT chest (-) on 2024 imilar appearance of a focal right lower lobe ground-glass and consolidative opacity with associated bronchiectasis. A slow growing neoplasm can not be excluded. Increased conspicuity of a 5 mm right lower lobe nodular ground-glass opacity and stable appearance of additional bilateral ground-glass nodular opacities measuring up to 8 mm in the left lower lobe, which may be followed on repeat imaging. Similar diffuse bilateral faint subpleural reticulations, which may represent chronic interstitial changes related to smoking, given patient's history.    2024: PET/CT showed 1. Minimal FDG avid paravertebral consolidative/ground-glass opacity in the right lower lobe, favored to  represent infectious/inflammatory process or atelectasis.  6 mm and 8 mm pulmonary nodules in the right and left lower lobes respectively are non FDG avid. Follow-up with dedicated CT of chest is recommended. 2. No other concerning FDG avid disease identified.    8/8/2024: RLL bronchoscpy with EBUS: RLL FNA showed Malignant cells derived from adenocarcinoma consistent with mucinous carcinoma.  LN 4R, 7 were negative.     She underwent RLL lobectomy and mediastinal lymphadenectomy with Dr. Falk on 8/29/2024. Pathology showing  Multiple separate tumor nodules are identified with similar morphology.  The tumor demonstrates a heterogenous mixture of invasive mucinous adenocarcinoma and approximately 10-15% demonstrates higher grade nuclear features and less intracytoplasmic mucin. By immunohistochemical staining, the tumor cells are strongly positive for CK7, focally positive for CDX2 and negative for TTF-1. While immunohistochemical staining is not entirely specific, then the findings are compatible with adenocarcinoma of pulmonary origin if other primary sites of origin have been excluded clinically. involving lung parenchyma, largest nodule measuring 4.5cm, all margins negative, adenocarcinoma is 1mm to nearest parenchymal resection margin. 0/5 LN negative (4R, 10R, 11R, 12R and 7). No VPI nor LVI. PD-L1 5%. In-house NGS showed KRAS p.G12D ([id number] c.35G>A); TP53 p.R917Rwt*6 ([id number] c.626_627delGA); TP53 p.R306* ([id number] c.916C>T). MS status: cannot be determined.    She presents for a consultation accompanied by her sister. She continues to have pain/discomfort from the surgical site but able to manage it at home. Otherwise denies any N/V. No fever or chills. No CP or SOB. Reports R ear pain.    Shx: no other drugs or EtOH. Works as [employer]. Lives with . She has 2 siblings.   Fhx: both sisters have breast cancer (at age of 60, the other sister at 27). Mother side - uncle has unknown cancer.    Surgical history: appendectomy in ; lap hysterectomy . L Wrist surgery in .     Treatment Summary:  - 2024: Robot-assisted RLL lobectomy and mediastinal lymphadenectomy (Dr. Falk)  -10/3/2024:  C1 Cisplatin 60mg/m2 and Pemetrexed 450mg/m2 IV    Interval History (10/3/2024):  Patient present in clinic for readiness to treat visit.  Her spouse is present for visit.  Feeling good today.  Ready for treatment. Breathing is stable.  Appetite is good.  Denies n/v/c/d.  Discussed treatment with home Rx's in place.  No other concerns today.  Labs WNL.  Ready for treatment.      Review of Systems   Constitutional: Negative.    HENT:  Negative.     Eyes: Negative.    Respiratory: Negative.     Cardiovascular: Negative.    Gastrointestinal: Negative.    Musculoskeletal: Negative.    Skin: Negative.    Hematological: Negative.    Psychiatric/Behavioral: Negative.       Medical History:   Past Medical History:   Diagnosis Date    Achilles tendinitis, unspecified leg     Achilles tendinitis    Acute maxillary sinusitis, unspecified 2018    Acute maxillary sinusitis    Acute nasopharyngitis (common cold) 2016    Common cold    Adenocarcinoma of lung (Multi)     Age-related osteoporosis without current pathological fracture 2015    Encounter for ongoing osteoporosis therapy, bisphosphonates    Complete or unspecified spontaneous  without complication         Dyspnea, unspecified 2019    Paroxysmal dyspnea    Encounter for general adult medical examination without abnormal findings 2018    Annual physical exam    Encounter for health counseling related to travel 2018    Counseling about travel    Encounter for screening for lipoid disorders 2019    Screening cholesterol level    Epigastric pain 2020    Dyspepsia    GERD (gastroesophageal reflux disease)     Irritable bowel syndrome     Left patella fracture 2023    Mastodynia 2021     Breast pain, right    Otalgia, right ear 03/07/2020    Otalgia, right    Other general symptoms and signs 01/18/2018    Flu-like symptoms    Other microscopic hematuria 09/19/2017    Hematuria, microscopic    Pain in right hand 03/04/2019    Pain of right hand    Pain in right knee 08/05/2019    Knee pain, right    Pain in unspecified shoulder     Shoulder pain    Personal history of (healed) traumatic fracture 10/30/2020    History of fracture of phalanx of toe    Personal history of other diseases of the digestive system 08/16/2018    History of gastroenteritis    Personal history of other diseases of the musculoskeletal system and connective tissue 12/27/2016    History of low back pain    Personal history of other diseases of the musculoskeletal system and connective tissue 10/19/2020    History of osteopenia    Personal history of other diseases of the nervous system and sense organs 08/05/2019    Personal history of seizure disorder    Personal history of other diseases of the respiratory system 05/13/2022    History of acute sinusitis    Personal history of other diseases of the respiratory system 10/30/2020    History of chronic rhinitis    Personal history of other diseases of the respiratory system 04/19/2017    History of influenza    Personal history of other diseases of the respiratory system 03/07/2020    History of viral pharyngitis    Personal history of other infectious and parasitic diseases     History of varicella    Personal history of other medical treatment 11/02/2018    History of screening mammography    Personal history of other specified conditions     History of headache    Personal history of other specified conditions 03/24/2020    History of abnormal mammogram    Personal history of other specified conditions 03/02/2018    History of fatigue    Personal history of other specified conditions 09/19/2017    History of dysuria    Personal history of other specified conditions 08/26/2020     History of diarrhea    Personal history of other specified conditions 03/03/2020    History of abdominal pain    Personal history of other specified conditions 01/11/2019    History of chest pain    Personal history of urinary (tract) infections 11/07/2016    History of urinary tract infection    Personal history of urinary (tract) infections 07/02/2018    History of urinary tract infection    Pulmonary nodules     Bilateral    Seizure disorder (Multi)     Strain of muscle(s) and tendon(s) of peroneal muscle group at lower leg level, left leg, initial encounter 11/05/2020    Strain of peroneal tendon of left foot, initial encounter    Unspecified fracture of left toe(s), initial encounter for closed fracture 10/30/2020    Fracture of proximal phalanx of toe of left foot    Unspecified injury of left wrist, hand and finger(s), initial encounter 01/05/2021    Left wrist injury    Unspecified injury of unspecified foot, initial encounter     Foot injury    Urinary tract infection, site not specified 07/02/2018    Acute UTI    Urinary tract infection, site not specified 11/07/2016    Acute UTI       Surgical History:   Past Surgical History:   Procedure Laterality Date    APPENDECTOMY  10/24/2013    Appendectomy    BREAST CYST ASPIRATION  1993    LAPAROSCOPY DIAGNOSTIC / BIOPSY / ASPIRATION / LYSIS  12/18/2014    Exploratory Laparoscopy    PELVIC LAPAROSCOPY      endometriosis    US GUIDED SOFT TISSUE BIOPSY  12/18/2023    US GUIDED SOFT TISSUE BIOPSY 12/18/2023 [facility] US       Family History:  Family History   Problem Relation Name Age of Onset    Dementia Mother      Hypertension Mother      Diabetes type II Mother      Coronary artery disease Father      Other (Congestive heart failure) Father      Breast cancer Sister Linda Megan     Breast cancer Sister Linda     Breast cancer Sister [facility]        Allergies:  Allergies   Allergen Reactions    Phenytoin Other     double vision    Codeine Hallucinations     AUDITORY  HALLUCINATIONS       Meds (Current):    Current Outpatient Medications:     ascorbic acid (Vitamin C) 1,000 mg tablet, Take 1 tablet (1,000 mg) by mouth once daily., Disp: , Rfl:     calcium carbonate (CALCIUM 600 ORAL), Take 1 tablet by mouth once daily., Disp: , Rfl:     carBAMazepine XR (TEGretol  XR) 400 mg 12 hr tablet, Take 1 tablet (400 mg) by mouth 2 times a day. Do not crush, chew, or split., Disp: 180 tablet, Rfl: 3    carBAMazepine XR (TEGretol XR) 100 mg 12 hr tablet, Take 1 tablet (100 mg) by mouth 2 times a day., Disp: 180 tablet, Rfl: 3    cholecalciferol (Vitamin D-3) 50 MCG (2000 UT) tablet, Take 1 tablet (50 mcg) by mouth once daily., Disp: , Rfl:     cyanocobalamin (Vitamin B-12) 100 mcg tablet, Take 1 tablet (100 mcg) by mouth once daily., Disp: , Rfl:     dexAMETHasone (Decadron) 4 mg tablet, Take 4 mg (1 tablet) by mouth twice daily the day before treatment, once the evening of treatment, and twice daily the day after treatment., Disp: 5 tablet, Rfl: 5    estradiol (Estrace) 0.01 % (0.1 mg/gram) vaginal cream, Insert 0.25 Applicatorful (1 g) into the vagina 2 times a week., Disp: 42.5 g, Rfl: 3    folic acid (Folvite) 1 mg tablet, Take 1 tablet (1,000 mcg) by mouth once daily., Disp: 30 tablet, Rfl: 11    ofloxacin (Floxin) 0.3 % otic solution, Administer 5 drops into the right ear once daily for 10 days., Disp: 5 mL, Rfl: 0    OLANZapine (ZyPREXA) 5 mg tablet, Take 1 tablet (5 mg) by mouth once daily at bedtime. For 4 days starting the evening of treatment, Disp: 4 tablet, Rfl: 3    ondansetron (Zofran) 8 mg tablet, Take 1 tablet (8 mg) by mouth every 8 hours if needed for nausea or vomiting., Disp: 30 tablet, Rfl: 5    PHENobarbitaL 64.8 mg tablet, TAKE 1 TABLET BY MOUTH EVERY MORNING AND TAKE 2 TABLETS BY MOUTH EVERY EVENING, Disp: 270 tablet, Rfl: 1    prochlorperazine (Compazine) 10 mg tablet, Take 1 tablet (10 mg) by mouth every 6 hours if needed for nausea or vomiting., Disp: 30 tablet,  Rfl: 5    zoledronic acid (Reclast) 5 mg/100 mL piggyback, Infuse 100 mL (5 mg) at 400 mL/hr over 15 minutes into a venous catheter 1 time for 1 dose. (Patient not taking: Reported on 2024), Disp: 100 mL, Rfl: 0  No current facility-administered medications for this visit.    Pain Assessment:  Pain is: post-operative  Ratin  Pain control: well-controlled    Performance Status (ECOG): 0       ECOG Definition  0 Fully active; no performance restrictions.  1 Strenuous physical activity restricted; fully ambulatory and able to carry out light work.  2 Capable of all self-care but unable to carry out any work activities. Up and about >50% of waking hours.  3 Capable of only limited self-care; confined to bed or chair >50% of waking hours.  4 Completely disabled; cannot carry out any self-care; totally confined to bed or chair.    Exam:  There were no vitals taken for this visit.  B/p 117/74, HR 85, RR 16, T 97.7, Spo2 96%    Wt Readings from Last 5 Encounters:   10/03/24 59.4 kg (130 lb 13.5 oz)   24 59.5 kg (131 lb 2.8 oz)   24 60.5 kg (133 lb 6.4 oz)   24 61.5 kg (135 lb 11.1 oz)   24 61.6 kg (135 lb 12.9 oz)      Physical Exam  Vitals reviewed.   Constitutional:       Appearance: Normal appearance.   HENT:      Head: Normocephalic.      Nose: Nose normal.      Mouth/Throat:      Mouth: Mucous membranes are moist.      Pharynx: Oropharynx is clear.   Eyes:      Extraocular Movements: Extraocular movements intact.      Conjunctiva/sclera: Conjunctivae normal.      Pupils: Pupils are equal, round, and reactive to light.   Cardiovascular:      Rate and Rhythm: Normal rate and regular rhythm.      Pulses: Normal pulses.      Heart sounds: Normal heart sounds.   Pulmonary:      Breath sounds: Normal breath sounds.      Comments: Hx RLL lobectomy  Abdominal:      General: Bowel sounds are normal.      Palpations: Abdomen is soft.   Musculoskeletal:         General: Normal range of motion.       Cervical back: Normal range of motion and neck supple.   Skin:     General: Skin is warm and dry.   Neurological:      General: No focal deficit present.      Mental Status: She is alert and oriented to person, place, and time.   Psychiatric:         Mood and Affect: Mood normal.         Behavior: Behavior normal.         Thought Content: Thought content normal.         Judgment: Judgment normal.       Labs:                 9/26/24 labs reviewed.     Assessment/Plan:   64 y.o. female with past medical history as stated referred for management of newly diagnosed lung cancer discussing adjuvant treatment.     The patient's records and imaging have been reviewed in detail.  MD discussed with the patient the natural history of their disease at length.  The following has also been discussed with the patient:    # Cancer:  stage IIB ([id number]) adenocarcinoma of lung, with multiple nodules within the RLL, PD-L1 5%. In-house NGS showed KRAS p.G12D ([id number] c.35G>A); TP53 p.W790Tfd*6 (NM_000546 c.626_627delGA); TP53 p.R306* (NM_000546 c.916C>T). MS status: cannot be determined. Tumor cells are strongly positive for CK7, focally positive for CDX2 and negative for TTF-1. While immunohistochemical staining is not entirely specific, then the findings are compatible with adenocarcinoma of pulmonary origin if other primary sites of origin have been excluded clinically.     S/p RLL lobectomy on 8/24/2024. We therefore discussed the concept of curative intent, adjuvant platinum doublet including risk/benefits of cisplatin vs. carboplatin and different paired chemotherapies within 4-6 weeks post surgery.  I favor cisplatin 60mg/m2 IV D#1 & pemetrexed (Alimta) 450 mg/m2 IV D#1 every 21 days for 4 planned cycles based on tolerance and disease response. This analysis included 5 studies with approximately 4600 patients having completely resected NSCLC and  showed that adjuvant chemotherapy is associated 5% decreased risk of death due  to any cause at 5 years. (LACE meta-analysis Xu et al. JCO. 2016) Stage IB patients treated with chemotherapy demonstrated a hazard ratio of 0.93 (95% CI, 0.78 to 1.10).  CALGB 9633 showed survival advantage for tumors >4 cm treated with adjuvant carboplatin and paclitaxel (HR 0.82, 90% CI 0.65-1.04). However, the overall benefit on overall survival is a modest 3-4%.   Side effects including but not limited to pain, fatigue, anorexia, nausea, vomiting, diarrhea, stomatitis, electrolyte disturbances, anemia, neutropenia, thrombocytopenia, generalized weakness, ototoxicity, nephrotoxicity, and skin rash were reviewed.   Adequate hydration and monitoring of urine output and hearing reviewed.  We reviewed initiation of vitamin B12 1000mg IM injection q9 weeks, folic acid (folate) 1mg orally daily, and dexamethasone (Decadron) 4mg orally twice daily to start on the day before, day of, and day after chemotherapy to help prevent cytopenias and improve nausea control.  Written information provided.  All questions answered.  Informed consent signed.  Planned initiation in next few weeks. C1 10/3/24.    Given disease stage status and positive PDL-1 expression (>=1%), I recommend additional adjuvant atezolizumab following adjuvant chemotherapy.  We specifically reviewed 1200mg IV every 3 weeks for 1 year post chemotherapy.  This is based on phase III Impower 010 (John et al, Lancet 2021). Additional adjuvant atezolizumab decreased the risk of recurrence, new primary NSCLC or death by 34% compared to best supportive care in stage II-IIIA population whose tumor expressed PDL1 1% or more (HR=0.66; 0.50-0.88). Updated results also suggested OS benefit with (HR=0.71; 95%CI: 0.49-1.03) (John et al. .Lancet 2021). Side effects including but not limited to fatigue, anorexia, nausea, vomiting, diarrhea, constipation, cytopenias, electrolyte abnormalities, liver and/or kidney dysfunction, skin reaction, pneumonitis, colitis,  hyper/hypothyroidism, diabetes reviewed.  Will start after chemotherapy.     - Interval Imagin2024: PET/CT showed 1. Minimal FDG avid paravertebral consolidative/ground-glass opacity in the right lower lobe, favored to represent infectious/inflammatory process or atelectasis.  6 mm and 8 mm pulmonary nodules in the right and left lower lobes respectively are non FDG avid. Follow-up with dedicated CT of chest is recommended. 2. No other concerning FDG avid disease identified. Brain Mri NOW. Will also obtain STAT CT chest as new baseline before starting treatment.     # Seizure disorder: firstly found when she was in her teens, and she has been on both Carbamazepine and Phenobarbital. Following with Dr. Baker who is going to retire soon.     # R ear pain: ofloxacin ear drop prescribed. Advised to follow up with ENT if not improving in the next week.    # Clinical Trials:  None currently.     # Access: Peripheral veins.  IR consult placed for mediport placement.      # Pain: Surgical related pain. Well controlled.     # Bone Health: No signs of bony disease.     # Psychosocial: Coping well, no acute issues.  Good support from family & friends.  Social work support available.    # GI/CINV: No acute issues.  Eating and voiding well.  Nutrition consult available.    # Smoking: N/A, former smoker.    # Fertility: N/A.        # Heme/ESAs: N/A.    # GOC: Patient is aware of curative intent goals of care.    # Language: English.    # Interdisciplinary Patient/Family Education Record:  Learner:  Patient.  Learning Needs Reviewed:  Treatments, Medications, Disease Process, Diagnostic Tests.  Barriers: None.  Teaching Method: Discussion, Handout(s).  Comprehension:  Verbalized Understanding.  Follow-up Plan:  Return to office as per MD.    # Dispo: RTC 10/10/24 for tox check visit with Dr. Lim. C2 10/24/24.  Pt instructed to call for any concerns or questions.

## 2024-10-03 NOTE — PATIENT INSTRUCTIONS
Today you were given a nausea medication called Aloxi. This is a long acting medication. Please do not take zofran until Binu 10/6/24. If you need to treat nausea and/or vomiting you make take the prescribed compazine. Starting Binu 10/6/24 you may alternate zofran and compazine as needed for nausea and/or vomiting.

## 2024-10-04 ENCOUNTER — APPOINTMENT (OUTPATIENT)
Dept: PHYSICAL THERAPY | Facility: HOSPITAL | Age: 64
End: 2024-10-04
Payer: COMMERCIAL

## 2024-10-04 ENCOUNTER — SOCIAL WORK (OUTPATIENT)
Dept: CASE MANAGEMENT | Facility: HOSPITAL | Age: 64
End: 2024-10-04
Payer: COMMERCIAL

## 2024-10-04 ENCOUNTER — TELEPHONE (OUTPATIENT)
Dept: ADMISSION | Facility: HOSPITAL | Age: 64
End: 2024-10-04
Payer: COMMERCIAL

## 2024-10-04 NOTE — PROGRESS NOTES
Work Documentation    Documentation Type: FMLA  Date Received:10/1/24  Individual Requesting Documentation: Patient  Completed By: SW,, signed by physician  Documentation submitted to : [employer] via fax once physician has signed   Additional Information: SW met with pt and spouse today to discuss the paperwork need. Pt looking for intermittent FMLA. Pt has requested a letter as well to her supervisor so that he is aware of her limitations. She requested for accommodations that she work a 5 hour day and is allowed to work from home. FMLA will also reflect this.   Pt also brought in a STD form but decided that it would not be necessary to complete. SW will keep this in case she experiences more side effects from treatment than she is hoping for. Pt will let SW know if this becomes necessary. Letter was emailed to supervising physician and Pt and FMLA was faxed to [employer] and emailed to Pt.     SW will continue to follow as needed.

## 2024-10-04 NOTE — TELEPHONE ENCOUNTER
Rhoda is a patient of Dr. Lim in [place]. She wanted to get transferred to [place]. Transferred call to [facility].

## 2024-10-05 ENCOUNTER — APPOINTMENT (OUTPATIENT)
Dept: RADIOLOGY | Facility: HOSPITAL | Age: 64
End: 2024-10-05
Payer: COMMERCIAL

## 2024-10-05 ENCOUNTER — NURSE TRIAGE (OUTPATIENT)
Dept: HEMATOLOGY/ONCOLOGY | Facility: HOSPITAL | Age: 64
End: 2024-10-05
Payer: COMMERCIAL

## 2024-10-05 ENCOUNTER — HOSPITAL ENCOUNTER (EMERGENCY)
Facility: HOSPITAL | Age: 64
Discharge: HOME | End: 2024-10-05
Attending: STUDENT IN AN ORGANIZED HEALTH CARE EDUCATION/TRAINING PROGRAM
Payer: COMMERCIAL

## 2024-10-05 VITALS
SYSTOLIC BLOOD PRESSURE: 115 MMHG | BODY MASS INDEX: 23.04 KG/M2 | TEMPERATURE: 97.5 F | RESPIRATION RATE: 14 BRPM | DIASTOLIC BLOOD PRESSURE: 80 MMHG | HEART RATE: 65 BPM | HEIGHT: 63 IN | OXYGEN SATURATION: 98 % | WEIGHT: 130 LBS

## 2024-10-05 DIAGNOSIS — K52.9 COLITIS: ICD-10-CM

## 2024-10-05 DIAGNOSIS — K59.00 CONSTIPATION, UNSPECIFIED CONSTIPATION TYPE: Primary | ICD-10-CM

## 2024-10-05 LAB
ALBUMIN SERPL BCP-MCNC: 4.2 G/DL (ref 3.4–5)
ALP SERPL-CCNC: 45 U/L (ref 33–136)
ALT SERPL W P-5'-P-CCNC: 28 U/L (ref 7–45)
ANION GAP SERPL CALCULATED.3IONS-SCNC: 12 MMOL/L (ref 10–20)
AST SERPL W P-5'-P-CCNC: 25 U/L (ref 9–39)
BASOPHILS # BLD AUTO: 0.04 X10*3/UL (ref 0–0.1)
BASOPHILS NFR BLD AUTO: 0.5 %
BILIRUB SERPL-MCNC: 0.4 MG/DL (ref 0–1.2)
BUN SERPL-MCNC: 13 MG/DL (ref 6–23)
CALCIUM SERPL-MCNC: 9.1 MG/DL (ref 8.6–10.3)
CHLORIDE SERPL-SCNC: 100 MMOL/L (ref 98–107)
CO2 SERPL-SCNC: 27 MMOL/L (ref 21–32)
CREAT SERPL-MCNC: 0.6 MG/DL (ref 0.5–1.05)
EGFRCR SERPLBLD CKD-EPI 2021: >90 ML/MIN/1.73M*2
EOSINOPHIL # BLD AUTO: 0.03 X10*3/UL (ref 0–0.7)
EOSINOPHIL NFR BLD AUTO: 0.4 %
ERYTHROCYTE [DISTWIDTH] IN BLOOD BY AUTOMATED COUNT: 13.3 % (ref 11.5–14.5)
GLUCOSE SERPL-MCNC: 106 MG/DL (ref 74–99)
HCT VFR BLD AUTO: 37.3 % (ref 36–46)
HGB BLD-MCNC: 12.1 G/DL (ref 12–16)
IMM GRANULOCYTES # BLD AUTO: 0.02 X10*3/UL (ref 0–0.7)
IMM GRANULOCYTES NFR BLD AUTO: 0.2 % (ref 0–0.9)
LACTATE SERPL-SCNC: 0.9 MMOL/L (ref 0.4–2)
LIPASE SERPL-CCNC: 16 U/L (ref 9–82)
LYMPHOCYTES # BLD AUTO: 1.2 X10*3/UL (ref 1.2–4.8)
LYMPHOCYTES NFR BLD AUTO: 14.9 %
MCH RBC QN AUTO: 31.2 PG (ref 26–34)
MCHC RBC AUTO-ENTMCNC: 32.4 G/DL (ref 32–36)
MCV RBC AUTO: 96 FL (ref 80–100)
MONOCYTES # BLD AUTO: 0.31 X10*3/UL (ref 0.1–1)
MONOCYTES NFR BLD AUTO: 3.8 %
NEUTROPHILS # BLD AUTO: 6.47 X10*3/UL (ref 1.2–7.7)
NEUTROPHILS NFR BLD AUTO: 80.2 %
NRBC BLD-RTO: 0 /100 WBCS (ref 0–0)
PLATELET # BLD AUTO: 273 X10*3/UL (ref 150–450)
POTASSIUM SERPL-SCNC: 3.8 MMOL/L (ref 3.5–5.3)
PROT SERPL-MCNC: 6.7 G/DL (ref 6.4–8.2)
RBC # BLD AUTO: 3.88 X10*6/UL (ref 4–5.2)
SODIUM SERPL-SCNC: 135 MMOL/L (ref 136–145)
WBC # BLD AUTO: 8.1 X10*3/UL (ref 4.4–11.3)

## 2024-10-05 PROCEDURE — 99284 EMERGENCY DEPT VISIT MOD MDM: CPT | Mod: 25 | Performed by: STUDENT IN AN ORGANIZED HEALTH CARE EDUCATION/TRAINING PROGRAM

## 2024-10-05 PROCEDURE — 83605 ASSAY OF LACTIC ACID: CPT | Performed by: STUDENT IN AN ORGANIZED HEALTH CARE EDUCATION/TRAINING PROGRAM

## 2024-10-05 PROCEDURE — 96361 HYDRATE IV INFUSION ADD-ON: CPT | Performed by: STUDENT IN AN ORGANIZED HEALTH CARE EDUCATION/TRAINING PROGRAM

## 2024-10-05 PROCEDURE — 74177 CT ABD & PELVIS W/CONTRAST: CPT

## 2024-10-05 PROCEDURE — 2500000004 HC RX 250 GENERAL PHARMACY W/ HCPCS (ALT 636 FOR OP/ED): Performed by: STUDENT IN AN ORGANIZED HEALTH CARE EDUCATION/TRAINING PROGRAM

## 2024-10-05 PROCEDURE — 36415 COLL VENOUS BLD VENIPUNCTURE: CPT | Performed by: STUDENT IN AN ORGANIZED HEALTH CARE EDUCATION/TRAINING PROGRAM

## 2024-10-05 PROCEDURE — 83690 ASSAY OF LIPASE: CPT | Performed by: STUDENT IN AN ORGANIZED HEALTH CARE EDUCATION/TRAINING PROGRAM

## 2024-10-05 PROCEDURE — 80053 COMPREHEN METABOLIC PANEL: CPT | Performed by: STUDENT IN AN ORGANIZED HEALTH CARE EDUCATION/TRAINING PROGRAM

## 2024-10-05 PROCEDURE — 2550000001 HC RX 255 CONTRASTS: Performed by: STUDENT IN AN ORGANIZED HEALTH CARE EDUCATION/TRAINING PROGRAM

## 2024-10-05 PROCEDURE — 74177 CT ABD & PELVIS W/CONTRAST: CPT | Performed by: RADIOLOGY

## 2024-10-05 PROCEDURE — 96374 THER/PROPH/DIAG INJ IV PUSH: CPT | Performed by: STUDENT IN AN ORGANIZED HEALTH CARE EDUCATION/TRAINING PROGRAM

## 2024-10-05 PROCEDURE — 85025 COMPLETE CBC W/AUTO DIFF WBC: CPT | Performed by: STUDENT IN AN ORGANIZED HEALTH CARE EDUCATION/TRAINING PROGRAM

## 2024-10-05 RX ORDER — AMOXICILLIN AND CLAVULANATE POTASSIUM 875; 125 MG/1; MG/1
1 TABLET, FILM COATED ORAL EVERY 12 HOURS
Qty: 14 TABLET | Refills: 0 | Status: SHIPPED | OUTPATIENT
Start: 2024-10-05 | End: 2024-10-12

## 2024-10-05 RX ORDER — KETOROLAC TROMETHAMINE 30 MG/ML
15 INJECTION, SOLUTION INTRAMUSCULAR; INTRAVENOUS ONCE
Status: COMPLETED | OUTPATIENT
Start: 2024-10-05 | End: 2024-10-05

## 2024-10-05 RX ORDER — SYRING-NEEDL,DISP,INSUL,0.3 ML 29 G X1/2"
296 SYRINGE, EMPTY DISPOSABLE MISCELLANEOUS ONCE
Qty: 296 ML | Refills: 0 | Status: SHIPPED | OUTPATIENT
Start: 2024-10-05 | End: 2024-10-05

## 2024-10-05 RX ADMIN — KETOROLAC TROMETHAMINE 15 MG: 30 INJECTION, SOLUTION INTRAMUSCULAR at 13:26

## 2024-10-05 RX ADMIN — IOHEXOL 75 ML: 350 INJECTION, SOLUTION INTRAVENOUS at 14:09

## 2024-10-05 RX ADMIN — SODIUM CHLORIDE 1000 ML: 9 INJECTION, SOLUTION INTRAVENOUS at 13:14

## 2024-10-05 ASSESSMENT — PAIN DESCRIPTION - FREQUENCY: FREQUENCY: CONSTANT/CONTINUOUS

## 2024-10-05 ASSESSMENT — COLUMBIA-SUICIDE SEVERITY RATING SCALE - C-SSRS
6. HAVE YOU EVER DONE ANYTHING, STARTED TO DO ANYTHING, OR PREPARED TO DO ANYTHING TO END YOUR LIFE?: NO
2. HAVE YOU ACTUALLY HAD ANY THOUGHTS OF KILLING YOURSELF?: NO
1. IN THE PAST MONTH, HAVE YOU WISHED YOU WERE DEAD OR WISHED YOU COULD GO TO SLEEP AND NOT WAKE UP?: NO

## 2024-10-05 ASSESSMENT — PAIN SCALES - GENERAL
PAINLEVEL_OUTOF10: 2
PAINLEVEL_OUTOF10: 4

## 2024-10-05 ASSESSMENT — PAIN - FUNCTIONAL ASSESSMENT
PAIN_FUNCTIONAL_ASSESSMENT: 0-10
PAIN_FUNCTIONAL_ASSESSMENT: 0-10

## 2024-10-05 ASSESSMENT — PAIN DESCRIPTION - PROGRESSION: CLINICAL_PROGRESSION: NOT CHANGED

## 2024-10-05 ASSESSMENT — PAIN DESCRIPTION - LOCATION: LOCATION: ABDOMEN

## 2024-10-05 ASSESSMENT — PAIN DESCRIPTION - PAIN TYPE: TYPE: ACUTE PAIN

## 2024-10-05 ASSESSMENT — PAIN DESCRIPTION - ONSET: ONSET: AWAKENED FROM SLEEP

## 2024-10-05 NOTE — DISCHARGE INSTRUCTIONS
Take antibiotics to completion.  You can use magnesium citrate and for the constipation.  If symptoms worsen or change he can return at any time for further evaluation and treatment.  Strongly recommend following up with gastroenterology for further evaluation.

## 2024-10-05 NOTE — TELEPHONE ENCOUNTER
2 dry, hard BM's yesterday. In the night had 3 very small liquid BM's followed by about an hour of 10/10 abdominal pain where she feels she needs to go more and cannot and then resolves. Was nauseated early this am, resolved with prochlorperazine. States feels like she needs to take a stool softener or something. Message sent to fellow on call.       Additional Information   Commented on: What have you eaten in the last 2 days?     keshia Dias jello   Commented on: Where is the pain? Is there more than one place where you're having pain?     Entire abdomen    Protocols used: Diarrhea, Pain

## 2024-10-05 NOTE — ED PROVIDER NOTES
HPI   Chief Complaint   Patient presents with    Abdominal Pain     Pt a lung ca pt recently started chemo is having abd pain and diarreha       Patient is a 64-year-old female with a history of lung cancer that presents to the emergency department for evaluation of abdominal pain, diarrhea.  Patient states that she has had a long history of bowel issues and chronic constipation.  She states that she had 2 large bowel movements yesterday however overnight started having episodes of watery diarrhea.  This morning she had severe abdominal pain throughout her entire abdomen and felt distended.  She states that pain is improved however she still has mild pain worse on the right side.  She does admit to nausea with several episodes of vomiting this morning however took a dose of Compazine with improvement of her nausea and vomiting.  She called the nursing line for her oncologist office who recommended she come in for further evaluation to rule out bowel obstruction.      History provided by:  Patient          Patient History   Past Medical History:   Diagnosis Date    Achilles tendinitis, unspecified leg     Achilles tendinitis    Acute maxillary sinusitis, unspecified 2018    Acute maxillary sinusitis    Acute nasopharyngitis (common cold) 2016    Common cold    Adenocarcinoma of lung (Multi)     Age-related osteoporosis without current pathological fracture 2015    Encounter for ongoing osteoporosis therapy, bisphosphonates    Complete or unspecified spontaneous  without complication         Dyspnea, unspecified 2019    Paroxysmal dyspnea    Encounter for general adult medical examination without abnormal findings 2018    Annual physical exam    Encounter for health counseling related to travel 2018    Counseling about travel    Encounter for screening for lipoid disorders 2019    Screening cholesterol level    Epigastric pain 2020    Dyspepsia    GERD  (gastroesophageal reflux disease)     Irritable bowel syndrome     Left patella fracture 09/05/2023    Mastodynia 05/12/2021    Breast pain, right    Otalgia, right ear 03/07/2020    Otalgia, right    Other general symptoms and signs 01/18/2018    Flu-like symptoms    Other microscopic hematuria 09/19/2017    Hematuria, microscopic    Pain in right hand 03/04/2019    Pain of right hand    Pain in right knee 08/05/2019    Knee pain, right    Pain in unspecified shoulder     Shoulder pain    Personal history of (healed) traumatic fracture 10/30/2020    History of fracture of phalanx of toe    Personal history of other diseases of the digestive system 08/16/2018    History of gastroenteritis    Personal history of other diseases of the musculoskeletal system and connective tissue 12/27/2016    History of low back pain    Personal history of other diseases of the musculoskeletal system and connective tissue 10/19/2020    History of osteopenia    Personal history of other diseases of the nervous system and sense organs 08/05/2019    Personal history of seizure disorder    Personal history of other diseases of the respiratory system 05/13/2022    History of acute sinusitis    Personal history of other diseases of the respiratory system 10/30/2020    History of chronic rhinitis    Personal history of other diseases of the respiratory system 04/19/2017    History of influenza    Personal history of other diseases of the respiratory system 03/07/2020    History of viral pharyngitis    Personal history of other infectious and parasitic diseases     History of varicella    Personal history of other medical treatment 11/02/2018    History of screening mammography    Personal history of other specified conditions     History of headache    Personal history of other specified conditions 03/24/2020    History of abnormal mammogram    Personal history of other specified conditions 03/02/2018    History of fatigue    Personal  history of other specified conditions 09/19/2017    History of dysuria    Personal history of other specified conditions 08/26/2020    History of diarrhea    Personal history of other specified conditions 03/03/2020    History of abdominal pain    Personal history of other specified conditions 01/11/2019    History of chest pain    Personal history of urinary (tract) infections 11/07/2016    History of urinary tract infection    Personal history of urinary (tract) infections 07/02/2018    History of urinary tract infection    Pulmonary nodules     Bilateral    Seizure disorder (Multi)     Strain of muscle(s) and tendon(s) of peroneal muscle group at lower leg level, left leg, initial encounter 11/05/2020    Strain of peroneal tendon of left foot, initial encounter    Unspecified fracture of left toe(s), initial encounter for closed fracture 10/30/2020    Fracture of proximal phalanx of toe of left foot    Unspecified injury of left wrist, hand and finger(s), initial encounter 01/05/2021    Left wrist injury    Unspecified injury of unspecified foot, initial encounter     Foot injury    Urinary tract infection, site not specified 07/02/2018    Acute UTI    Urinary tract infection, site not specified 11/07/2016    Acute UTI     Past Surgical History:   Procedure Laterality Date    APPENDECTOMY  10/24/2013    Appendectomy    BREAST CYST ASPIRATION  1993    LAPAROSCOPY DIAGNOSTIC / BIOPSY / ASPIRATION / LYSIS  12/18/2014    Exploratory Laparoscopy    PELVIC LAPAROSCOPY      endometriosis    US GUIDED SOFT TISSUE BIOPSY  12/18/2023    US GUIDED SOFT TISSUE BIOPSY 12/18/2023 [facility] US     Family History   Problem Relation Name Age of Onset    Dementia Mother      Hypertension Mother      Diabetes type II Mother      Coronary artery disease Father      Other (Congestive heart failure) Father      Breast cancer Sister Linda Guzman     Breast cancer Sister Linda     Breast cancer Sister Pat      Social History     Tobacco  Use    Smoking status: Former     Current packs/day: 0.00     Types: Cigarettes     Quit date: 1990     Years since quittin.2     Passive exposure: Past    Smokeless tobacco: Never   Vaping Use    Vaping status: Never Used   Substance Use Topics    Alcohol use: Not Currently     Comment: social    Drug use: Never       Physical Exam   ED Triage Vitals [10/05/24 1254]   Temperature Heart Rate Respirations BP   36.4 °C (97.5 °F) 68 18 117/68      Pulse Ox Temp Source Heart Rate Source Patient Position   98 % Oral Monitor Sitting      BP Location FiO2 (%)     Right arm --       Physical Exam  Vitals and nursing note reviewed.   Constitutional:       General: She is not in acute distress.     Appearance: She is well-developed. She is not ill-appearing.   HENT:      Head: Normocephalic and atraumatic.   Eyes:      Extraocular Movements: Extraocular movements intact.      Pupils: Pupils are equal, round, and reactive to light.   Cardiovascular:      Rate and Rhythm: Normal rate and regular rhythm.   Pulmonary:      Effort: Pulmonary effort is normal. No respiratory distress.      Breath sounds: No stridor. No wheezing or rhonchi.   Abdominal:      General: Abdomen is flat.      Palpations: Abdomen is soft.      Tenderness: There is abdominal tenderness in the right upper quadrant and right lower quadrant. There is no guarding.   Skin:     General: Skin is warm and dry.   Neurological:      General: No focal deficit present.      Mental Status: She is alert.       Recent Results (from the past 24 hour(s))   CBC and Auto Differential    Collection Time: 10/05/24  1:13 PM   Result Value Ref Range    WBC 8.1 4.4 - 11.3 x10*3/uL    nRBC 0.0 0.0 - 0.0 /100 WBCs    RBC 3.88 (L) 4.00 - 5.20 x10*6/uL    Hemoglobin 12.1 12.0 - 16.0 g/dL    Hematocrit 37.3 36.0 - 46.0 %    MCV 96 80 - 100 fL    MCH 31.2 26.0 - 34.0 pg    MCHC 32.4 32.0 - 36.0 g/dL    RDW 13.3 11.5 - 14.5 %    Platelets 273 150 - 450 x10*3/uL    Neutrophils  % 80.2 40.0 - 80.0 %    Immature Granulocytes %, Automated 0.2 0.0 - 0.9 %    Lymphocytes % 14.9 13.0 - 44.0 %    Monocytes % 3.8 2.0 - 10.0 %    Eosinophils % 0.4 0.0 - 6.0 %    Basophils % 0.5 0.0 - 2.0 %    Neutrophils Absolute 6.47 1.20 - 7.70 x10*3/uL    Immature Granulocytes Absolute, Automated 0.02 0.00 - 0.70 x10*3/uL    Lymphocytes Absolute 1.20 1.20 - 4.80 x10*3/uL    Monocytes Absolute 0.31 0.10 - 1.00 x10*3/uL    Eosinophils Absolute 0.03 0.00 - 0.70 x10*3/uL    Basophils Absolute 0.04 0.00 - 0.10 x10*3/uL   Comprehensive Metabolic Panel    Collection Time: 10/05/24  1:13 PM   Result Value Ref Range    Glucose 106 (H) 74 - 99 mg/dL    Sodium 135 (L) 136 - 145 mmol/L    Potassium 3.8 3.5 - 5.3 mmol/L    Chloride 100 98 - 107 mmol/L    Bicarbonate 27 21 - 32 mmol/L    Anion Gap 12 10 - 20 mmol/L    Urea Nitrogen 13 6 - 23 mg/dL    Creatinine 0.60 0.50 - 1.05 mg/dL    eGFR >90 >60 mL/min/1.73m*2    Calcium 9.1 8.6 - 10.3 mg/dL    Albumin 4.2 3.4 - 5.0 g/dL    Alkaline Phosphatase 45 33 - 136 U/L    Total Protein 6.7 6.4 - 8.2 g/dL    AST 25 9 - 39 U/L    Bilirubin, Total 0.4 0.0 - 1.2 mg/dL    ALT 28 7 - 45 U/L   Lipase    Collection Time: 10/05/24  1:13 PM   Result Value Ref Range    Lipase 16 9 - 82 U/L   Lactate    Collection Time: 10/05/24  1:13 PM   Result Value Ref Range    Lactate 0.9 0.4 - 2.0 mmol/L           ED Course & MDM   Diagnoses as of 10/06/24 0603   Constipation, unspecified constipation type   Colitis                 No data recorded     [place] Coma Scale Score: 15 (10/05/24 1315 : Teresa Franklin RN)                           Medical Decision Making  Patient is a 64-year-old female that presents to the emergency department for evaluation of abdominal pain, diarrhea, nausea or vomiting.  Patient resting comfortably and in no obvious distress on exam.  She is awake, alert and oriented.  She is hemodynamically stable on initial presentation.  Nausea and vomiting has significant resolved  after taking Compazine however she still does have right-sided tenderness palpation of the abdomen and blood work is ordered including CBC, CMP, lipase, lactate along with a CT scan of the abdomen pelvis.  Patient was given IV Toradol, IV fluids for symptomatic treatment at this time.  Blood work is unremarkable occluding a white count of 8.1 with no left shift present.  She does have normal electrolytes, normal kidney function, normal lipase and lactic acid is normal at 0.9.  CT scan of the head and pelvis does show some moderate diffuse retained colonic stool consistent with constipation and stool stasis with some mild wall thickening in the sigmoid and rectum concerning for possible infectious colitis versus inflammatory bowel disease.  Given the concern for possible colitis patient was started on Augmentin and will be given gastroenterology to follow-up with.  Constipation will also be treated with magnesium citrate to help clear out her bowels.  Patient to follow-up with gastroenterology for further evaluation and likely colonoscopy.  Patient is resting comfortably on reevaluation and has remained hemodynamically stable and return precautions were discussed with patient.        Procedure  Procedures     Ronak Ling DO  10/06/24 0603

## 2024-10-09 ASSESSMENT — ENCOUNTER SYMPTOMS
PSYCHIATRIC NEGATIVE: 1
HEMATOLOGIC/LYMPHATIC NEGATIVE: 1
GASTROINTESTINAL NEGATIVE: 1
RESPIRATORY NEGATIVE: 1
CARDIOVASCULAR NEGATIVE: 1
MUSCULOSKELETAL NEGATIVE: 1
EYES NEGATIVE: 1
CONSTITUTIONAL NEGATIVE: 1

## 2024-10-09 NOTE — PROGRESS NOTES
Ir Subjective:   Patient Name: Rhoda Pena    : 1960     MRN: [id number]     Age: 64 y.o.     Gender: female  Referring Provider: Dr. Santosh Falk (Thoracic Surgery)    CC: Management of Newly diagnosed stage IIB ([id number]) adenocarcinoma of lung, with multiple nodules within the RLL, PD-L1 5%. In-house NGS showed KRAS p.G12D (NM_[id number] c.35G>A); TP53 p.Z605Ozx*6 ([id number] c.626_627delGA); TP53 p.R306* ([id number] c.916C>T). MS status: cannot be determined. S/p RLL lobectomy on 2024. Surgical pathology showed tumor 4.5cm with very close margin. All LN (0/5) negative.    Presenting History (2024): At time of initial presentation a 64 y.o. female, former smoker (started at age 14, 1 pack per day, quitted at age 30) with a past medical history of osteoporosis, hx of seizure on Carbamazepine (has not had any seizure for 1 decade) referred for management of newly diagnosed RLL lung adenocarcinoma s/p RLL lobectomy.     She was firstly found to have some abnormalities in the RLL on the CT cardiac scoring on 2023. Subsequent follow up CT Chest (-) 2024 showed Stable area of mixed consolidation and ground-glass opacity in the medial RLL. Additional 0.7 cm solid nodule in the left lower lobe. This is indeterminate and further attention on follow-up imaging is recommended.    CT chest (-) on 2024 imilar appearance of a focal right lower lobe ground-glass and consolidative opacity with associated bronchiectasis. A slow growing neoplasm can not be excluded. Increased conspicuity of a 5 mm right lower lobe nodular ground-glass opacity and stable appearance of additional bilateral ground-glass nodular opacities measuring up to 8 mm in the left lower lobe, which may be followed on repeat imaging. Similar diffuse bilateral faint subpleural reticulations, which may represent chronic interstitial changes related to smoking, given patient's history.    2024: PET/CT showed 1. Minimal FDG avid  paravertebral consolidative/ground-glass opacity in the right lower lobe, favored to represent infectious/inflammatory process or atelectasis.  6 mm and 8 mm pulmonary nodules in the right and left lower lobes respectively are non FDG avid. Follow-up with dedicated CT of chest is recommended. 2. No other concerning FDG avid disease identified.    8/8/2024: RLL bronchoscpy with EBUS: RLL FNA showed Malignant cells derived from adenocarcinoma consistent with mucinous carcinoma.  LN 4R, 7 were negative.     She underwent RLL lobectomy and mediastinal lymphadenectomy with Dr. Falk on 8/29/2024. Pathology showing  Multiple separate tumor nodules are identified with similar morphology.  The tumor demonstrates a heterogenous mixture of invasive mucinous adenocarcinoma and approximately 10-15% demonstrates higher grade nuclear features and less intracytoplasmic mucin. By immunohistochemical staining, the tumor cells are strongly positive for CK7, focally positive for CDX2 and negative for TTF-1. While immunohistochemical staining is not entirely specific, then the findings are compatible with adenocarcinoma of pulmonary origin if other primary sites of origin have been excluded clinically. involving lung parenchyma, largest nodule measuring 4.5cm, all margins negative, adenocarcinoma is 1mm to nearest parenchymal resection margin. 0/5 LN negative (4R, 10R, 11R, 12R and 7). No VPI nor LVI. PD-L1 5%. In-house NGS showed KRAS p.G12D ([id number] c.35G>A); TP53 p.K897Wjw*6 ([id number] c.626_627delGA); TP53 p.R306* ([id number] c.916C>T). MS status: cannot be determined.    She presents for a consultation accompanied by her sister. She continues to have pain/discomfort from the surgical site but able to manage it at home. Otherwise denies any N/V. No fever or chills. No CP or SOB. Reports R ear pain.    Shx: no other drugs or EtOH. Works as [employer]. Lives with . She has 2 siblings.   Fhx: both sisters have breast cancer  (at age of 60, the other sister at 27). Mother side - uncle has unknown cancer.   Surgical history: appendectomy in ; lap hysterectomy . L Wrist surgery in .     Treatment Summary:  - 2024: Robot-assisted RLL lobectomy and mediastinal lymphadenectomy (Dr. Falk)  -10/3/2024:  C1 Cisplatin 60mg/m2 and Pemetrexed 450mg/m2 IV    Interval History (10/10/2024):  Patient present in clinic for a toxicity check. She presented to ED this past weekend for abdominal pain, constipation and nausea. She was given mg citriate which cause singificant diarrhea Mon and Tue. She had 1 episode of soft stool yesterday and 3 soft stool today. No fever or chills. Mild pain in the LLQ. CT A/P showed infectious colitis and she was prescribed with a course of Augmentin.  Otherwise no CP, SOB. Energy level is fair. Stay hydrated. Appetite is fair. She is finishing up her antibiotics    Review of Systems   Constitutional: Negative.    HENT:  Negative.     Eyes: Negative.    Respiratory: Negative.     Cardiovascular: Negative.    Gastrointestinal: Negative.    Musculoskeletal: Negative.    Skin: Negative.    Hematological: Negative.    Psychiatric/Behavioral: Negative.       Medical History:   Past Medical History:   Diagnosis Date    Achilles tendinitis, unspecified leg     Achilles tendinitis    Acute maxillary sinusitis, unspecified 2018    Acute maxillary sinusitis    Acute nasopharyngitis (common cold) 2016    Common cold    Adenocarcinoma of lung (Multi)     Age-related osteoporosis without current pathological fracture 2015    Encounter for ongoing osteoporosis therapy, bisphosphonates    Complete or unspecified spontaneous  without complication         Dyspnea, unspecified 2019    Paroxysmal dyspnea    Encounter for general adult medical examination without abnormal findings 2018    Annual physical exam    Encounter for health counseling related to travel 2018     Counseling about travel    Encounter for screening for lipoid disorders 08/05/2019    Screening cholesterol level    Epigastric pain 08/26/2020    Dyspepsia    GERD (gastroesophageal reflux disease)     Irritable bowel syndrome     Left patella fracture 09/05/2023    Mastodynia 05/12/2021    Breast pain, right    Otalgia, right ear 03/07/2020    Otalgia, right    Other general symptoms and signs 01/18/2018    Flu-like symptoms    Other microscopic hematuria 09/19/2017    Hematuria, microscopic    Pain in right hand 03/04/2019    Pain of right hand    Pain in right knee 08/05/2019    Knee pain, right    Pain in unspecified shoulder     Shoulder pain    Personal history of (healed) traumatic fracture 10/30/2020    History of fracture of phalanx of toe    Personal history of other diseases of the digestive system 08/16/2018    History of gastroenteritis    Personal history of other diseases of the musculoskeletal system and connective tissue 12/27/2016    History of low back pain    Personal history of other diseases of the musculoskeletal system and connective tissue 10/19/2020    History of osteopenia    Personal history of other diseases of the nervous system and sense organs 08/05/2019    Personal history of seizure disorder    Personal history of other diseases of the respiratory system 05/13/2022    History of acute sinusitis    Personal history of other diseases of the respiratory system 10/30/2020    History of chronic rhinitis    Personal history of other diseases of the respiratory system 04/19/2017    History of influenza    Personal history of other diseases of the respiratory system 03/07/2020    History of viral pharyngitis    Personal history of other infectious and parasitic diseases     History of varicella    Personal history of other medical treatment 11/02/2018    History of screening mammography    Personal history of other specified conditions     History of headache    Personal history of other  specified conditions 03/24/2020    History of abnormal mammogram    Personal history of other specified conditions 03/02/2018    History of fatigue    Personal history of other specified conditions 09/19/2017    History of dysuria    Personal history of other specified conditions 08/26/2020    History of diarrhea    Personal history of other specified conditions 03/03/2020    History of abdominal pain    Personal history of other specified conditions 01/11/2019    History of chest pain    Personal history of urinary (tract) infections 11/07/2016    History of urinary tract infection    Personal history of urinary (tract) infections 07/02/2018    History of urinary tract infection    Pulmonary nodules     Bilateral    Seizure disorder (Multi)     Strain of muscle(s) and tendon(s) of peroneal muscle group at lower leg level, left leg, initial encounter 11/05/2020    Strain of peroneal tendon of left foot, initial encounter    Unspecified fracture of left toe(s), initial encounter for closed fracture 10/30/2020    Fracture of proximal phalanx of toe of left foot    Unspecified injury of left wrist, hand and finger(s), initial encounter 01/05/2021    Left wrist injury    Unspecified injury of unspecified foot, initial encounter     Foot injury    Urinary tract infection, site not specified 07/02/2018    Acute UTI    Urinary tract infection, site not specified 11/07/2016    Acute UTI       Surgical History:   Past Surgical History:   Procedure Laterality Date    APPENDECTOMY  10/24/2013    Appendectomy    BREAST CYST ASPIRATION  1993    LAPAROSCOPY DIAGNOSTIC / BIOPSY / ASPIRATION / LYSIS  12/18/2014    Exploratory Laparoscopy    PELVIC LAPAROSCOPY      endometriosis    US GUIDED SOFT TISSUE BIOPSY  12/18/2023    US GUIDED SOFT TISSUE BIOPSY 12/18/2023 [facility] US       Family History:  Family History   Problem Relation Name Age of Onset    Dementia Mother      Hypertension Mother      Diabetes type II Mother      Coronary  artery disease Father      Other (Congestive heart failure) Father      Breast cancer Sister Linda Guzman     Breast cancer Sister Linda     Breast cancer Sister Analilia        Allergies:  Allergies   Allergen Reactions    Phenytoin Other     double vision    Codeine Hallucinations     AUDITORY HALLUCINATIONS       Meds (Current):    Current Outpatient Medications:     amoxicillin-pot clavulanate (Augmentin) 875-125 mg tablet, Take 1 tablet by mouth every 12 hours for 7 days., Disp: 14 tablet, Rfl: 0    ascorbic acid (Vitamin C) 1,000 mg tablet, Take 1 tablet (1,000 mg) by mouth once daily., Disp: , Rfl:     calcium carbonate (CALCIUM 600 ORAL), Take 1 tablet by mouth once daily., Disp: , Rfl:     carBAMazepine XR (TEGretol  XR) 400 mg 12 hr tablet, Take 1 tablet (400 mg) by mouth 2 times a day. Do not crush, chew, or split., Disp: 180 tablet, Rfl: 3    carBAMazepine XR (TEGretol XR) 100 mg 12 hr tablet, Take 1 tablet (100 mg) by mouth 2 times a day., Disp: 180 tablet, Rfl: 3    cholecalciferol (Vitamin D-3) 50 MCG (2000 UT) tablet, Take 1 tablet (50 mcg) by mouth once daily., Disp: , Rfl:     estradiol (Estrace) 0.01 % (0.1 mg/gram) vaginal cream, Insert 0.25 Applicatorful (1 g) into the vagina 2 times a week., Disp: 42.5 g, Rfl: 3    folic acid (Folvite) 1 mg tablet, Take 1 tablet (1,000 mcg) by mouth once daily., Disp: 30 tablet, Rfl: 11    ofloxacin (Floxin) 0.3 % otic solution, Administer 5 drops into the right ear once daily for 10 days., Disp: 5 mL, Rfl: 0    PHENobarbitaL 64.8 mg tablet, TAKE 1 TABLET BY MOUTH EVERY MORNING AND TAKE 2 TABLETS BY MOUTH EVERY EVENING, Disp: 270 tablet, Rfl: 1    prochlorperazine (Compazine) 10 mg tablet, Take 1 tablet (10 mg) by mouth every 6 hours if needed for nausea or vomiting., Disp: 30 tablet, Rfl: 5    cyanocobalamin (Vitamin B-12) 100 mcg tablet, Take 1 tablet (100 mcg) by mouth once daily., Disp: , Rfl:     dexAMETHasone (Decadron) 4 mg tablet, Take 4 mg (1 tablet) by  mouth twice daily the day before treatment, once the evening of treatment, and twice daily the day after treatment. (Patient not taking: Reported on 10/10/2024), Disp: 5 tablet, Rfl: 5    loperamide (Imodium) 2 mg capsule, Take 1 capsule (2 mg) by mouth 4 times a day as needed for diarrhea for up to 10 days., Disp: 30 capsule, Rfl: 1    OLANZapine (ZyPREXA) 5 mg tablet, Take 1 tablet (5 mg) by mouth once daily at bedtime. For 4 days starting the evening of treatment (Patient not taking: Reported on 10/10/2024), Disp: 4 tablet, Rfl: 3    ondansetron (Zofran) 8 mg tablet, Take 1 tablet (8 mg) by mouth every 8 hours if needed for nausea or vomiting. (Patient not taking: Reported on 10/10/2024), Disp: 30 tablet, Rfl: 5    zoledronic acid (Reclast) 5 mg/100 mL piggyback, Infuse 100 mL (5 mg) at 400 mL/hr over 15 minutes into a venous catheter 1 time for 1 dose. (Patient not taking: Reported on 2024), Disp: 100 mL, Rfl: 0    Pain Assessment:  Pain is: post-operative  Ratin  Pain control: well-controlled    Performance Status (ECOG): 1       ECOG Definition  0 Fully active; no performance restrictions.  1 Strenuous physical activity restricted; fully ambulatory and able to carry out light work.  2 Capable of all self-care but unable to carry out any work activities. Up and about >50% of waking hours.  3 Capable of only limited self-care; confined to bed or chair >50% of waking hours.  4 Completely disabled; cannot carry out any self-care; totally confined to bed or chair.    Exam:  /82 (BP Location: Right arm, Patient Position: Sitting, BP Cuff Size: Small adult)   Pulse 90   Temp 36.4 °C (97.6 °F) (Temporal)   Resp 18   Wt 56.7 kg (125 lb)   SpO2 96%   BMI 22.14 kg/m²   B/p 117/74, HR 85, RR 16, T 97.7, Spo2 96%    Wt Readings from Last 5 Encounters:   10/10/24 56.7 kg (125 lb)   10/05/24 59 kg (130 lb)   10/03/24 59.4 kg (130 lb 13.5 oz)   24 59.5 kg (131 lb 2.8 oz)   24 60.5 kg (133 lb  6.4 oz)      Physical Exam  Vitals reviewed.   Constitutional:       Appearance: Normal appearance.   HENT:      Head: Normocephalic.      Nose: Nose normal.      Mouth/Throat:      Mouth: Mucous membranes are moist.      Pharynx: Oropharynx is clear.   Eyes:      Extraocular Movements: Extraocular movements intact.      Conjunctiva/sclera: Conjunctivae normal.      Pupils: Pupils are equal, round, and reactive to light.   Cardiovascular:      Rate and Rhythm: Normal rate and regular rhythm.      Pulses: Normal pulses.      Heart sounds: Normal heart sounds.   Pulmonary:      Breath sounds: Normal breath sounds.      Comments: Hx RLL lobectomy  Abdominal:      General: Bowel sounds are normal.      Palpations: Abdomen is soft.      Comments: Tender to palpation in the LLQ   Musculoskeletal:         General: Normal range of motion.      Cervical back: Normal range of motion and neck supple.   Skin:     General: Skin is warm and dry.   Neurological:      General: No focal deficit present.      Mental Status: She is alert and oriented to person, place, and time.   Psychiatric:         Mood and Affect: Mood normal.         Behavior: Behavior normal.         Thought Content: Thought content normal.         Judgment: Judgment normal.       Labs:  Results from last 7 days   Lab Units 10/10/24  0948 10/05/24  1313   WBC AUTO x10*3/uL 3.7* 8.1   HEMOGLOBIN g/dL 13.2 12.1   HEMATOCRIT % 38.9 37.3   PLATELETS AUTO x10*3/uL 275 273   NEUTROS ABS x10*3/uL 2.23 6.47   LYMPHS ABS AUTO x10*3/uL 1.06* 1.20   MONOS ABS AUTO x10*3/uL 0.30 0.31   EOS ABS AUTO x10*3/uL 0.04 0.03   NEUTROS PCT AUTO % 60.7 80.2   LYMPHS PCT AUTO % 28.9 14.9   MONOS PCT AUTO % 8.2 3.8   EOS PCT AUTO % 1.1 0.4         Results from last 7 days   Lab Units 10/10/24  0948 10/05/24  1313   GLUCOSE mg/dL 108* 106*   SODIUM mmol/L 133* 135*   POTASSIUM mmol/L 4.2 3.8   CHLORIDE mmol/L 94* 100   CO2 mmol/L 31 27   BUN mg/dL 13 13   CREATININE mg/dL 0.56 0.60    EGFR mL/min/1.73m*2 >90 >90   CALCIUM mg/dL 9.9 9.1   MAGNESIUM mg/dL 1.89  --    ALBUMIN g/dL 4.6 4.2   PROTEIN TOTAL g/dL 7.3 6.7   BILIRUBIN TOTAL mg/dL 0.4 0.4   ALK PHOS U/L 58 45   ALT U/L 23 28   AST U/L 18 25        9/26/24 labs reviewed.     Assessment/Plan:   64 y.o. female with past medical history as stated referred for management of newly diagnosed lung cancer discussing adjuvant treatment.     The patient's records and imaging have been reviewed in detail.  MD discussed with the patient the natural history of their disease at length.  The following has also been discussed with the patient:    # Cancer:  stage IIB ([id number]) adenocarcinoma of lung, with multiple nodules within the RLL, PD-L1 5%. In-house NGS showed KRAS p.G12D (NM_033360 c.35G>A); TP53 p.F163Sdq*6 (NM_000546 c.626_627delGA); TP53 p.R306* (NM_000546 c.916C>T). MS status: cannot be determined. Tumor cells are strongly positive for CK7, focally positive for CDX2 and negative for TTF-1. While immunohistochemical staining is not entirely specific, then the findings are compatible with adenocarcinoma of pulmonary origin if other primary sites of origin have been excluded clinically.     S/p RLL lobectomy on 8/24/2024. We therefore discussed the concept of curative intent, adjuvant platinum doublet including risk/benefits of cisplatin vs. carboplatin and different paired chemotherapies within 4-6 weeks post surgery.  I favor cisplatin 60mg/m2 IV D#1 & pemetrexed (Alimta) 450 mg/m2 IV D#1 every 21 days for 4 planned cycles based on tolerance and disease response. This analysis included 5 studies with approximately 4600 patients having completely resected NSCLC and  showed that adjuvant chemotherapy is associated 5% decreased risk of death due to any cause at 5 years. (LACE meta-analysis Xu et al. JCO. 2016) Stage IB patients treated with chemotherapy demonstrated a hazard ratio of 0.93 (95% CI, 0.78 to 1.10).  CALGB 9633 showed survival  advantage for tumors >4 cm treated with adjuvant carboplatin and paclitaxel (HR 0.82, 90% CI 0.65-1.04). However, the overall benefit on overall survival is a modest 3-4%.   Side effects including but not limited to pain, fatigue, anorexia, nausea, vomiting, diarrhea, stomatitis, electrolyte disturbances, anemia, neutropenia, thrombocytopenia, generalized weakness, ototoxicity, nephrotoxicity, and skin rash were reviewed.   Adequate hydration and monitoring of urine output and hearing reviewed.  We reviewed initiation of vitamin B12 1000mg IM injection q9 weeks, folic acid (folate) 1mg orally daily, and dexamethasone (Decadron) 4mg orally twice daily to start on the day before, day of, and day after chemotherapy to help prevent cytopenias and improve nausea control.  Written information provided.  All questions answered.  Informed consent signed.  Planned initiation in next few weeks. C1 10/3/24.    Given disease stage status and positive PDL-1 expression (>=1%), I recommend additional adjuvant atezolizumab following adjuvant chemotherapy.  We specifically reviewed 1200mg IV every 3 weeks for 1 year post chemotherapy.  This is based on phase III Impower 010 (John et al, Lancet ). Additional adjuvant atezolizumab decreased the risk of recurrence, new primary NSCLC or death by 34% compared to best supportive care in stage II-IIIA population whose tumor expressed PDL1 1% or more (HR=0.66; 0.50-0.88). Updated results also suggested OS benefit with (HR=0.71; 95%CI: 0.49-1.03) (John et al. .Lancet ). Side effects including but not limited to fatigue, anorexia, nausea, vomiting, diarrhea, constipation, cytopenias, electrolyte abnormalities, liver and/or kidney dysfunction, skin reaction, pneumonitis, colitis, hyper/hypothyroidism, diabetes reviewed.  Will start after chemotherapy.     - Interval Imagin2024: PET/CT showed 1. Minimal FDG avid paravertebral consolidative/ground-glass opacity in the right  lower lobe, favored to represent infectious/inflammatory process or atelectasis.  6 mm and 8 mm pulmonary nodules in the right and left lower lobes respectively are non FDG avid. Follow-up with dedicated CT of chest is recommended. 2. No other concerning FDG avid disease identified. Brain Mri NOW. Will also obtain STAT CT chest as new baseline before starting treatment.     # Seizure disorder: firstly found when she was in her teens, and she has been on both Carbamazepine and Phenobarbital. Following with Dr. Baker who is going to retire soon. However she is hesitant to switch to keppra.    # Colitis: in the setting of chemotherapy and stool softener. On Augmentin. Will prescribe immodium. She is aware to call is if symptoms persist or having fevers.     # R ear pain: ofloxacin ear drop prescribed. Advised to follow up with ENT if not improving in the next week. Improving    # Clinical Trials:  None currently.     # Access: Peripheral veins.  IR consult placed for mediport placement.      # Pain: Surgical related pain. Well controlled.     # Bone Health: No signs of bony disease.     # Psychosocial: Coping well, no acute issues.  Good support from family & friends.  Social work support available.    # GI/CINV: No acute issues.  Eating and voiding well.  Nutrition consult available.    # Smoking: N/A, former smoker.    # Fertility: N/A.        # Heme/ESAs: N/A.    # GOC: Patient is aware of curative intent goals of care.    # Language: English.    # Interdisciplinary Patient/Family Education Record:  Learner:  Patient.  Learning Needs Reviewed:  Treatments, Medications, Disease Process, Diagnostic Tests.  Barriers: None.  Teaching Method: Discussion, Handout(s).  Comprehension:  Verbalized Understanding.  Follow-up Plan:  Return to office as per MD.    # Dispo: C2 in 2 weeks

## 2024-10-10 ENCOUNTER — OFFICE VISIT (OUTPATIENT)
Dept: HEMATOLOGY/ONCOLOGY | Facility: CLINIC | Age: 64
End: 2024-10-10
Payer: COMMERCIAL

## 2024-10-10 ENCOUNTER — PHARMACY VISIT (OUTPATIENT)
Dept: PHARMACY | Facility: CLINIC | Age: 64
End: 2024-10-10

## 2024-10-10 VITALS
SYSTOLIC BLOOD PRESSURE: 127 MMHG | BODY MASS INDEX: 22.14 KG/M2 | RESPIRATION RATE: 18 BRPM | HEART RATE: 90 BPM | DIASTOLIC BLOOD PRESSURE: 82 MMHG | WEIGHT: 125 LBS | TEMPERATURE: 97.6 F | OXYGEN SATURATION: 96 %

## 2024-10-10 DIAGNOSIS — R19.7 DIARRHEA, UNSPECIFIED TYPE: ICD-10-CM

## 2024-10-10 DIAGNOSIS — C34.90 ADENOCARCINOMA, LUNG, UNSPECIFIED LATERALITY (MULTI): Primary | ICD-10-CM

## 2024-10-10 LAB
ALBUMIN SERPL BCP-MCNC: 4.6 G/DL (ref 3.4–5)
ALP SERPL-CCNC: 58 U/L (ref 33–136)
ALT SERPL W P-5'-P-CCNC: 23 U/L (ref 7–45)
ANION GAP SERPL CALC-SCNC: 12 MMOL/L (ref 10–20)
AST SERPL W P-5'-P-CCNC: 18 U/L (ref 9–39)
BASOPHILS # BLD AUTO: 0.03 X10*3/UL (ref 0–0.1)
BASOPHILS NFR BLD AUTO: 0.8 %
BILIRUB SERPL-MCNC: 0.4 MG/DL (ref 0–1.2)
BUN SERPL-MCNC: 13 MG/DL (ref 6–23)
CALCIUM SERPL-MCNC: 9.9 MG/DL (ref 8.6–10.3)
CHLORIDE SERPL-SCNC: 94 MMOL/L (ref 98–107)
CO2 SERPL-SCNC: 31 MMOL/L (ref 21–32)
CREAT SERPL-MCNC: 0.56 MG/DL (ref 0.5–1.05)
EGFRCR SERPLBLD CKD-EPI 2021: >90 ML/MIN/1.73M*2
EOSINOPHIL # BLD AUTO: 0.04 X10*3/UL (ref 0–0.7)
EOSINOPHIL NFR BLD AUTO: 1.1 %
ERYTHROCYTE [DISTWIDTH] IN BLOOD BY AUTOMATED COUNT: 12.4 % (ref 11.5–14.5)
GLUCOSE SERPL-MCNC: 108 MG/DL (ref 74–99)
HCT VFR BLD AUTO: 38.9 % (ref 36–46)
HGB BLD-MCNC: 13.2 G/DL (ref 12–16)
IMM GRANULOCYTES # BLD AUTO: 0.01 X10*3/UL (ref 0–0.7)
IMM GRANULOCYTES NFR BLD AUTO: 0.3 % (ref 0–0.9)
LYMPHOCYTES # BLD AUTO: 1.06 X10*3/UL (ref 1.2–4.8)
LYMPHOCYTES NFR BLD AUTO: 28.9 %
MAGNESIUM SERPL-MCNC: 1.89 MG/DL (ref 1.6–2.4)
MCH RBC QN AUTO: 31.5 PG (ref 26–34)
MCHC RBC AUTO-ENTMCNC: 33.9 G/DL (ref 32–36)
MCV RBC AUTO: 93 FL (ref 80–100)
MONOCYTES # BLD AUTO: 0.3 X10*3/UL (ref 0.1–1)
MONOCYTES NFR BLD AUTO: 8.2 %
NEUTROPHILS # BLD AUTO: 2.23 X10*3/UL (ref 1.2–7.7)
NEUTROPHILS NFR BLD AUTO: 60.7 %
NRBC BLD-RTO: 0 /100 WBCS (ref 0–0)
PLATELET # BLD AUTO: 275 X10*3/UL (ref 150–450)
POTASSIUM SERPL-SCNC: 4.2 MMOL/L (ref 3.5–5.3)
PROT SERPL-MCNC: 7.3 G/DL (ref 6.4–8.2)
RBC # BLD AUTO: 4.19 X10*6/UL (ref 4–5.2)
SODIUM SERPL-SCNC: 133 MMOL/L (ref 136–145)
WBC # BLD AUTO: 3.7 X10*3/UL (ref 4.4–11.3)

## 2024-10-10 PROCEDURE — 84075 ASSAY ALKALINE PHOSPHATASE: CPT | Performed by: STUDENT IN AN ORGANIZED HEALTH CARE EDUCATION/TRAINING PROGRAM

## 2024-10-10 PROCEDURE — 1126F AMNT PAIN NOTED NONE PRSNT: CPT | Performed by: STUDENT IN AN ORGANIZED HEALTH CARE EDUCATION/TRAINING PROGRAM

## 2024-10-10 PROCEDURE — 99215 OFFICE O/P EST HI 40 MIN: CPT | Performed by: STUDENT IN AN ORGANIZED HEALTH CARE EDUCATION/TRAINING PROGRAM

## 2024-10-10 PROCEDURE — 83735 ASSAY OF MAGNESIUM: CPT | Performed by: STUDENT IN AN ORGANIZED HEALTH CARE EDUCATION/TRAINING PROGRAM

## 2024-10-10 PROCEDURE — 85025 COMPLETE CBC W/AUTO DIFF WBC: CPT | Performed by: STUDENT IN AN ORGANIZED HEALTH CARE EDUCATION/TRAINING PROGRAM

## 2024-10-10 PROCEDURE — RXMED WILLOW AMBULATORY MEDICATION CHARGE

## 2024-10-10 PROCEDURE — 1159F MED LIST DOCD IN RCRD: CPT | Performed by: STUDENT IN AN ORGANIZED HEALTH CARE EDUCATION/TRAINING PROGRAM

## 2024-10-10 RX ORDER — LOPERAMIDE HYDROCHLORIDE 2 MG/1
2 CAPSULE ORAL 4 TIMES DAILY PRN
Qty: 30 CAPSULE | Refills: 1 | Status: SHIPPED | OUTPATIENT
Start: 2024-10-10 | End: 2024-10-20

## 2024-10-10 ASSESSMENT — PAIN SCALES - GENERAL: PAINLEVEL: 0-NO PAIN

## 2024-10-10 NOTE — PROGRESS NOTES
"Patient here today for follow up. Her first dose was last week. She was advised on 10/5 to go to the ED because she was having abdominal pain with N/V. Negative for bowel obstruction. She was treated and is now having soft bowel movements.     Fatigue: \"it's been ok\"  PO intake: she's been eating mostly fibrous foods to help with bowels   Weight: down -5lbs over the last few days, but has had bowel issues  N/V/D/C: some nausea starting last Friday; one episode of vomit last days; soft stools now with some cramping    Medications reviewed with patient.     Port placement 10/22.   C2 scheduled 10/24.     Labs drawn today.   "

## 2024-10-15 ENCOUNTER — TELEPHONE (OUTPATIENT)
Dept: HEMATOLOGY/ONCOLOGY | Facility: CLINIC | Age: 64
End: 2024-10-15
Payer: COMMERCIAL

## 2024-10-15 PROCEDURE — RXMED WILLOW AMBULATORY MEDICATION CHARGE

## 2024-10-16 ENCOUNTER — PHARMACY VISIT (OUTPATIENT)
Dept: PHARMACY | Facility: CLINIC | Age: 64
End: 2024-10-16
Payer: COMMERCIAL

## 2024-10-17 NOTE — TELEPHONE ENCOUNTER
Letter written and emailed to contact provided by patient.     Called patient and let her know this was done and that she can also access the letter via [employer].

## 2024-10-21 ENCOUNTER — TELEPHONE (OUTPATIENT)
Dept: CARDIOLOGY | Facility: HOSPITAL | Age: 64
End: 2024-10-21

## 2024-10-22 ENCOUNTER — HOSPITAL ENCOUNTER (OUTPATIENT)
Dept: CARDIOLOGY | Facility: HOSPITAL | Age: 64
Setting detail: OUTPATIENT SURGERY
Discharge: HOME | End: 2024-10-22
Payer: COMMERCIAL

## 2024-10-22 VITALS
HEIGHT: 63 IN | BODY MASS INDEX: 22.15 KG/M2 | OXYGEN SATURATION: 98 % | RESPIRATION RATE: 14 BRPM | SYSTOLIC BLOOD PRESSURE: 136 MMHG | DIASTOLIC BLOOD PRESSURE: 79 MMHG | HEART RATE: 66 BPM | WEIGHT: 125 LBS

## 2024-10-22 DIAGNOSIS — C34.90 ADENOCARCINOMA, LUNG, UNSPECIFIED LATERALITY (MULTI): ICD-10-CM

## 2024-10-22 PROCEDURE — 76942 ECHO GUIDE FOR BIOPSY: CPT | Mod: 59

## 2024-10-22 PROCEDURE — 2780000003 HC OR 278 NO HCPCS

## 2024-10-22 PROCEDURE — 2500000004 HC RX 250 GENERAL PHARMACY W/ HCPCS (ALT 636 FOR OP/ED): Performed by: RADIOLOGY

## 2024-10-22 PROCEDURE — 2500000005 HC RX 250 GENERAL PHARMACY W/O HCPCS: Performed by: RADIOLOGY

## 2024-10-22 PROCEDURE — C1894 INTRO/SHEATH, NON-LASER: HCPCS

## 2024-10-22 PROCEDURE — 7100000009 HC PHASE TWO TIME - INITIAL BASE CHARGE

## 2024-10-22 PROCEDURE — 99152 MOD SED SAME PHYS/QHP 5/>YRS: CPT | Performed by: RADIOLOGY

## 2024-10-22 PROCEDURE — 99152 MOD SED SAME PHYS/QHP 5/>YRS: CPT

## 2024-10-22 PROCEDURE — 7100000010 HC PHASE TWO TIME - EACH INCREMENTAL 1 MINUTE

## 2024-10-22 PROCEDURE — 36561 INSERT TUNNELED CV CATH: CPT | Mod: RT

## 2024-10-22 PROCEDURE — 2720000007 HC OR 272 NO HCPCS

## 2024-10-22 PROCEDURE — 77001 FLUOROGUIDE FOR VEIN DEVICE: CPT

## 2024-10-22 PROCEDURE — C1788 PORT, INDWELLING, IMP: HCPCS

## 2024-10-22 RX ORDER — HEPARIN 100 UNIT/ML
SYRINGE INTRAVENOUS AS NEEDED
Status: DISCONTINUED | OUTPATIENT
Start: 2024-10-22 | End: 2024-10-22 | Stop reason: HOSPADM

## 2024-10-22 RX ORDER — LIDOCAINE HYDROCHLORIDE AND EPINEPHRINE 10; 10 MG/ML; UG/ML
INJECTION, SOLUTION INFILTRATION; PERINEURAL AS NEEDED
Status: DISCONTINUED | OUTPATIENT
Start: 2024-10-22 | End: 2024-10-22 | Stop reason: HOSPADM

## 2024-10-22 RX ORDER — MIDAZOLAM HYDROCHLORIDE 1 MG/ML
INJECTION, SOLUTION INTRAMUSCULAR; INTRAVENOUS AS NEEDED
Status: DISCONTINUED | OUTPATIENT
Start: 2024-10-22 | End: 2024-10-22 | Stop reason: HOSPADM

## 2024-10-22 RX ORDER — FENTANYL CITRATE 50 UG/ML
INJECTION, SOLUTION INTRAMUSCULAR; INTRAVENOUS AS NEEDED
Status: DISCONTINUED | OUTPATIENT
Start: 2024-10-22 | End: 2024-10-22 | Stop reason: HOSPADM

## 2024-10-22 ASSESSMENT — PAIN SCALES - GENERAL
PAINLEVEL_OUTOF10: 0 - NO PAIN

## 2024-10-22 ASSESSMENT — PAIN - FUNCTIONAL ASSESSMENT
PAIN_FUNCTIONAL_ASSESSMENT: 0-10

## 2024-10-22 NOTE — POST-PROCEDURE NOTE
Interventional Radiology Brief Postprocedure Note    Attending: Raul Snowden MD      Assistant: none    Diagnosis: lung ca    Description of procedure: port placement     Anesthesia:  Local, mod sed    Complications: None    Estimated Blood Loss: minimal      See detailed result report with images in PACS.    The patient tolerated the procedure well without incident or complication.

## 2024-10-24 ENCOUNTER — INFUSION (OUTPATIENT)
Dept: HEMATOLOGY/ONCOLOGY | Facility: CLINIC | Age: 64
End: 2024-10-24
Payer: COMMERCIAL

## 2024-10-24 ENCOUNTER — OFFICE VISIT (OUTPATIENT)
Dept: HEMATOLOGY/ONCOLOGY | Facility: CLINIC | Age: 64
End: 2024-10-24
Payer: COMMERCIAL

## 2024-10-24 VITALS
OXYGEN SATURATION: 97 % | DIASTOLIC BLOOD PRESSURE: 82 MMHG | HEART RATE: 79 BPM | BODY MASS INDEX: 22.75 KG/M2 | TEMPERATURE: 98.2 F | SYSTOLIC BLOOD PRESSURE: 132 MMHG | RESPIRATION RATE: 18 BRPM | WEIGHT: 128.42 LBS

## 2024-10-24 DIAGNOSIS — M81.0 AGE RELATED OSTEOPOROSIS, UNSPECIFIED PATHOLOGICAL FRACTURE PRESENCE: ICD-10-CM

## 2024-10-24 DIAGNOSIS — C34.90 ADENOCARCINOMA, LUNG, UNSPECIFIED LATERALITY (MULTI): Primary | ICD-10-CM

## 2024-10-24 DIAGNOSIS — C34.90 ADENOCARCINOMA, LUNG, UNSPECIFIED LATERALITY (MULTI): ICD-10-CM

## 2024-10-24 DIAGNOSIS — Z51.11 ENCOUNTER FOR ANTINEOPLASTIC CHEMOTHERAPY: ICD-10-CM

## 2024-10-24 DIAGNOSIS — Z98.890 HISTORY OF LUNG SURGERY: ICD-10-CM

## 2024-10-24 LAB
ALBUMIN SERPL BCP-MCNC: 4.2 G/DL (ref 3.4–5)
ALP SERPL-CCNC: 49 U/L (ref 33–136)
ALT SERPL W P-5'-P-CCNC: 12 U/L (ref 7–45)
ANION GAP SERPL CALC-SCNC: 13 MMOL/L (ref 10–20)
AST SERPL W P-5'-P-CCNC: 14 U/L (ref 9–39)
BASOPHILS # BLD AUTO: 0.01 X10*3/UL (ref 0–0.1)
BASOPHILS NFR BLD AUTO: 0.3 %
BILIRUB SERPL-MCNC: 0.3 MG/DL (ref 0–1.2)
BUN SERPL-MCNC: 11 MG/DL (ref 6–23)
CALCIUM SERPL-MCNC: 8.9 MG/DL (ref 8.6–10.3)
CHLORIDE SERPL-SCNC: 96 MMOL/L (ref 98–107)
CO2 SERPL-SCNC: 27 MMOL/L (ref 21–32)
CREAT SERPL-MCNC: 0.47 MG/DL (ref 0.5–1.05)
EGFRCR SERPLBLD CKD-EPI 2021: >90 ML/MIN/1.73M*2
EOSINOPHIL # BLD AUTO: 0.04 X10*3/UL (ref 0–0.7)
EOSINOPHIL NFR BLD AUTO: 1 %
ERYTHROCYTE [DISTWIDTH] IN BLOOD BY AUTOMATED COUNT: 13.4 % (ref 11.5–14.5)
GLUCOSE SERPL-MCNC: 97 MG/DL (ref 74–99)
HCT VFR BLD AUTO: 32.2 % (ref 36–46)
HGB BLD-MCNC: 10.9 G/DL (ref 12–16)
IMM GRANULOCYTES # BLD AUTO: 0.01 X10*3/UL (ref 0–0.7)
IMM GRANULOCYTES NFR BLD AUTO: 0.3 % (ref 0–0.9)
LYMPHOCYTES # BLD AUTO: 1.76 X10*3/UL (ref 1.2–4.8)
LYMPHOCYTES NFR BLD AUTO: 44.6 %
MAGNESIUM SERPL-MCNC: 1.83 MG/DL (ref 1.6–2.4)
MCH RBC QN AUTO: 32.2 PG (ref 26–34)
MCHC RBC AUTO-ENTMCNC: 33.9 G/DL (ref 32–36)
MCV RBC AUTO: 95 FL (ref 80–100)
MONOCYTES # BLD AUTO: 0.53 X10*3/UL (ref 0.1–1)
MONOCYTES NFR BLD AUTO: 13.4 %
NEUTROPHILS # BLD AUTO: 1.6 X10*3/UL (ref 1.2–7.7)
NEUTROPHILS NFR BLD AUTO: 40.4 %
NRBC BLD-RTO: 0 /100 WBCS (ref 0–0)
PLATELET # BLD AUTO: 335 X10*3/UL (ref 150–450)
POTASSIUM SERPL-SCNC: 3.7 MMOL/L (ref 3.5–5.3)
PROT SERPL-MCNC: 6.4 G/DL (ref 6.4–8.2)
RBC # BLD AUTO: 3.39 X10*6/UL (ref 4–5.2)
SODIUM SERPL-SCNC: 132 MMOL/L (ref 136–145)
WBC # BLD AUTO: 4 X10*3/UL (ref 4.4–11.3)

## 2024-10-24 PROCEDURE — 2500000004 HC RX 250 GENERAL PHARMACY W/ HCPCS (ALT 636 FOR OP/ED): Performed by: INTERNAL MEDICINE

## 2024-10-24 PROCEDURE — 99215 OFFICE O/P EST HI 40 MIN: CPT | Performed by: STUDENT IN AN ORGANIZED HEALTH CARE EDUCATION/TRAINING PROGRAM

## 2024-10-24 PROCEDURE — 96361 HYDRATE IV INFUSION ADD-ON: CPT | Mod: INF

## 2024-10-24 PROCEDURE — 80053 COMPREHEN METABOLIC PANEL: CPT

## 2024-10-24 PROCEDURE — 96411 CHEMO IV PUSH ADDL DRUG: CPT

## 2024-10-24 PROCEDURE — 99215 OFFICE O/P EST HI 40 MIN: CPT | Mod: 25 | Performed by: STUDENT IN AN ORGANIZED HEALTH CARE EDUCATION/TRAINING PROGRAM

## 2024-10-24 PROCEDURE — 1159F MED LIST DOCD IN RCRD: CPT | Performed by: STUDENT IN AN ORGANIZED HEALTH CARE EDUCATION/TRAINING PROGRAM

## 2024-10-24 PROCEDURE — 85025 COMPLETE CBC W/AUTO DIFF WBC: CPT

## 2024-10-24 PROCEDURE — 96413 CHEMO IV INFUSION 1 HR: CPT

## 2024-10-24 PROCEDURE — 1160F RVW MEDS BY RX/DR IN RCRD: CPT | Performed by: STUDENT IN AN ORGANIZED HEALTH CARE EDUCATION/TRAINING PROGRAM

## 2024-10-24 PROCEDURE — 83735 ASSAY OF MAGNESIUM: CPT

## 2024-10-24 PROCEDURE — 1125F AMNT PAIN NOTED PAIN PRSNT: CPT | Performed by: STUDENT IN AN ORGANIZED HEALTH CARE EDUCATION/TRAINING PROGRAM

## 2024-10-24 PROCEDURE — 2500000004 HC RX 250 GENERAL PHARMACY W/ HCPCS (ALT 636 FOR OP/ED): Performed by: STUDENT IN AN ORGANIZED HEALTH CARE EDUCATION/TRAINING PROGRAM

## 2024-10-24 PROCEDURE — 96367 TX/PROPH/DG ADDL SEQ IV INF: CPT

## 2024-10-24 PROCEDURE — 96375 TX/PRO/DX INJ NEW DRUG ADDON: CPT | Mod: INF

## 2024-10-24 RX ORDER — PALONOSETRON 0.05 MG/ML
0.25 INJECTION, SOLUTION INTRAVENOUS ONCE
Status: CANCELLED | OUTPATIENT
Start: 2024-10-24

## 2024-10-24 RX ORDER — DEXAMETHASONE 6 MG/1
12 TABLET ORAL ONCE
Status: CANCELLED | OUTPATIENT
Start: 2024-10-24 | End: 2024-10-24

## 2024-10-24 RX ORDER — HEPARIN 100 UNIT/ML
500 SYRINGE INTRAVENOUS AS NEEDED
OUTPATIENT
Start: 2024-10-24

## 2024-10-24 RX ORDER — PROCHLORPERAZINE MALEATE 10 MG
10 TABLET ORAL EVERY 6 HOURS PRN
Status: DISCONTINUED | OUTPATIENT
Start: 2024-10-24 | End: 2024-10-24 | Stop reason: HOSPADM

## 2024-10-24 RX ORDER — EPINEPHRINE 0.3 MG/.3ML
0.3 INJECTION SUBCUTANEOUS EVERY 5 MIN PRN
Status: CANCELLED | OUTPATIENT
Start: 2024-10-24

## 2024-10-24 RX ORDER — PROCHLORPERAZINE EDISYLATE 5 MG/ML
10 INJECTION INTRAMUSCULAR; INTRAVENOUS EVERY 6 HOURS PRN
Status: CANCELLED | OUTPATIENT
Start: 2024-10-24

## 2024-10-24 RX ORDER — PROCHLORPERAZINE MALEATE 10 MG
10 TABLET ORAL EVERY 6 HOURS PRN
Status: CANCELLED | OUTPATIENT
Start: 2024-10-24

## 2024-10-24 RX ORDER — DEXAMETHASONE 6 MG/1
12 TABLET ORAL ONCE
Status: COMPLETED | OUTPATIENT
Start: 2024-10-24 | End: 2024-10-24

## 2024-10-24 RX ORDER — FAMOTIDINE 10 MG/ML
20 INJECTION INTRAVENOUS ONCE AS NEEDED
Status: DISCONTINUED | OUTPATIENT
Start: 2024-10-24 | End: 2024-10-24 | Stop reason: HOSPADM

## 2024-10-24 RX ORDER — ALBUTEROL SULFATE 0.83 MG/ML
3 SOLUTION RESPIRATORY (INHALATION) AS NEEDED
Status: CANCELLED | OUTPATIENT
Start: 2024-10-24

## 2024-10-24 RX ORDER — LIDOCAINE AND PRILOCAINE 25; 25 MG/G; MG/G
CREAM TOPICAL ONCE
Qty: 30 G | Refills: 1 | Status: SHIPPED | OUTPATIENT
Start: 2024-10-24 | End: 2024-10-25

## 2024-10-24 RX ORDER — PALONOSETRON 0.05 MG/ML
0.25 INJECTION, SOLUTION INTRAVENOUS ONCE
Status: COMPLETED | OUTPATIENT
Start: 2024-10-24 | End: 2024-10-24

## 2024-10-24 RX ORDER — EPINEPHRINE 0.3 MG/.3ML
0.3 INJECTION SUBCUTANEOUS EVERY 5 MIN PRN
Status: DISCONTINUED | OUTPATIENT
Start: 2024-10-24 | End: 2024-10-24 | Stop reason: HOSPADM

## 2024-10-24 RX ORDER — HEPARIN 100 UNIT/ML
500 SYRINGE INTRAVENOUS AS NEEDED
Status: DISCONTINUED | OUTPATIENT
Start: 2024-10-24 | End: 2024-10-24 | Stop reason: HOSPADM

## 2024-10-24 RX ORDER — FAMOTIDINE 10 MG/ML
20 INJECTION INTRAVENOUS ONCE AS NEEDED
Status: CANCELLED | OUTPATIENT
Start: 2024-10-24

## 2024-10-24 RX ORDER — HEPARIN SODIUM,PORCINE/PF 10 UNIT/ML
50 SYRINGE (ML) INTRAVENOUS AS NEEDED
OUTPATIENT
Start: 2024-10-24

## 2024-10-24 RX ORDER — DIPHENHYDRAMINE HYDROCHLORIDE 50 MG/ML
50 INJECTION INTRAMUSCULAR; INTRAVENOUS AS NEEDED
Status: CANCELLED | OUTPATIENT
Start: 2024-10-24

## 2024-10-24 RX ORDER — ALBUTEROL SULFATE 0.83 MG/ML
3 SOLUTION RESPIRATORY (INHALATION) AS NEEDED
Status: DISCONTINUED | OUTPATIENT
Start: 2024-10-24 | End: 2024-10-24 | Stop reason: HOSPADM

## 2024-10-24 RX ORDER — DIPHENHYDRAMINE HYDROCHLORIDE 50 MG/ML
50 INJECTION INTRAMUSCULAR; INTRAVENOUS AS NEEDED
Status: DISCONTINUED | OUTPATIENT
Start: 2024-10-24 | End: 2024-10-24 | Stop reason: HOSPADM

## 2024-10-24 RX ORDER — PROCHLORPERAZINE EDISYLATE 5 MG/ML
10 INJECTION INTRAMUSCULAR; INTRAVENOUS EVERY 6 HOURS PRN
Status: DISCONTINUED | OUTPATIENT
Start: 2024-10-24 | End: 2024-10-24 | Stop reason: HOSPADM

## 2024-10-24 ASSESSMENT — ENCOUNTER SYMPTOMS
EYES NEGATIVE: 1
HEMATOLOGIC/LYMPHATIC NEGATIVE: 1
MUSCULOSKELETAL NEGATIVE: 1
CARDIOVASCULAR NEGATIVE: 1
PSYCHIATRIC NEGATIVE: 1
GASTROINTESTINAL NEGATIVE: 1
RESPIRATORY NEGATIVE: 1
CONSTITUTIONAL NEGATIVE: 1

## 2024-10-24 ASSESSMENT — PAIN SCALES - GENERAL: PAINLEVEL_OUTOF10: 1

## 2024-10-24 NOTE — PROGRESS NOTES
Ir Subjective:   Patient Name: Rhoda Pena    : 1960     MRN: [id number]     Age: 64 y.o.     Gender: female  Referring Provider: Dr. Santosh Falk (Thoracic Surgery)    CC: Management of Newly diagnosed stage IIB ([id number]) adenocarcinoma of lung, with multiple nodules within the RLL, PD-L1 5%. In-house NGS showed KRAS p.G12D ([id number] c.35G>A); TP53 p.X190Pdx*6 ([id number] c.626_627delGA); TP53 p.R306* ([id number] c.916C>T). MS status: cannot be determined. S/p RLL lobectomy on 2024. Surgical pathology showed tumor 4.5cm with very close margin. All LN (0/5) negative.    Presenting History (2024): At time of initial presentation a 64 y.o. female, former smoker (started at age 14, 1 pack per day, quitted at age 30) with a past medical history of osteoporosis, hx of seizure on Carbamazepine (has not had any seizure for 1 decade) referred for management of newly diagnosed RLL lung adenocarcinoma s/p RLL lobectomy.     She was firstly found to have some abnormalities in the RLL on the CT cardiac scoring on 2023. Subsequent follow up CT Chest (-) 2024 showed Stable area of mixed consolidation and ground-glass opacity in the medial RLL. Additional 0.7 cm solid nodule in the left lower lobe. This is indeterminate and further attention on follow-up imaging is recommended.    CT chest (-) on 2024 imilar appearance of a focal right lower lobe ground-glass and consolidative opacity with associated bronchiectasis. A slow growing neoplasm can not be excluded. Increased conspicuity of a 5 mm right lower lobe nodular ground-glass opacity and stable appearance of additional bilateral ground-glass nodular opacities measuring up to 8 mm in the left lower lobe, which may be followed on repeat imaging. Similar diffuse bilateral faint subpleural reticulations, which may represent chronic interstitial changes related to smoking, given patient's history.    2024: PET/CT showed 1. Minimal FDG avid  paravertebral consolidative/ground-glass opacity in the right lower lobe, favored to represent infectious/inflammatory process or atelectasis.  6 mm and 8 mm pulmonary nodules in the right and left lower lobes respectively are non FDG avid. Follow-up with dedicated CT of chest is recommended. 2. No other concerning FDG avid disease identified.    8/8/2024: RLL bronchoscpy with EBUS: RLL FNA showed Malignant cells derived from adenocarcinoma consistent with mucinous carcinoma.  LN 4R, 7 were negative.     She underwent RLL lobectomy and mediastinal lymphadenectomy with Dr. Falk on 8/29/2024. Pathology showing  Multiple separate tumor nodules are identified with similar morphology.  The tumor demonstrates a heterogenous mixture of invasive mucinous adenocarcinoma and approximately 10-15% demonstrates higher grade nuclear features and less intracytoplasmic mucin. By immunohistochemical staining, the tumor cells are strongly positive for CK7, focally positive for CDX2 and negative for TTF-1. While immunohistochemical staining is not entirely specific, then the findings are compatible with adenocarcinoma of pulmonary origin if other primary sites of origin have been excluded clinically. involving lung parenchyma, largest nodule measuring 4.5cm, all margins negative, adenocarcinoma is 1mm to nearest parenchymal resection margin. 0/5 LN negative (4R, 10R, 11R, 12R and 7). No VPI nor LVI. PD-L1 5%. In-house NGS showed KRAS p.G12D ([id number] c.35G>A); TP53 p.B193Kql*6 ([id number] c.[id number]); TP53 p.R306* ([id number] c.916C>T). MS status: cannot be determined.    She presents for a consultation accompanied by her sister. She continues to have pain/discomfort from the surgical site but able to manage it at home. Otherwise denies any N/V. No fever or chills. No CP or SOB. Reports R ear pain.    Shx: no other drugs or EtOH. Works as [employer]. Lives with . She has 2 siblings.   Fhx: both sisters have breast cancer  (at age of 60, the other sister at 27). Mother side - uncle has unknown cancer.   Surgical history: appendectomy in ; lap hysterectomy . L Wrist surgery in .     Treatment Summary:  - 2024: Robot-assisted RLL lobectomy and mediastinal lymphadenectomy (Dr. Falk)  -10/03/2024:  C1 Cisplatin 60mg/m2 and Pemetrexed 450mg/m2 IV  -10/24/2024:  C2 Cisplatin 60mg/m2 and Pemetrexed 450mg/m2 IV    Interval History (10/24/2024):  Patient present in clinic for C2 treatment accompanied by her . She has MediPort placed earlier this week and had pain around the incision site the same night. She took oxycodone which helped. The pain has been improving. Otherwise her appetite has been fair. Gained 2 kg since last visit. Had 1 episode of diarrhea last time and has been having constipation since. Continues to work full time from home. No fever or chills. No CP or SOB.     Review of Systems   Constitutional: Negative.    HENT:  Negative.     Eyes: Negative.    Respiratory: Negative.     Cardiovascular: Negative.    Gastrointestinal: Negative.    Musculoskeletal: Negative.    Skin: Negative.    Hematological: Negative.    Psychiatric/Behavioral: Negative.       Medical History:   Past Medical History:   Diagnosis Date    Achilles tendinitis, unspecified leg     Achilles tendinitis    Acute maxillary sinusitis, unspecified 2018    Acute maxillary sinusitis    Acute nasopharyngitis (common cold) 2016    Common cold    Adenocarcinoma of lung (Multi)     Age-related osteoporosis without current pathological fracture 2015    Encounter for ongoing osteoporosis therapy, bisphosphonates    Complete or unspecified spontaneous  without complication         Dyspnea, unspecified 2019    Paroxysmal dyspnea    Encounter for general adult medical examination without abnormal findings 2018    Annual physical exam    Encounter for health counseling related to travel 2018     Counseling about travel    Encounter for screening for lipoid disorders 08/05/2019    Screening cholesterol level    Epigastric pain 08/26/2020    Dyspepsia    GERD (gastroesophageal reflux disease)     Irritable bowel syndrome     Left patella fracture 09/05/2023    Mastodynia 05/12/2021    Breast pain, right    Otalgia, right ear 03/07/2020    Otalgia, right    Other general symptoms and signs 01/18/2018    Flu-like symptoms    Other microscopic hematuria 09/19/2017    Hematuria, microscopic    Pain in right hand 03/04/2019    Pain of right hand    Pain in right knee 08/05/2019    Knee pain, right    Pain in unspecified shoulder     Shoulder pain    Personal history of (healed) traumatic fracture 10/30/2020    History of fracture of phalanx of toe    Personal history of other diseases of the digestive system 08/16/2018    History of gastroenteritis    Personal history of other diseases of the musculoskeletal system and connective tissue 12/27/2016    History of low back pain    Personal history of other diseases of the musculoskeletal system and connective tissue 10/19/2020    History of osteopenia    Personal history of other diseases of the nervous system and sense organs 08/05/2019    Personal history of seizure disorder    Personal history of other diseases of the respiratory system 05/13/2022    History of acute sinusitis    Personal history of other diseases of the respiratory system 10/30/2020    History of chronic rhinitis    Personal history of other diseases of the respiratory system 04/19/2017    History of influenza    Personal history of other diseases of the respiratory system 03/07/2020    History of viral pharyngitis    Personal history of other infectious and parasitic diseases     History of varicella    Personal history of other medical treatment 11/02/2018    History of screening mammography    Personal history of other specified conditions     History of headache    Personal history of other  specified conditions 03/24/2020    History of abnormal mammogram    Personal history of other specified conditions 03/02/2018    History of fatigue    Personal history of other specified conditions 09/19/2017    History of dysuria    Personal history of other specified conditions 08/26/2020    History of diarrhea    Personal history of other specified conditions 03/03/2020    History of abdominal pain    Personal history of other specified conditions 01/11/2019    History of chest pain    Personal history of urinary (tract) infections 11/07/2016    History of urinary tract infection    Personal history of urinary (tract) infections 07/02/2018    History of urinary tract infection    Pulmonary nodules     Bilateral    Seizure disorder (Multi)     Strain of muscle(s) and tendon(s) of peroneal muscle group at lower leg level, left leg, initial encounter 11/05/2020    Strain of peroneal tendon of left foot, initial encounter    Unspecified fracture of left toe(s), initial encounter for closed fracture 10/30/2020    Fracture of proximal phalanx of toe of left foot    Unspecified injury of left wrist, hand and finger(s), initial encounter 01/05/2021    Left wrist injury    Unspecified injury of unspecified foot, initial encounter     Foot injury    Urinary tract infection, site not specified 07/02/2018    Acute UTI    Urinary tract infection, site not specified 11/07/2016    Acute UTI       Surgical History:   Past Surgical History:   Procedure Laterality Date    APPENDECTOMY  10/24/2013    Appendectomy    BREAST CYST ASPIRATION  1993    LAPAROSCOPY DIAGNOSTIC / BIOPSY / ASPIRATION / LYSIS  12/18/2014    Exploratory Laparoscopy    PELVIC LAPAROSCOPY      endometriosis    US GUIDED SOFT TISSUE BIOPSY  12/18/2023    US GUIDED SOFT TISSUE BIOPSY 12/18/2023 [facility] US       Family History:  Family History   Problem Relation Name Age of Onset    Dementia Mother      Hypertension Mother      Diabetes type II Mother      Coronary  artery disease Father      Other (Congestive heart failure) Father      Breast cancer Sister Linda Guzman     Breast cancer Sister Linda     Breast cancer Sister Analilia        Allergies:  Allergies   Allergen Reactions    Phenytoin Other     double vision    Codeine Hallucinations     AUDITORY HALLUCINATIONS       Meds (Current):    Current Outpatient Medications:     ascorbic acid (Vitamin C) 1,000 mg tablet, Take 1 tablet (1,000 mg) by mouth once daily., Disp: , Rfl:     calcium carbonate (CALCIUM 600 ORAL), Take 1 tablet by mouth once daily., Disp: , Rfl:     carBAMazepine XR (TEGretol  XR) 400 mg 12 hr tablet, Take 1 tablet (400 mg) by mouth 2 times a day. Do not crush, chew, or split., Disp: 180 tablet, Rfl: 3    carBAMazepine XR (TEGretol XR) 100 mg 12 hr tablet, Take 1 tablet (100 mg) by mouth 2 times a day., Disp: 180 tablet, Rfl: 3    cholecalciferol (Vitamin D-3) 50 MCG (2000 UT) tablet, Take 1 tablet (50 mcg) by mouth once daily., Disp: , Rfl:     cyanocobalamin (Vitamin B-12) 100 mcg tablet, Take 1 tablet (100 mcg) by mouth once daily., Disp: , Rfl:     dexAMETHasone (Decadron) 4 mg tablet, Take 4 mg (1 tablet) by mouth twice daily the day before treatment, once the evening of treatment, and twice daily the day after treatment., Disp: 5 tablet, Rfl: 5    estradiol (Estrace) 0.01 % (0.1 mg/gram) vaginal cream, Insert 0.25 Applicatorful (1 g) into the vagina 2 times a week., Disp: 42.5 g, Rfl: 3    folic acid (Folvite) 1 mg tablet, Take 1 tablet (1,000 mcg) by mouth once daily., Disp: 30 tablet, Rfl: 11    OLANZapine (ZyPREXA) 5 mg tablet, Take 1 tablet (5 mg) by mouth once daily at bedtime. For 4 days starting the evening of treatment, Disp: 4 tablet, Rfl: 3    PHENobarbitaL 64.8 mg tablet, TAKE 1 TABLET BY MOUTH EVERY MORNING AND TAKE 2 TABLETS BY MOUTH EVERY EVENING, Disp: 270 tablet, Rfl: 1    prochlorperazine (Compazine) 10 mg tablet, Take 1 tablet (10 mg) by mouth every 6 hours if needed for nausea  or vomiting., Disp: 30 tablet, Rfl: 5    lidocaine-prilocaine (Emla) 2.5-2.5 % cream, Apply topically 1 time for 1 dose., Disp: 30 g, Rfl: 1    ondansetron (Zofran) 8 mg tablet, Take 1 tablet (8 mg) by mouth every 8 hours if needed for nausea or vomiting. (Patient not taking: Reported on 10/24/2024), Disp: 30 tablet, Rfl: 5    zoledronic acid (Reclast) 5 mg/100 mL piggyback, Infuse 100 mL (5 mg) at 400 mL/hr over 15 minutes into a venous catheter 1 time for 1 dose. (Patient not taking: Reported on 10/24/2024), Disp: 100 mL, Rfl: 0  No current facility-administered medications for this visit.    Facility-Administered Medications Ordered in Other Visits:     albuterol 2.5 mg /3 mL (0.083 %) nebulizer solution 3 mL, 3 mL, nebulization, PRN, Argenis Lim MD [facility]    CISplatin (Platinol) 97 mg in sodium chloride 0.9% 644 mL IV, 60 mg/m2 (Treatment Plan Recorded), intravenous, Once, Argenis Lim MD [facility]    dexAMETHasone (Decadron) tablet 12 mg, 12 mg, oral, Once, Argenis Lim MD [facility]    dextrose 5 % in water (D5W) bolus, 500 mL, intravenous, PRN, Argenis Lim MD [facility]    diphenhydrAMINE (BENADryl) injection 50 mg, 50 mg, intravenous, PRN, Argenis Lim MD [facility]    EPINEPHrine (Epipen) injection syringe 0.3 mg, 0.3 mg, intramuscular, q5 min PRN, Argenis Lim MD [facility]    famotidine PF (Pepcid) injection 20 mg, 20 mg, intravenous, Once PRN, Argenis Lim MD [facility]    fosaprepitant (Emend) 150 mg in sodium chloride 0.9% 250 mL IV, 150 mg, intravenous, Once, Argenis Lim MD [facility]    heparin flush 100 unit/mL syringe 500 Units, 500 Units, intra-catheter, PRN, Damaris Jhaveri MD    methylPREDNISolone sod succinate (SOLU-Medrol) 40 mg/mL injection 40 mg, 40 mg, intravenous, PRN, Argenis Lim MD [facility]    palonosetron (Aloxi) injection 250 mcg, 0.25 mg, intravenous, Once, Argenis Lim MD [facility]    PEMEtrexed disodium (Alimta) 700 mg in sodium chloride 0.9% 138 mL IV, 450 mg/m2 (Treatment Plan Recorded), intravenous, Once, Argenis Lim MD [facility]    potassium chloride in  0.9%NaCl with magnesium sulfate 1 g infusion, 999 mL/hr, intravenous, Once, Argenis Lim MD [facility], Last Rate: 999 mL/hr at 10/24/24 1114, 999 mL/hr at 10/24/24 1114    prochlorperazine (Compazine) injection 10 mg, 10 mg, intravenous, q6h PRN, Argenis Lim MD [facility]    prochlorperazine (Compazine) tablet 10 mg, 10 mg, oral, q6h PRN, Argenis Lim MD [facility]    sodium chloride 0.9 % bolus 1,000 mL, 1,000 mL, intravenous, Once, Argenis Lim MD [facility]    sodium chloride 0.9 % bolus 500 mL, 500 mL, intravenous, PRN, Argenis Lim MD [facility]    Pain Assessment:  Pain is: post-operative  Ratin  Pain control: well-controlled    Performance Status (ECOG): 1       ECOG Definition  0 Fully active; no performance restrictions.  1 Strenuous physical activity restricted; fully ambulatory and able to carry out light work.  2 Capable of all self-care but unable to carry out any work activities. Up and about >50% of waking hours.  3 Capable of only limited self-care; confined to bed or chair >50% of waking hours.  4 Completely disabled; cannot carry out any self-care; totally confined to bed or chair.    Exam:  /82 (BP Location: Right arm, Patient Position: Sitting, BP Cuff Size: Adult long)   Pulse 79   Temp 36.8 °C (98.2 °F) (Temporal)   Resp 18   Wt 58.3 kg (128 lb 6.7 oz)   SpO2 97%   BMI 22.75 kg/m²   B/p 117/74, HR 85, RR 16, T 97.7, Spo2 96%    Wt Readings from Last 5 Encounters:   10/24/24 58.3 kg (128 lb 6.7 oz)   10/22/24 56.7 kg (125 lb)   10/10/24 56.7 kg (125 lb)   10/05/24 59 kg (130 lb)   10/03/24 59.4 kg (130 lb 13.5 oz)      Physical Exam  Vitals reviewed.   Constitutional:       Appearance: Normal appearance.   HENT:      Head: Normocephalic.      Nose: Nose normal.      Mouth/Throat:      Mouth: Mucous membranes are moist.      Pharynx: Oropharynx is clear.   Eyes:      Extraocular Movements: Extraocular movements intact.      Conjunctiva/sclera: Conjunctivae normal.      Pupils: Pupils are equal, round, and reactive to  light.   Cardiovascular:      Rate and Rhythm: Normal rate and regular rhythm.      Pulses: Normal pulses.      Heart sounds: Normal heart sounds.   Pulmonary:      Breath sounds: Normal breath sounds.      Comments: Hx RLL lobectomy  Abdominal:      General: Bowel sounds are normal.      Palpations: Abdomen is soft.   Musculoskeletal:         General: Normal range of motion.      Cervical back: Normal range of motion and neck supple.   Skin:     General: Skin is warm and dry.   Neurological:      General: No focal deficit present.      Mental Status: She is alert and oriented to person, place, and time.   Psychiatric:         Mood and Affect: Mood normal.         Behavior: Behavior normal.         Thought Content: Thought content normal.         Judgment: Judgment normal.       Labs:  Results from last 7 days   Lab Units 10/24/24  0950   WBC AUTO x10*3/uL 4.0*   HEMOGLOBIN g/dL 10.9*   HEMATOCRIT % 32.2*   PLATELETS AUTO x10*3/uL 335   NEUTROS ABS x10*3/uL 1.60   LYMPHS ABS AUTO x10*3/uL 1.76   MONOS ABS AUTO x10*3/uL 0.53   EOS ABS AUTO x10*3/uL 0.04   NEUTROS PCT AUTO % 40.4   LYMPHS PCT AUTO % 44.6   MONOS PCT AUTO % 13.4   EOS PCT AUTO % 1.0           Results from last 7 days   Lab Units 10/24/24  0950   GLUCOSE mg/dL 97   SODIUM mmol/L 132*   POTASSIUM mmol/L 3.7   CHLORIDE mmol/L 96*   CO2 mmol/L 27   BUN mg/dL 11   CREATININE mg/dL 0.47*   EGFR mL/min/1.73m*2 >90   CALCIUM mg/dL 8.9   MAGNESIUM mg/dL 1.83   ALBUMIN g/dL 4.2   PROTEIN TOTAL g/dL 6.4   BILIRUBIN TOTAL mg/dL 0.3   ALK PHOS U/L 49   ALT U/L 12   AST U/L 14          9/26/24 labs reviewed.     Assessment/Plan:   64 y.o. female with past medical history as stated referred for management of newly diagnosed lung cancer discussing adjuvant treatment.     The patient's records and imaging have been reviewed in detail.  MD discussed with the patient the natural history of their disease at length.  The following has also been discussed with the  patient:    # Cancer:  stage IIB ([id number]) adenocarcinoma of lung, with multiple nodules within the RLL s/p RLL lobectomy, tumor measuring 4.5cm. All LN negative. PD-L1 5%. In-house NGS showed KRAS p.G12D ([id number] c.35G>A); TP53 p.R342Lmy*6 ([id number] c.626_627delGA); TP53 p.R306* (NM_000546 c.916C>T). MS status: cannot be determined. Tumor cells are strongly positive for CK7, focally positive for CDX2 and negative for TTF-1. While immunohistochemical staining is not entirely specific, then the findings are compatible with adenocarcinoma of pulmonary origin if other primary sites of origin have been excluded clinically.     S/p RLL lobectomy on 8/24/2024. We therefore discussed the concept of curative intent, adjuvant platinum doublet including risk/benefits of cisplatin vs. carboplatin and different paired chemotherapies within 4-6 weeks post surgery.  I favor cisplatin 60mg/m2 IV D#1 & pemetrexed (Alimta) 450 mg/m2 IV D#1 every 21 days for 4 planned cycles based on tolerance and disease response. This analysis included 5 studies with approximately 4600 patients having completely resected NSCLC and  showed that adjuvant chemotherapy is associated 5% decreased risk of death due to any cause at 5 years. (LACE meta-analysis Xu et al. JCO. 2016) Stage IB patients treated with chemotherapy demonstrated a hazard ratio of 0.93 (95% CI, 0.78 to 1.10).  CALGB 9633 showed survival advantage for tumors >4 cm treated with adjuvant carboplatin and paclitaxel (HR 0.82, 90% CI 0.65-1.04). However, the overall benefit on overall survival is a modest 3-4%.   Side effects including but not limited to pain, fatigue, anorexia, nausea, vomiting, diarrhea, stomatitis, electrolyte disturbances, anemia, neutropenia, thrombocytopenia, generalized weakness, ototoxicity, nephrotoxicity, and skin rash were reviewed.   Adequate hydration and monitoring of urine output and hearing reviewed.  We reviewed initiation of vitamin B12 1000mg  IM injection q9 weeks, folic acid (folate) 1mg orally daily, and dexamethasone (Decadron) 4mg orally twice daily to start on the day before, day of, and day after chemotherapy to help prevent cytopenias and improve nausea control.  Written information provided.  All questions answered.  Informed consent signed.  Planned initiation in next few weeks. C1 10/3/24.    Given disease stage status and positive PDL-1 expression (>=1%), I recommend additional adjuvant atezolizumab following adjuvant chemotherapy.  We specifically reviewed 1200mg IV every 3 weeks for 1 year post chemotherapy.  This is based on phase III Impower 010 (John et al, Lancet ). Additional adjuvant atezolizumab decreased the risk of recurrence, new primary NSCLC or death by 34% compared to best supportive care in stage II-IIIA population whose tumor expressed PDL1 1% or more (HR=0.66; 0.50-0.88). Updated results also suggested OS benefit with (HR=0.71; 95%CI: 0.49-1.03) (John et al. .Lancet ). Side effects including but not limited to fatigue, anorexia, nausea, vomiting, diarrhea, constipation, cytopenias, electrolyte abnormalities, liver and/or kidney dysfunction, skin reaction, pneumonitis, colitis, hyper/hypothyroidism, diabetes reviewed.  Will start after chemotherapy.     - Interval Imagin2024: PET/CT showed 1. Minimal FDG avid paravertebral consolidative/ground-glass opacity in the right lower lobe, favored to represent infectious/inflammatory process or atelectasis.  6 mm and 8 mm pulmonary nodules in the right and left lower lobes respectively are non FDG avid. Follow-up with dedicated CT of chest is recommended. 2. No other concerning FDG avid disease identified. Brain Mri 2024: No brain mets. Will repeat CT Chest (-) after 4 cycles of chemotherapy.       # Seizure disorder: firstly found when she was in her teens, and she has been on both Carbamazepine and Phenobarbital. Following with Dr. Baker who is going to retire  soon. However she is hesitant to switch to keppra.    # Colitis: in the setting of chemotherapy and stool softener. On Augmentin. Will prescribe immodium. She is aware to call is if symptoms persist or having fevers. Resolved.    # R ear pain: ofloxacin ear drop prescribed. Advised to follow up with ENT if not improving in the next week. Resolved.    # Clinical Trials:  None currently.     # Access: Mediport placed by IR in 10/2024.    # Pain: Surgical related pain. Well controlled.     # Bone Health: No signs of bony disease.     # Psychosocial: Coping well, no acute issues.  Good support from family & friends.  Social work support available.    # GI/CINV: No acute issues.  Eating and voiding well.  Nutrition consult available.    # Smoking: N/A, former smoker.    # Fertility: N/A.        # Heme/ESAs:  Normocytic anemia likely 2/2 to chemo. Continue to monitor.     # GOC: Patient is aware of curative intent goals of care.    # Language: English.    # Interdisciplinary Patient/Family Education Record:  Learner:  Patient.  Learning Needs Reviewed:  Treatments, Medications, Disease Process, Diagnostic Tests.  Barriers: None.  Teaching Method: Discussion, Handout(s).  Comprehension:  Verbalized Understanding.  Follow-up Plan:  Return to office as per MD.    # Dispo: C3 in 3 weeks.

## 2024-10-24 NOTE — PROGRESS NOTES
Patient is here with her  for follow up and treatment with Dr. Lim. Medications and allergies reviewed with patient.   Patient reports that she is back to work, she reports fatigue at the end of the day. She had her port placed on 10/22, she states that she had pain after the placement that has since resolved  Pain: no  Nausea/vomiting: no  Diarrhea/constipation:  constipation  Difficulty eating:   Weight loss: no    Per orders patient to get pemetrexed and cisplatin today.   She will follow up with Terrance Millan CNP next visit and scans will be ordered then.

## 2024-10-24 NOTE — SIGNIFICANT EVENT
"Mediport accessed using 20 G 3/4\" wesley needle, bbr obtained, labs drawn. Mediport flushed with 10 ml NS, pt tolerated well.   "

## 2024-10-24 NOTE — SIGNIFICANT EVENT
Mediport placed 10/22  PER IR note  Uncomplicated placement of a  right infraclavicular  single-lumen  Mediport- 8-Azeri catheter tip residing at the cavoatrial junction.   CT power-injection compatible Mediport.  MRI-compatible Mediport.  Optimal functioning device and ready for utilization.

## 2024-11-09 ENCOUNTER — PHARMACY VISIT (OUTPATIENT)
Dept: PHARMACY | Facility: CLINIC | Age: 64
End: 2024-11-09
Payer: COMMERCIAL

## 2024-11-09 PROCEDURE — RXMED WILLOW AMBULATORY MEDICATION CHARGE

## 2024-11-11 ENCOUNTER — TELEPHONE (OUTPATIENT)
Dept: CARDIAC SURGERY | Facility: CLINIC | Age: 64
End: 2024-11-11
Payer: COMMERCIAL

## 2024-11-11 NOTE — TELEPHONE ENCOUNTER
Patient called to notify staff that [facility] states they still have not received fax. Will call on her behalf as a successful fax was received in return.

## 2024-11-11 NOTE — TELEPHONE ENCOUNTER
Spoke to Seth at [facility] 0957 am who confirmed the fax was received. She believes it was just delayed r/t the weekend which is why the patient was told it was not in yet.     Called patient to notify that it was indeed received.

## 2024-11-14 ENCOUNTER — INFUSION (OUTPATIENT)
Dept: HEMATOLOGY/ONCOLOGY | Facility: CLINIC | Age: 64
End: 2024-11-14
Payer: COMMERCIAL

## 2024-11-14 ENCOUNTER — OFFICE VISIT (OUTPATIENT)
Dept: HEMATOLOGY/ONCOLOGY | Facility: CLINIC | Age: 64
End: 2024-11-14
Payer: COMMERCIAL

## 2024-11-14 VITALS
RESPIRATION RATE: 16 BRPM | HEART RATE: 78 BPM | BODY MASS INDEX: 23.07 KG/M2 | DIASTOLIC BLOOD PRESSURE: 79 MMHG | SYSTOLIC BLOOD PRESSURE: 120 MMHG | OXYGEN SATURATION: 96 % | WEIGHT: 130.18 LBS | TEMPERATURE: 97.2 F

## 2024-11-14 DIAGNOSIS — C34.90 ADENOCARCINOMA, LUNG, UNSPECIFIED LATERALITY (MULTI): ICD-10-CM

## 2024-11-14 DIAGNOSIS — M81.0 AGE RELATED OSTEOPOROSIS, UNSPECIFIED PATHOLOGICAL FRACTURE PRESENCE: ICD-10-CM

## 2024-11-14 LAB
ALBUMIN SERPL BCP-MCNC: 3.9 G/DL (ref 3.4–5)
ALP SERPL-CCNC: 52 U/L (ref 33–136)
ALT SERPL W P-5'-P-CCNC: 8 U/L (ref 7–45)
ANION GAP SERPL CALC-SCNC: 11 MMOL/L (ref 10–20)
AST SERPL W P-5'-P-CCNC: 15 U/L (ref 9–39)
BASOPHILS # BLD AUTO: 0.01 X10*3/UL (ref 0–0.1)
BASOPHILS NFR BLD AUTO: 0.3 %
BILIRUB SERPL-MCNC: 0.3 MG/DL (ref 0–1.2)
BUN SERPL-MCNC: 13 MG/DL (ref 6–23)
CALCIUM SERPL-MCNC: 9.1 MG/DL (ref 8.6–10.3)
CHLORIDE SERPL-SCNC: 100 MMOL/L (ref 98–107)
CO2 SERPL-SCNC: 29 MMOL/L (ref 21–32)
CREAT SERPL-MCNC: 0.47 MG/DL (ref 0.5–1.05)
EGFRCR SERPLBLD CKD-EPI 2021: >90 ML/MIN/1.73M*2
EOSINOPHIL # BLD AUTO: 0.02 X10*3/UL (ref 0–0.7)
EOSINOPHIL NFR BLD AUTO: 0.5 %
ERYTHROCYTE [DISTWIDTH] IN BLOOD BY AUTOMATED COUNT: 14.4 % (ref 11.5–14.5)
GLUCOSE SERPL-MCNC: 102 MG/DL (ref 74–99)
HCT VFR BLD AUTO: 31.3 % (ref 36–46)
HGB BLD-MCNC: 10.4 G/DL (ref 12–16)
IMM GRANULOCYTES # BLD AUTO: 0.01 X10*3/UL (ref 0–0.7)
IMM GRANULOCYTES NFR BLD AUTO: 0.3 % (ref 0–0.9)
LYMPHOCYTES # BLD AUTO: 1.38 X10*3/UL (ref 1.2–4.8)
LYMPHOCYTES NFR BLD AUTO: 35.2 %
MAGNESIUM SERPL-MCNC: 1.71 MG/DL (ref 1.6–2.4)
MCH RBC QN AUTO: 31.9 PG (ref 26–34)
MCHC RBC AUTO-ENTMCNC: 33.2 G/DL (ref 32–36)
MCV RBC AUTO: 96 FL (ref 80–100)
MONOCYTES # BLD AUTO: 0.43 X10*3/UL (ref 0.1–1)
MONOCYTES NFR BLD AUTO: 11 %
NEUTROPHILS # BLD AUTO: 2.07 X10*3/UL (ref 1.2–7.7)
NEUTROPHILS NFR BLD AUTO: 52.7 %
NRBC BLD-RTO: 0 /100 WBCS (ref 0–0)
PLATELET # BLD AUTO: 277 X10*3/UL (ref 150–450)
POTASSIUM SERPL-SCNC: 3.4 MMOL/L (ref 3.5–5.3)
PROT SERPL-MCNC: 6.1 G/DL (ref 6.4–8.2)
RBC # BLD AUTO: 3.26 X10*6/UL (ref 4–5.2)
SODIUM SERPL-SCNC: 137 MMOL/L (ref 136–145)
WBC # BLD AUTO: 3.9 X10*3/UL (ref 4.4–11.3)

## 2024-11-14 PROCEDURE — 80053 COMPREHEN METABOLIC PANEL: CPT

## 2024-11-14 PROCEDURE — 2500000004 HC RX 250 GENERAL PHARMACY W/ HCPCS (ALT 636 FOR OP/ED): Performed by: INTERNAL MEDICINE

## 2024-11-14 PROCEDURE — 96367 TX/PROPH/DG ADDL SEQ IV INF: CPT

## 2024-11-14 PROCEDURE — 96375 TX/PRO/DX INJ NEW DRUG ADDON: CPT | Mod: INF

## 2024-11-14 PROCEDURE — 96411 CHEMO IV PUSH ADDL DRUG: CPT

## 2024-11-14 PROCEDURE — 99214 OFFICE O/P EST MOD 30 MIN: CPT | Mod: 25 | Performed by: NURSE PRACTITIONER

## 2024-11-14 PROCEDURE — 1126F AMNT PAIN NOTED NONE PRSNT: CPT | Performed by: NURSE PRACTITIONER

## 2024-11-14 PROCEDURE — 96413 CHEMO IV INFUSION 1 HR: CPT

## 2024-11-14 PROCEDURE — 83735 ASSAY OF MAGNESIUM: CPT

## 2024-11-14 PROCEDURE — 1159F MED LIST DOCD IN RCRD: CPT | Performed by: NURSE PRACTITIONER

## 2024-11-14 PROCEDURE — 85025 COMPLETE CBC W/AUTO DIFF WBC: CPT

## 2024-11-14 PROCEDURE — 99214 OFFICE O/P EST MOD 30 MIN: CPT | Performed by: NURSE PRACTITIONER

## 2024-11-14 PROCEDURE — 96361 HYDRATE IV INFUSION ADD-ON: CPT | Mod: INF

## 2024-11-14 RX ORDER — EPINEPHRINE 0.3 MG/.3ML
0.3 INJECTION SUBCUTANEOUS EVERY 5 MIN PRN
Status: CANCELLED | OUTPATIENT
Start: 2024-11-14

## 2024-11-14 RX ORDER — ALBUTEROL SULFATE 0.83 MG/ML
3 SOLUTION RESPIRATORY (INHALATION) AS NEEDED
Status: CANCELLED | OUTPATIENT
Start: 2024-11-14

## 2024-11-14 RX ORDER — FAMOTIDINE 10 MG/ML
20 INJECTION INTRAVENOUS ONCE AS NEEDED
Status: DISCONTINUED | OUTPATIENT
Start: 2024-11-14 | End: 2024-11-14 | Stop reason: HOSPADM

## 2024-11-14 RX ORDER — PROCHLORPERAZINE MALEATE 10 MG
10 TABLET ORAL EVERY 6 HOURS PRN
Status: CANCELLED | OUTPATIENT
Start: 2024-11-14

## 2024-11-14 RX ORDER — HEPARIN 100 UNIT/ML
500 SYRINGE INTRAVENOUS AS NEEDED
Status: DISCONTINUED | OUTPATIENT
Start: 2024-11-14 | End: 2024-11-14 | Stop reason: HOSPADM

## 2024-11-14 RX ORDER — PALONOSETRON 0.05 MG/ML
0.25 INJECTION, SOLUTION INTRAVENOUS ONCE
Status: COMPLETED | OUTPATIENT
Start: 2024-11-14 | End: 2024-11-14

## 2024-11-14 RX ORDER — DIPHENHYDRAMINE HYDROCHLORIDE 50 MG/ML
50 INJECTION INTRAMUSCULAR; INTRAVENOUS AS NEEDED
Status: DISCONTINUED | OUTPATIENT
Start: 2024-11-14 | End: 2024-11-14 | Stop reason: HOSPADM

## 2024-11-14 RX ORDER — FAMOTIDINE 10 MG/ML
20 INJECTION INTRAVENOUS ONCE AS NEEDED
Status: CANCELLED | OUTPATIENT
Start: 2024-11-14

## 2024-11-14 RX ORDER — HEPARIN 100 UNIT/ML
500 SYRINGE INTRAVENOUS AS NEEDED
OUTPATIENT
Start: 2024-11-14

## 2024-11-14 RX ORDER — PROCHLORPERAZINE MALEATE 10 MG
10 TABLET ORAL EVERY 6 HOURS PRN
Status: DISCONTINUED | OUTPATIENT
Start: 2024-11-14 | End: 2024-11-14 | Stop reason: HOSPADM

## 2024-11-14 RX ORDER — PALONOSETRON 0.05 MG/ML
0.25 INJECTION, SOLUTION INTRAVENOUS ONCE
Status: CANCELLED | OUTPATIENT
Start: 2024-11-14

## 2024-11-14 RX ORDER — ALBUTEROL SULFATE 0.83 MG/ML
3 SOLUTION RESPIRATORY (INHALATION) AS NEEDED
Status: DISCONTINUED | OUTPATIENT
Start: 2024-11-14 | End: 2024-11-14 | Stop reason: HOSPADM

## 2024-11-14 RX ORDER — HEPARIN SODIUM,PORCINE/PF 10 UNIT/ML
50 SYRINGE (ML) INTRAVENOUS AS NEEDED
OUTPATIENT
Start: 2024-11-14

## 2024-11-14 RX ORDER — DEXAMETHASONE 4 MG/1
12 TABLET ORAL ONCE
Status: CANCELLED | OUTPATIENT
Start: 2024-11-14 | End: 2024-11-14

## 2024-11-14 RX ORDER — EPINEPHRINE 0.3 MG/.3ML
0.3 INJECTION SUBCUTANEOUS EVERY 5 MIN PRN
Status: DISCONTINUED | OUTPATIENT
Start: 2024-11-14 | End: 2024-11-14 | Stop reason: HOSPADM

## 2024-11-14 RX ORDER — PROCHLORPERAZINE EDISYLATE 5 MG/ML
10 INJECTION INTRAMUSCULAR; INTRAVENOUS EVERY 6 HOURS PRN
Status: DISCONTINUED | OUTPATIENT
Start: 2024-11-14 | End: 2024-11-14 | Stop reason: HOSPADM

## 2024-11-14 RX ORDER — DIPHENHYDRAMINE HYDROCHLORIDE 50 MG/ML
50 INJECTION INTRAMUSCULAR; INTRAVENOUS AS NEEDED
Status: CANCELLED | OUTPATIENT
Start: 2024-11-14

## 2024-11-14 RX ORDER — PROCHLORPERAZINE EDISYLATE 5 MG/ML
10 INJECTION INTRAMUSCULAR; INTRAVENOUS EVERY 6 HOURS PRN
Status: CANCELLED | OUTPATIENT
Start: 2024-11-14

## 2024-11-14 RX ORDER — DEXAMETHASONE 6 MG/1
12 TABLET ORAL ONCE
Status: COMPLETED | OUTPATIENT
Start: 2024-11-14 | End: 2024-11-14

## 2024-11-14 ASSESSMENT — PAIN SCALES - GENERAL: PAINLEVEL_OUTOF10: 0-NO PAIN

## 2024-11-14 NOTE — PROGRESS NOTES
Patient here today for follow up. She's returned to working full time, works from home.     Fatigue: no change   PO intake: re-gained appetite last week, now doing well   Weight: stable  N/V/D/C: denies N/V; she remains slightly constipated, taking Extra Strength Senna every night    Medications and pharmacy preference reviewed with patient.

## 2024-11-14 NOTE — PROGRESS NOTES
Ir Subjective:   Patient Name: Rhoda Pena    : 1960     MRN: [id number]     Age: 64 y.o.     Gender: female  Referring Provider: Dr. Santosh Falk (Thoracic Surgery)    CC: Management of Newly diagnosed stage IIB ([id number]) adenocarcinoma of lung, with multiple nodules within the RLL, PD-L1 5%. In-house NGS showed KRAS p.G12D (NM_[id number] c.35G>A); TP53 p.Z851Lsj*6 ([id number] c.626_627delGA); TP53 p.R306* ([id number] c.916C>T). MS status: cannot be determined. S/p RLL lobectomy on 2024. Surgical pathology showed tumor 4.5cm with very close margin. All LN (0/5) negative.    Presenting History (2024): At time of initial presentation a 64 y.o. female, former smoker (started at age 14, 1 pack per day, quitted at age 30) with a past medical history of osteoporosis, hx of seizure on Carbamazepine (has not had any seizure for 1 decade) referred for management of newly diagnosed RLL lung adenocarcinoma s/p RLL lobectomy.     She was firstly found to have some abnormalities in the RLL on the CT cardiac scoring on 2023. Subsequent follow up CT Chest (-) 2024 showed Stable area of mixed consolidation and ground-glass opacity in the medial RLL. Additional 0.7 cm solid nodule in the left lower lobe. This is indeterminate and further attention on follow-up imaging is recommended.    CT chest (-) on 2024 imilar appearance of a focal right lower lobe ground-glass and consolidative opacity with associated bronchiectasis. A slow growing neoplasm can not be excluded. Increased conspicuity of a 5 mm right lower lobe nodular ground-glass opacity and stable appearance of additional bilateral ground-glass nodular opacities measuring up to 8 mm in the left lower lobe, which may be followed on repeat imaging. Similar diffuse bilateral faint subpleural reticulations, which may represent chronic interstitial changes related to smoking, given patient's history.    2024: PET/CT showed 1. Minimal FDG avid  paravertebral consolidative/ground-glass opacity in the right lower lobe, favored to represent infectious/inflammatory process or atelectasis.  6 mm and 8 mm pulmonary nodules in the right and left lower lobes respectively are non FDG avid. Follow-up with dedicated CT of chest is recommended. 2. No other concerning FDG avid disease identified.    8/8/2024: RLL bronchoscpy with EBUS: RLL FNA showed Malignant cells derived from adenocarcinoma consistent with mucinous carcinoma.  LN 4R, 7 were negative.     She underwent RLL lobectomy and mediastinal lymphadenectomy with Dr. Falk on 8/29/2024. Pathology showing  Multiple separate tumor nodules are identified with similar morphology.  The tumor demonstrates a heterogenous mixture of invasive mucinous adenocarcinoma and approximately 10-15% demonstrates higher grade nuclear features and less intracytoplasmic mucin. By immunohistochemical staining, the tumor cells are strongly positive for CK7, focally positive for CDX2 and negative for TTF-1. While immunohistochemical staining is not entirely specific, then the findings are compatible with adenocarcinoma of pulmonary origin if other primary sites of origin have been excluded clinically. involving lung parenchyma, largest nodule measuring 4.5cm, all margins negative, adenocarcinoma is 1mm to nearest parenchymal resection margin. 0/5 LN negative (4R, 10R, 11R, 12R and 7). No VPI nor LVI. PD-L1 5%. In-house NGS showed KRAS p.G12D ([id number] c.35G>A); TP53 p.N922Typ*6 ([id number] c.626_627delGA); TP53 p.R306* ([id number] c.916C>T). MS status: cannot be determined.    She presents for a consultation accompanied by her sister. She continues to have pain/discomfort from the surgical site but able to manage it at home. Otherwise denies any N/V. No fever or chills. No CP or SOB. Reports R ear pain.    Shx: no other drugs or EtOH. Works as [employer]. Lives with . She has 2 siblings.   Fhx: both sisters have breast cancer  (at age of 60, the other sister at 27). Mother side - uncle has unknown cancer.   Surgical history: appendectomy in ; lap hysterectomy . L Wrist surgery in .     Treatment Summary:  - 2024: Robot-assisted RLL lobectomy and mediastinal lymphadenectomy (Dr. Falk)  -10/03/2024:  C1 Cisplatin 60mg/m2 and Pemetrexed 450mg/m2 IV  -10/24/2024:  C2 Cisplatin 60mg/m2 and Pemetrexed 450mg/m2 IV  -2024:  C2 Cisplatin 60mg/m2 and Pemetrexed 450mg/m2 IV    Interval History (2024):    Patient present in clinic for C3 treatment accompanied by her . Her primary complaint is fatigue, worse 2 to 3 days following chemotherapy. She does nap in the afternoon feels refreshed afterwards. Working. She denies any fevers, chills or night sweats. No cough, chest pain or shortness of breath. Rare nausea not requiring antiemetics. No dysgeusia. No constipation or diarrhea. No urinary symptoms. No rash. No neuropathy. Chronic left shoulder pain, holding off on PT until after current treatments. Right chest wall discomfort at surgical site continues to improve.     Review of Systems:  A review of systems has been completed and are negative for complaints except what is stated in the HPI and/or past medical history      Medical History:   Past Medical History:   Diagnosis Date    Achilles tendinitis, unspecified leg     Achilles tendinitis    Acute maxillary sinusitis, unspecified 2018    Acute maxillary sinusitis    Acute nasopharyngitis (common cold) 2016    Common cold    Adenocarcinoma of lung (Multi)     Age-related osteoporosis without current pathological fracture 2015    Encounter for ongoing osteoporosis therapy, bisphosphonates    Complete or unspecified spontaneous  without complication         Dyspnea, unspecified 2019    Paroxysmal dyspnea    Encounter for general adult medical examination without abnormal findings 2018    Annual physical exam     Encounter for health counseling related to travel 09/06/2018    Counseling about travel    Encounter for screening for lipoid disorders 08/05/2019    Screening cholesterol level    Epigastric pain 08/26/2020    Dyspepsia    GERD (gastroesophageal reflux disease)     Irritable bowel syndrome     Left patella fracture 09/05/2023    Mastodynia 05/12/2021    Breast pain, right    Otalgia, right ear 03/07/2020    Otalgia, right    Other general symptoms and signs 01/18/2018    Flu-like symptoms    Other microscopic hematuria 09/19/2017    Hematuria, microscopic    Pain in right hand 03/04/2019    Pain of right hand    Pain in right knee 08/05/2019    Knee pain, right    Pain in unspecified shoulder     Shoulder pain    Personal history of (healed) traumatic fracture 10/30/2020    History of fracture of phalanx of toe    Personal history of other diseases of the digestive system 08/16/2018    History of gastroenteritis    Personal history of other diseases of the musculoskeletal system and connective tissue 12/27/2016    History of low back pain    Personal history of other diseases of the musculoskeletal system and connective tissue 10/19/2020    History of osteopenia    Personal history of other diseases of the nervous system and sense organs 08/05/2019    Personal history of seizure disorder    Personal history of other diseases of the respiratory system 05/13/2022    History of acute sinusitis    Personal history of other diseases of the respiratory system 10/30/2020    History of chronic rhinitis    Personal history of other diseases of the respiratory system 04/19/2017    History of influenza    Personal history of other diseases of the respiratory system 03/07/2020    History of viral pharyngitis    Personal history of other infectious and parasitic diseases     History of varicella    Personal history of other medical treatment 11/02/2018    History of screening mammography    Personal history of other specified  conditions     History of headache    Personal history of other specified conditions 03/24/2020    History of abnormal mammogram    Personal history of other specified conditions 03/02/2018    History of fatigue    Personal history of other specified conditions 09/19/2017    History of dysuria    Personal history of other specified conditions 08/26/2020    History of diarrhea    Personal history of other specified conditions 03/03/2020    History of abdominal pain    Personal history of other specified conditions 01/11/2019    History of chest pain    Personal history of urinary (tract) infections 11/07/2016    History of urinary tract infection    Personal history of urinary (tract) infections 07/02/2018    History of urinary tract infection    Pulmonary nodules     Bilateral    Seizure disorder (Multi)     Strain of muscle(s) and tendon(s) of peroneal muscle group at lower leg level, left leg, initial encounter 11/05/2020    Strain of peroneal tendon of left foot, initial encounter    Unspecified fracture of left toe(s), initial encounter for closed fracture 10/30/2020    Fracture of proximal phalanx of toe of left foot    Unspecified injury of left wrist, hand and finger(s), initial encounter 01/05/2021    Left wrist injury    Unspecified injury of unspecified foot, initial encounter     Foot injury    Urinary tract infection, site not specified 07/02/2018    Acute UTI    Urinary tract infection, site not specified 11/07/2016    Acute UTI       Surgical History:   Past Surgical History:   Procedure Laterality Date    APPENDECTOMY  10/24/2013    Appendectomy    BREAST CYST ASPIRATION  1993    LAPAROSCOPY DIAGNOSTIC / BIOPSY / ASPIRATION / LYSIS  12/18/2014    Exploratory Laparoscopy    PELVIC LAPAROSCOPY      endometriosis    US GUIDED SOFT TISSUE BIOPSY  12/18/2023    US GUIDED SOFT TISSUE BIOPSY 12/18/2023 [facility] US       Family History:  Family History   Problem Relation Name Age of Onset    Dementia Mother       Hypertension Mother      Diabetes type II Mother      Coronary artery disease Father      Other (Congestive heart failure) Father      Breast cancer Sister Linda Guzman     Breast cancer Sister Linda     Breast cancer Sister Analilia        Allergies:  Allergies   Allergen Reactions    Phenytoin Other     double vision    Codeine Hallucinations     AUDITORY HALLUCINATIONS       Meds (Current):      Current Outpatient Medications:     ascorbic acid (Vitamin C) 1,000 mg tablet, Take 1 tablet (1,000 mg) by mouth once daily., Disp: , Rfl:     calcium carbonate (CALCIUM 600 ORAL), Take 1 tablet by mouth once daily., Disp: , Rfl:     carBAMazepine XR (TEGretol  XR) 400 mg 12 hr tablet, Take 1 tablet (400 mg) by mouth 2 times a day. Do not crush, chew, or split., Disp: 180 tablet, Rfl: 3    carBAMazepine XR (TEGretol XR) 100 mg 12 hr tablet, Take 1 tablet (100 mg) by mouth 2 times a day., Disp: 180 tablet, Rfl: 3    cholecalciferol (Vitamin D-3) 50 MCG (2000 UT) tablet, Take 1 tablet (50 mcg) by mouth once daily., Disp: , Rfl:     cyanocobalamin (Vitamin B-12) 100 mcg tablet, Take 1 tablet (100 mcg) by mouth once daily., Disp: , Rfl:     dexAMETHasone (Decadron) 4 mg tablet, Take 4 mg (1 tablet) by mouth twice daily the day before treatment, once the evening of treatment, and twice daily the day after treatment., Disp: 5 tablet, Rfl: 5    estradiol (Estrace) 0.01 % (0.1 mg/gram) vaginal cream, Insert 0.25 Applicatorful (1 g) into the vagina 2 times a week., Disp: 42.5 g, Rfl: 3    folic acid (Folvite) 1 mg tablet, Take 1 tablet (1,000 mcg) by mouth once daily., Disp: 30 tablet, Rfl: 11    OLANZapine (ZyPREXA) 5 mg tablet, Take 1 tablet (5 mg) by mouth once daily at bedtime. For 4 days starting the evening of treatment, Disp: 4 tablet, Rfl: 3    ondansetron (Zofran) 8 mg tablet, Take 1 tablet (8 mg) by mouth every 8 hours if needed for nausea or vomiting., Disp: 30 tablet, Rfl: 5    PHENobarbitaL 64.8 mg tablet, TAKE 1  TABLET BY MOUTH EVERY MORNING AND TAKE 2 TABLETS BY MOUTH EVERY EVENING, Disp: 270 tablet, Rfl: 1    prochlorperazine (Compazine) 10 mg tablet, Take 1 tablet (10 mg) by mouth every 6 hours if needed for nausea or vomiting., Disp: 30 tablet, Rfl: 5    zoledronic acid (Reclast) 5 mg/100 mL piggyback, Infuse 100 mL (5 mg) at 400 mL/hr over 15 minutes into a venous catheter 1 time for 1 dose. (Patient not taking: Reported on 10/24/2024), Disp: 100 mL, Rfl: 0      Performance Status (ECOG): 1    ECOG Definition  0 Fully active; no performance restrictions.  1 Strenuous physical activity restricted; fully ambulatory and able to carry out light work.  2 Capable of all self-care but unable to carry out any work activities. Up and about >50% of waking hours.  3 Capable of only limited self-care; confined to bed or chair >50% of waking hours.  4 Completely disabled; cannot carry out any self-care; totally confined to bed or chair.    Exam:  /79 (BP Location: Right arm, Patient Position: Sitting, BP Cuff Size: Adult long)   Pulse 78   Temp 36.2 °C (97.2 °F) (Temporal)   Resp 16   Wt 59.1 kg (130 lb 2.9 oz)   SpO2 96%   BMI 23.07 kg/m²     Wt Readings from Last 5 Encounters:   24 59.1 kg (130 lb 2.9 oz)   10/24/24 58.3 kg (128 lb 6.7 oz)   10/22/24 56.7 kg (125 lb)   10/10/24 56.7 kg (125 lb)   10/05/24 59 kg (130 lb)     Physical Exam:  ECO  Pain: chronic left shoulder pain. Right chest wall post op pain improving  Constitutional: Well developed, awake/alert/oriented x3, no distress, alert and cooperative  Eyes: PER. sclera anicteric  ENMT: Oral mucosa moist, no lesions or thrush identified  Respiratory/Thorax: Breathing is non-labored. Lungs are clear to auscultation bilaterally. No adventitious breath sounds. Hx RLL lobectomy   Cardiovascular: S1-S2. Regular rate and rhythm. No murmurs, rubs, or gallops appreciated  Gastrointestinal: Abdomen soft nontender, nondistended, normal active bowel  sounds.  Musculoskeletal: ROM intact, no joint swelling, normal strength  Extremities: normal extremities, no cyanosis, no edema, no clubbing  Lymphatics: no palpable lymphadenopathy   Skin: no rash  Neurologic: alert and oriented x3. Nonfocal exam. No myoclonus  Psychological: Pleasant, appropriate and easily engaged     Labs:    Results from last 7 days   Lab Units 11/14/24  0827   WBC AUTO x10*3/uL 3.9*   HEMOGLOBIN g/dL 10.4*   HEMATOCRIT % 31.3*   PLATELETS AUTO x10*3/uL 277   NEUTROS ABS x10*3/uL 2.07   LYMPHS ABS AUTO x10*3/uL 1.38   MONOS ABS AUTO x10*3/uL 0.43   EOS ABS AUTO x10*3/uL 0.02   NEUTROS PCT AUTO % 52.7   LYMPHS PCT AUTO % 35.2   MONOS PCT AUTO % 11.0   EOS PCT AUTO % 0.5      Results from last 7 days   Lab Units 11/14/24  0827   GLUCOSE mg/dL 102*   SODIUM mmol/L 137   POTASSIUM mmol/L 3.4*   CHLORIDE mmol/L 100   CO2 mmol/L 29   BUN mg/dL 13   CREATININE mg/dL 0.47*   EGFR mL/min/1.73m*2 >90   CALCIUM mg/dL 9.1   MAGNESIUM mg/dL 1.71   ALBUMIN g/dL 3.9   PROTEIN TOTAL g/dL 6.1*   BILIRUBIN TOTAL mg/dL 0.3   ALK PHOS U/L 52   ALT U/L 8   AST U/L 15            Assessment/Plan:   64 y.o. female with past medical history as stated referred for management of newly diagnosed lung cancer discussing adjuvant treatment.     The patient's records and imaging have been reviewed in detail.  MD discussed with the patient the natural history of their disease at length.  The following has also been discussed with the patient:    # Cancer:  stage IIB ([id number]) adenocarcinoma of lung, with multiple nodules within the RLL s/p RLL lobectomy, tumor measuring 4.5cm. All LN negative. PD-L1 5%. In-house NGS showed KRAS p.G12D (NM_033360 c.35G>A); TP53 p.G416Yjw*6 (NM_000546 c.626_627delGA); TP53 p.R306* (NM_000546 c.916C>T). MS status: cannot be determined. Tumor cells are strongly positive for CK7, focally positive for CDX2 and negative for TTF-1. While immunohistochemical staining is not entirely specific, then  the findings are compatible with adenocarcinoma of pulmonary origin if other primary sites of origin have been excluded clinically.     S/p RLL lobectomy on 8/24/2024. We therefore discussed the concept of curative intent, adjuvant platinum doublet including risk/benefits of cisplatin vs. carboplatin and different paired chemotherapies within 4-6 weeks post surgery.  I favor cisplatin 60mg/m2 IV D#1 & pemetrexed (Alimta) 450 mg/m2 IV D#1 every 21 days for 4 planned cycles based on tolerance and disease response. This analysis included 5 studies with approximately 4600 patients having completely resected NSCLC and  showed that adjuvant chemotherapy is associated 5% decreased risk of death due to any cause at 5 years. (LACE meta-analysis Xu et al. JCO. 2016) Stage IB patients treated with chemotherapy demonstrated a hazard ratio of 0.93 (95% CI, 0.78 to 1.10).  CALGB 9633 showed survival advantage for tumors >4 cm treated with adjuvant carboplatin and paclitaxel (HR 0.82, 90% CI 0.65-1.04). However, the overall benefit on overall survival is a modest 3-4%.   Side effects including but not limited to pain, fatigue, anorexia, nausea, vomiting, diarrhea, stomatitis, electrolyte disturbances, anemia, neutropenia, thrombocytopenia, generalized weakness, ototoxicity, nephrotoxicity, and skin rash were reviewed.   Adequate hydration and monitoring of urine output and hearing reviewed.  We reviewed initiation of vitamin B12 1000mg IM injection q9 weeks, folic acid (folate) 1mg orally daily, and dexamethasone (Decadron) 4mg orally twice daily to start on the day before, day of, and day after chemotherapy to help prevent cytopenias and improve nausea control.  Written information provided.  All questions answered.  Informed consent signed.  Planned initiation in next few weeks. C1 10/3/24.    Given disease stage status and positive PDL-1 expression (>=1%), I recommend additional adjuvant atezolizumab following adjuvant  chemotherapy.  We specifically reviewed 1200mg IV every 3 weeks for 1 year post chemotherapy.  This is based on phase III Impower 010 (John et al, Lancet ). Additional adjuvant atezolizumab decreased the risk of recurrence, new primary NSCLC or death by 34% compared to best supportive care in stage II-IIIA population whose tumor expressed PDL1 1% or more (HR=0.66; 0.50-0.88). Updated results also suggested OS benefit with (HR=0.71; 95%CI: 0.49-1.03) (John et al. .Lancet ). Side effects including but not limited to fatigue, anorexia, nausea, vomiting, diarrhea, constipation, cytopenias, electrolyte abnormalities, liver and/or kidney dysfunction, skin reaction, pneumonitis, colitis, hyper/hypothyroidism, diabetes reviewed.  Will start after chemotherapy.     - Interval Imagin2024: PET/CT showed 1. Minimal FDG avid paravertebral consolidative/ground-glass opacity in the right lower lobe, favored to represent infectious/inflammatory process or atelectasis.  6 mm and 8 mm pulmonary nodules in the right and left lower lobes respectively are non FDG avid. Follow-up with dedicated CT of chest is recommended. 2. No other concerning FDG avid disease identified. Brain Mri 2024: No brain mets. Will repeat CT Chest (-) after 4 cycles of chemotherapy.       # Seizure disorder: firstly found when she was in her teens, and she has been on both Carbamazepine and Phenobarbital. Following with Dr. Baker who is going to retire soon. However she is hesitant to switch to keppra.    # Colitis: in the setting of chemotherapy and stool softener. On Augmentin. Will prescribe immodium. She is aware to call is if symptoms persist or having fevers. Resolved.    # R ear pain: ofloxacin ear drop prescribed. Advised to follow up with ENT if not improving in the next week. Resolved.    # Clinical Trials:  None currently.     # Access: Mediport placed by IR in 10/2024.    # Pain: Surgical related pain. Well controlled.     #  Bone Health: No signs of bony disease.     # Psychosocial: Coping well, no acute issues.  Good support from family & friends.  Social work support available.    # GI/CINV: No acute issues.  Eating and voiding well.  Nutrition consult available.    # Smoking: N/A, former smoker.    # Fertility: N/A.        # Heme/ESAs:  Normocytic anemia likely 2/2 to chemo. Continue to monitor.     # GOC: Patient is aware of curative intent goals of care.    # Language: English.    # Interdisciplinary Patient/Family Education Record:  Learner:  Patient.  Learning Needs Reviewed:  Treatments, Medications, Disease Process, Diagnostic Tests.  Barriers: None.  Teaching Method: Discussion, Handout(s).  Comprehension:  Verbalized Understanding.  Follow-up Plan:  Return to office as per MD.    # Dispo: C4 in 3 weeks.   CT chest ordered for December 20, 2024  Plan to start Atezolizumab December 26, 2024

## 2024-11-14 NOTE — SIGNIFICANT EVENT
Bbr obtained, flushed with 10ml NS followed by 100 u/5 ml Heparin. Sauceda needle de-accessed, site covered with bandaid. Pt tolerated well.

## 2024-11-15 DIAGNOSIS — C34.90 ADENOCARCINOMA, LUNG, UNSPECIFIED LATERALITY (MULTI): Primary | ICD-10-CM

## 2024-11-25 PROCEDURE — RXMED WILLOW AMBULATORY MEDICATION CHARGE

## 2024-11-27 ENCOUNTER — PHARMACY VISIT (OUTPATIENT)
Dept: PHARMACY | Facility: CLINIC | Age: 64
End: 2024-11-27
Payer: COMMERCIAL

## 2024-12-04 ENCOUNTER — OFFICE VISIT (OUTPATIENT)
Dept: NEUROLOGY | Facility: CLINIC | Age: 64
End: 2024-12-04
Payer: COMMERCIAL

## 2024-12-04 VITALS
SYSTOLIC BLOOD PRESSURE: 136 MMHG | DIASTOLIC BLOOD PRESSURE: 77 MMHG | HEART RATE: 71 BPM | BODY MASS INDEX: 23.03 KG/M2 | RESPIRATION RATE: 19 BRPM | WEIGHT: 130 LBS

## 2024-12-04 DIAGNOSIS — G40.209 COMPLEX PARTIAL SEIZURE EVOLVING TO GENERALIZED SEIZURE (MULTI): Primary | ICD-10-CM

## 2024-12-04 PROCEDURE — 1159F MED LIST DOCD IN RCRD: CPT | Performed by: STUDENT IN AN ORGANIZED HEALTH CARE EDUCATION/TRAINING PROGRAM

## 2024-12-04 PROCEDURE — 99214 OFFICE O/P EST MOD 30 MIN: CPT | Performed by: STUDENT IN AN ORGANIZED HEALTH CARE EDUCATION/TRAINING PROGRAM

## 2024-12-04 PROCEDURE — RXMED WILLOW AMBULATORY MEDICATION CHARGE

## 2024-12-04 PROCEDURE — 1036F TOBACCO NON-USER: CPT | Performed by: STUDENT IN AN ORGANIZED HEALTH CARE EDUCATION/TRAINING PROGRAM

## 2024-12-04 PROCEDURE — 1126F AMNT PAIN NOTED NONE PRSNT: CPT | Performed by: STUDENT IN AN ORGANIZED HEALTH CARE EDUCATION/TRAINING PROGRAM

## 2024-12-04 RX ORDER — CARBAMAZEPINE 400 MG/1
400 TABLET, EXTENDED RELEASE ORAL 2 TIMES DAILY
Qty: 180 TABLET | Refills: 3 | Status: SHIPPED | OUTPATIENT
Start: 2024-12-04 | End: 2025-12-04

## 2024-12-04 RX ORDER — CARBAMAZEPINE 100 MG/1
100 TABLET, EXTENDED RELEASE ORAL 2 TIMES DAILY
Qty: 180 TABLET | Refills: 3 | Status: SHIPPED | OUTPATIENT
Start: 2024-12-04 | End: 2025-12-04

## 2024-12-04 RX ORDER — PHENOBARBITAL 64.8 MG/1
TABLET ORAL 2 TIMES DAILY
Qty: 270 TABLET | Refills: 1 | Status: SHIPPED | OUTPATIENT
Start: 2024-12-04 | End: 2025-06-02

## 2024-12-04 RX ORDER — CARBAMAZEPINE 400 MG/1
400 TABLET, EXTENDED RELEASE ORAL 2 TIMES DAILY
Qty: 180 TABLET | Refills: 3 | Status: SHIPPED | OUTPATIENT
Start: 2024-12-04 | End: 2024-12-04 | Stop reason: WASHOUT

## 2024-12-04 ASSESSMENT — PAIN SCALES - GENERAL: PAINLEVEL_OUTOF10: 0-NO PAIN

## 2024-12-04 NOTE — PROGRESS NOTES
"EPILEPSY  OUTPATIENT HISTORY AND PHYSICAL  NOTE    History of Present Illness: Rhoda Pena is a 64 y.o.  female with a PMHx of adenocarcinoma of the lung s/p lobectomy & chemotherapy,  epilepsy, osteoporosis presenting to clinic to transfer care from Dr. Baker s/p [facility]. Last seizure was at least 20 years ago, reports that she is doing good.     Neurologic/Seizure History  Seizure Description:   Diagnosed at age 8, in 1968. Parents heard a noise, and found her having a grand mal seizure. She was not aware of what was going on. No fevers at that time. 6 months later had another seizure. Started Dilantin, had several side effects including double vision. Started Phenobarbitol instad. Did several EEGs, report not available. At age 18 started Carbamazepine. Unclear why but no seizures. Started driving.  Around the 80s-90s switched to generic tegretol. Did not have a seizure but didn't feel right, so switched to name brand. Had a break through event in 90s when weaning phenobarbital. In 2004, felt that she was having auras during a period of severe stress. Describes the auras as a strange sensation on the right side her face, \"feels like there is nothing inside of her\". No smells, no ear ringing, no flashing lights. Unknown tongue biting or urinary incontinence. After the seizure would get severe headaches.  During her teenage years would have partial right face and arm seizures before her period. Most seizures occurred at night, would wake up from sound sleep and ask for help.        Seizure Risk Factors:  Birth: Normal   History of febrile seizures: None  Development: Normal   CNS infections: Normal  Head trauma: None  CNS surgery: none  Family hx of epilepsy:None    AEDs:  Home:  Carbamazepine  BID, Phenobarbitol 64.8 mg daily   Previous: Pheyntoin - blurred vision    Recent AED levels  CBZ levels: 2023-7.8, 2022-5.8 2018-5.6  PB levels: 2023 - 36.5 2018 41.3    ROS: All systems reviewed and were negative " except as above    Home Medications:    Current Outpatient Medications   Medication Instructions    ascorbic acid (Vitamin C) 1,000 mg tablet Take 1 tablet (1,000 mg) by mouth once daily.    calcium carbonate (CALCIUM 600 ORAL) 1 tablet, Daily    carBAMazepine XR (TEGRETOL XR) 100 mg, oral, 2 times daily    cholecalciferol (VITAMIN D-3) 50 mcg, Daily    cyanocobalamin (VITAMIN B-12) 100 mcg, Daily    dexAMETHasone (Decadron) 4 mg tablet Take 4 mg (1 tablet) by mouth twice daily the day before treatment, once the evening of treatment, and twice daily the day after treatment.    estradiol (Estrace) 0.01 % (0.1 mg/gram) vaginal cream Insert 0.25 Applicatorful (1 g) into the vagina 2 times a week.    folic acid (FOLVITE) 1,000 mcg, oral, Daily    OLANZapine (ZYPREXA) 5 mg, oral, Nightly, For 4 days starting the evening of treatment    ondansetron (ZOFRAN) 8 mg, oral, Every 8 hours PRN    PHENobarbitaL 64.8 mg tablet TAKE 1 TABLET BY MOUTH EVERY MORNING AND TAKE 2 TABLETS BY MOUTH EVERY EVENING    prochlorperazine (COMPAZINE) 10 mg, oral, Every 6 hours PRN    Tegretol  mg, oral, 2 times daily, Do not crush, chew, or split.    zoledronic acid (Reclast) 5 mg/100 mL piggyback 5 mg, intravenous, Once         Past Medical History:    has a past medical history of Achilles tendinitis, unspecified leg, Acute maxillary sinusitis, unspecified (2018), Acute nasopharyngitis (common cold) (2016), Adenocarcinoma of lung (Multi), Age-related osteoporosis without current pathological fracture (2015), Complete or unspecified spontaneous  without complication, Dyspnea, unspecified (2019), Encounter for general adult medical examination without abnormal findings (2018), Encounter for health counseling related to travel (2018), Encounter for screening for lipoid disorders (2019), Epigastric pain (2020), GERD (gastroesophageal reflux disease), Irritable bowel syndrome, Left  patella fracture (09/05/2023), Mastodynia (05/12/2021), Otalgia, right ear (03/07/2020), Other general symptoms and signs (01/18/2018), Other microscopic hematuria (09/19/2017), Pain in right hand (03/04/2019), Pain in right knee (08/05/2019), Pain in unspecified shoulder, Personal history of (healed) traumatic fracture (10/30/2020), Personal history of other diseases of the digestive system (08/16/2018), Personal history of other diseases of the musculoskeletal system and connective tissue (12/27/2016), Personal history of other diseases of the musculoskeletal system and connective tissue (10/19/2020), Personal history of other diseases of the nervous system and sense organs (08/05/2019), Personal history of other diseases of the respiratory system (05/13/2022), Personal history of other diseases of the respiratory system (10/30/2020), Personal history of other diseases of the respiratory system (04/19/2017), Personal history of other diseases of the respiratory system (03/07/2020), Personal history of other infectious and parasitic diseases, Personal history of other medical treatment (11/02/2018), Personal history of other specified conditions, Personal history of other specified conditions (03/24/2020), Personal history of other specified conditions (03/02/2018), Personal history of other specified conditions (09/19/2017), Personal history of other specified conditions (08/26/2020), Personal history of other specified conditions (03/03/2020), Personal history of other specified conditions (01/11/2019), Personal history of urinary (tract) infections (11/07/2016), Personal history of urinary (tract) infections (07/02/2018), Pulmonary nodules, Seizure disorder (Multi), Strain of muscle(s) and tendon(s) of peroneal muscle group at lower leg level, left leg, initial encounter (11/05/2020), Unspecified fracture of left toe(s), initial encounter for closed fracture (10/30/2020), Unspecified injury of left wrist, hand  and finger(s), initial encounter (2021), Unspecified injury of unspecified foot, initial encounter, Urinary tract infection, site not specified (2018), and Urinary tract infection, site not specified (2016).    Past Surgical History:    has a past surgical history that includes Appendectomy (10/24/2013); Laparoscopy diagnostic / biopsy / aspiration / lysis (2014); US guided soft tissue biopsy (2023); Breast cyst aspiration (); and Pelvic laparoscopy.    Allergies:   Allergies   Allergen Reactions    Phenytoin Other     double vision    Codeine Hallucinations     AUDITORY HALLUCINATIONS       Family History:   Family History   Problem Relation Name Age of Onset    Dementia Mother      Hypertension Mother      Diabetes type II Mother      Coronary artery disease Father      Other (Congestive heart failure) Father      Breast cancer Sister Linda Guzman     Breast cancer Sister Linda     Breast cancer Sister Analilia        Past Social History:   Social History     Tobacco Use    Smoking status: Former     Current packs/day: 0.00     Types: Cigarettes     Quit date: 1990     Years since quittin.4     Passive exposure: Past    Smokeless tobacco: Never   Vaping Use    Vaping status: Never Used   Substance Use Topics    Alcohol use: Not Currently     Comment: social    Drug use: Never       Vitals:   Heart Rate:  [71] 71  Resp:  [19] 19  BP: (136)/(77) 136/77    Physical Exam:   General Appearance:  No distress, alert, interactive and cooperative.       Mental State: Orientation was normal to time, place and person. Recent and remote memory was intact.  Attention span and concentration were normal. Language testing was fluent to conversation General fund of knowledge was intact.     Cranial Nerves:   CN: Visual fields full  grossly intact   CN 3, 4, 6: Pupils round, 4 mm in diameter, equally reactive to light. Lids symmetric; no ptosis. EOMs normal alignment, full range with normal  saccades, pursuit and convergence.   No nystagmus.   CN 5: Not assessed    CN 7: Normal and symmetric facial strength. Nasolabial folds symmetric.   CN 8: Hearing intact to voice  CN 9: Palate elevates symmetrically.   CN 11: Normal strength of shoulder shrug    CN 12: Tongue midline, with normal bulk and strength; no fasciculations.     Motor: Muscle bulk and tone were normal in both upper and lower extremities. No fasciculations, tremor or other abnormal movements were present.      Strength    R L  Shoulder abduction 5 5  Elbow extension 5 5  Elbow flexion  5 5     5 5    Hip flexion      5 5  Knee flexion       5 5  Knee extension    5 5  DorsiFlex     5      5  PlantarFlex          5       5    Reflexes: Right/ Left:  Biceps 2/2, brachioradialis 2/2, triceps 2/2, patellar 2/2, ankle 2/2. No clonus,    Sensory: In both upper and lower extremities, sensation was intact to light touch    Coordination: In both upper extremities, finger-nose-finger was intact without dysmetria or overshoot. In both lower extremities, heel-to-shin was intact.     Gait: Station was stable with a normal base. Gait was stable with a normal arm swing and speed. No ataxia, shuffling, steppage or waddling was present.        Assessment/Recommendations  Rhoda Pena is a 64 y.o.  female with a PMHx of adenocarcinoma of the lung s/p lobectomy & chemotherapy,  epilepsy, osteoporosis presenting to clinic to transfer care from Dr. Baker s/p [facility]. Last seizure was at least 20 years ago on carbamazepine and phenobarbital.  No events concerning for seizures denies any auras, events of tongue biting or urinary incontinence. Would recommend continuing current AEDs. In the future when medical issues are stable can reconsider tapering/changing AEDs. Previous labs reviewed including previous ASM levels and CBC, CMP. Recent MRI brain w wo from 09/26/24 without clear epileptogenic lesion.    4D Classification  Event type: Epileptic paroxysmal  event  Seizure semiology: Non-specific aura/Right face somatosensory aura -> Right face/arm clonic -> Bilateral tonic-clonic seizure  Epileptogenic Zone: Left hemisphere  Etiology Unknown  Co-morbidities: Adenocarcinoma of the lung    1. Complex partial seizure evolving to generalized seizure (Multi)  - CBZ and PB level prior to next visit  - Follow-up CBC and CMP prior to next visit as ordered by patient's other physician  - Continue Tegretol  mg BID (Brand name) and Tegretol 100 mg BID (generic). Refills sent  - Continue Phenobarbital 64.8 mg AM + 129.6 mg PM. Refills sent.  - Follow Up In Neurology in 1 year     Karlee Washington MD  Department of Neurology, PGY-3

## 2024-12-04 NOTE — PATIENT INSTRUCTIONS
Dear Ms. Pena,     It was a pleasure seeing you in clinic today. Please continue taking your seizure medications exactly as directed. Please return to clinic in 1 year.

## 2024-12-05 ENCOUNTER — OFFICE VISIT (OUTPATIENT)
Dept: HEMATOLOGY/ONCOLOGY | Facility: CLINIC | Age: 64
End: 2024-12-05
Payer: COMMERCIAL

## 2024-12-05 ENCOUNTER — INFUSION (OUTPATIENT)
Dept: HEMATOLOGY/ONCOLOGY | Facility: CLINIC | Age: 64
End: 2024-12-05
Payer: COMMERCIAL

## 2024-12-05 VITALS
HEART RATE: 83 BPM | WEIGHT: 130.51 LBS | SYSTOLIC BLOOD PRESSURE: 142 MMHG | DIASTOLIC BLOOD PRESSURE: 80 MMHG | TEMPERATURE: 96.8 F | OXYGEN SATURATION: 96 % | BODY MASS INDEX: 23.13 KG/M2 | RESPIRATION RATE: 18 BRPM

## 2024-12-05 DIAGNOSIS — M81.0 AGE RELATED OSTEOPOROSIS, UNSPECIFIED PATHOLOGICAL FRACTURE PRESENCE: ICD-10-CM

## 2024-12-05 DIAGNOSIS — G40.209 COMPLEX PARTIAL SEIZURE EVOLVING TO GENERALIZED SEIZURE (MULTI): ICD-10-CM

## 2024-12-05 DIAGNOSIS — C34.90 ADENOCARCINOMA, LUNG, UNSPECIFIED LATERALITY (MULTI): Primary | ICD-10-CM

## 2024-12-05 DIAGNOSIS — C34.90 ADENOCARCINOMA, LUNG, UNSPECIFIED LATERALITY (MULTI): ICD-10-CM

## 2024-12-05 LAB
ALBUMIN SERPL BCP-MCNC: 3.9 G/DL (ref 3.4–5)
ALP SERPL-CCNC: 50 U/L (ref 33–136)
ALT SERPL W P-5'-P-CCNC: 10 U/L (ref 7–45)
ANION GAP SERPL CALC-SCNC: 13 MMOL/L (ref 10–20)
AST SERPL W P-5'-P-CCNC: 17 U/L (ref 9–39)
BASOPHILS # BLD AUTO: 0.02 X10*3/UL (ref 0–0.1)
BASOPHILS NFR BLD AUTO: 0.6 %
BILIRUB SERPL-MCNC: 0.3 MG/DL (ref 0–1.2)
BUN SERPL-MCNC: 16 MG/DL (ref 6–23)
CALCIUM SERPL-MCNC: 8.8 MG/DL (ref 8.6–10.3)
CARBAMAZEPINE SERPL-MCNC: 7.2 UG/ML (ref 4–12)
CHLORIDE SERPL-SCNC: 102 MMOL/L (ref 98–107)
CO2 SERPL-SCNC: 28 MMOL/L (ref 21–32)
CREAT SERPL-MCNC: 0.54 MG/DL (ref 0.5–1.05)
EGFRCR SERPLBLD CKD-EPI 2021: >90 ML/MIN/1.73M*2
EOSINOPHIL # BLD AUTO: 0.02 X10*3/UL (ref 0–0.7)
EOSINOPHIL NFR BLD AUTO: 0.6 %
ERYTHROCYTE [DISTWIDTH] IN BLOOD BY AUTOMATED COUNT: 16.1 % (ref 11.5–14.5)
GLUCOSE SERPL-MCNC: 100 MG/DL (ref 74–99)
HCT VFR BLD AUTO: 30.8 % (ref 36–46)
HGB BLD-MCNC: 10.3 G/DL (ref 12–16)
IMM GRANULOCYTES # BLD AUTO: 0 X10*3/UL (ref 0–0.7)
IMM GRANULOCYTES NFR BLD AUTO: 0 % (ref 0–0.9)
LYMPHOCYTES # BLD AUTO: 1.34 X10*3/UL (ref 1.2–4.8)
LYMPHOCYTES NFR BLD AUTO: 37.5 %
MAGNESIUM SERPL-MCNC: 1.83 MG/DL (ref 1.6–2.4)
MCH RBC QN AUTO: 33.1 PG (ref 26–34)
MCHC RBC AUTO-ENTMCNC: 33.4 G/DL (ref 32–36)
MCV RBC AUTO: 99 FL (ref 80–100)
MONOCYTES # BLD AUTO: 0.43 X10*3/UL (ref 0.1–1)
MONOCYTES NFR BLD AUTO: 12 %
NEUTROPHILS # BLD AUTO: 1.76 X10*3/UL (ref 1.2–7.7)
NEUTROPHILS NFR BLD AUTO: 49.3 %
NRBC BLD-RTO: 0 /100 WBCS (ref 0–0)
PHENOBARB SERPL-MCNC: 39.4 UG/ML (ref 10–40)
PLATELET # BLD AUTO: 325 X10*3/UL (ref 150–450)
POTASSIUM SERPL-SCNC: 3.6 MMOL/L (ref 3.5–5.3)
PROT SERPL-MCNC: 6.4 G/DL (ref 6.4–8.2)
RBC # BLD AUTO: 3.11 X10*6/UL (ref 4–5.2)
SODIUM SERPL-SCNC: 139 MMOL/L (ref 136–145)
WBC # BLD AUTO: 3.6 X10*3/UL (ref 4.4–11.3)

## 2024-12-05 PROCEDURE — 96361 HYDRATE IV INFUSION ADD-ON: CPT | Mod: INF

## 2024-12-05 PROCEDURE — 84075 ASSAY ALKALINE PHOSPHATASE: CPT

## 2024-12-05 PROCEDURE — 80156 ASSAY CARBAMAZEPINE TOTAL: CPT

## 2024-12-05 PROCEDURE — 83735 ASSAY OF MAGNESIUM: CPT

## 2024-12-05 PROCEDURE — 96417 CHEMO IV INFUS EACH ADDL SEQ: CPT

## 2024-12-05 PROCEDURE — 2500000004 HC RX 250 GENERAL PHARMACY W/ HCPCS (ALT 636 FOR OP/ED): Performed by: INTERNAL MEDICINE

## 2024-12-05 PROCEDURE — 80184 ASSAY OF PHENOBARBITAL: CPT

## 2024-12-05 PROCEDURE — RXMED WILLOW AMBULATORY MEDICATION CHARGE

## 2024-12-05 PROCEDURE — 1126F AMNT PAIN NOTED NONE PRSNT: CPT | Performed by: NURSE PRACTITIONER

## 2024-12-05 PROCEDURE — 96375 TX/PRO/DX INJ NEW DRUG ADDON: CPT | Mod: INF

## 2024-12-05 PROCEDURE — 85025 COMPLETE CBC W/AUTO DIFF WBC: CPT

## 2024-12-05 PROCEDURE — 96413 CHEMO IV INFUSION 1 HR: CPT

## 2024-12-05 PROCEDURE — 96372 THER/PROPH/DIAG INJ SC/IM: CPT

## 2024-12-05 PROCEDURE — 99213 OFFICE O/P EST LOW 20 MIN: CPT | Mod: 25 | Performed by: NURSE PRACTITIONER

## 2024-12-05 PROCEDURE — 1159F MED LIST DOCD IN RCRD: CPT | Performed by: NURSE PRACTITIONER

## 2024-12-05 PROCEDURE — 96367 TX/PROPH/DG ADDL SEQ IV INF: CPT

## 2024-12-05 PROCEDURE — 99213 OFFICE O/P EST LOW 20 MIN: CPT | Performed by: NURSE PRACTITIONER

## 2024-12-05 RX ORDER — EPINEPHRINE 0.3 MG/.3ML
0.3 INJECTION SUBCUTANEOUS EVERY 5 MIN PRN
Status: CANCELLED | OUTPATIENT
Start: 2024-12-05

## 2024-12-05 RX ORDER — CYANOCOBALAMIN 1000 UG/ML
1000 INJECTION, SOLUTION INTRAMUSCULAR; SUBCUTANEOUS ONCE
Status: CANCELLED | OUTPATIENT
Start: 2024-12-05

## 2024-12-05 RX ORDER — ALBUTEROL SULFATE 0.83 MG/ML
3 SOLUTION RESPIRATORY (INHALATION) AS NEEDED
Status: CANCELLED | OUTPATIENT
Start: 2024-12-05

## 2024-12-05 RX ORDER — EPINEPHRINE 0.3 MG/.3ML
0.3 INJECTION SUBCUTANEOUS EVERY 5 MIN PRN
Status: DISCONTINUED | OUTPATIENT
Start: 2024-12-05 | End: 2024-12-05 | Stop reason: HOSPADM

## 2024-12-05 RX ORDER — DIPHENHYDRAMINE HYDROCHLORIDE 50 MG/ML
50 INJECTION INTRAMUSCULAR; INTRAVENOUS AS NEEDED
Status: DISCONTINUED | OUTPATIENT
Start: 2024-12-05 | End: 2024-12-05 | Stop reason: HOSPADM

## 2024-12-05 RX ORDER — PROCHLORPERAZINE EDISYLATE 5 MG/ML
10 INJECTION INTRAMUSCULAR; INTRAVENOUS EVERY 6 HOURS PRN
Status: CANCELLED | OUTPATIENT
Start: 2024-12-05

## 2024-12-05 RX ORDER — PROCHLORPERAZINE EDISYLATE 5 MG/ML
10 INJECTION INTRAMUSCULAR; INTRAVENOUS EVERY 6 HOURS PRN
Status: DISCONTINUED | OUTPATIENT
Start: 2024-12-05 | End: 2024-12-05 | Stop reason: HOSPADM

## 2024-12-05 RX ORDER — ALBUTEROL SULFATE 0.83 MG/ML
3 SOLUTION RESPIRATORY (INHALATION) AS NEEDED
Status: DISCONTINUED | OUTPATIENT
Start: 2024-12-05 | End: 2024-12-05 | Stop reason: HOSPADM

## 2024-12-05 RX ORDER — PALONOSETRON 0.05 MG/ML
0.25 INJECTION, SOLUTION INTRAVENOUS ONCE
Status: COMPLETED | OUTPATIENT
Start: 2024-12-05 | End: 2024-12-05

## 2024-12-05 RX ORDER — DEXAMETHASONE 4 MG/1
12 TABLET ORAL ONCE
Status: CANCELLED | OUTPATIENT
Start: 2024-12-05 | End: 2024-12-05

## 2024-12-05 RX ORDER — DIPHENHYDRAMINE HYDROCHLORIDE 50 MG/ML
50 INJECTION INTRAMUSCULAR; INTRAVENOUS AS NEEDED
Status: CANCELLED | OUTPATIENT
Start: 2024-12-05

## 2024-12-05 RX ORDER — FAMOTIDINE 10 MG/ML
20 INJECTION INTRAVENOUS ONCE AS NEEDED
Status: CANCELLED | OUTPATIENT
Start: 2024-12-05

## 2024-12-05 RX ORDER — PROCHLORPERAZINE MALEATE 10 MG
10 TABLET ORAL EVERY 6 HOURS PRN
Status: CANCELLED | OUTPATIENT
Start: 2024-12-05

## 2024-12-05 RX ORDER — PROCHLORPERAZINE MALEATE 10 MG
10 TABLET ORAL EVERY 6 HOURS PRN
Status: DISCONTINUED | OUTPATIENT
Start: 2024-12-05 | End: 2024-12-05 | Stop reason: HOSPADM

## 2024-12-05 RX ORDER — PALONOSETRON 0.05 MG/ML
0.25 INJECTION, SOLUTION INTRAVENOUS ONCE
Status: CANCELLED | OUTPATIENT
Start: 2024-12-05

## 2024-12-05 RX ORDER — HEPARIN 100 UNIT/ML
500 SYRINGE INTRAVENOUS AS NEEDED
OUTPATIENT
Start: 2024-12-05

## 2024-12-05 RX ORDER — CYANOCOBALAMIN 1000 UG/ML
1000 INJECTION, SOLUTION INTRAMUSCULAR; SUBCUTANEOUS ONCE
Status: COMPLETED | OUTPATIENT
Start: 2024-12-05 | End: 2024-12-05

## 2024-12-05 RX ORDER — HEPARIN 100 UNIT/ML
500 SYRINGE INTRAVENOUS AS NEEDED
Status: DISCONTINUED | OUTPATIENT
Start: 2024-12-05 | End: 2024-12-05 | Stop reason: HOSPADM

## 2024-12-05 RX ORDER — DEXAMETHASONE 6 MG/1
12 TABLET ORAL ONCE
Status: COMPLETED | OUTPATIENT
Start: 2024-12-05 | End: 2024-12-05

## 2024-12-05 RX ORDER — HEPARIN SODIUM,PORCINE/PF 10 UNIT/ML
50 SYRINGE (ML) INTRAVENOUS AS NEEDED
OUTPATIENT
Start: 2024-12-05

## 2024-12-05 RX ORDER — FOLIC ACID 1 MG/1
1000 TABLET ORAL DAILY
Qty: 30 TABLET | Refills: 0 | Status: SHIPPED | OUTPATIENT
Start: 2024-12-05

## 2024-12-05 RX ORDER — FAMOTIDINE 10 MG/ML
20 INJECTION INTRAVENOUS ONCE AS NEEDED
Status: DISCONTINUED | OUTPATIENT
Start: 2024-12-05 | End: 2024-12-05 | Stop reason: HOSPADM

## 2024-12-05 ASSESSMENT — PAIN SCALES - GENERAL: PAINLEVEL_OUTOF10: 0-NO PAIN

## 2024-12-05 NOTE — PROGRESS NOTES
Patient here today for follow up. She's feeling generally well. No new or worsening symptoms.     Fatigue: not sleeping well Sat/Sun after treatment, then extremely fatigued Mondays  PO intake: adequate; she feels her appetite is back  Weight: gained a few pounds  N/V/D/C: denies N/V; still taking daily stool softener to maintain regularity    Medications and pharmacy preference reviewed with patient.     Seeing rheum in February.    Last Peme/Cisplatin treatment today.   Planning for Atezolizumab and follow up on 12/26.     Treatment specific information reviewed and given to patient. All questions answered.

## 2024-12-05 NOTE — PROGRESS NOTES
Ir Subjective:   Patient Name: Rhoda Pena    : 1960     MRN: [id number]     Age: 64 y.o.     Gender: female  Referring Provider: Dr. Santosh Falk (Thoracic Surgery)    CC: Management of Newly diagnosed stage IIB ([id number]) adenocarcinoma of lung, with multiple nodules within the RLL, PD-L1 5%. In-house NGS showed KRAS p.G12D ([id number] c.35G>A); TP53 p.M288Uga*6 ([id number] c.[id number]_627delGA); TP53 p.R306* ([id number] c.916C>T). MS status: cannot be determined. S/p RLL lobectomy on 2024. Surgical pathology showed tumor 4.5cm with very close margin. All LN (0/5) negative.    Presenting History (2024): At time of initial presentation a 64 y.o. female, former smoker (started at age 14, 1 pack per day, quitted at age 30) with a past medical history of osteoporosis, hx of seizure on Carbamazepine (has not had any seizure for 1 decade) referred for management of newly diagnosed RLL lung adenocarcinoma s/p RLL lobectomy.     She was firstly found to have some abnormalities in the RLL on the CT cardiac scoring on 2023. Subsequent follow up CT Chest (-) 2024 showed Stable area of mixed consolidation and ground-glass opacity in the medial RLL. Additional 0.7 cm solid nodule in the left lower lobe. This is indeterminate and further attention on follow-up imaging is recommended.    CT chest (-) on 2024 imilar appearance of a focal right lower lobe ground-glass and consolidative opacity with associated bronchiectasis. A slow growing neoplasm can not be excluded. Increased conspicuity of a 5 mm right lower lobe nodular ground-glass opacity and stable appearance of additional bilateral ground-glass nodular opacities measuring up to 8 mm in the left lower lobe, which may be followed on repeat imaging. Similar diffuse bilateral faint subpleural reticulations, which may represent chronic interstitial changes related to smoking, given patient's history.    2024: PET/CT showed 1. Minimal FDG avid  paravertebral consolidative/ground-glass opacity in the right lower lobe, favored to represent infectious/inflammatory process or atelectasis.  6 mm and 8 mm pulmonary nodules in the right and left lower lobes respectively are non FDG avid. Follow-up with dedicated CT of chest is recommended. 2. No other concerning FDG avid disease identified.    8/8/2024: RLL bronchoscpy with EBUS: RLL FNA showed Malignant cells derived from adenocarcinoma consistent with mucinous carcinoma.  LN 4R, 7 were negative.     She underwent RLL lobectomy and mediastinal lymphadenectomy with Dr. Falk on 8/29/2024. Pathology showing  Multiple separate tumor nodules are identified with similar morphology.  The tumor demonstrates a heterogenous mixture of invasive mucinous adenocarcinoma and approximately 10-15% demonstrates higher grade nuclear features and less intracytoplasmic mucin. By immunohistochemical staining, the tumor cells are strongly positive for CK7, focally positive for CDX2 and negative for TTF-1. While immunohistochemical staining is not entirely specific, then the findings are compatible with adenocarcinoma of pulmonary origin if other primary sites of origin have been excluded clinically. involving lung parenchyma, largest nodule measuring 4.5cm, all margins negative, adenocarcinoma is 1mm to nearest parenchymal resection margin. 0/5 LN negative (4R, 10R, 11R, 12R and 7). No VPI nor LVI. PD-L1 5%. In-house NGS showed KRAS p.G12D ([id number] c.35G>A); TP53 p.M441Uwo*6 ([id number] c.[id number]); TP53 p.R306* ([id number] c.916C>T). MS status: cannot be determined.    She presents for a consultation accompanied by her sister. She continues to have pain/discomfort from the surgical site but able to manage it at home. Otherwise denies any N/V. No fever or chills. No CP or SOB. Reports R ear pain.    Shx: no other drugs or EtOH. Works as [employer]. Lives with . She has 2 siblings.   Fhx: both sisters have breast cancer  (at age of 60, the other sister at 27). Mother side - uncle has unknown cancer.   Surgical history: appendectomy in ; lap hysterectomy . L Wrist surgery in .     Treatment Summary:  - 2024: Robot-assisted RLL lobectomy and mediastinal lymphadenectomy (Dr. Falk)  -10/03/2024:  C1 Cisplatin 60mg/m2 and Pemetrexed 450mg/m2 IV  -10/24/2024:  C2 Cisplatin 60mg/m2 and Pemetrexed 450mg/m2 IV  -2024:  C3 Cisplatin 60mg/m2 and Pemetrexed 450mg/m2 IV    Interval History (2024):    Patient present in clinic for C4 treatment accompanied by her . Her primary complaint is fatigue, worse 2 to 3 days following chemotherapy. She does nap in the afternoon feels refreshed afterwards. Working. She denies any fevers, chills or night sweats. No cough, chest pain or shortness of breath. Rare nausea not requiring antiemetics. No dysgeusia. No constipation or diarrhea. No urinary symptoms. No rash. No neuropathy. Chronic left shoulder pain, holding off on PT until after current treatments. Right chest wall discomfort at surgical site continues to improve.     Review of Systems:  A review of systems has been completed and are negative for complaints except what is stated in the HPI and/or past medical history      Medical History:   Past Medical History:   Diagnosis Date    Achilles tendinitis, unspecified leg     Achilles tendinitis    Acute maxillary sinusitis, unspecified 2018    Acute maxillary sinusitis    Acute nasopharyngitis (common cold) 2016    Common cold    Adenocarcinoma of lung (Multi)     Age-related osteoporosis without current pathological fracture 2015    Encounter for ongoing osteoporosis therapy, bisphosphonates    Complete or unspecified spontaneous  without complication         Dyspnea, unspecified 2019    Paroxysmal dyspnea    Encounter for general adult medical examination without abnormal findings 2018    Annual physical exam     Encounter for health counseling related to travel 09/06/2018    Counseling about travel    Encounter for screening for lipoid disorders 08/05/2019    Screening cholesterol level    Epigastric pain 08/26/2020    Dyspepsia    GERD (gastroesophageal reflux disease)     Irritable bowel syndrome     Left patella fracture 09/05/2023    Mastodynia 05/12/2021    Breast pain, right    Otalgia, right ear 03/07/2020    Otalgia, right    Other general symptoms and signs 01/18/2018    Flu-like symptoms    Other microscopic hematuria 09/19/2017    Hematuria, microscopic    Pain in right hand 03/04/2019    Pain of right hand    Pain in right knee 08/05/2019    Knee pain, right    Pain in unspecified shoulder     Shoulder pain    Personal history of (healed) traumatic fracture 10/30/2020    History of fracture of phalanx of toe    Personal history of other diseases of the digestive system 08/16/2018    History of gastroenteritis    Personal history of other diseases of the musculoskeletal system and connective tissue 12/27/2016    History of low back pain    Personal history of other diseases of the musculoskeletal system and connective tissue 10/19/2020    History of osteopenia    Personal history of other diseases of the nervous system and sense organs 08/05/2019    Personal history of seizure disorder    Personal history of other diseases of the respiratory system 05/13/2022    History of acute sinusitis    Personal history of other diseases of the respiratory system 10/30/2020    History of chronic rhinitis    Personal history of other diseases of the respiratory system 04/19/2017    History of influenza    Personal history of other diseases of the respiratory system 03/07/2020    History of viral pharyngitis    Personal history of other infectious and parasitic diseases     History of varicella    Personal history of other medical treatment 11/02/2018    History of screening mammography    Personal history of other specified  conditions     History of headache    Personal history of other specified conditions 03/24/2020    History of abnormal mammogram    Personal history of other specified conditions 03/02/2018    History of fatigue    Personal history of other specified conditions 09/19/2017    History of dysuria    Personal history of other specified conditions 08/26/2020    History of diarrhea    Personal history of other specified conditions 03/03/2020    History of abdominal pain    Personal history of other specified conditions 01/11/2019    History of chest pain    Personal history of urinary (tract) infections 11/07/2016    History of urinary tract infection    Personal history of urinary (tract) infections 07/02/2018    History of urinary tract infection    Pulmonary nodules     Bilateral    Seizure disorder (Multi)     Strain of muscle(s) and tendon(s) of peroneal muscle group at lower leg level, left leg, initial encounter 11/05/2020    Strain of peroneal tendon of left foot, initial encounter    Unspecified fracture of left toe(s), initial encounter for closed fracture 10/30/2020    Fracture of proximal phalanx of toe of left foot    Unspecified injury of left wrist, hand and finger(s), initial encounter 01/05/2021    Left wrist injury    Unspecified injury of unspecified foot, initial encounter     Foot injury    Urinary tract infection, site not specified 07/02/2018    Acute UTI    Urinary tract infection, site not specified 11/07/2016    Acute UTI       Surgical History:   Past Surgical History:   Procedure Laterality Date    APPENDECTOMY  10/24/2013    Appendectomy    BREAST CYST ASPIRATION  1993    LAPAROSCOPY DIAGNOSTIC / BIOPSY / ASPIRATION / LYSIS  12/18/2014    Exploratory Laparoscopy    PELVIC LAPAROSCOPY      endometriosis    US GUIDED SOFT TISSUE BIOPSY  12/18/2023    US GUIDED SOFT TISSUE BIOPSY 12/18/2023 [facility] US       Family History:  Family History   Problem Relation Name Age of Onset    Dementia Mother       Hypertension Mother      Diabetes type II Mother      Coronary artery disease Father      Other (Congestive heart failure) Father      Breast cancer Sister Linda Guzman     Breast cancer Sister Linda     Breast cancer Sister Pat        Allergies:  Allergies   Allergen Reactions    Phenytoin Other     double vision    Codeine Hallucinations     AUDITORY HALLUCINATIONS       Meds (Current):    Current Outpatient Medications:     ascorbic acid (Vitamin C) 1,000 mg tablet, Take 1 tablet (1,000 mg) by mouth once daily., Disp: , Rfl:     calcium carbonate (CALCIUM 600 ORAL), Take 1 tablet by mouth once daily., Disp: , Rfl:     carBAMazepine XR (TEGretol XR) 100 mg 12 hr tablet, Take 1 tablet (100 mg) by mouth 2 times a day., Disp: 180 tablet, Rfl: 3    cholecalciferol (Vitamin D-3) 50 MCG (2000 UT) tablet, Take 1 tablet (50 mcg) by mouth once daily., Disp: , Rfl:     cyanocobalamin (Vitamin B-12) 100 mcg tablet, Take 1 tablet (100 mcg) by mouth once daily., Disp: , Rfl:     dexAMETHasone (Decadron) 4 mg tablet, Take 4 mg (1 tablet) by mouth twice daily the day before treatment, once the evening of treatment, and twice daily the day after treatment., Disp: 5 tablet, Rfl: 5    estradiol (Estrace) 0.01 % (0.1 mg/gram) vaginal cream, Insert 0.25 Applicatorful (1 g) into the vagina 2 times a week., Disp: 42.5 g, Rfl: 3    OLANZapine (ZyPREXA) 5 mg tablet, Take 1 tablet (5 mg) by mouth once daily at bedtime. For 4 days starting the evening of treatment, Disp: 4 tablet, Rfl: 3    ondansetron (Zofran) 8 mg tablet, Take 1 tablet (8 mg) by mouth every 8 hours if needed for nausea or vomiting., Disp: 30 tablet, Rfl: 5    PHENobarbitaL 64.8 mg tablet, TAKE 1 TABLET BY MOUTH EVERY MORNING AND TAKE 2 TABLETS BY MOUTH EVERY EVENING, Disp: 270 tablet, Rfl: 1    prochlorperazine (Compazine) 10 mg tablet, Take 1 tablet (10 mg) by mouth every 6 hours if needed for nausea or vomiting., Disp: 30 tablet, Rfl: 5    Tegretol  mg 12 hr  tablet, Take 1 tablet (400 mg) by mouth 2 times a day. Do not crush, chew, or split., Disp: 180 tablet, Rfl: 3    folic acid (Folvite) 1 mg tablet, Take 1 tablet (1,000 mcg) by mouth once daily., Disp: 30 tablet, Rfl: 0    zoledronic acid (Reclast) 5 mg/100 mL piggyback, Infuse 100 mL (5 mg) at 400 mL/hr over 15 minutes into a venous catheter 1 time for 1 dose. (Patient not taking: Reported on 10/24/2024), Disp: 100 mL, Rfl: 0  No current facility-administered medications for this visit.    Facility-Administered Medications Ordered in Other Visits:     albuterol 2.5 mg /3 mL (0.083 %) nebulizer solution 3 mL, 3 mL, nebulization, PRN, Amber Barroso MD    CISplatin (Platinol) 97 mg in sodium chloride 0.9% 644 mL IV, 60 mg/m2 (Treatment Plan Recorded), intravenous, Once, Amber Barroso MD    dextrose 5 % in water (D5W) bolus 500 mL, 500 mL, intravenous, PRN, Amber Barroso MD    diphenhydrAMINE (BENADryl) injection 50 mg, 50 mg, intravenous, PRN, Amber Barroso MD    EPINEPHrine (Epipen) injection syringe 0.3 mg, 0.3 mg, intramuscular, q5 min PRN, Amber Barroso MD    famotidine PF (Pepcid) injection 20 mg, 20 mg, intravenous, Once PRN, Amber Barroso MD    fosaprepitant (Emend) 150 mg in sodium chloride 0.9% 250 mL IV, 150 mg, intravenous, Once, Amber Barroso MD, Last Rate: 500 mL/hr at 12/05/24 1104, 150 mg at 12/05/24 1104    heparin flush 100 unit/mL syringe 500 Units, 500 Units, intra-catheter, PRN, Damaris Jhaveri MD    methylPREDNISolone sod succinate (SOLU-Medrol) 40 mg/mL injection 40 mg, 40 mg, intravenous, PRN, Amber Barroso MD    PEMEtrexed disodium (Alimta) 700 mg in sodium chloride 0.9% 138 mL IV, 450 mg/m2 (Treatment Plan Recorded), intravenous, Once, Amber Barroso MD    prochlorperazine (Compazine) injection 10 mg, 10 mg, intravenous, q6h PRN, Amber Barroso MD    prochlorperazine (Compazine) tablet 10 mg, 10 mg, oral, q6h PRN, Amber Barroso MD    sodium chloride 0.9 % bolus 1,000 mL,  1,000 mL, intravenous, Once, Amber Barroso MD    sodium chloride 0.9 % bolus 500 mL, 500 mL, intravenous, PRN, Amber Barroso MD        Performance Status (ECOG): 1    ECOG Definition  0 Fully active; no performance restrictions.  1 Strenuous physical activity restricted; fully ambulatory and able to carry out light work.  2 Capable of all self-care but unable to carry out any work activities. Up and about >50% of waking hours.  3 Capable of only limited self-care; confined to bed or chair >50% of waking hours.  4 Completely disabled; cannot carry out any self-care; totally confined to bed or chair.    Exam:  /80 (BP Location: Right arm, Patient Position: Sitting, BP Cuff Size: Adult long)   Pulse 83   Temp 36 °C (96.8 °F) (Temporal)   Resp 18   Wt 59.2 kg (130 lb 8.2 oz)   SpO2 96%   BMI 23.13 kg/m²     Wt Readings from Last 5 Encounters:   24 59.2 kg (130 lb 8.2 oz)   24 59 kg (130 lb)   24 59.1 kg (130 lb 2.9 oz)   10/24/24 58.3 kg (128 lb 6.7 oz)   10/22/24 56.7 kg (125 lb)       Physical Exam:  ECO  Pain: chronic left shoulder pain. Right chest wall post op pain improving  Constitutional: Well developed, awake/alert/oriented x3, no distress, alert and cooperative  Eyes: PER. sclera anicteric  ENMT: Oral mucosa moist, no lesions or thrush identified  Respiratory/Thorax: Breathing is non-labored. Lungs are clear to auscultation bilaterally. No adventitious breath sounds. Hx RLL lobectomy   Cardiovascular: S1-S2. Regular rate and rhythm. No murmurs, rubs, or gallops appreciated  Gastrointestinal: Abdomen soft nontender, nondistended, normal active bowel sounds.  Musculoskeletal: ROM intact, no joint swelling, normal strength  Extremities: normal extremities, no cyanosis, no edema, no clubbing  Lymphatics: no palpable lymphadenopathy   Skin: no rash  Neurologic: alert and oriented x3. Nonfocal exam. No myoclonus  Psychological: Pleasant, appropriate and easily engaged      Labs:  Results from last 7 days   Lab Units 12/05/24  0834   WBC AUTO x10*3/uL 3.6*   HEMOGLOBIN g/dL 10.3*   HEMATOCRIT % 30.8*   PLATELETS AUTO x10*3/uL 325   NEUTROS ABS x10*3/uL 1.76   LYMPHS ABS AUTO x10*3/uL 1.34   MONOS ABS AUTO x10*3/uL 0.43   EOS ABS AUTO x10*3/uL 0.02   NEUTROS PCT AUTO % 49.3   LYMPHS PCT AUTO % 37.5   MONOS PCT AUTO % 12.0   EOS PCT AUTO % 0.6      Results from last 7 days   Lab Units 12/05/24  0834   GLUCOSE mg/dL 100*   SODIUM mmol/L 139   POTASSIUM mmol/L 3.6   CHLORIDE mmol/L 102   CO2 mmol/L 28   BUN mg/dL 16   CREATININE mg/dL 0.54   EGFR mL/min/1.73m*2 >90   CALCIUM mg/dL 8.8   MAGNESIUM mg/dL 1.83   ALBUMIN g/dL 3.9   PROTEIN TOTAL g/dL 6.4   BILIRUBIN TOTAL mg/dL 0.3   ALK PHOS U/L 50   ALT U/L 10   AST U/L 17            Assessment/Plan:   64 y.o. female with past medical history as stated referred for management of newly diagnosed lung cancer discussing adjuvant treatment.     The patient's records and imaging have been reviewed in detail.  MD discussed with the patient the natural history of their disease at length.  The following has also been discussed with the patient:    # Cancer:  stage IIB ([id number]) adenocarcinoma of lung, with multiple nodules within the RLL s/p RLL lobectomy, tumor measuring 4.5cm. All LN negative. PD-L1 5%. In-house NGS showed KRAS p.G12D ([id number] c.35G>A); TP53 p.S328Jrw*6 ([id number] c.626_627delGA); TP53 p.R306* (NM_000546 c.916C>T). MS status: cannot be determined. Tumor cells are strongly positive for CK7, focally positive for CDX2 and negative for TTF-1. While immunohistochemical staining is not entirely specific, then the findings are compatible with adenocarcinoma of pulmonary origin if other primary sites of origin have been excluded clinically.     S/p RLL lobectomy on 8/24/2024. We therefore discussed the concept of curative intent, adjuvant platinum doublet including risk/benefits of cisplatin vs. carboplatin and different paired  chemotherapies within 4-6 weeks post surgery.  I favor cisplatin 60mg/m2 IV D#1 & pemetrexed (Alimta) 450 mg/m2 IV D#1 every 21 days for 4 planned cycles based on tolerance and disease response. This analysis included 5 studies with approximately 4600 patients having completely resected NSCLC and  showed that adjuvant chemotherapy is associated 5% decreased risk of death due to any cause at 5 years. (LACE meta-analysis Xu et al. JCO. 2016) Stage IB patients treated with chemotherapy demonstrated a hazard ratio of 0.93 (95% CI, 0.78 to 1.10).  CALGB 9633 showed survival advantage for tumors >4 cm treated with adjuvant carboplatin and paclitaxel (HR 0.82, 90% CI 0.65-1.04). However, the overall benefit on overall survival is a modest 3-4%.   Side effects including but not limited to pain, fatigue, anorexia, nausea, vomiting, diarrhea, stomatitis, electrolyte disturbances, anemia, neutropenia, thrombocytopenia, generalized weakness, ototoxicity, nephrotoxicity, and skin rash were reviewed.   Adequate hydration and monitoring of urine output and hearing reviewed.  We reviewed initiation of vitamin B12 1000mg IM injection q9 weeks, folic acid (folate) 1mg orally daily, and dexamethasone (Decadron) 4mg orally twice daily to start on the day before, day of, and day after chemotherapy to help prevent cytopenias and improve nausea control.  Written information provided.  All questions answered.  Informed consent signed.  Planned initiation in next few weeks. C1 10/3/24.    Given disease stage status and positive PDL-1 expression (>=1%), I recommend additional adjuvant atezolizumab following adjuvant chemotherapy.  We specifically reviewed 1200mg IV every 3 weeks for 1 year post chemotherapy.  This is based on phase III Impower 010 (John et al, Lancet 2021). Additional adjuvant atezolizumab decreased the risk of recurrence, new primary NSCLC or death by 34% compared to best supportive care in stage II-IIIA population  whose tumor expressed PDL1 1% or more (HR=0.66; 0.50-0.88). Updated results also suggested OS benefit with (HR=0.71; 95%CI: 0.49-1.03) (Felip et al. .Lancet ). Side effects including but not limited to fatigue, anorexia, nausea, vomiting, diarrhea, constipation, cytopenias, electrolyte abnormalities, liver and/or kidney dysfunction, skin reaction, pneumonitis, colitis, hyper/hypothyroidism, diabetes reviewed.  Will start after chemotherapy.     - Interval Imagin2024: PET/CT showed 1. Minimal FDG avid paravertebral consolidative/ground-glass opacity in the right lower lobe, favored to represent infectious/inflammatory process or atelectasis.  6 mm and 8 mm pulmonary nodules in the right and left lower lobes respectively are non FDG avid. Follow-up with dedicated CT of chest is recommended. 2. No other concerning FDG avid disease identified. Brain Mri 2024: No brain mets. Will repeat CT Chest (-) after 4 cycles of chemotherapy - ordered 2024      # Seizure disorder: firstly found when she was in her teens, and she has been on both Carbamazepine and Phenobarbital. Following with Dr. Baker who is going to retire soon. However she is hesitant to switch to keppra.    # Colitis: in the setting of chemotherapy and stool softener. On Augmentin. Will prescribe immodium. She is aware to call is if symptoms persist or having fevers. Resolved.    # R ear pain: ofloxacin ear drop prescribed. Advised to follow up with ENT if not improving in the next week. Resolved.    # Clinical Trials:  None currently.     # Access: Mediport placed by IR in 10/2024.    # Pain: Surgical related pain. Well controlled.     # Bone Health: No signs of bony disease.     # Psychosocial: Coping well, no acute issues.  Good support from family & friends.  Social work support available.    # GI/CINV: No acute issues.  Eating and voiding well.  Nutrition consult available.    # Smoking: N/A, former smoker.    # Fertility: N/A.         # Heme/ESAs:  Normocytic anemia likely 2/2 to chemo. Continue to monitor.     # GOC: Patient is aware of curative intent goals of care.    # Language: English.    # Interdisciplinary Patient/Family Education Record:  Learner:  Patient.  Learning Needs Reviewed:  Treatments, Medications, Disease Process, Diagnostic Tests.  Barriers: None.  Teaching Method: Discussion, Handout(s).  Comprehension:  Verbalized Understanding.  Follow-up Plan:  Return to office as per MD.    # Dispo:   RTC in 3 weeks with plans to start Atezolizumab December 26, 2024  CT chest ordered for December 20, 2024      Terrance Millan, APRN-CNP  [facility]  Thoracic + [facility] Medical Oncology     I spent a total of 25+ minutes on the date of the service which included preparing to see the patient, face-to-face patient care, completing clinical documentation, obtaining and / or reviewing separately obtained history, counseling and educating the patient/family/caregiver, ordering medications, tests, or procedures, communicating with other healthcare providers (not separately reported), independently interpreting results, not separately reported, and communicating results to the patient/family/caregiver, Name and date of birth verified.

## 2024-12-06 PROBLEM — O03.9 PREGNANCY WITH ABORTIVE OUTCOME: Status: RESOLVED | Noted: 2024-12-06 | Resolved: 2024-12-06

## 2024-12-06 PROBLEM — M22.2X9 PATELLOFEMORAL PAIN SYNDROME: Status: ACTIVE | Noted: 2024-12-06

## 2024-12-06 PROBLEM — R53.83 FATIGUE: Status: ACTIVE | Noted: 2024-12-06

## 2024-12-06 PROBLEM — M25.559 ARTHRALGIA OF HIP: Status: ACTIVE | Noted: 2024-12-06

## 2024-12-06 PROBLEM — M54.50 LOW BACK PAIN, UNSPECIFIED: Status: ACTIVE | Noted: 2023-08-23

## 2024-12-06 PROBLEM — H43.819 POSTERIOR VITREOUS DETACHMENT: Status: ACTIVE | Noted: 2021-09-02

## 2024-12-06 PROBLEM — J31.0 CHRONIC RHINITIS: Status: ACTIVE | Noted: 2024-12-06

## 2024-12-06 PROBLEM — K52.9 INFLAMMATION OF STOMACH AND INTESTINE: Status: RESOLVED | Noted: 2024-12-06 | Resolved: 2024-12-06

## 2024-12-06 PROBLEM — J11.1 INFLUENZA: Status: RESOLVED | Noted: 2024-12-06 | Resolved: 2024-12-06

## 2024-12-06 PROBLEM — S49.92XA INJURY OF LEFT SHOULDER: Status: RESOLVED | Noted: 2023-10-28 | Resolved: 2024-12-06

## 2024-12-06 PROBLEM — Z86.19 HISTORY OF VARICELLA: Status: ACTIVE | Noted: 2024-12-06

## 2024-12-06 PROBLEM — S69.90XA INJURY OF WRIST: Status: RESOLVED | Noted: 2023-10-28 | Resolved: 2024-12-06

## 2024-12-06 PROBLEM — R51.9 HEADACHE: Status: ACTIVE | Noted: 2024-12-06

## 2024-12-06 PROBLEM — S52.552A: Status: RESOLVED | Noted: 2023-10-28 | Resolved: 2024-12-06

## 2024-12-06 PROBLEM — S92.425A CLOSED NONDISPLACED FRACTURE OF DISTAL PHALANX OF LEFT GREAT TOE: Status: RESOLVED | Noted: 2023-10-28 | Resolved: 2024-12-06

## 2024-12-06 PROBLEM — S82.832A CLOSED FRACTURE OF FIBULA, PROXIMAL, LEFT: Status: RESOLVED | Noted: 2023-09-05 | Resolved: 2024-12-06

## 2024-12-06 PROBLEM — R10.9 ABDOMINAL PAIN: Status: RESOLVED | Noted: 2024-12-06 | Resolved: 2024-12-06

## 2024-12-09 ENCOUNTER — APPOINTMENT (OUTPATIENT)
Dept: PRIMARY CARE | Facility: CLINIC | Age: 64
End: 2024-12-09
Payer: COMMERCIAL

## 2024-12-09 ENCOUNTER — PHARMACY VISIT (OUTPATIENT)
Dept: PHARMACY | Facility: CLINIC | Age: 64
End: 2024-12-09
Payer: COMMERCIAL

## 2024-12-09 ENCOUNTER — TELEPHONE (OUTPATIENT)
Dept: HEMATOLOGY/ONCOLOGY | Facility: CLINIC | Age: 64
End: 2024-12-09

## 2024-12-09 VITALS
BODY MASS INDEX: 23.53 KG/M2 | DIASTOLIC BLOOD PRESSURE: 70 MMHG | HEART RATE: 79 BPM | OXYGEN SATURATION: 96 % | WEIGHT: 132.8 LBS | SYSTOLIC BLOOD PRESSURE: 116 MMHG | HEIGHT: 63 IN

## 2024-12-09 DIAGNOSIS — M81.0 AGE-RELATED OSTEOPOROSIS WITHOUT CURRENT PATHOLOGICAL FRACTURE: ICD-10-CM

## 2024-12-09 DIAGNOSIS — G40.209 COMPLEX PARTIAL SEIZURE EVOLVING TO GENERALIZED SEIZURE (MULTI): ICD-10-CM

## 2024-12-09 DIAGNOSIS — Z00.00 ROUTINE ADULT HEALTH MAINTENANCE: Primary | ICD-10-CM

## 2024-12-09 DIAGNOSIS — C34.31 MALIGNANT NEOPLASM OF LOWER LOBE OF RIGHT LUNG (MULTI): ICD-10-CM

## 2024-12-09 DIAGNOSIS — F32.A CHRONIC DEPRESSION: ICD-10-CM

## 2024-12-09 PROBLEM — R92.30 DENSE BREAST TISSUE ON MAMMOGRAM: Status: RESOLVED | Noted: 2023-09-05 | Resolved: 2024-12-09

## 2024-12-09 PROBLEM — R79.89 HIGH SERUM CHOLESTANOL: Status: RESOLVED | Noted: 2023-10-28 | Resolved: 2024-12-09

## 2024-12-09 PROBLEM — J31.0 CHRONIC RHINITIS: Status: RESOLVED | Noted: 2024-12-06 | Resolved: 2024-12-09

## 2024-12-09 PROBLEM — M54.50 LOW BACK PAIN, UNSPECIFIED: Status: RESOLVED | Noted: 2023-08-23 | Resolved: 2024-12-09

## 2024-12-09 PROBLEM — R51.9 HEADACHE: Status: RESOLVED | Noted: 2024-12-06 | Resolved: 2024-12-09

## 2024-12-09 PROBLEM — Z78.0 POST-MENOPAUSAL: Status: RESOLVED | Noted: 2023-10-28 | Resolved: 2024-12-09

## 2024-12-09 PROBLEM — R53.83 FATIGUE: Status: RESOLVED | Noted: 2024-12-06 | Resolved: 2024-12-09

## 2024-12-09 PROBLEM — R11.0 NAUSEA IN ADULT: Status: RESOLVED | Noted: 2023-10-28 | Resolved: 2024-12-09

## 2024-12-09 PROBLEM — Z04.9 CONDITION NOT FOUND: Status: RESOLVED | Noted: 2023-10-28 | Resolved: 2024-12-09

## 2024-12-09 PROBLEM — R22.41 FOOT MASS, RIGHT: Status: RESOLVED | Noted: 2023-10-30 | Resolved: 2024-12-09

## 2024-12-09 PROBLEM — S89.92XA LEFT KNEE INJURY: Status: RESOLVED | Noted: 2023-10-28 | Resolved: 2024-12-09

## 2024-12-09 PROBLEM — R93.89: Status: RESOLVED | Noted: 2024-03-18 | Resolved: 2024-12-09

## 2024-12-09 PROCEDURE — 3008F BODY MASS INDEX DOCD: CPT | Performed by: NURSE PRACTITIONER

## 2024-12-09 PROCEDURE — 99214 OFFICE O/P EST MOD 30 MIN: CPT | Performed by: NURSE PRACTITIONER

## 2024-12-09 PROCEDURE — 1158F ADVNC CARE PLAN TLK DOCD: CPT | Performed by: NURSE PRACTITIONER

## 2024-12-09 PROCEDURE — G2211 COMPLEX E/M VISIT ADD ON: HCPCS | Performed by: NURSE PRACTITIONER

## 2024-12-09 PROCEDURE — 1159F MED LIST DOCD IN RCRD: CPT | Performed by: NURSE PRACTITIONER

## 2024-12-09 PROCEDURE — 1123F ACP DISCUSS/DSCN MKR DOCD: CPT | Performed by: NURSE PRACTITIONER

## 2024-12-09 PROCEDURE — 1160F RVW MEDS BY RX/DR IN RCRD: CPT | Performed by: NURSE PRACTITIONER

## 2024-12-09 ASSESSMENT — ENCOUNTER SYMPTOMS
SEIZURES: 1
FEVER: 0
NAUSEA: 0
JOINT SWELLING: 0
ADENOPATHY: 0
COLOR CHANGE: 0
ACTIVITY CHANGE: 1
COUGH: 0
EYE PAIN: 0
ABDOMINAL PAIN: 0
MYALGIAS: 0
CONSTIPATION: 1
BACK PAIN: 0
FATIGUE: 1
DIARRHEA: 0
WOUND: 0
PALPITATIONS: 0
CHILLS: 0
WHEEZING: 0
HEADACHES: 0
DIFFICULTY URINATING: 0
TROUBLE SWALLOWING: 0
WEAKNESS: 0
ABDOMINAL DISTENTION: 0
SHORTNESS OF BREATH: 0
BRUISES/BLEEDS EASILY: 0
DIZZINESS: 0

## 2024-12-09 NOTE — ASSESSMENT & PLAN NOTE
Last seizure was at least 20 years ago? She continues on Tegretol and phenobarbital with good success. Patient continues to follow with neurology routinely

## 2024-12-09 NOTE — ASSESSMENT & PLAN NOTE
Recently diagnosed with adenocarcinoma of the lung s/p lobectomy & chemotherapy in 2024. Will be starting immunotherapy

## 2024-12-09 NOTE — ASSESSMENT & PLAN NOTE
Most recent blood work reviewed; will continue to monitor as appropriate  - most recent colonoscopy completed in 2018 with recommendations to repeat scope in 10 years (2028)  -Most recent mammogram completed in August, 2024 with recommendations for annual screening (08/2025)

## 2024-12-09 NOTE — PATIENT INSTRUCTIONS
Please arrange for routine follow up in 6 months. You may schedule on-line through [employer] or call our office at [phone].

## 2024-12-09 NOTE — TELEPHONE ENCOUNTER
Returned patient's call. Per [place] and treatment plan she will not need to continue her Olanzapine and dexamethasone with her immunotherapy.     She is aware and agreeable.

## 2024-12-09 NOTE — PROGRESS NOTES
"Subjective   Patient ID: Rhoda Pena is a 64 y.o. female who presents for Follow-up and Establish Care.    Patient seen today in order to establish primary care.  Discussed with patient new diagnosis of lung cancer from earlier this year.  In August, 2024 she underwent a lobectomy for adenocarcinoma of the lung and just recently completed her last round of chemo therapy.  She states, \"I am exhausted\".  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  She reports a good support system with her  and family.  She states her mood is \"pretty good\" and but occasional insomnia is reported.  Patient reports weight loss but this appears to have stabilized.  Some constipation reported as well.  Prior to cancer diagnosis patient  voiced being relatively active and overall healthy.  History of epilepsy reported with last seizure being approximately 20 years ago.  Patient denies any issues with vision or dentition.  Medications reviewed.  No other acute concerns voiced at this time.             Current Outpatient Medications on File Prior to Visit   Medication Sig Dispense Refill    ascorbic acid (Vitamin C) 1,000 mg tablet Take 1 tablet (1,000 mg) by mouth once daily.      calcium carbonate (CALCIUM 600 ORAL) Take 1 tablet by mouth once daily.      carBAMazepine XR (TEGretol XR) 100 mg 12 hr tablet Take 1 tablet (100 mg) by mouth 2 times a day. 180 tablet 3    cholecalciferol (Vitamin D-3) 50 MCG (2000 UT) tablet Take 1 tablet (50 mcg) by mouth once daily.      cyanocobalamin (Vitamin B-12) 100 mcg tablet Take 1 tablet (100 mcg) by mouth once daily.      dexAMETHasone (Decadron) 4 mg tablet Take 4 mg (1 tablet) by mouth twice daily the day before treatment, once the evening of treatment, and twice daily the day after treatment. 5 tablet 5    estradiol (Estrace) 0.01 % (0.1 mg/gram) vaginal cream Insert 0.25 Applicatorful (1 g) into the vagina 2 times a week. 42.5 g " 3    folic acid (Folvite) 1 mg tablet Take 1 tablet (1,000 mcg) by mouth once daily. 30 tablet 0    OLANZapine (ZyPREXA) 5 mg tablet Take 1 tablet (5 mg) by mouth once daily at bedtime. For 4 days starting the evening of treatment 4 tablet 3    ondansetron (Zofran) 8 mg tablet Take 1 tablet (8 mg) by mouth every 8 hours if needed for nausea or vomiting. 30 tablet 5    PHENobarbitaL 64.8 mg tablet TAKE 1 TABLET BY MOUTH EVERY MORNING AND TAKE 2 TABLETS BY MOUTH EVERY EVENING 270 tablet 1    prochlorperazine (Compazine) 10 mg tablet Take 1 tablet (10 mg) by mouth every 6 hours if needed for nausea or vomiting. 30 tablet 5    Tegretol  mg 12 hr tablet Take 1 tablet (400 mg) by mouth 2 times a day. Do not crush, chew, or split. 180 tablet 3    zoledronic acid (Reclast) 5 mg/100 mL piggyback Infuse 100 mL (5 mg) at 400 mL/hr over 15 minutes into a venous catheter 1 time for 1 dose. (Patient not taking: Reported on 10/24/2024) 100 mL 0    [DISCONTINUED] folic acid (Folvite) 1 mg tablet Take 1 tablet (1,000 mcg) by mouth once daily. 30 tablet 11     No current facility-administered medications on file prior to visit.       Past Medical History:   Diagnosis Date    Abdominal pain 2024    Achilles tendinitis, unspecified leg     Achilles tendinitis    Acute maxillary sinusitis, unspecified 2018    Acute maxillary sinusitis    Acute nasopharyngitis (common cold) 2016    Common cold    Adenocarcinoma of lung (Multi)     Age-related osteoporosis without current pathological fracture 2015    Encounter for ongoing osteoporosis therapy, bisphosphonates    Complete or unspecified spontaneous  without complication         Dyspnea, unspecified 2019    Paroxysmal dyspnea    Encounter for general adult medical examination without abnormal findings 2018    Annual physical exam    Encounter for health counseling related to travel 2018    Counseling about travel    Encounter  for screening for lipoid disorders 08/05/2019    Screening cholesterol level    Epigastric pain 08/26/2020    Dyspepsia    GERD (gastroesophageal reflux disease)     Influenza 12/06/2024    Irritable bowel syndrome     Left patella fracture 09/05/2023    Mastodynia 05/12/2021    Breast pain, right    Otalgia, right ear 03/07/2020    Otalgia, right    Other general symptoms and signs 01/18/2018    Flu-like symptoms    Other microscopic hematuria 09/19/2017    Hematuria, microscopic    Pain in right hand 03/04/2019    Pain of right hand    Pain in right knee 08/05/2019    Knee pain, right    Pain in unspecified shoulder     Shoulder pain    Personal history of (healed) traumatic fracture 10/30/2020    History of fracture of phalanx of toe    Personal history of other diseases of the digestive system 08/16/2018    History of gastroenteritis    Personal history of other diseases of the musculoskeletal system and connective tissue 12/27/2016    History of low back pain    Personal history of other diseases of the musculoskeletal system and connective tissue 10/19/2020    History of osteopenia    Personal history of other diseases of the nervous system and sense organs 08/05/2019    Personal history of seizure disorder    Personal history of other diseases of the respiratory system 05/13/2022    History of acute sinusitis    Personal history of other diseases of the respiratory system 10/30/2020    History of chronic rhinitis    Personal history of other diseases of the respiratory system 04/19/2017    History of influenza    Personal history of other diseases of the respiratory system 03/07/2020    History of viral pharyngitis    Personal history of other infectious and parasitic diseases     History of varicella    Personal history of other medical treatment 11/02/2018    History of screening mammography    Personal history of other specified conditions     History of headache    Personal history of other specified  conditions 03/24/2020    History of abnormal mammogram    Personal history of other specified conditions 03/02/2018    History of fatigue    Personal history of other specified conditions 09/19/2017    History of dysuria    Personal history of other specified conditions 08/26/2020    History of diarrhea    Personal history of other specified conditions 03/03/2020    History of abdominal pain    Personal history of other specified conditions 01/11/2019    History of chest pain    Personal history of urinary (tract) infections 11/07/2016    History of urinary tract infection    Personal history of urinary (tract) infections 07/02/2018    History of urinary tract infection    Pregnancy with abortive outcome 12/06/2024    Pulmonary nodules     Bilateral    Seizure disorder (Multi)     Strain of muscle(s) and tendon(s) of peroneal muscle group at lower leg level, left leg, initial encounter 11/05/2020    Strain of peroneal tendon of left foot, initial encounter    Unspecified fracture of left toe(s), initial encounter for closed fracture 10/30/2020    Fracture of proximal phalanx of toe of left foot    Unspecified injury of left wrist, hand and finger(s), initial encounter 01/05/2021    Left wrist injury    Unspecified injury of unspecified foot, initial encounter     Foot injury    Urinary tract infection, site not specified 07/02/2018    Acute UTI    Urinary tract infection, site not specified 11/07/2016    Acute UTI        Past Surgical History:   Procedure Laterality Date    APPENDECTOMY  10/24/2013    Appendectomy    BREAST CYST ASPIRATION  1993    LAPAROSCOPY DIAGNOSTIC / BIOPSY / ASPIRATION / LYSIS  12/18/2014    Exploratory Laparoscopy    PELVIC LAPAROSCOPY      endometriosis    US GUIDED SOFT TISSUE BIOPSY  12/18/2023    US GUIDED SOFT TISSUE BIOPSY 12/18/2023 [facility] US        Family History   Problem Relation Name Age of Onset    Dementia Mother      Hypertension Mother      Diabetes type II Mother      Coronary  "artery disease Father      Other (Congestive heart failure) Father      Breast cancer Sister Linda Guzman     Breast cancer Sister Linda     Breast cancer Sister Analilia         Review of Systems   Constitutional:  Positive for activity change and fatigue. Negative for chills and fever.        See HPI   HENT:  Negative for dental problem and trouble swallowing.    Eyes:  Negative for pain and visual disturbance.        Wears glasses   Respiratory:  Negative for cough, shortness of breath and wheezing.    Cardiovascular:  Negative for chest pain, palpitations and leg swelling.   Gastrointestinal:  Positive for constipation. Negative for abdominal distention, abdominal pain, diarrhea and nausea.   Endocrine: Negative for cold intolerance and heat intolerance.   Genitourinary:  Negative for difficulty urinating.   Musculoskeletal:  Negative for back pain, gait problem, joint swelling and myalgias.   Skin:  Negative for color change, pallor, rash and wound.   Allergic/Immunologic: Negative for environmental allergies and food allergies.   Neurological:  Positive for seizures. Negative for dizziness, weakness and headaches.        Positive for seizure disorder   Hematological:  Negative for adenopathy. Does not bruise/bleed easily.   Psychiatric/Behavioral:  Negative for behavioral problems.         Positive for occasional insomnia   All other systems reviewed and are negative.      Objective   /70   Pulse 79   Ht 1.598 m (5' 2.9\")   Wt 60.2 kg (132 lb 12.8 oz)   SpO2 96%   BMI 23.60 kg/m²     Physical Exam  Constitutional:       General: She is not in acute distress.     Appearance: Normal appearance. She is not toxic-appearing.   HENT:      Head: Normocephalic and atraumatic.      Right Ear: Tympanic membrane, ear canal and external ear normal.      Left Ear: Tympanic membrane, ear canal and external ear normal.      Nose: Nose normal.      Mouth/Throat:      Mouth: Mucous membranes are moist.      Pharynx: " Oropharynx is clear.   Eyes:      Extraocular Movements: Extraocular movements intact.      Conjunctiva/sclera: Conjunctivae normal.      Pupils: Pupils are equal, round, and reactive to light.   Cardiovascular:      Rate and Rhythm: Normal rate and regular rhythm.      Pulses: Normal pulses.      Heart sounds: Normal heart sounds. No murmur heard.  Pulmonary:      Effort: Pulmonary effort is normal.      Breath sounds: Normal breath sounds. No wheezing.   Abdominal:      General: Bowel sounds are normal.      Palpations: Abdomen is soft.   Musculoskeletal:         General: No swelling. Normal range of motion.      Cervical back: Normal range of motion and neck supple.   Skin:     General: Skin is warm and dry.   Neurological:      General: No focal deficit present.      Mental Status: She is alert and oriented to person, place, and time. Mental status is at baseline.      Cranial Nerves: No cranial nerve deficit.      Motor: No weakness.   Psychiatric:         Mood and Affect: Mood normal.         Behavior: Behavior normal.         Thought Content: Thought content normal.         Judgment: Judgment normal.         Assessment/Plan   Problem List Items Addressed This Visit             ICD-10-CM    Chronic depression F32.A     Reportedly stable per patient. Provider to be notify for any new or worsening mood concerns         Complex partial seizure evolving to generalized seizure (Multi) G40.209     Last seizure was at least 20 years ago? She continues on Tegretol and phenobarbital with good success. Patient continues to follow with neurology routinely          Osteoporosis M81.0     Reclast infusions per rheumatology as appropriate.         Malignant neoplasm of lower lobe of right lung (Multi) C34.31     Recently diagnosed with adenocarcinoma of the lung s/p lobectomy & chemotherapy in 2024. Will be starting immunotherapy         Routine adult health maintenance - Primary Z00.00     Most recent blood work reviewed;  will continue to monitor as appropriate  - most recent colonoscopy completed in 2018 with recommendations to repeat scope in 10 years (2028)  -Most recent mammogram completed in August, 2024 with recommendations for annual screening (08/2025)

## 2024-12-11 PROBLEM — K21.9 ESOPHAGEAL REFLUX: Status: RESOLVED | Noted: 2023-10-28 | Resolved: 2024-12-11

## 2024-12-17 ENCOUNTER — TELEPHONE (OUTPATIENT)
Dept: HEMATOLOGY/ONCOLOGY | Facility: CLINIC | Age: 64
End: 2024-12-17
Payer: COMMERCIAL

## 2024-12-17 ENCOUNTER — SOCIAL WORK (OUTPATIENT)
Dept: CASE MANAGEMENT | Facility: HOSPITAL | Age: 64
End: 2024-12-17
Payer: COMMERCIAL

## 2024-12-17 NOTE — TELEPHONE ENCOUNTER
Per Rhoda she has a lump on her right jaw line.  Her  says it looks like a bite.  It is red and warm.  Very tender but no drainage.  It has a scab on top.  Per Terrance, apply warm compresses and let us know how it looks on Thursday.  Go to the ER or PCP if you develop fever , chills.  Rhoda did a techback.

## 2024-12-17 NOTE — PROGRESS NOTES
SW spoke to patient yesterday to discuss the need for critical illness paperwork to be sent in for her. SW has forms and has had CNP sign. Notes collected and with Pt signed permission these forms and notes were sent over to substantiate her critical illness for this benefit. SW faxed forms and informed Pt that they had been sent.

## 2024-12-20 ENCOUNTER — PHARMACY VISIT (OUTPATIENT)
Dept: PHARMACY | Facility: CLINIC | Age: 64
End: 2024-12-20
Payer: COMMERCIAL

## 2024-12-20 ENCOUNTER — HOSPITAL ENCOUNTER (OUTPATIENT)
Dept: RADIOLOGY | Facility: HOSPITAL | Age: 64
Discharge: HOME | End: 2024-12-20
Payer: COMMERCIAL

## 2024-12-20 DIAGNOSIS — Z98.890 HISTORY OF LUNG SURGERY: ICD-10-CM

## 2024-12-20 PROCEDURE — 71250 CT THORAX DX C-: CPT

## 2024-12-20 PROCEDURE — RXMED WILLOW AMBULATORY MEDICATION CHARGE

## 2024-12-26 ENCOUNTER — OFFICE VISIT (OUTPATIENT)
Dept: HEMATOLOGY/ONCOLOGY | Facility: CLINIC | Age: 64
End: 2024-12-26
Payer: COMMERCIAL

## 2024-12-26 ENCOUNTER — INFUSION (OUTPATIENT)
Dept: HEMATOLOGY/ONCOLOGY | Facility: CLINIC | Age: 64
End: 2024-12-26
Payer: COMMERCIAL

## 2024-12-26 VITALS
HEART RATE: 68 BPM | TEMPERATURE: 97 F | RESPIRATION RATE: 18 BRPM | WEIGHT: 130.84 LBS | DIASTOLIC BLOOD PRESSURE: 75 MMHG | BODY MASS INDEX: 23.25 KG/M2 | SYSTOLIC BLOOD PRESSURE: 139 MMHG | OXYGEN SATURATION: 99 %

## 2024-12-26 DIAGNOSIS — M81.0 AGE RELATED OSTEOPOROSIS, UNSPECIFIED PATHOLOGICAL FRACTURE PRESENCE: ICD-10-CM

## 2024-12-26 DIAGNOSIS — C34.90 ADENOCARCINOMA, LUNG, UNSPECIFIED LATERALITY (MULTI): ICD-10-CM

## 2024-12-26 DIAGNOSIS — C34.90 ADENOCARCINOMA, LUNG, UNSPECIFIED LATERALITY (MULTI): Primary | ICD-10-CM

## 2024-12-26 LAB
ALBUMIN SERPL BCP-MCNC: 4 G/DL (ref 3.4–5)
ALP SERPL-CCNC: 56 U/L (ref 33–136)
ALT SERPL W P-5'-P-CCNC: 8 U/L (ref 7–45)
ANION GAP SERPL CALC-SCNC: 12 MMOL/L (ref 10–20)
AST SERPL W P-5'-P-CCNC: 18 U/L (ref 9–39)
BASOPHILS # BLD AUTO: 0.02 X10*3/UL (ref 0–0.1)
BASOPHILS NFR BLD AUTO: 0.7 %
BILIRUB SERPL-MCNC: 0.3 MG/DL (ref 0–1.2)
BUN SERPL-MCNC: 15 MG/DL (ref 6–23)
CALCIUM SERPL-MCNC: 9.1 MG/DL (ref 8.6–10.3)
CHLORIDE SERPL-SCNC: 102 MMOL/L (ref 98–107)
CO2 SERPL-SCNC: 29 MMOL/L (ref 21–32)
CORTIS AM PEAK SERPL-MSCNC: 10.3 UG/DL (ref 5–20)
CREAT SERPL-MCNC: 0.54 MG/DL (ref 0.5–1.05)
EGFRCR SERPLBLD CKD-EPI 2021: >90 ML/MIN/1.73M*2
EOSINOPHIL # BLD AUTO: 0.03 X10*3/UL (ref 0–0.7)
EOSINOPHIL NFR BLD AUTO: 1 %
ERYTHROCYTE [DISTWIDTH] IN BLOOD BY AUTOMATED COUNT: 15.9 % (ref 11.5–14.5)
GLUCOSE SERPL-MCNC: 109 MG/DL (ref 74–99)
HCT VFR BLD AUTO: 32 % (ref 36–46)
HGB BLD-MCNC: 10.5 G/DL (ref 12–16)
IMM GRANULOCYTES # BLD AUTO: 0 X10*3/UL (ref 0–0.7)
IMM GRANULOCYTES NFR BLD AUTO: 0 % (ref 0–0.9)
LYMPHOCYTES # BLD AUTO: 0.92 X10*3/UL (ref 1.2–4.8)
LYMPHOCYTES NFR BLD AUTO: 31.1 %
MCH RBC QN AUTO: 33.2 PG (ref 26–34)
MCHC RBC AUTO-ENTMCNC: 32.8 G/DL (ref 32–36)
MCV RBC AUTO: 101 FL (ref 80–100)
MONOCYTES # BLD AUTO: 0.43 X10*3/UL (ref 0.1–1)
MONOCYTES NFR BLD AUTO: 14.5 %
NEUTROPHILS # BLD AUTO: 1.56 X10*3/UL (ref 1.2–7.7)
NEUTROPHILS NFR BLD AUTO: 52.7 %
NRBC BLD-RTO: 0 /100 WBCS (ref 0–0)
PLATELET # BLD AUTO: 297 X10*3/UL (ref 150–450)
POTASSIUM SERPL-SCNC: 3.6 MMOL/L (ref 3.5–5.3)
PROT SERPL-MCNC: 6.4 G/DL (ref 6.4–8.2)
RBC # BLD AUTO: 3.16 X10*6/UL (ref 4–5.2)
SODIUM SERPL-SCNC: 139 MMOL/L (ref 136–145)
TSH SERPL-ACNC: 1.48 MIU/L (ref 0.44–3.98)
WBC # BLD AUTO: 3 X10*3/UL (ref 4.4–11.3)

## 2024-12-26 PROCEDURE — 82533 TOTAL CORTISOL: CPT

## 2024-12-26 PROCEDURE — 85025 COMPLETE CBC W/AUTO DIFF WBC: CPT

## 2024-12-26 PROCEDURE — 99214 OFFICE O/P EST MOD 30 MIN: CPT | Performed by: NURSE PRACTITIONER

## 2024-12-26 PROCEDURE — 80053 COMPREHEN METABOLIC PANEL: CPT

## 2024-12-26 PROCEDURE — 96365 THER/PROPH/DIAG IV INF INIT: CPT | Mod: INF

## 2024-12-26 PROCEDURE — 82024 ASSAY OF ACTH: CPT

## 2024-12-26 PROCEDURE — 96413 CHEMO IV INFUSION 1 HR: CPT

## 2024-12-26 PROCEDURE — 1159F MED LIST DOCD IN RCRD: CPT | Performed by: NURSE PRACTITIONER

## 2024-12-26 PROCEDURE — 2500000004 HC RX 250 GENERAL PHARMACY W/ HCPCS (ALT 636 FOR OP/ED): Mod: JZ | Performed by: INTERNAL MEDICINE

## 2024-12-26 PROCEDURE — 99214 OFFICE O/P EST MOD 30 MIN: CPT | Mod: 25 | Performed by: NURSE PRACTITIONER

## 2024-12-26 PROCEDURE — 84443 ASSAY THYROID STIM HORMONE: CPT

## 2024-12-26 PROCEDURE — 1126F AMNT PAIN NOTED NONE PRSNT: CPT | Performed by: NURSE PRACTITIONER

## 2024-12-26 PROCEDURE — 1160F RVW MEDS BY RX/DR IN RCRD: CPT | Performed by: NURSE PRACTITIONER

## 2024-12-26 RX ORDER — DIPHENHYDRAMINE HYDROCHLORIDE 50 MG/ML
50 INJECTION INTRAMUSCULAR; INTRAVENOUS AS NEEDED
Status: DISCONTINUED | OUTPATIENT
Start: 2024-12-26 | End: 2024-12-26 | Stop reason: HOSPADM

## 2024-12-26 RX ORDER — EPINEPHRINE 0.3 MG/.3ML
0.3 INJECTION SUBCUTANEOUS EVERY 5 MIN PRN
Status: DISCONTINUED | OUTPATIENT
Start: 2024-12-26 | End: 2024-12-26 | Stop reason: HOSPADM

## 2024-12-26 RX ORDER — PROCHLORPERAZINE MALEATE 10 MG
10 TABLET ORAL EVERY 6 HOURS PRN
Status: CANCELLED | OUTPATIENT
Start: 2024-12-26

## 2024-12-26 RX ORDER — ALBUTEROL SULFATE 0.83 MG/ML
3 SOLUTION RESPIRATORY (INHALATION) AS NEEDED
Status: DISCONTINUED | OUTPATIENT
Start: 2024-12-26 | End: 2024-12-26 | Stop reason: HOSPADM

## 2024-12-26 RX ORDER — HEPARIN SODIUM,PORCINE/PF 10 UNIT/ML
50 SYRINGE (ML) INTRAVENOUS AS NEEDED
Status: DISCONTINUED | OUTPATIENT
Start: 2024-12-26 | End: 2024-12-26 | Stop reason: HOSPADM

## 2024-12-26 RX ORDER — FAMOTIDINE 10 MG/ML
20 INJECTION INTRAVENOUS ONCE AS NEEDED
Status: CANCELLED | OUTPATIENT
Start: 2024-12-26

## 2024-12-26 RX ORDER — DIPHENHYDRAMINE HYDROCHLORIDE 50 MG/ML
50 INJECTION INTRAMUSCULAR; INTRAVENOUS AS NEEDED
Status: CANCELLED | OUTPATIENT
Start: 2024-12-26

## 2024-12-26 RX ORDER — HEPARIN SODIUM,PORCINE/PF 10 UNIT/ML
50 SYRINGE (ML) INTRAVENOUS AS NEEDED
OUTPATIENT
Start: 2024-12-26

## 2024-12-26 RX ORDER — PROCHLORPERAZINE EDISYLATE 5 MG/ML
10 INJECTION INTRAMUSCULAR; INTRAVENOUS EVERY 6 HOURS PRN
Status: CANCELLED | OUTPATIENT
Start: 2024-12-26

## 2024-12-26 RX ORDER — FAMOTIDINE 10 MG/ML
20 INJECTION INTRAVENOUS ONCE AS NEEDED
Status: DISCONTINUED | OUTPATIENT
Start: 2024-12-26 | End: 2024-12-26 | Stop reason: HOSPADM

## 2024-12-26 RX ORDER — EPINEPHRINE 0.3 MG/.3ML
0.3 INJECTION SUBCUTANEOUS EVERY 5 MIN PRN
Status: CANCELLED | OUTPATIENT
Start: 2024-12-26

## 2024-12-26 RX ORDER — HEPARIN 100 UNIT/ML
500 SYRINGE INTRAVENOUS AS NEEDED
OUTPATIENT
Start: 2024-12-26

## 2024-12-26 RX ORDER — ALBUTEROL SULFATE 0.83 MG/ML
3 SOLUTION RESPIRATORY (INHALATION) AS NEEDED
Status: CANCELLED | OUTPATIENT
Start: 2024-12-26

## 2024-12-26 RX ORDER — HEPARIN 100 UNIT/ML
500 SYRINGE INTRAVENOUS AS NEEDED
Status: DISCONTINUED | OUTPATIENT
Start: 2024-12-26 | End: 2024-12-26 | Stop reason: HOSPADM

## 2024-12-26 RX ORDER — PROCHLORPERAZINE EDISYLATE 5 MG/ML
10 INJECTION INTRAMUSCULAR; INTRAVENOUS EVERY 6 HOURS PRN
Status: DISCONTINUED | OUTPATIENT
Start: 2024-12-26 | End: 2024-12-26 | Stop reason: HOSPADM

## 2024-12-26 RX ORDER — PROCHLORPERAZINE MALEATE 10 MG
10 TABLET ORAL EVERY 6 HOURS PRN
Status: DISCONTINUED | OUTPATIENT
Start: 2024-12-26 | End: 2024-12-26 | Stop reason: HOSPADM

## 2024-12-26 ASSESSMENT — PAIN SCALES - GENERAL: PAINLEVEL_OUTOF10: 0-NO PAIN

## 2024-12-26 NOTE — PROGRESS NOTES
Subjective:   Patient Name: Rhoda Pena    : 1960     MRN: [id number]     Age: 64 y.o.     Gender: female  Referring Provider: Dr. Santosh Falk (Thoracic Surgery)    CC: Management of Newly diagnosed stage IIB ([id number]) adenocarcinoma of lung, with multiple nodules within the RLL, PD-L1 5%. In-house NGS showed KRAS p.G12D ([id number] c.35G>A); TP53 p.B636Tnp*6 ([id number] c.626_627delGA); TP53 p.R306* ([id number] c.916C>T). MS status: cannot be determined. S/p RLL lobectomy on 2024. Surgical pathology showed tumor 4.5cm with very close margin. All LN (0/5) negative.    Presenting History (2024): At time of initial presentation a 64 y.o. female, former smoker (started at age 14, 1 pack per day, quitted at age 30) with a past medical history of osteoporosis, hx of seizure on Carbamazepine (has not had any seizure for 1 decade) referred for management of newly diagnosed RLL lung adenocarcinoma s/p RLL lobectomy.     She was firstly found to have some abnormalities in the RLL on the CT cardiac scoring on 2023. Subsequent follow up CT Chest (-) 2024 showed Stable area of mixed consolidation and ground-glass opacity in the medial RLL. Additional 0.7 cm solid nodule in the left lower lobe. This is indeterminate and further attention on follow-up imaging is recommended.    CT chest (-) on 2024 imilar appearance of a focal right lower lobe ground-glass and consolidative opacity with associated bronchiectasis. A slow growing neoplasm can not be excluded. Increased conspicuity of a 5 mm right lower lobe nodular ground-glass opacity and stable appearance of additional bilateral ground-glass nodular opacities measuring up to 8 mm in the left lower lobe, which may be followed on repeat imaging. Similar diffuse bilateral faint subpleural reticulations, which may represent chronic interstitial changes related to smoking, given patient's history.    2024: PET/CT showed 1. Minimal FDG avid  paravertebral consolidative/ground-glass opacity in the right lower lobe, favored to represent infectious/inflammatory process or atelectasis.  6 mm and 8 mm pulmonary nodules in the right and left lower lobes respectively are non FDG avid. Follow-up with dedicated CT of chest is recommended. 2. No other concerning FDG avid disease identified.    8/8/2024: RLL bronchoscpy with EBUS: RLL FNA showed Malignant cells derived from adenocarcinoma consistent with mucinous carcinoma.  LN 4R, 7 were negative.     She underwent RLL lobectomy and mediastinal lymphadenectomy with Dr. Falk on 8/29/2024. Pathology showing  Multiple separate tumor nodules are identified with similar morphology.  The tumor demonstrates a heterogenous mixture of invasive mucinous adenocarcinoma and approximately 10-15% demonstrates higher grade nuclear features and less intracytoplasmic mucin. By immunohistochemical staining, the tumor cells are strongly positive for CK7, focally positive for CDX2 and negative for TTF-1. While immunohistochemical staining is not entirely specific, then the findings are compatible with adenocarcinoma of pulmonary origin if other primary sites of origin have been excluded clinically. involving lung parenchyma, largest nodule measuring 4.5cm, all margins negative, adenocarcinoma is 1mm to nearest parenchymal resection margin. 0/5 LN negative (4R, 10R, 11R, 12R and 7). No VPI nor LVI. PD-L1 5%. In-house NGS showed KRAS p.G12D ([id number] c.35G>A); TP53 p.L496Uzy*6 ([id number] c.626_627delGA); TP53 p.R306* ([id number] c.916C>T). MS status: cannot be determined.    She presents for a consultation accompanied by her sister. She continues to have pain/discomfort from the surgical site but able to manage it at home. Otherwise denies any N/V. No fever or chills. No CP or SOB. Reports R ear pain.    Shx: no other drugs or EtOH. Works as [employer]. Lives with . She has 2 siblings.   Fhx: both sisters have breast cancer  (at age of 60, the other sister at 27). Mother side - uncle has unknown cancer.   Surgical history: appendectomy in 1979; lap hysterectomy 1990. L Wrist surgery in 2021.     Treatment Summary:  - 08/29/2024: Robot-assisted RLL lobectomy and mediastinal lymphadenectomy (Dr. Falk)  -10/03/2024:  C1 Cisplatin 60mg/m2 and Pemetrexed 450mg/m2 IV  -10/24/2024:  C2 Cisplatin 60mg/m2 and Pemetrexed 450mg/m2 IV  -11/14/2024:  C3 Cisplatin 60mg/m2 and Pemetrexed 450mg/m2 IV  -12/05/2024:  C4 Cisplatin 60mg/m2 and Pemetrexed 450mg/m2 IV  -12/26/2024:  C1 Atezolizumab 1200mg IV    Interval History (12/26/2024):    Patient presents to clinic for treatment readiness visit and toxicity check. She is now s/p 4 cycles of cisplatin + pemetrexed. Today we will proceed to cycle 1 adjuvant Atezolizumab planned every 21 days for 1 year.   Today she is accompanied by her sister. Her primary complaint is healing lesion on right cheek and mild right sided periorbital edema. Pt had contacted the office last week with nodule on right cheek. As discussed she applied warm compress and nodule drained clear fluid. Sit looks clean, dry and intact. Area with dry skin/scab. No warmth, erythrema or s/s infection of surrounding tissue. Fatigue improving, worse in afternoon. Planning on going back to the office January 2nd, has been working from home. She denies any fevers, chills or night sweats. No cough, chest pain or shortness of breath. No current nausea. No dysgeusia. No constipation or diarrhea. No urinary symptoms. No rash. No neuropathy. Chronic left shoulder pain, holding off on PT until after current treatments. Right chest wall discomfort at surgical site continues to improve.     Review of Systems:  A review of systems has been completed and are negative for complaints except what is stated in the HPI and/or past medical history      Medical History:   Past Medical History:   Diagnosis Date    Achilles tendinitis, unspecified leg      Achilles tendinitis    Acute maxillary sinusitis, unspecified 2018    Acute maxillary sinusitis    Acute nasopharyngitis (common cold) 2016    Common cold    Adenocarcinoma of lung (Multi)     Age-related osteoporosis without current pathological fracture 2015    Encounter for ongoing osteoporosis therapy, bisphosphonates    Complete or unspecified spontaneous  without complication         Dyspnea, unspecified 2019    Paroxysmal dyspnea    Encounter for general adult medical examination without abnormal findings 2018    Annual physical exam    Encounter for health counseling related to travel 2018    Counseling about travel    Encounter for screening for lipoid disorders 2019    Screening cholesterol level    Epigastric pain 2020    Dyspepsia    GERD (gastroesophageal reflux disease)     Irritable bowel syndrome     Left patella fracture 2023    Mastodynia 2021    Breast pain, right    Otalgia, right ear 2020    Otalgia, right    Other general symptoms and signs 2018    Flu-like symptoms    Other microscopic hematuria 2017    Hematuria, microscopic    Pain in right hand 2019    Pain of right hand    Pain in right knee 2019    Knee pain, right    Pain in unspecified shoulder     Shoulder pain    Personal history of (healed) traumatic fracture 10/30/2020    History of fracture of phalanx of toe    Personal history of other diseases of the digestive system 2018    History of gastroenteritis    Personal history of other diseases of the musculoskeletal system and connective tissue 2016    History of low back pain    Personal history of other diseases of the musculoskeletal system and connective tissue 10/19/2020    History of osteopenia    Personal history of other diseases of the nervous system and sense organs 2019    Personal history of seizure disorder    Personal history of other diseases of  the respiratory system 05/13/2022    History of acute sinusitis    Personal history of other diseases of the respiratory system 10/30/2020    History of chronic rhinitis    Personal history of other diseases of the respiratory system 04/19/2017    History of influenza    Personal history of other diseases of the respiratory system 03/07/2020    History of viral pharyngitis    Personal history of other infectious and parasitic diseases     History of varicella    Personal history of other medical treatment 11/02/2018    History of screening mammography    Personal history of other specified conditions     History of headache    Personal history of other specified conditions 03/24/2020    History of abnormal mammogram    Personal history of other specified conditions 03/02/2018    History of fatigue    Personal history of other specified conditions 09/19/2017    History of dysuria    Personal history of other specified conditions 08/26/2020    History of diarrhea    Personal history of other specified conditions 03/03/2020    History of abdominal pain    Personal history of other specified conditions 01/11/2019    History of chest pain    Personal history of urinary (tract) infections 11/07/2016    History of urinary tract infection    Personal history of urinary (tract) infections 07/02/2018    History of urinary tract infection    Pulmonary nodules     Bilateral    Seizure disorder (Multi)     Strain of muscle(s) and tendon(s) of peroneal muscle group at lower leg level, left leg, initial encounter 11/05/2020    Strain of peroneal tendon of left foot, initial encounter    Unspecified fracture of left toe(s), initial encounter for closed fracture 10/30/2020    Fracture of proximal phalanx of toe of left foot    Unspecified injury of left wrist, hand and finger(s), initial encounter 01/05/2021    Left wrist injury    Unspecified injury of unspecified foot, initial encounter     Foot injury    Urinary tract infection,  site not specified 07/02/2018    Acute UTI    Urinary tract infection, site not specified 11/07/2016    Acute UTI       Surgical History:   Past Surgical History:   Procedure Laterality Date    APPENDECTOMY  10/24/2013    Appendectomy    BREAST CYST ASPIRATION  1993    LAPAROSCOPY DIAGNOSTIC / BIOPSY / ASPIRATION / LYSIS  12/18/2014    Exploratory Laparoscopy    PELVIC LAPAROSCOPY      endometriosis    US GUIDED SOFT TISSUE BIOPSY  12/18/2023    US GUIDED SOFT TISSUE BIOPSY 12/18/2023 [facility] US       Family History:  Family History   Problem Relation Name Age of Onset    Dementia Mother      Hypertension Mother      Diabetes type II Mother      Coronary artery disease Father      Other (Congestive heart failure) Father      Breast cancer Sister Linda Guzman     Breast cancer Sister Linda     Breast cancer Sister Analilia        Allergies:  Allergies   Allergen Reactions    Phenytoin Other     double vision    Codeine Hallucinations     AUDITORY HALLUCINATIONS       Meds (Current):    Current Outpatient Medications:     ascorbic acid (Vitamin C) 1,000 mg tablet, Take 1 tablet (1,000 mg) by mouth once daily., Disp: , Rfl:     calcium carbonate (CALCIUM 600 ORAL), Take 1 tablet by mouth once daily., Disp: , Rfl:     carBAMazepine XR (TEGretol XR) 100 mg 12 hr tablet, Take 1 tablet (100 mg) by mouth 2 times a day., Disp: 180 tablet, Rfl: 3    cholecalciferol (Vitamin D-3) 50 MCG (2000 UT) tablet, Take 1 tablet (50 mcg) by mouth once daily., Disp: , Rfl:     estradiol (Estrace) 0.01 % (0.1 mg/gram) vaginal cream, Insert 0.25 Applicatorful (1 g) into the vagina 2 times a week., Disp: 42.5 g, Rfl: 3    PHENobarbitaL 64.8 mg tablet, TAKE 1 TABLET BY MOUTH EVERY MORNING AND TAKE 2 TABLETS BY MOUTH EVERY EVENING, Disp: 270 tablet, Rfl: 1    Tegretol  mg 12 hr tablet, Take 1 tablet (400 mg) by mouth 2 times a day. Do not crush, chew, or split., Disp: 180 tablet, Rfl: 3    cyanocobalamin (Vitamin B-12) 100 mcg tablet, Take 1  tablet (100 mcg) by mouth once daily. (Patient not taking: Reported on 2024), Disp: , Rfl:     ondansetron (Zofran) 8 mg tablet, Take 1 tablet (8 mg) by mouth every 8 hours if needed for nausea or vomiting. (Patient not taking: Reported on 2024), Disp: 30 tablet, Rfl: 5    prochlorperazine (Compazine) 10 mg tablet, Take 1 tablet (10 mg) by mouth every 6 hours if needed for nausea or vomiting., Disp: 30 tablet, Rfl: 5    zoledronic acid (Reclast) 5 mg/100 mL piggyback, Infuse 100 mL (5 mg) at 400 mL/hr over 15 minutes into a venous catheter 1 time for 1 dose. (Patient not taking: Reported on 2024), Disp: 100 mL, Rfl: 0      Performance Status (ECOG): 1    ECOG Definition  0 Fully active; no performance restrictions.  1 Strenuous physical activity restricted; fully ambulatory and able to carry out light work.  2 Capable of all self-care but unable to carry out any work activities. Up and about >50% of waking hours.  3 Capable of only limited self-care; confined to bed or chair >50% of waking hours.  4 Completely disabled; cannot carry out any self-care; totally confined to bed or chair.    Exam:  /75 (BP Location: Right arm, Patient Position: Sitting, BP Cuff Size: Adult long)   Pulse 68   Temp 36.1 °C (97 °F) (Temporal)   Resp 18   Wt 59.4 kg (130 lb 13.5 oz)   SpO2 99%   BMI 23.25 kg/m²     Wt Readings from Last 5 Encounters:   24 59.4 kg (130 lb 13.5 oz)   24 60.2 kg (132 lb 12.8 oz)   24 59.2 kg (130 lb 8.2 oz)   24 59 kg (130 lb)   24 59.1 kg (130 lb 2.9 oz)     Physical Exam:  ECO  Pain: chronic left shoulder pain. Right chest wall post op pain improving  Constitutional: Well developed, awake/alert/oriented x3, no distress, alert and cooperative  Eyes: PER. sclera anicteric  ENMT: Oral mucosa moist, no lesions or thrush identified. Right cheek healing dry scab.   Respiratory/Thorax: Breathing is non-labored. Lungs are clear to auscultation  bilaterally. No adventitious breath sounds. Hx RLL lobectomy   Cardiovascular: S1-S2. Regular rate and rhythm. No murmurs, rubs, or gallops appreciated  Gastrointestinal: Abdomen soft nontender, nondistended, normal active bowel sounds.  Musculoskeletal: ROM intact, no joint swelling, normal strength  Extremities: normal extremities, no cyanosis, no edema, no clubbing  Lymphatics: no palpable lymphadenopathy   Skin: no rash  Neurologic: alert and oriented x3. Nonfocal exam. No myoclonus  Psychological: Pleasant, appropriate and easily engaged     Labs:  Results from last 7 days   Lab Units 12/26/24  0856   WBC AUTO x10*3/uL 3.0*   HEMOGLOBIN g/dL 10.5*   HEMATOCRIT % 32.0*   PLATELETS AUTO x10*3/uL 297   NEUTROS ABS x10*3/uL 1.56   LYMPHS ABS AUTO x10*3/uL 0.92*   MONOS ABS AUTO x10*3/uL 0.43   EOS ABS AUTO x10*3/uL 0.03   NEUTROS PCT AUTO % 52.7   LYMPHS PCT AUTO % 31.1   MONOS PCT AUTO % 14.5   EOS PCT AUTO % 1.0      Results from last 7 days   Lab Units 12/26/24  0856   GLUCOSE mg/dL 109*   SODIUM mmol/L 139   POTASSIUM mmol/L 3.6   CHLORIDE mmol/L 102   CO2 mmol/L 29   BUN mg/dL 15   CREATININE mg/dL 0.54   EGFR mL/min/1.73m*2 >90   CALCIUM mg/dL 9.1   ALBUMIN g/dL 4.0   PROTEIN TOTAL g/dL 6.4   BILIRUBIN TOTAL mg/dL 0.3   ALK PHOS U/L 56   ALT U/L 8   AST U/L 18     Imaging:  CT Chest 12/20/2026  IMPRESSION:  1. Status post right lower lobectomy.  2. Small loculated pleural right lower hemithorax medially, decreased size to 09/27/2024.  3. Chronic interstitial changes of the lungs. However, there is an increase in the interstitial markings in the right lung, perhaps post radiation change.  4. 4 mm low-density right upper lobe nodule, unchanged compared to oldest chest CT dated 01/08/2024     Assessment/Plan:   64 y.o. female with past medical history as stated referred for management of newly diagnosed lung cancer discussing adjuvant treatment.     The patient's records and imaging have been reviewed in detail.   MD discussed with the patient the natural history of their disease at length.  The following has also been discussed with the patient:    # Cancer:  stage IIB ([id number]) adenocarcinoma of lung, with multiple nodules within the RLL s/p RLL lobectomy, tumor measuring 4.5cm. All LN negative. PD-L1 5%. In-house NGS showed KRAS p.G12D ([id number] c.35G>A); TP53 p.P465Wnl*6 (NM_000546 c.626_627delGA); TP53 p.R306* (NM_000546 c.916C>T). MS status: cannot be determined. Tumor cells are strongly positive for CK7, focally positive for CDX2 and negative for TTF-1. While immunohistochemical staining is not entirely specific, then the findings are compatible with adenocarcinoma of pulmonary origin if other primary sites of origin have been excluded clinically.     S/p RLL lobectomy on 8/24/2024. We therefore discussed the concept of curative intent, adjuvant platinum doublet including risk/benefits of cisplatin vs. carboplatin and different paired chemotherapies within 4-6 weeks post surgery.  I favor cisplatin 60mg/m2 IV D#1 & pemetrexed (Alimta) 450 mg/m2 IV D#1 every 21 days for 4 planned cycles based on tolerance and disease response. This analysis included 5 studies with approximately 4600 patients having completely resected NSCLC and  showed that adjuvant chemotherapy is associated 5% decreased risk of death due to any cause at 5 years. (LACE meta-analysis Xu et al. JCO. 2016) Stage IB patients treated with chemotherapy demonstrated a hazard ratio of 0.93 (95% CI, 0.78 to 1.10).  CALGB 9633 showed survival advantage for tumors >4 cm treated with adjuvant carboplatin and paclitaxel (HR 0.82, 90% CI 0.65-1.04). However, the overall benefit on overall survival is a modest 3-4%.   Side effects including but not limited to pain, fatigue, anorexia, nausea, vomiting, diarrhea, stomatitis, electrolyte disturbances, anemia, neutropenia, thrombocytopenia, generalized weakness, ototoxicity, nephrotoxicity, and skin rash were  reviewed.   Adequate hydration and monitoring of urine output and hearing reviewed.  We reviewed initiation of vitamin B12 1000mg IM injection q9 weeks, folic acid (folate) 1mg orally daily, and dexamethasone (Decadron) 4mg orally twice daily to start on the day before, day of, and day after chemotherapy to help prevent cytopenias and improve nausea control.  Written information provided.  All questions answered.  Informed consent signed.  Planned initiation in next few weeks. C1 10/3/24.    Given disease stage status and positive PDL-1 expression (>=1%), I recommend additional adjuvant atezolizumab following adjuvant chemotherapy.  We specifically reviewed 1200mg IV every 3 weeks for 1 year post chemotherapy. Started 2024. This is based on phase III Impower 010 (John et al, Lancet ). Additional adjuvant atezolizumab decreased the risk of recurrence, new primary NSCLC or death by 34% compared to best supportive care in stage II-IIIA population whose tumor expressed PDL1 1% or more (HR=0.66; 0.50-0.88). Updated results also suggested OS benefit with (HR=0.71; 95%CI: 0.49-1.03) (John et al. .Lancet ). Side effects including but not limited to fatigue, anorexia, nausea, vomiting, diarrhea, constipation, cytopenias, electrolyte abnormalities, liver and/or kidney dysfunction, skin reaction, pneumonitis, colitis, hyper/hypothyroidism, diabetes reviewed.  Will start after chemotherapy.     - Imagin2024: PET/CT showed 1. Minimal FDG avid paravertebral consolidative/ground-glass opacity in the right lower lobe, favored to represent infectious/inflammatory process or atelectasis.  6 mm and 8 mm pulmonary nodules in the right and left lower lobes respectively are non FDG avid. Follow-up with dedicated CT of chest is recommended. 2. No other concerning FDG avid disease identified. Brain Mri 2024: No brain mets. Will repeat CT Chest (-) after 4 cycles of chemotherapy     - Interval Imaging  12/20/2024: CT Chest - IMPRESSION:  1. Status post right lower lobectomy. 2. Small loculated pleural right lower hemithorax medially, decreased  size to 09/27/2024. 3. Chronic interstitial changes of the lungs. However, there is an increase in the interstitial markings in the right lung, perhaps post radiation change. 4. 4 mm low-density right upper lobe nodule, unchanged compared to oldest chest CT dated 01/08/2024      # Seizure disorder: firstly found when she was in her teens, and she has been on both Carbamazepine and Phenobarbital. Following with Dr. Baker who is going to retire soon. However she is hesitant to switch to keppra.    # Colitis: in the setting of chemotherapy and stool softener. On Augmentin. Will prescribe immodium. She is aware to call is if symptoms persist or having fevers. Resolved.    # R ear pain: ofloxacin ear drop prescribed. Advised to follow up with ENT if not improving in the next week. Resolved.    # Clinical Trials:  None currently.     # Access: Mediport placed by IR in 10/2024.    # Pain: Surgical related pain. Well controlled.     # Bone Health: No signs of bony disease.     # Psychosocial: Coping well, no acute issues.  Good support from family & friends.  Social work support available.    # GI/CINV: No acute issues.  Eating and voiding well.  Nutrition consult available.    # Smoking: N/A, former smoker.    # Fertility: N/A.        # Heme/ESAs:  Normocytic anemia likely 2/2 to chemo. Continue to monitor.     # GOC: Patient is aware of curative intent goals of care.    # Language: English.    # Interdisciplinary Patient/Family Education Record:  Learner:  Patient.  Learning Needs Reviewed:  Treatments, Medications, Disease Process, Diagnostic Tests.  Barriers: None.  Teaching Method: Discussion, Handout(s).  Comprehension:  Verbalized Understanding.  Follow-up Plan:  Return to office as per MD.    # Dispo:   RTC in 3 weeks with plans for cycle 2 Atezolizumab  Plan to repeat  alvaro in 3 months (after cycle 4)      Terrance Millan, APRN-CNP  [facility]  Thoracic + [facility]&N Medical Oncology     I spent a total of 25+ minutes on the date of the service which included preparing to see the patient, face-to-face patient care, completing clinical documentation, obtaining and / or reviewing separately obtained history, counseling and educating the patient/family/caregiver, ordering medications, tests, or procedures, communicating with other healthcare providers (not separately reported), independently interpreting results, not separately reported, and communicating results to the patient/family/caregiver, Name and date of birth verified.

## 2024-12-26 NOTE — PROGRESS NOTES
Patient here for follow up visit with Terrance Millan for Dx of  lung cancer prior to her first infusion of Atezolizumab. Patient here with sister     Medications and Allergies reviewed and reconciled this visit.    Pt reports quarter sized red rash to right side of jhoana. It is red in color, denies pain or itching. She stated every morning it drains clear fluid. It is dry at time of visit.  She has swelling under right eye and reported some vision changes as well as excessive watering to that eye last week.    Education and pamphlet given for Atezolizumab.     Pt reports appetite is  good and she has gained colten.   Pt denies N/V/D.    Pt denies pain.      Follow up per NP  request.    Pt. reports availability and use of mychart, Reviewed this is a good place to communicate with the team as well as review labs and upcoming orders.     No barriers to education noted, patient agrees to current plan and verbalized understanding using teach back method.

## 2024-12-27 ENCOUNTER — APPOINTMENT (OUTPATIENT)
Dept: RADIOLOGY | Facility: HOSPITAL | Age: 64
End: 2024-12-27
Payer: COMMERCIAL

## 2024-12-27 ENCOUNTER — HOSPITAL ENCOUNTER (EMERGENCY)
Facility: HOSPITAL | Age: 64
Discharge: OTHER INSTITUTION | End: 2024-12-27
Attending: EMERGENCY MEDICINE
Payer: COMMERCIAL

## 2024-12-27 ENCOUNTER — HOSPITAL ENCOUNTER (INPATIENT)
Facility: HOSPITAL | Age: 64
End: 2024-12-27
Attending: PSYCHIATRY & NEUROLOGY | Admitting: PSYCHIATRY & NEUROLOGY
Payer: COMMERCIAL

## 2024-12-27 VITALS
RESPIRATION RATE: 17 BRPM | TEMPERATURE: 97.5 F | WEIGHT: 132 LBS | SYSTOLIC BLOOD PRESSURE: 145 MMHG | DIASTOLIC BLOOD PRESSURE: 89 MMHG | BODY MASS INDEX: 23.39 KG/M2 | HEIGHT: 63 IN | OXYGEN SATURATION: 97 % | HEART RATE: 73 BPM

## 2024-12-27 DIAGNOSIS — R29.898 RIGHT ARM WEAKNESS: Primary | ICD-10-CM

## 2024-12-27 DIAGNOSIS — Q21.12 PFO (PATENT FORAMEN OVALE) (HHS-HCC): ICD-10-CM

## 2024-12-27 DIAGNOSIS — G81.91 RIGHT HEMIPARESIS (MULTI): ICD-10-CM

## 2024-12-27 DIAGNOSIS — I63.9 CEREBRAL INFARCTION, UNSPECIFIED: Primary | ICD-10-CM

## 2024-12-27 LAB
ALBUMIN SERPL BCP-MCNC: 4.4 G/DL (ref 3.4–5)
ALP SERPL-CCNC: 61 U/L (ref 33–136)
ALT SERPL W P-5'-P-CCNC: 9 U/L (ref 7–45)
ANION GAP SERPL CALCULATED.3IONS-SCNC: 12 MMOL/L (ref 10–20)
APPEARANCE UR: CLEAR
AST SERPL W P-5'-P-CCNC: 19 U/L (ref 9–39)
BASOPHILS # BLD AUTO: 0.01 X10*3/UL (ref 0–0.1)
BASOPHILS NFR BLD AUTO: 0.3 %
BILIRUB SERPL-MCNC: 0.3 MG/DL (ref 0–1.2)
BILIRUB UR STRIP.AUTO-MCNC: NEGATIVE MG/DL
BUN SERPL-MCNC: 16 MG/DL (ref 6–23)
CALCIUM SERPL-MCNC: 9.7 MG/DL (ref 8.6–10.3)
CHLORIDE SERPL-SCNC: 100 MMOL/L (ref 98–107)
CO2 SERPL-SCNC: 28 MMOL/L (ref 21–32)
COLOR UR: YELLOW
CREAT SERPL-MCNC: 0.58 MG/DL (ref 0.5–1.05)
EGFRCR SERPLBLD CKD-EPI 2021: >90 ML/MIN/1.73M*2
EOSINOPHIL # BLD AUTO: 0.02 X10*3/UL (ref 0–0.7)
EOSINOPHIL NFR BLD AUTO: 0.5 %
ERYTHROCYTE [DISTWIDTH] IN BLOOD BY AUTOMATED COUNT: 15.8 % (ref 11.5–14.5)
GLUCOSE SERPL-MCNC: 92 MG/DL (ref 74–99)
GLUCOSE UR STRIP.AUTO-MCNC: NORMAL MG/DL
HCT VFR BLD AUTO: 35.5 % (ref 36–46)
HGB BLD-MCNC: 11.6 G/DL (ref 12–16)
IMM GRANULOCYTES # BLD AUTO: 0.01 X10*3/UL (ref 0–0.7)
IMM GRANULOCYTES NFR BLD AUTO: 0.3 % (ref 0–0.9)
KETONES UR STRIP.AUTO-MCNC: NEGATIVE MG/DL
LEUKOCYTE ESTERASE UR QL STRIP.AUTO: NEGATIVE
LYMPHOCYTES # BLD AUTO: 1.06 X10*3/UL (ref 1.2–4.8)
LYMPHOCYTES NFR BLD AUTO: 28.6 %
MCH RBC QN AUTO: 33.2 PG (ref 26–34)
MCHC RBC AUTO-ENTMCNC: 32.7 G/DL (ref 32–36)
MCV RBC AUTO: 102 FL (ref 80–100)
MONOCYTES # BLD AUTO: 0.48 X10*3/UL (ref 0.1–1)
MONOCYTES NFR BLD AUTO: 13 %
NEUTROPHILS # BLD AUTO: 2.12 X10*3/UL (ref 1.2–7.7)
NEUTROPHILS NFR BLD AUTO: 57.3 %
NITRITE UR QL STRIP.AUTO: NEGATIVE
NRBC BLD-RTO: 0 /100 WBCS (ref 0–0)
PH UR STRIP.AUTO: 5.5 [PH]
PHENOBARB SERPL-MCNC: 41 UG/ML (ref 10–40)
PLATELET # BLD AUTO: 304 X10*3/UL (ref 150–450)
POTASSIUM SERPL-SCNC: 3.7 MMOL/L (ref 3.5–5.3)
PROT SERPL-MCNC: 7.5 G/DL (ref 6.4–8.2)
PROT UR STRIP.AUTO-MCNC: NEGATIVE MG/DL
RBC # BLD AUTO: 3.49 X10*6/UL (ref 4–5.2)
RBC # UR STRIP.AUTO: NEGATIVE /UL
SODIUM SERPL-SCNC: 136 MMOL/L (ref 136–145)
SP GR UR STRIP.AUTO: 1.02
TSH SERPL-ACNC: 1.5 MIU/L (ref 0.44–3.98)
UROBILINOGEN UR STRIP.AUTO-MCNC: NORMAL MG/DL
WBC # BLD AUTO: 3.7 X10*3/UL (ref 4.4–11.3)

## 2024-12-27 PROCEDURE — 1200000002 HC GENERAL ROOM WITH TELEMETRY DAILY

## 2024-12-27 PROCEDURE — 80184 ASSAY OF PHENOBARBITAL: CPT

## 2024-12-27 PROCEDURE — 84443 ASSAY THYROID STIM HORMONE: CPT

## 2024-12-27 PROCEDURE — 81003 URINALYSIS AUTO W/O SCOPE: CPT

## 2024-12-27 PROCEDURE — 85025 COMPLETE CBC W/AUTO DIFF WBC: CPT

## 2024-12-27 PROCEDURE — 2500000001 HC RX 250 WO HCPCS SELF ADMINISTERED DRUGS (ALT 637 FOR MEDICARE OP): Performed by: EMERGENCY MEDICINE

## 2024-12-27 PROCEDURE — 80053 COMPREHEN METABOLIC PANEL: CPT | Performed by: EMERGENCY MEDICINE

## 2024-12-27 PROCEDURE — 85025 COMPLETE CBC W/AUTO DIFF WBC: CPT | Performed by: EMERGENCY MEDICINE

## 2024-12-27 PROCEDURE — 99285 EMERGENCY DEPT VISIT HI MDM: CPT | Mod: 25 | Performed by: EMERGENCY MEDICINE

## 2024-12-27 PROCEDURE — 99223 1ST HOSP IP/OBS HIGH 75: CPT

## 2024-12-27 PROCEDURE — 81003 URINALYSIS AUTO W/O SCOPE: CPT | Performed by: EMERGENCY MEDICINE

## 2024-12-27 PROCEDURE — 70450 CT HEAD/BRAIN W/O DYE: CPT

## 2024-12-27 PROCEDURE — 70450 CT HEAD/BRAIN W/O DYE: CPT | Performed by: STUDENT IN AN ORGANIZED HEALTH CARE EDUCATION/TRAINING PROGRAM

## 2024-12-27 RX ORDER — CARBAMAZEPINE 100 MG/1
100 TABLET, EXTENDED RELEASE ORAL 2 TIMES DAILY
Status: DISCONTINUED | OUTPATIENT
Start: 2024-12-27 | End: 2024-12-31 | Stop reason: HOSPADM

## 2024-12-27 RX ORDER — DEXTROSE 50 % IN WATER (D50W) INTRAVENOUS SYRINGE
12.5
Status: CANCELLED | OUTPATIENT
Start: 2024-12-27

## 2024-12-27 RX ORDER — INSULIN LISPRO 100 [IU]/ML
0-5 INJECTION, SOLUTION INTRAVENOUS; SUBCUTANEOUS
Status: CANCELLED | OUTPATIENT
Start: 2024-12-28

## 2024-12-27 RX ORDER — LORAZEPAM 2 MG/ML
2 INJECTION INTRAMUSCULAR EVERY 4 HOURS PRN
Status: DISCONTINUED | OUTPATIENT
Start: 2024-12-27 | End: 2024-12-31 | Stop reason: HOSPADM

## 2024-12-27 RX ORDER — PHENOBARBITAL 32.4 MG/1
129.6 TABLET ORAL NIGHTLY
Status: DISCONTINUED | OUTPATIENT
Start: 2024-12-27 | End: 2024-12-31 | Stop reason: HOSPADM

## 2024-12-27 RX ORDER — POLYETHYLENE GLYCOL 3350 17 G/17G
17 POWDER, FOR SOLUTION ORAL DAILY
Status: CANCELLED | OUTPATIENT
Start: 2024-12-27

## 2024-12-27 RX ORDER — ACETAMINOPHEN 325 MG/1
650 TABLET ORAL EVERY 4 HOURS PRN
Status: CANCELLED | OUTPATIENT
Start: 2024-12-27

## 2024-12-27 RX ORDER — DEXTROSE 50 % IN WATER (D50W) INTRAVENOUS SYRINGE
25
Status: CANCELLED | OUTPATIENT
Start: 2024-12-27

## 2024-12-27 RX ORDER — CHOLECALCIFEROL (VITAMIN D3) 25 MCG
2000 TABLET ORAL DAILY
Status: DISCONTINUED | OUTPATIENT
Start: 2024-12-28 | End: 2024-12-31 | Stop reason: HOSPADM

## 2024-12-27 RX ORDER — ACETAMINOPHEN 160 MG/5ML
650 SOLUTION ORAL EVERY 4 HOURS PRN
Status: CANCELLED | OUTPATIENT
Start: 2024-12-27

## 2024-12-27 RX ORDER — ASPIRIN 325 MG
325 TABLET ORAL ONCE
Status: COMPLETED | OUTPATIENT
Start: 2024-12-27 | End: 2024-12-27

## 2024-12-27 RX ORDER — ENOXAPARIN SODIUM 100 MG/ML
40 INJECTION SUBCUTANEOUS EVERY 24 HOURS
Status: DISCONTINUED | OUTPATIENT
Start: 2024-12-27 | End: 2024-12-29

## 2024-12-27 RX ORDER — PHENOBARBITAL 32.4 MG/1
64.8 TABLET ORAL 2 TIMES DAILY
Status: DISCONTINUED | OUTPATIENT
Start: 2024-12-27 | End: 2024-12-27

## 2024-12-27 RX ORDER — ACETAMINOPHEN 160 MG/5ML
650 SOLUTION ORAL EVERY 4 HOURS PRN
Status: DISCONTINUED | OUTPATIENT
Start: 2024-12-27 | End: 2024-12-31 | Stop reason: HOSPADM

## 2024-12-27 RX ORDER — PHENOBARBITAL 32.4 MG/1
64.8 TABLET ORAL EVERY MORNING
Status: DISCONTINUED | OUTPATIENT
Start: 2024-12-28 | End: 2024-12-31 | Stop reason: HOSPADM

## 2024-12-27 RX ORDER — CARBAMAZEPINE 400 MG/1
400 TABLET, EXTENDED RELEASE ORAL 2 TIMES DAILY
Status: DISCONTINUED | OUTPATIENT
Start: 2024-12-27 | End: 2024-12-31 | Stop reason: HOSPADM

## 2024-12-27 RX ORDER — POLYETHYLENE GLYCOL 3350 17 G/17G
17 POWDER, FOR SOLUTION ORAL DAILY
Status: DISCONTINUED | OUTPATIENT
Start: 2024-12-28 | End: 2024-12-31 | Stop reason: HOSPADM

## 2024-12-27 RX ORDER — ENOXAPARIN SODIUM 100 MG/ML
40 INJECTION SUBCUTANEOUS EVERY 24 HOURS
Status: CANCELLED | OUTPATIENT
Start: 2024-12-27

## 2024-12-27 RX ORDER — ACETAMINOPHEN 325 MG/1
650 TABLET ORAL EVERY 4 HOURS PRN
Status: DISCONTINUED | OUTPATIENT
Start: 2024-12-27 | End: 2024-12-31 | Stop reason: HOSPADM

## 2024-12-27 RX ADMIN — ASPIRIN 325 MG: 325 TABLET ORAL at 18:40

## 2024-12-27 SDOH — SOCIAL STABILITY: SOCIAL INSECURITY: WITHIN THE LAST YEAR, HAVE YOU BEEN HUMILIATED OR EMOTIONALLY ABUSED IN OTHER WAYS BY YOUR PARTNER OR EX-PARTNER?: NO

## 2024-12-27 SDOH — SOCIAL STABILITY: SOCIAL NETWORK
DO YOU BELONG TO ANY CLUBS OR ORGANIZATIONS SUCH AS CHURCH GROUPS, UNIONS, FRATERNAL OR ATHLETIC GROUPS, OR SCHOOL GROUPS?: NO

## 2024-12-27 SDOH — SOCIAL STABILITY: SOCIAL INSECURITY: DO YOU FEEL ANYONE HAS EXPLOITED OR TAKEN ADVANTAGE OF YOU FINANCIALLY OR OF YOUR PERSONAL PROPERTY?: NO

## 2024-12-27 SDOH — SOCIAL STABILITY: SOCIAL INSECURITY: ABUSE: ADULT

## 2024-12-27 SDOH — ECONOMIC STABILITY: HOUSING INSECURITY: AT ANY TIME IN THE PAST 12 MONTHS, WERE YOU HOMELESS OR LIVING IN A SHELTER (INCLUDING NOW)?: NO

## 2024-12-27 SDOH — SOCIAL STABILITY: SOCIAL INSECURITY: WITHIN THE LAST YEAR, HAVE YOU BEEN AFRAID OF YOUR PARTNER OR EX-PARTNER?: NO

## 2024-12-27 SDOH — ECONOMIC STABILITY: FOOD INSECURITY: WITHIN THE PAST 12 MONTHS, YOU WORRIED THAT YOUR FOOD WOULD RUN OUT BEFORE YOU GOT THE MONEY TO BUY MORE.: NEVER TRUE

## 2024-12-27 SDOH — SOCIAL STABILITY: SOCIAL INSECURITY: ARE THERE ANY APPARENT SIGNS OF INJURIES/BEHAVIORS THAT COULD BE RELATED TO ABUSE/NEGLECT?: NO

## 2024-12-27 SDOH — SOCIAL STABILITY: SOCIAL INSECURITY: HAVE YOU HAD THOUGHTS OF HARMING ANYONE ELSE?: NO

## 2024-12-27 SDOH — SOCIAL STABILITY: SOCIAL NETWORK: IN A TYPICAL WEEK, HOW MANY TIMES DO YOU TALK ON THE PHONE WITH FAMILY, FRIENDS, OR NEIGHBORS?: PATIENT DECLINED

## 2024-12-27 SDOH — ECONOMIC STABILITY: FOOD INSECURITY: HOW HARD IS IT FOR YOU TO PAY FOR THE VERY BASICS LIKE FOOD, HOUSING, MEDICAL CARE, AND HEATING?: NOT HARD AT ALL

## 2024-12-27 SDOH — SOCIAL STABILITY: SOCIAL INSECURITY: DO YOU FEEL UNSAFE GOING BACK TO THE PLACE WHERE YOU ARE LIVING?: NO

## 2024-12-27 SDOH — ECONOMIC STABILITY: FOOD INSECURITY: WITHIN THE PAST 12 MONTHS, THE FOOD YOU BOUGHT JUST DIDN'T LAST AND YOU DIDN'T HAVE MONEY TO GET MORE.: NEVER TRUE

## 2024-12-27 SDOH — HEALTH STABILITY: MENTAL HEALTH
DO YOU FEEL STRESS - TENSE, RESTLESS, NERVOUS, OR ANXIOUS, OR UNABLE TO SLEEP AT NIGHT BECAUSE YOUR MIND IS TROUBLED ALL THE TIME - THESE DAYS?: TO SOME EXTENT

## 2024-12-27 SDOH — HEALTH STABILITY: PHYSICAL HEALTH
HOW OFTEN DO YOU NEED TO HAVE SOMEONE HELP YOU WHEN YOU READ INSTRUCTIONS, PAMPHLETS, OR OTHER WRITTEN MATERIAL FROM YOUR DOCTOR OR PHARMACY?: NEVER

## 2024-12-27 SDOH — SOCIAL STABILITY: SOCIAL INSECURITY
WITHIN THE LAST YEAR, HAVE YOU BEEN RAPED OR FORCED TO HAVE ANY KIND OF SEXUAL ACTIVITY BY YOUR PARTNER OR EX-PARTNER?: NO

## 2024-12-27 SDOH — SOCIAL STABILITY: SOCIAL INSECURITY: DOES ANYONE TRY TO KEEP YOU FROM HAVING/CONTACTING OTHER FRIENDS OR DOING THINGS OUTSIDE YOUR HOME?: NO

## 2024-12-27 SDOH — SOCIAL STABILITY: SOCIAL NETWORK: HOW OFTEN DO YOU ATTEND CHURCH OR RELIGIOUS SERVICES?: 1 TO 4 TIMES PER YEAR

## 2024-12-27 SDOH — ECONOMIC STABILITY: HOUSING INSECURITY: IN THE LAST 12 MONTHS, WAS THERE A TIME WHEN YOU WERE NOT ABLE TO PAY THE MORTGAGE OR RENT ON TIME?: NO

## 2024-12-27 SDOH — SOCIAL STABILITY: SOCIAL NETWORK: HOW OFTEN DO YOU ATTEND MEETINGS OF THE CLUBS OR ORGANIZATIONS YOU BELONG TO?: 1 TO 4 TIMES PER YEAR

## 2024-12-27 SDOH — SOCIAL STABILITY: SOCIAL INSECURITY: ARE YOU MARRIED, WIDOWED, DIVORCED, SEPARATED, NEVER MARRIED, OR LIVING WITH A PARTNER?: MARRIED

## 2024-12-27 SDOH — SOCIAL STABILITY: SOCIAL INSECURITY: ARE YOU OR HAVE YOU BEEN THREATENED OR ABUSED PHYSICALLY, EMOTIONALLY, OR SEXUALLY BY ANYONE?: NO

## 2024-12-27 SDOH — ECONOMIC STABILITY: INCOME INSECURITY: IN THE PAST 12 MONTHS HAS THE ELECTRIC, GAS, OIL, OR WATER COMPANY THREATENED TO SHUT OFF SERVICES IN YOUR HOME?: NO

## 2024-12-27 SDOH — HEALTH STABILITY: PHYSICAL HEALTH: ON AVERAGE, HOW MANY MINUTES DO YOU ENGAGE IN EXERCISE AT THIS LEVEL?: 0 MIN

## 2024-12-27 SDOH — ECONOMIC STABILITY: TRANSPORTATION INSECURITY: IN THE PAST 12 MONTHS, HAS LACK OF TRANSPORTATION KEPT YOU FROM MEDICAL APPOINTMENTS OR FROM GETTING MEDICATIONS?: NO

## 2024-12-27 SDOH — SOCIAL STABILITY: SOCIAL INSECURITY: HAVE YOU HAD ANY THOUGHTS OF HARMING ANYONE ELSE?: NO

## 2024-12-27 SDOH — SOCIAL STABILITY: SOCIAL NETWORK: HOW OFTEN DO YOU GET TOGETHER WITH FRIENDS OR RELATIVES?: PATIENT DECLINED

## 2024-12-27 SDOH — HEALTH STABILITY: PHYSICAL HEALTH: ON AVERAGE, HOW MANY DAYS PER WEEK DO YOU ENGAGE IN MODERATE TO STRENUOUS EXERCISE (LIKE A BRISK WALK)?: 0 DAYS

## 2024-12-27 SDOH — SOCIAL STABILITY: SOCIAL INSECURITY: HAS ANYONE EVER THREATENED TO HURT YOUR FAMILY OR YOUR PETS?: NO

## 2024-12-27 ASSESSMENT — COLUMBIA-SUICIDE SEVERITY RATING SCALE - C-SSRS
6. HAVE YOU EVER DONE ANYTHING, STARTED TO DO ANYTHING, OR PREPARED TO DO ANYTHING TO END YOUR LIFE?: NO
2. HAVE YOU ACTUALLY HAD ANY THOUGHTS OF KILLING YOURSELF?: NO
2. HAVE YOU ACTUALLY HAD ANY THOUGHTS OF KILLING YOURSELF?: NO
6. HAVE YOU EVER DONE ANYTHING, STARTED TO DO ANYTHING, OR PREPARED TO DO ANYTHING TO END YOUR LIFE?: NO
1. IN THE PAST MONTH, HAVE YOU WISHED YOU WERE DEAD OR WISHED YOU COULD GO TO SLEEP AND NOT WAKE UP?: NO
1. IN THE PAST MONTH, HAVE YOU WISHED YOU WERE DEAD OR WISHED YOU COULD GO TO SLEEP AND NOT WAKE UP?: NO

## 2024-12-27 ASSESSMENT — ACTIVITIES OF DAILY LIVING (ADL)
LACK_OF_TRANSPORTATION: NO
ASSISTIVE_DEVICE: EYEGLASSES
HEARING - LEFT EAR: FUNCTIONAL
WALKS IN HOME: INDEPENDENT
LACK_OF_TRANSPORTATION: NO
ADEQUATE_TO_COMPLETE_ADL: YES
GROOMING: INDEPENDENT
TOILETING: INDEPENDENT
HEARING - RIGHT EAR: FUNCTIONAL
PATIENT'S MEMORY ADEQUATE TO SAFELY COMPLETE DAILY ACTIVITIES?: YES
JUDGMENT_ADEQUATE_SAFELY_COMPLETE_DAILY_ACTIVITIES: YES
FEEDING YOURSELF: INDEPENDENT
DRESSING YOURSELF: INDEPENDENT
BATHING: INDEPENDENT

## 2024-12-27 ASSESSMENT — LIFESTYLE VARIABLES
HOW MANY STANDARD DRINKS CONTAINING ALCOHOL DO YOU HAVE ON A TYPICAL DAY: PATIENT DOES NOT DRINK
AUDIT-C TOTAL SCORE: 0
AUDIT-C TOTAL SCORE: 0
PRESCIPTION_ABUSE_PAST_12_MONTHS: NO
SUBSTANCE_ABUSE_PAST_12_MONTHS: NO
HOW OFTEN DO YOU HAVE A DRINK CONTAINING ALCOHOL: NEVER
SKIP TO QUESTIONS 9-10: 1
HOW OFTEN DO YOU HAVE 6 OR MORE DRINKS ON ONE OCCASION: NEVER

## 2024-12-27 ASSESSMENT — PAIN - FUNCTIONAL ASSESSMENT
PAIN_FUNCTIONAL_ASSESSMENT: 0-10

## 2024-12-27 ASSESSMENT — COGNITIVE AND FUNCTIONAL STATUS - GENERAL
DAILY ACTIVITIY SCORE: 24
MOBILITY SCORE: 24

## 2024-12-27 ASSESSMENT — PAIN SCALES - GENERAL
PAINLEVEL_OUTOF10: 0 - NO PAIN

## 2024-12-28 ENCOUNTER — APPOINTMENT (OUTPATIENT)
Dept: RADIOLOGY | Facility: HOSPITAL | Age: 64
End: 2024-12-28
Payer: COMMERCIAL

## 2024-12-28 ENCOUNTER — APPOINTMENT (OUTPATIENT)
Dept: NEUROLOGY | Facility: HOSPITAL | Age: 64
End: 2024-12-28
Payer: COMMERCIAL

## 2024-12-28 LAB
ACTH PLAS-MCNC: 11.3 PG/ML (ref 7.2–63.3)
ANION GAP SERPL CALC-SCNC: 12 MMOL/L (ref 10–20)
BASOPHILS # BLD AUTO: 0.02 X10*3/UL (ref 0–0.1)
BASOPHILS NFR BLD AUTO: 0.7 %
BUN SERPL-MCNC: 12 MG/DL (ref 6–23)
CALCIUM SERPL-MCNC: 9 MG/DL (ref 8.6–10.6)
CARBAMAZEPINE SERPL-MCNC: 6.3 UG/ML (ref 4–12)
CHLORIDE SERPL-SCNC: 103 MMOL/L (ref 98–107)
CHOLEST SERPL-MCNC: 249 MG/DL (ref 0–199)
CHOLESTEROL/HDL RATIO: 3.4
CO2 SERPL-SCNC: 29 MMOL/L (ref 21–32)
CREAT SERPL-MCNC: 0.6 MG/DL (ref 0.5–1.05)
EGFRCR SERPLBLD CKD-EPI 2021: >90 ML/MIN/1.73M*2
EOSINOPHIL # BLD AUTO: 0.03 X10*3/UL (ref 0–0.7)
EOSINOPHIL NFR BLD AUTO: 1.1 %
ERYTHROCYTE [DISTWIDTH] IN BLOOD BY AUTOMATED COUNT: 15.5 % (ref 11.5–14.5)
EST. AVERAGE GLUCOSE BLD GHB EST-MCNC: 100 MG/DL
GLUCOSE SERPL-MCNC: 80 MG/DL (ref 74–99)
HBA1C MFR BLD: 5.1 %
HCT VFR BLD AUTO: 31.5 % (ref 36–46)
HDLC SERPL-MCNC: 73.1 MG/DL
HGB BLD-MCNC: 10.6 G/DL (ref 12–16)
IMM GRANULOCYTES # BLD AUTO: 0.01 X10*3/UL (ref 0–0.7)
IMM GRANULOCYTES NFR BLD AUTO: 0.4 % (ref 0–0.9)
LDLC SERPL CALC-MCNC: 154 MG/DL
LYMPHOCYTES # BLD AUTO: 1.16 X10*3/UL (ref 1.2–4.8)
LYMPHOCYTES NFR BLD AUTO: 41.4 %
MAGNESIUM SERPL-MCNC: 2.07 MG/DL (ref 1.6–2.4)
MCH RBC QN AUTO: 33.3 PG (ref 26–34)
MCHC RBC AUTO-ENTMCNC: 33.7 G/DL (ref 32–36)
MCV RBC AUTO: 99 FL (ref 80–100)
MONOCYTES # BLD AUTO: 0.48 X10*3/UL (ref 0.1–1)
MONOCYTES NFR BLD AUTO: 17.1 %
NEUTROPHILS # BLD AUTO: 1.1 X10*3/UL (ref 1.2–7.7)
NEUTROPHILS NFR BLD AUTO: 39.3 %
NON HDL CHOLESTEROL: 176 MG/DL (ref 0–149)
NRBC BLD-RTO: 0 /100 WBCS (ref 0–0)
PLATELET # BLD AUTO: 261 X10*3/UL (ref 150–450)
POTASSIUM SERPL-SCNC: 4.4 MMOL/L (ref 3.5–5.3)
RBC # BLD AUTO: 3.18 X10*6/UL (ref 4–5.2)
SODIUM SERPL-SCNC: 140 MMOL/L (ref 136–145)
TRIGL SERPL-MCNC: 110 MG/DL (ref 0–149)
VLDL: 22 MG/DL (ref 0–40)
WBC # BLD AUTO: 2.8 X10*3/UL (ref 4.4–11.3)

## 2024-12-28 PROCEDURE — 2500000001 HC RX 250 WO HCPCS SELF ADMINISTERED DRUGS (ALT 637 FOR MEDICARE OP)

## 2024-12-28 PROCEDURE — 70553 MRI BRAIN STEM W/O & W/DYE: CPT

## 2024-12-28 PROCEDURE — 80061 LIPID PANEL: CPT

## 2024-12-28 PROCEDURE — 70553 MRI BRAIN STEM W/O & W/DYE: CPT | Performed by: RADIOLOGY

## 2024-12-28 PROCEDURE — 80048 BASIC METABOLIC PNL TOTAL CA: CPT

## 2024-12-28 PROCEDURE — 2550000001 HC RX 255 CONTRASTS: Performed by: PSYCHIATRY & NEUROLOGY

## 2024-12-28 PROCEDURE — 83036 HEMOGLOBIN GLYCOSYLATED A1C: CPT

## 2024-12-28 PROCEDURE — 83735 ASSAY OF MAGNESIUM: CPT

## 2024-12-28 PROCEDURE — 80156 ASSAY CARBAMAZEPINE TOTAL: CPT

## 2024-12-28 PROCEDURE — 85025 COMPLETE CBC W/AUTO DIFF WBC: CPT

## 2024-12-28 PROCEDURE — 1200000002 HC GENERAL ROOM WITH TELEMETRY DAILY

## 2024-12-28 PROCEDURE — 2500000004 HC RX 250 GENERAL PHARMACY W/ HCPCS (ALT 636 FOR OP/ED)

## 2024-12-28 PROCEDURE — 72156 MRI NECK SPINE W/O & W/DYE: CPT | Performed by: RADIOLOGY

## 2024-12-28 PROCEDURE — A9575 INJ GADOTERATE MEGLUMI 0.1ML: HCPCS | Performed by: PSYCHIATRY & NEUROLOGY

## 2024-12-28 PROCEDURE — 72156 MRI NECK SPINE W/O & W/DYE: CPT

## 2024-12-28 PROCEDURE — 4A10X4Z MONITORING OF CENTRAL NERVOUS ELECTRICAL ACTIVITY, EXTERNAL APPROACH: ICD-10-PCS | Performed by: STUDENT IN AN ORGANIZED HEALTH CARE EDUCATION/TRAINING PROGRAM

## 2024-12-28 RX ORDER — ATORVASTATIN CALCIUM 40 MG/1
40 TABLET, FILM COATED ORAL NIGHTLY
Status: DISCONTINUED | OUTPATIENT
Start: 2024-12-28 | End: 2024-12-31 | Stop reason: HOSPADM

## 2024-12-28 RX ORDER — GADOTERATE MEGLUMINE 376.9 MG/ML
15 INJECTION INTRAVENOUS
Status: COMPLETED | OUTPATIENT
Start: 2024-12-28 | End: 2024-12-28

## 2024-12-28 RX ADMIN — CARBAMAZEPINE 400 MG: 400 TABLET, EXTENDED RELEASE ORAL at 00:31

## 2024-12-28 RX ADMIN — PHENOBARBITAL 129.6 MG: 32.4 TABLET ORAL at 21:15

## 2024-12-28 RX ADMIN — PHENOBARBITAL 129.6 MG: 32.4 TABLET ORAL at 00:02

## 2024-12-28 RX ADMIN — ENOXAPARIN SODIUM 40 MG: 100 INJECTION SUBCUTANEOUS at 00:02

## 2024-12-28 RX ADMIN — CARBAMAZEPINE 100 MG: 100 TABLET, EXTENDED RELEASE ORAL at 00:31

## 2024-12-28 RX ADMIN — CARBAMAZEPINE 400 MG: 400 TABLET, EXTENDED RELEASE ORAL at 21:15

## 2024-12-28 RX ADMIN — PHENOBARBITAL 64.8 MG: 32.4 TABLET ORAL at 09:36

## 2024-12-28 RX ADMIN — ATORVASTATIN CALCIUM 40 MG: 40 TABLET, FILM COATED ORAL at 21:15

## 2024-12-28 RX ADMIN — CARBAMAZEPINE 100 MG: 100 TABLET, EXTENDED RELEASE ORAL at 21:15

## 2024-12-28 RX ADMIN — ENOXAPARIN SODIUM 40 MG: 100 INJECTION SUBCUTANEOUS at 21:15

## 2024-12-28 RX ADMIN — CARBAMAZEPINE 100 MG: 100 TABLET, EXTENDED RELEASE ORAL at 09:37

## 2024-12-28 RX ADMIN — CARBAMAZEPINE 400 MG: 400 TABLET, EXTENDED RELEASE ORAL at 09:37

## 2024-12-28 RX ADMIN — Medication 2000 UNITS: at 09:36

## 2024-12-28 RX ADMIN — GADOTERATE MEGLUMINE 12 ML: 376.9 INJECTION INTRAVENOUS at 13:00

## 2024-12-28 ASSESSMENT — COGNITIVE AND FUNCTIONAL STATUS - GENERAL
DAILY ACTIVITIY SCORE: 24
PATIENT BASELINE BEDBOUND: NO
MOBILITY SCORE: 24

## 2024-12-28 ASSESSMENT — PAIN - FUNCTIONAL ASSESSMENT: PAIN_FUNCTIONAL_ASSESSMENT: 0-10

## 2024-12-28 ASSESSMENT — PAIN SCALES - GENERAL: PAINLEVEL_OUTOF10: 0 - NO PAIN

## 2024-12-28 NOTE — ED PROVIDER NOTES
HPI   Chief Complaint   Patient presents with    Tremors     Bib ems from home with tremors of the left arm, patient brought herself to the ground so that she would not fall. Aox4 on arrival. History of chemotherapy which ended on , started immunotherapy yesterday. Patient states that she has increased weakness.        Patient is a 64-year-old female with past medical history of adenocarcinoma of the lung presenting with concern for tremors to her right arm.  She states that about an hour prior to arrival, when she was reaching for something her right arm began to tremor.  She states she does have a history of seizures so she got it herself to the ground as she was unsure if she was going to have a seizure.  States that the tremor stopped so she got up and then when she tried to use her arm again she began having significant tremors so she guided herself to ground and then called EMS.  Patient feels as if her right hand is weaker than her left and then begins complaining of right-sided weakness in general.  Patient denies fevers, chills, cough, sore throat, runny nose, chest pain, shortness of breath, abdominal pain, nausea, vomiting, diarrhea or urinary complaints.              Patient History   Past Medical History:   Diagnosis Date    Abdominal pain 2024    Achilles tendinitis, unspecified leg     Achilles tendinitis    Acute maxillary sinusitis, unspecified 2018    Acute maxillary sinusitis    Acute nasopharyngitis (common cold) 2016    Common cold    Adenocarcinoma of lung (Multi)     Age-related osteoporosis without current pathological fracture 2015    Encounter for ongoing osteoporosis therapy, bisphosphonates    Complete or unspecified spontaneous  without complication         Dyspnea, unspecified 2019    Paroxysmal dyspnea    Encounter for general adult medical examination without abnormal findings 2018    Annual physical exam    Encounter for health  counseling related to travel 09/06/2018    Counseling about travel    Encounter for screening for lipoid disorders 08/05/2019    Screening cholesterol level    Epigastric pain 08/26/2020    Dyspepsia    GERD (gastroesophageal reflux disease)     Influenza 12/06/2024    Irritable bowel syndrome     Left patella fracture 09/05/2023    Mastodynia 05/12/2021    Breast pain, right    Otalgia, right ear 03/07/2020    Otalgia, right    Other general symptoms and signs 01/18/2018    Flu-like symptoms    Other microscopic hematuria 09/19/2017    Hematuria, microscopic    Pain in right hand 03/04/2019    Pain of right hand    Pain in right knee 08/05/2019    Knee pain, right    Pain in unspecified shoulder     Shoulder pain    Personal history of (healed) traumatic fracture 10/30/2020    History of fracture of phalanx of toe    Personal history of other diseases of the digestive system 08/16/2018    History of gastroenteritis    Personal history of other diseases of the musculoskeletal system and connective tissue 12/27/2016    History of low back pain    Personal history of other diseases of the musculoskeletal system and connective tissue 10/19/2020    History of osteopenia    Personal history of other diseases of the nervous system and sense organs 08/05/2019    Personal history of seizure disorder    Personal history of other diseases of the respiratory system 05/13/2022    History of acute sinusitis    Personal history of other diseases of the respiratory system 10/30/2020    History of chronic rhinitis    Personal history of other diseases of the respiratory system 04/19/2017    History of influenza    Personal history of other diseases of the respiratory system 03/07/2020    History of viral pharyngitis    Personal history of other infectious and parasitic diseases     History of varicella    Personal history of other medical treatment 11/02/2018    History of screening mammography    Personal history of other  specified conditions     History of headache    Personal history of other specified conditions 03/24/2020    History of abnormal mammogram    Personal history of other specified conditions 03/02/2018    History of fatigue    Personal history of other specified conditions 09/19/2017    History of dysuria    Personal history of other specified conditions 08/26/2020    History of diarrhea    Personal history of other specified conditions 03/03/2020    History of abdominal pain    Personal history of other specified conditions 01/11/2019    History of chest pain    Personal history of urinary (tract) infections 11/07/2016    History of urinary tract infection    Personal history of urinary (tract) infections 07/02/2018    History of urinary tract infection    Pregnancy with abortive outcome 12/06/2024    Pulmonary nodules     Bilateral    Seizure disorder (Multi)     Strain of muscle(s) and tendon(s) of peroneal muscle group at lower leg level, left leg, initial encounter 11/05/2020    Strain of peroneal tendon of left foot, initial encounter    Unspecified fracture of left toe(s), initial encounter for closed fracture 10/30/2020    Fracture of proximal phalanx of toe of left foot    Unspecified injury of left wrist, hand and finger(s), initial encounter 01/05/2021    Left wrist injury    Unspecified injury of unspecified foot, initial encounter     Foot injury    Urinary tract infection, site not specified 07/02/2018    Acute UTI    Urinary tract infection, site not specified 11/07/2016    Acute UTI     Past Surgical History:   Procedure Laterality Date    APPENDECTOMY  10/24/2013    Appendectomy    BREAST CYST ASPIRATION  1993    LAPAROSCOPY DIAGNOSTIC / BIOPSY / ASPIRATION / LYSIS  12/18/2014    Exploratory Laparoscopy    PELVIC LAPAROSCOPY      endometriosis    US GUIDED SOFT TISSUE BIOPSY  12/18/2023    US GUIDED SOFT TISSUE BIOPSY 12/18/2023 [facility] US     Family History   Problem Relation Name Age of Onset     Dementia Mother      Hypertension Mother      Diabetes type II Mother      Coronary artery disease Father      Other (Congestive heart failure) Father      Breast cancer Sister Linda Guzman     Breast cancer Sister Linda     Breast cancer Sister Pat      Social History     Tobacco Use    Smoking status: Former     Current packs/day: 0.00     Types: Cigarettes     Quit date: 1990     Years since quittin.5     Passive exposure: Past    Smokeless tobacco: Never   Vaping Use    Vaping status: Never Used   Substance Use Topics    Alcohol use: Not Currently     Comment: social    Drug use: Never       Physical Exam   ED Triage Vitals   Temperature Heart Rate Respirations BP   24 1424 24 1424 24 1424 24 1424   36.4 °C (97.5 °F) 69 18 143/78      Pulse Ox Temp src Heart Rate Source Patient Position   24 1424 -- 24 1915 --   98 %  Monitor       BP Location FiO2 (%)     -- --             Physical Exam  Vitals and nursing note reviewed.   Constitutional:       Appearance: She is well-developed.      Comments: Awake, sitting in examination chair   HENT:      Head: Normocephalic and atraumatic.      Nose: Nose normal.      Mouth/Throat:      Mouth: Mucous membranes are moist.      Pharynx: Oropharynx is clear.   Eyes:      Extraocular Movements: Extraocular movements intact.      Conjunctiva/sclera: Conjunctivae normal.      Pupils: Pupils are equal, round, and reactive to light.   Cardiovascular:      Rate and Rhythm: Normal rate and regular rhythm.      Pulses: Normal pulses.      Heart sounds: Normal heart sounds. No murmur heard.  Pulmonary:      Effort: Pulmonary effort is normal. No respiratory distress.      Breath sounds: Normal breath sounds.   Abdominal:      General: Abdomen is flat.      Palpations: Abdomen is soft.      Tenderness: There is no abdominal tenderness.   Musculoskeletal:         General: No swelling. Normal range of motion.      Cervical back: Normal  range of motion and neck supple.   Skin:     General: Skin is warm and dry.      Capillary Refill: Capillary refill takes less than 2 seconds.   Neurological:      General: No focal deficit present.      Mental Status: She is alert and oriented to person, place, and time.      Comments: Awake, alert and oriented x 3. Power intact in the lower extremities.  4-5 strength in the right upper extremity hand and tricep, 5 out of 5 strength to the rest of right upper extremity.  5 out of 5 strength of the left upper extremity.  Sensation is intact to light touch in the upper and lower extremities. Cranial Nerves 2-12 are intact. Patella DTRs intact. Finger-to-nose intact. No truncal ataxia.   Psychiatric:         Mood and Affect: Mood normal.         Behavior: Behavior normal.           ED Course & MDM   ED Course as of 12/27/24 2024   Fri Dec 27, 2024   1518 EKG personally interpreted by me performed at 1429  Normal sinus rhythm ventricular rate 66 normal axis and intervals no acute ischemic changes [EF]      ED Course User Index  [EF] Diane Cates,          Diagnoses as of 12/27/24 2024   Right arm weakness                 No data recorded     [place] Coma Scale Score: 15 (12/27/24 1630 : Teresa Franklin RN)       NIH Stroke Scale: 1 (12/27/24 2023 : Osiel Red PA-C)                   Medical Decision Making  Patient is a 64-year-old female with past medical history of lung cancer presenting with concerns for right arm tremors.  Lab work, imaging ordered.  Conditions considered include but are not limited to: Metastatic disease, electrolyte abnormality, dehydration.  NIH of 1.  Test of skew is negative.  There is no cerebral ataxia present.    I saw this patient in conjunction with Dr. Cates.  CBC does show mild leukopenia with WBCs of 3.7 and anemia with hemoglobin of 11.6.  CMP without significant electrolyte abnormality or renal impairment.  UA with micro is without infection.  TSH within normal limits.   CT head without acute intracranial pathology.  It does recommend MRI if concern for intracranial metastasis.    Attending physician spoke with hospitalist at main campus as well as neurology.  After discussion, patient will be excepted to the neurology service under Dr. Huber.  325 mg aspirin has been given.  Patient is agreeable to transport.  Patient is stable and will be transferred.    Portions of this note made with Dragon software, please be mindful of potential grammatical errors.        Medications   aspirin tablet 325 mg (325 mg oral Given 12/27/24 1840)         Labs Reviewed   CBC WITH AUTO DIFFERENTIAL - Abnormal       Result Value    WBC 3.7 (*)     nRBC 0.0      RBC 3.49 (*)     Hemoglobin 11.6 (*)     Hematocrit 35.5 (*)      (*)     MCH 33.2      MCHC 32.7      RDW 15.8 (*)     Platelets 304      Neutrophils % 57.3      Immature Granulocytes %, Automated 0.3      Lymphocytes % 28.6      Monocytes % 13.0      Eosinophils % 0.5      Basophils % 0.3      Neutrophils Absolute 2.12      Immature Granulocytes Absolute, Automated 0.01      Lymphocytes Absolute 1.06 (*)     Monocytes Absolute 0.48      Eosinophils Absolute 0.02      Basophils Absolute 0.01     COMPREHENSIVE METABOLIC PANEL - Normal    Glucose 92      Sodium 136      Potassium 3.7      Chloride 100      Bicarbonate 28      Anion Gap 12      Urea Nitrogen 16      Creatinine 0.58      eGFR >90      Calcium 9.7      Albumin 4.4      Alkaline Phosphatase 61      Total Protein 7.5      AST 19      Bilirubin, Total 0.3      ALT 9     URINALYSIS WITH REFLEX MICROSCOPIC - Normal    Color, Urine Yellow      Appearance, Urine Clear      Specific Gravity, Urine 1.022      pH, Urine 5.5      Protein, Urine NEGATIVE      Glucose, Urine Normal      Blood, Urine NEGATIVE      Ketones, Urine NEGATIVE      Bilirubin, Urine NEGATIVE      Urobilinogen, Urine Normal      Nitrite, Urine NEGATIVE      Leukocyte Esterase, Urine NEGATIVE     TSH WITH  REFLEX TO FREE T4 IF ABNORMAL - Normal    Thyroid Stimulating Hormone 1.50      Narrative:     TSH testing is performed using different testing methodology at [facility] than at other [facility]. Direct result comparisons should only be made within the same method.           CT head wo IV contrast   Final Result   No acute intracranial hemorrhage, mass effect, or CT apparent acute   infarct. There is concern for intracranial metastases, recommend MRI   of the brain with and without contrast.        Signed by: Bryan Jimenez 12/27/2024 5:10 PM   Dictation workstation:   [id number]            Procedure  Procedures     Osiel Red PA-C  12/27/24 2024

## 2024-12-28 NOTE — PROGRESS NOTES
Pharmacy Admission Order Reconciliation Review    Rhoda Pena is a 64 y.o. female admitted for Right hemiparesis (Multi). Pharmacy reviewed the patient's unreconciled admission medications.    Prior to admission medications that were reviewed and acted on by the pharmacist include:  vitamin B12 tablet  These medications have been reconciled.     Any other unreconcilied medications have been addressed and will be ordered or held by the patient's medical team. Medications addressed by the pharmacist may be added or changed by the patient's medical team at any time.    Blaine Nascimento, [place]  Transitions of Care Pharmacist  [facility]  Please reach out via Secure Chat for questions\

## 2024-12-28 NOTE — PROGRESS NOTES
Subjective   No abnormal RUE movement since admission. She was having some difficulty using her right hand compared to left. She says despite it being her dominant hand, it feels weak and kind of difficult to use. Her  strength was weak on right. She was also complaining of neck pain radiating to posterior head. She had no other systemic complaints.     Objective     Vitals:    12/28/24 0844   BP: 104/67   Pulse: 70   Resp: 16   Temp: 36.6 °C (97.9 °F)   SpO2: 96%        General:   - No distress, alert, interactive and cooperative.      Higher mental function:   - Oriented to time, place and person. Normal language. Normal memory.      Cranial nerves:   CN 2: Normal   CN 3, 4, 6: EOMs normal alignment, normal saccades, pursuit and convergence. No nystagmus.   CN 5: Facial sensation intact bilaterally.   CN 7: No facial deviation. Nasolabial folds symmetric.   CN 8: Hearing intact to conversation.  CN 11: Limited by neck pain, but was moving neck normally   CN 12: Tongue midline, normal bulk     Motor:   - Muscle bulk and tone were normal in both upper and lower extremities.   - No fasciculations, tremor or other abnormal movements were present.   - Normal strength except for right hand  (4/5)  - No CST lesion signs      Sensory:   - In both upper and lower extremities, sensation was intact to light touch     Coordination:  - Normal finger-nose-finger without dysmetria or overshoot     Labs:    12/27 Phenobarbital             41  12/28 Carbamazepine 6.3    Results from last 7 days   Lab Units 12/28/24  0644 12/27/24  1437 12/26/24  0856   WBC AUTO x10*3/uL 2.8* 3.7* 3.0*   HEMOGLOBIN g/dL 10.6* 11.6* 10.5*   HEMATOCRIT % 31.5* 35.5* 32.0*   PLATELETS AUTO x10*3/uL 261 304 297     Results from last 7 days   Lab Units 12/28/24  0644 12/27/24  1437 12/26/24  0856   SODIUM mmol/L 140 136 139   POTASSIUM mmol/L 4.4 3.7 3.6   CHLORIDE mmol/L 103 100 102   CO2 mmol/L 29 28 29   ANION GAP mmol/L 12 12 12   BUN  mg/dL 12 16 15   CREATININE mg/dL 0.60 0.58 0.54   GLUCOSE mg/dL 80 92 109*   CALCIUM mg/dL 9.0 9.7 9.1   MAGNESIUM mg/dL 2.07  --   --      Imagin/26 MRI brain:     - No evidence of metastatic disease, or other acute intracranial  abnormality.    - Several tiny nonenhancing foci of hyperintense FLAIR and T2 signal  in the periventricular and subcortical white matter of bilateral  cerebral hemispheres are nonspecific, but likely represent early  changes of microvascular disease.         CT head:     - No acute intracranial hemorrhage, mass effect, or CT apparent acute  infarct.          MRI Brain and neck    - Pending     Assessment/Plan      Rhoda Pena is a 64-year-old female with a medical history of lung adenocarcinoma and epilepsy. She presents with shaking in her RUE which was followed by weakness. The shaking resembled her previous seizure semiology, characterized by feelings of hollowness and abnormal sensations on the right side of her face followed by RUE shaking and occasionally generalized tonic-clonic seizures. During this episode, she did not experience loss of awareness, any preceding aura, tongue biting, urinary incontinence, or aphasia, although she did report some heaviness and weakness in her RUE. She was diagnosed with epilepsy beginning at age 8, currently on Carbamazepine  mg twice daily and Phenobarbital at 64.8 mg in the morning and 129.6 mg at night. However, she has been seizure-free for the past ten years. For her lung adenocarcinoma she had lobectomy done on 2024, has been undergoing chemotherapy since 2024, with her last session on 2024, and she recently received immune checkpoint inhibitor Atezolizumab on 2024. She endorses missing a few doses of her AEDs but none in the last five days. Following this episode, she visited [facility], CT head was unremarkable, 325 mg of Aspirin was given and subsequently transferred to  [facility] for further evaluation.    Considering current clinical presentation, several differential diagnoses arise, including minor stroke, a relapse of previously well-controlled epilepsy, CNS metastasis, paraneoplastic encephalitis and immune checkpoint inhibitor induced neurotoxicity. The transient nature of her symptoms, along with cardiac risk factors and small vessel disease burden, suggests the possibility of a minor stroke / TIA. The recent diagnosis of lung cancer and the occurrence of seizure after being seizure free for > 10 years raise concerns for potential metastasis; however, the same semiology as past seizures and limited cortical involvement based on semiology, without additional features of intracranial mass effect, makes this less likely. A breakthrough seizure in the context of previous established epilepsy is also a consideration, likely triggered by missed medications and systemic stress related to her cancer and its treatment. Paraneoplastic encephalitis is another potential diagnosis, although it is rare in lung adenocarcinoma, and the acute and focal nature of her symptoms make it less likely. ICI induced neurotoxicity is exceedingly rare in first few days of therapy initiation but still possible. Considering all differentials, a MRI brain would help narrow down the diagnoses. At the moment, breakthrough seizure in setting of previous established epilepsy is more plausible.       #RUE shaking followed by weakness.   - MRI Brain w/wo contrast  - Continue Carbamazepine  BID, Phenobarbitol 64.8 mg AM and 129.6 qhs   - 2mg IV Ativan PRN for GTC lasting > 3-5 minutes  - If MRI shows a stroke, then start Antiplatelets and continue stroke work up    #Neck pain radiating to posterior head   - F/U MRI neck   - Tylenol q4 PRN for pain        DVT Ppx: lovenox  Diet: Regular  Code: Full code(confirmed on admission).      Tan Smyth MD  PGY1 Neurology

## 2024-12-28 NOTE — CARE PLAN
The patient's goals for the shift include get reason for the weakness    The clinical goals for the shift include pt will remain neurologically stable    Pt remained neurologically stable through the shift.    1.2

## 2024-12-28 NOTE — H&P
History Of Present Illness  Rhoda Pena is a 64 y.o. 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 Murillo), osteoporosis, GERD, presenting with RUE shaking followed by weakness.     She reports that she was at her baseline normal status today, she reports that when she was in her bathroom trying to reach for her toothpaste, her RUE suddenly started to shake involuntarily, she went to her work room and sat down on the floor then the shaking stopped. The shaking involved the whole RUE from the shoulder. The episode lasted 1-2 minutes. There was no LOC, no preceding aura, no tongue bite or urinary incontinence, no aphasia or RLE invovlement. She noticed after the event that she had some right sided facial numbness and her RUE felt heavy and weak.     She went to  [facility], CTH was unremarkable, was loaded with 325mg Aspirin and trasnferred to  for further work up.Basic labs were unremarkable apart from expected leukopenia of 3.7    Off note On chemo since 10/2024 (last session 12/5/24) and recently started ICI Atezolizumab on 12/26/24 as the first dose.     She report missing few doses of her ASMs but nothing within the last 5 days. Her last seizure was >10 years ago. Typical semiology includes Aura(feeling hallow ,abnormal feeling around the right face) -> RUE hemibody shaking -> sometimes GTC.     Neurologic/Seizure History  Seizure Description:   Diagnosed at age 8, in 1968. Parents heard a noise, and found her having a grand mal seizure. She was not aware of what was going on. No fevers at that time. 6 months later had another seizure. Started Dilantin, had several side effects including double vision. Started Phenobarbitol instad. Did several EEGs, report not available. At age 18 started Carbamazepine. Unclear why but no seizures. Started driving.  Around the 80s-90s switched to generic tegretol. Did not have a seizure but didn't feel  "right, so switched to name brand. Had a break through event in  when weaning phenobarbital. In , felt that she was having auras during a period of severe stress. Describes the auras as a strange sensation on the right side her face, \"feels like there is nothing inside of her\". No smells, no ear ringing, no flashing lights. Unknown tongue biting or urinary incontinence. After the seizure would get severe headaches.  During her teenage years would have partial right face and arm seizures before her period. Most seizures occurred at night, would wake up from sound sleep and ask for help.     Seizure Risk Factors:  Birth: Normal   History of febrile seizures: None  Development: Normal   CNS infections: Normal  Head trauma: None  CNS surgery: none  Family hx of epilepsy:None     AEDs:  Home:  Carbamazepine  BID, Phenobarbitol 64.8 mg AM and 129.6 qhs   Previous: Pheyntoin - blurred vision     Recent AED levels  CBZ levels: -7.8, -5.8 -5.6  PB levels:  - 36.5  41.3      Past Medical History  Past Medical History:   Diagnosis Date    Abdominal pain 2024    Achilles tendinitis, unspecified leg     Achilles tendinitis    Acute maxillary sinusitis, unspecified 2018    Acute maxillary sinusitis    Acute nasopharyngitis (common cold) 2016    Common cold    Adenocarcinoma of lung (Multi)     Age-related osteoporosis without current pathological fracture 2015    Encounter for ongoing osteoporosis therapy, bisphosphonates    Complete or unspecified spontaneous  without complication         Dyspnea, unspecified 2019    Paroxysmal dyspnea    Encounter for general adult medical examination without abnormal findings 2018    Annual physical exam    Encounter for health counseling related to travel 2018    Counseling about travel    Encounter for screening for lipoid disorders 2019    Screening cholesterol level    Epigastric pain 2020 "    Dyspepsia    GERD (gastroesophageal reflux disease)     Influenza 12/06/2024    Irritable bowel syndrome     Left patella fracture 09/05/2023    Mastodynia 05/12/2021    Breast pain, right    Otalgia, right ear 03/07/2020    Otalgia, right    Other general symptoms and signs 01/18/2018    Flu-like symptoms    Other microscopic hematuria 09/19/2017    Hematuria, microscopic    Pain in right hand 03/04/2019    Pain of right hand    Pain in right knee 08/05/2019    Knee pain, right    Pain in unspecified shoulder     Shoulder pain    Personal history of (healed) traumatic fracture 10/30/2020    History of fracture of phalanx of toe    Personal history of other diseases of the digestive system 08/16/2018    History of gastroenteritis    Personal history of other diseases of the musculoskeletal system and connective tissue 12/27/2016    History of low back pain    Personal history of other diseases of the musculoskeletal system and connective tissue 10/19/2020    History of osteopenia    Personal history of other diseases of the nervous system and sense organs 08/05/2019    Personal history of seizure disorder    Personal history of other diseases of the respiratory system 05/13/2022    History of acute sinusitis    Personal history of other diseases of the respiratory system 10/30/2020    History of chronic rhinitis    Personal history of other diseases of the respiratory system 04/19/2017    History of influenza    Personal history of other diseases of the respiratory system 03/07/2020    History of viral pharyngitis    Personal history of other infectious and parasitic diseases     History of varicella    Personal history of other medical treatment 11/02/2018    History of screening mammography    Personal history of other specified conditions     History of headache    Personal history of other specified conditions 03/24/2020    History of abnormal mammogram    Personal history of other specified conditions  2018    History of fatigue    Personal history of other specified conditions 2017    History of dysuria    Personal history of other specified conditions 2020    History of diarrhea    Personal history of other specified conditions 2020    History of abdominal pain    Personal history of other specified conditions 2019    History of chest pain    Personal history of urinary (tract) infections 2016    History of urinary tract infection    Personal history of urinary (tract) infections 2018    History of urinary tract infection    Pregnancy with abortive outcome 2024    Pulmonary nodules     Bilateral    Seizure disorder (Multi)     Strain of muscle(s) and tendon(s) of peroneal muscle group at lower leg level, left leg, initial encounter 2020    Strain of peroneal tendon of left foot, initial encounter    Unspecified fracture of left toe(s), initial encounter for closed fracture 10/30/2020    Fracture of proximal phalanx of toe of left foot    Unspecified injury of left wrist, hand and finger(s), initial encounter 2021    Left wrist injury    Unspecified injury of unspecified foot, initial encounter     Foot injury    Urinary tract infection, site not specified 2018    Acute UTI    Urinary tract infection, site not specified 2016    Acute UTI     Surgical History  Past Surgical History:   Procedure Laterality Date    APPENDECTOMY  10/24/2013    Appendectomy    BREAST CYST ASPIRATION  1993    LAPAROSCOPY DIAGNOSTIC / BIOPSY / ASPIRATION / LYSIS  2014    Exploratory Laparoscopy    PELVIC LAPAROSCOPY      endometriosis    US GUIDED SOFT TISSUE BIOPSY  2023    US GUIDED SOFT TISSUE BIOPSY 2023 [facility]      Social History  Social History     Tobacco Use    Smoking status: Former     Current packs/day: 0.00     Types: Cigarettes     Quit date: 1990     Years since quittin.5     Passive exposure: Past    Smokeless tobacco: Never    Vaping Use    Vaping status: Never Used   Substance Use Topics    Alcohol use: Not Currently     Comment: social    Drug use: Never     Allergies  Phenytoin and Codeine  Home Medications  Medications Prior to Admission   Medication Sig Dispense Refill Last Dose/Taking    ascorbic acid (Vitamin C) 1,000 mg tablet Take 1 tablet (1,000 mg) by mouth once daily.       calcium carbonate (CALCIUM 600 ORAL) Take 1 tablet by mouth once daily.       carBAMazepine XR (TEGretol XR) 100 mg 12 hr tablet Take 1 tablet (100 mg) by mouth 2 times a day. 180 tablet 3     cholecalciferol (Vitamin D-3) 50 MCG (2000 UT) tablet Take 1 tablet (50 mcg) by mouth once daily.       cyanocobalamin (Vitamin B-12) 100 mcg tablet Take 1 tablet (100 mcg) by mouth once daily. (Patient not taking: Reported on 12/26/2024)       estradiol (Estrace) 0.01 % (0.1 mg/gram) vaginal cream Insert 0.25 Applicatorful (1 g) into the vagina 2 times a week. 42.5 g 3     ondansetron (Zofran) 8 mg tablet Take 1 tablet (8 mg) by mouth every 8 hours if needed for nausea or vomiting. (Patient not taking: Reported on 12/26/2024) 30 tablet 5     PHENobarbitaL 64.8 mg tablet TAKE 1 TABLET BY MOUTH EVERY MORNING AND TAKE 2 TABLETS BY MOUTH EVERY EVENING 270 tablet 1     prochlorperazine (Compazine) 10 mg tablet Take 1 tablet (10 mg) by mouth every 6 hours if needed for nausea or vomiting. 30 tablet 5     Tegretol  mg 12 hr tablet Take 1 tablet (400 mg) by mouth 2 times a day. Do not crush, chew, or split. 180 tablet 3     zoledronic acid (Reclast) 5 mg/100 mL piggyback Infuse 100 mL (5 mg) at 400 mL/hr over 15 minutes into a venous catheter 1 time for 1 dose. (Patient not taking: Reported on 8/19/2024) 100 mL 0        Review of Systems  Neurological Exam  Physical Exam  GENERAL APPEARANCE:  No distress, alert, interactive and cooperative.      MENTAL STATE:   Orientation was normal to time, place and person. Recent and remote memory was intact.  Attention span and  concentration were normal. Language testing was normal for comprehension, repetition, expression, and naming.    CRANIAL NERVES:   CN 2   Visual fields full to confrontation.   CN 3, 4, 6   Pupils round, 4 mm in diameter, equally reactive to light. Lids symmetric; no ptosis. EOMs normal alignment, full range with normal saccades, pursuit and convergence.   No nystagmus.   CN 5   Facial sensation intact bilaterally.   CN 7   Normal and symmetric facial strength. Nasolabial folds symmetric.   CN 8   Hearing intact to conversation.  CN 9/10  Palate elevates symmetrically.   CN 11   Normal strength of shoulder shrug and neck turning.   CN 12   Tongue midline, with normal bulk and strength; no fasciculations.     MOTOR:   Muscle bulk and tone were normal in both upper and lower extremities.   No fasciculations, tremor or other abnormal movements were present.   No Drift in RUE, but noted mild orbiting LUE>RUE and slower RUE movement with dexterity.                         R          L  Deltoids        5          5  Biceps          5          5  Triceps          5          5  Wrist Flex      5          5  Wrist Ext       5          5  Hand      5          5    Hip Flex         5          5  Knee Flex      5          5  Knee Ext        5          5  Dorsiflex        5          5  Plantarflex     5          5      REFLEXES:                       R          L  BR:              2         2  Biceps:        1          2  Triceps:        2          2  Knee:           2          2  Ankle:          1          1    Babinski: toes downgoing to plantar stimulation. No clonus or other pathologic reflexes present.     SENSORY:   In both upper and lower extremities, sensation was intact to light touch    COORDINATION:    In both upper extremities, finger-nose-finger was intact without dysmetria or overshoot.   In both lower extremities, heel-to-shin was intact.      GAIT:   Station was stable with a normal base. Gait was stable with  "a normal arm swing and speed. No ataxia, shuffling, steppage or waddling was present. No circumduction was present. Tandem gait was intact. No Romberg sign was present.    Last Recorded Vitals  There were no vitals taken for this visit.        Relevant Results                                      No MRI head results found for the past 14 days  CT head wo IV contrast    Result Date: 12/27/2024  Interpreted By:  Bryan Jimenez, STUDY: CT HEAD WO IV CONTRAST;  12/27/2024 4:23 pm   INDICATION: Signs/Symptoms:Tremors, history of cancer.  Concern for metastatic disease.   COMPARISON: None.   ACCESSION NUMBER(S): [id number]   ORDERING CLINICIAN: RODOLFO SIFUENTES   TECHNIQUE: Noncontrast axial CT scan of head was performed.   FINDINGS: Parenchyma: There is no intracranial hemorrhage. The grey-white differentiation is intact. There is no mass effect or midline shift.   CSF Spaces: The ventricles, sulci and basal cisterns are within normal limits for age.   Extra-Axial Fluid: There is no extraaxial fluid collection.   Calvarium: The calvarium is unremarkable.   Paranasal sinuses: Visualized paranasal sinuses are clear.   Mastoids: Clear.   Orbits: Normal.   Soft tissues: Unremarkable.       No acute intracranial hemorrhage, mass effect, or CT apparent acute infarct. There is concern for intracranial metastases, recommend MRI of the brain with and without contrast.   Signed by: Bryan Jimenez 12/27/2024 5:10 PM Dictation workstation:   [id number]   No echocardiogram results found for the past 14 days        No results found for: \"BNP\"  I have personally reviewed the following imaging results CT head wo IV contrast    Result Date: 12/27/2024  Interpreted By:  Bryan Jimenez, STUDY: CT HEAD WO IV CONTRAST;  12/27/2024 4:23 pm   INDICATION: Signs/Symptoms:Tremors, history of cancer.  Concern for metastatic disease.   COMPARISON: None.   ACCESSION NUMBER(S): [id number]   ORDERING CLINICIAN: RDOOLFO SIFUENTES   " TECHNIQUE: Noncontrast axial CT scan of head was performed.   FINDINGS: Parenchyma: There is no intracranial hemorrhage. The grey-white differentiation is intact. There is no mass effect or midline shift.   CSF Spaces: The ventricles, sulci and basal cisterns are within normal limits for age.   Extra-Axial Fluid: There is no extraaxial fluid collection.   Calvarium: The calvarium is unremarkable.   Paranasal sinuses: Visualized paranasal sinuses are clear.   Mastoids: Clear.   Orbits: Normal.   Soft tissues: Unremarkable.       No acute intracranial hemorrhage, mass effect, or CT apparent acute infarct. There is concern for intracranial metastases, recommend MRI of the brain with and without contrast.   Signed by: Bryan Jimenez 12/27/2024 5:10 PM Dictation workstation:   [id number]    CT chest wo IV contrast    Result Date: 12/23/2024  Interpreted By:  Fannie Casas, STUDY: CT CHEST WO IV CONTRAST;  12/20/2024 10:08 am   INDICATION: Signs/Symptoms:history of lung surgery.   COMPARISON: 09/27/2024, 06/25/2024, 03/04/2024, 01/08/2024 (oldest chest CT)   ACCESSION NUMBER(S): [id number]   ORDERING CLINICIAN: KASSIDY HAHN   TECHNIQUE: Helical data acquisition of the chest was obtained  without IV contrast material.  Images were reformatted in axial, coronal, and sagittal planes.   FINDINGS: LUNGS and AIRWAYS: Status post right lower lobectomy. There is a small loculated right pleural effusion in the right lower hemithorax medially. This is smaller in size compared to 09/27/2024. There are chronic interstitial changes of the lungs. However, there is an increase in the interstitial markings in the right lung, perhaps post radiation change. There is no focal dense area of consolidation. There is a 4 mm low-density nodule at the right lung apex on series 7 axial image 56/286, unchanged compared to oldest study from 01/08/2024.   ISAURO AND MEDIASTINUM: There is no hilar mediastinal adenopathy. There is no  axillary adenopathy.     HEART and VESSELS: There is a right internal jugular port with the tip at the junction of the superior vena cava and right atrium. The thoracic aorta is of normal course and caliber without vascular calcifications.   Main pulmonary artery and its branches are normal in caliber.   No coronary artery calcifications are seen. The study is not optimized for evaluation of coronary arteries.   The cardiac chambers are not enlarged.   No evidence of pericardial effusion.   UPPER ABDOMEN: The visualized subdiaphragmatic structures demonstrate no remarkable findings.   CHEST WALL and OSSEOUS STRUCTURES: There are no suspicious osseous lesions. Multilevel degenerative changes are present.   The chest wall is unremarkable.   There is no soft tissue abnormality.       1. Status post right lower lobectomy. 2. Small loculated pleural right lower hemithorax medially, decreased size to 09/27/2024. 3. Chronic interstitial changes of the lungs. However, there is an increase in the interstitial markings in the right lung, perhaps post radiation change. 4. 4 mm low-density right upper lobe nodule, unchanged compared to oldest chest CT dated 01/08/2024   MACRO: None   Signed by: Fannie Casas 12/23/2024 9:06 AM Dictation workstation:   [id number].  IV Thrombolysis IV Thrombolysis Checklist        Assessment/Plan     Rhoda Pena is a 64 y.o. 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 Murillo), osteoporosis, GERD.    She is presenting for further work up of isolated RUE shaking followed by heaviness that is slightly improving now. Overall presentation suggest a focal seizure which is similar to her prior seizures in the past but she has been seizure free for >10 years. Will need MRI Brain to rue out a small stroke v.s metastasis. Other differentials include Immune check point inhibitor neurotoxicity given her first dose was yesterday  althoguh reaction is usually delayed.       #RUE shaking followed by weakness.   - MRI Brain w/wo contrast STAT  - Continue home AEDs for now: Carbamazepine  BID, Phenobarbitol 64.8 mg AM and 129.6 qhs   - 2mg IV Ativan PRN for GTC lasting > 3-5 minutes  - if MRI shows a stroke, then start Antiplatelets and continue stroke work up.   - Carbamazepine level(ordered), phenobarb level(Added)  - Consider ICI related toxicity      DVT Ppx: lovenox  Diet: Regular    Code: Full code(confirmed on admission).    Sai Cornejo  PGY3 Neurology    Sai Cornejo MD

## 2024-12-28 NOTE — PROGRESS NOTES
Pharmacy Medication History Review    Rhoda Pena is a 64 y.o. female admitted for Right hemiparesis (Multi). Pharmacy reviewed the patient's [id number] medications and allergies for accuracy.    Medications ADDED:  None  Medications CHANGED:  Calcium  Medications REMOVED:   Zofran  Compazine     The list below reflects the updated PTA list.   Prior to Admission Medications   Prescriptions Informant   PHENobarbitaL 64.8 mg tablet Self   Sig: TAKE 1 TABLET BY MOUTH EVERY MORNING AND TAKE 2 TABLETS BY MOUTH EVERY EVENING   Tegretol  mg 12 hr tablet Self   Sig: Take 1 tablet (400 mg) by mouth 2 times a day. Do not crush, chew, or split.  Patient takes tegretol  mg tablet and  mg tablet one tablet each (500 mg) twice daily    ascorbic acid (Vitamin C) 1,000 mg tablet Self   Sig: Take 1 tablet (1,000 mg) by mouth once daily.   calcium carbonate (CALCIUM 600 ORAL) Self   Sig: Take 1 tablet by mouth 2 times a day.   carBAMazepine XR (TEGretol XR) 100 mg 12 hr tablet Self   Sig: Take 1 tablet (100 mg) by mouth 2 times a day.  Patient takes tegretol  mg tablet and  mg tablet one tablet each (500 mg) twice daily    cholecalciferol (Vitamin D-3) 50 MCG (2000 UT) tablet Self   Sig: Take 1 tablet (50 mcg) by mouth once daily.   cyanocobalamin (Vitamin B-12) 100 mcg tablet Self   Sig: Take 1 tablet (100 mcg) by mouth once daily.  Patient reported she took a month off per provider instructions but will resume in January 2025    estradiol (Estrace) 0.01 % (0.1 mg/gram) vaginal cream Self   Sig: Insert 0.25 Applicatorful (1 g) into the vagina 2 times a week.  Patient reported using on Sunday and Thursday    zoledronic acid (Reclast) 5 mg/100 mL piggyback Self   Sig: Infuse 100 mL (5 mg) at 400 mL/hr over 15 minutes into a venous catheter 1 time for 1 dose.  Patient reported she receives this medication once per year. Last pharmacy dispense 2/14/24       Facility-Administered Medications: None  "       The list below reflects the updated allergy list. Please review each documented allergy for additional clarification and justification.  Allergies  Reviewed by Blaine Nascimento PharmD on 12/28/2024        Severity Reactions Comments    Phenytoin High Other double vision    Codeine Not Specified Hallucinations AUDITORY HALLUCINATIONS            Patient accepts M2B at discharge.     Sources:   [facility]  Pharmacy dispense history  Patient interview Good historian  Chart Review  12/26/24  oncology office visit    Additional Comments:  None      Blaine Nascimento PharmD  Transitions of Care Pharmacist  12/28/24     Secure Chat preferred   If no response call [phone] or Vocera \"Med Rec\"    "

## 2024-12-29 LAB
APTT PPP: 32 SECONDS (ref 27–38)
INR PPP: 1.1 (ref 0.9–1.1)
PROTHROMBIN TIME: 12.4 SECONDS (ref 9.8–12.8)
UFH PPP CHRO-ACNC: 0.2 IU/ML

## 2024-12-29 PROCEDURE — 85520 HEPARIN ASSAY: CPT | Performed by: STUDENT IN AN ORGANIZED HEALTH CARE EDUCATION/TRAINING PROGRAM

## 2024-12-29 PROCEDURE — 99223 1ST HOSP IP/OBS HIGH 75: CPT | Performed by: STUDENT IN AN ORGANIZED HEALTH CARE EDUCATION/TRAINING PROGRAM

## 2024-12-29 PROCEDURE — 2500000001 HC RX 250 WO HCPCS SELF ADMINISTERED DRUGS (ALT 637 FOR MEDICARE OP)

## 2024-12-29 PROCEDURE — 2500000004 HC RX 250 GENERAL PHARMACY W/ HCPCS (ALT 636 FOR OP/ED): Performed by: STUDENT IN AN ORGANIZED HEALTH CARE EDUCATION/TRAINING PROGRAM

## 2024-12-29 PROCEDURE — 85730 THROMBOPLASTIN TIME PARTIAL: CPT | Performed by: STUDENT IN AN ORGANIZED HEALTH CARE EDUCATION/TRAINING PROGRAM

## 2024-12-29 PROCEDURE — 85610 PROTHROMBIN TIME: CPT | Performed by: STUDENT IN AN ORGANIZED HEALTH CARE EDUCATION/TRAINING PROGRAM

## 2024-12-29 PROCEDURE — 1200000002 HC GENERAL ROOM WITH TELEMETRY DAILY

## 2024-12-29 PROCEDURE — 95720 EEG PHY/QHP EA INCR W/VEEG: CPT | Performed by: PSYCHIATRY & NEUROLOGY

## 2024-12-29 PROCEDURE — 95715 VEEG EA 12-26HR INTMT MNTR: CPT

## 2024-12-29 PROCEDURE — 95700 EEG CONT REC W/VID EEG TECH: CPT

## 2024-12-29 PROCEDURE — 2500000004 HC RX 250 GENERAL PHARMACY W/ HCPCS (ALT 636 FOR OP/ED)

## 2024-12-29 RX ORDER — HEPARIN SODIUM 10000 [USP'U]/100ML
0-4000 INJECTION, SOLUTION INTRAVENOUS CONTINUOUS
Status: DISCONTINUED | OUTPATIENT
Start: 2024-12-29 | End: 2024-12-31

## 2024-12-29 RX ADMIN — HEPARIN SODIUM 700 UNITS/HR: 10000 INJECTION, SOLUTION INTRAVENOUS at 15:05

## 2024-12-29 RX ADMIN — Medication 2000 UNITS: at 08:32

## 2024-12-29 RX ADMIN — PHENOBARBITAL 129.6 MG: 32.4 TABLET ORAL at 22:03

## 2024-12-29 RX ADMIN — CARBAMAZEPINE 400 MG: 400 TABLET, EXTENDED RELEASE ORAL at 22:03

## 2024-12-29 RX ADMIN — PHENOBARBITAL 64.8 MG: 32.4 TABLET ORAL at 08:32

## 2024-12-29 RX ADMIN — CARBAMAZEPINE 100 MG: 100 TABLET, EXTENDED RELEASE ORAL at 22:04

## 2024-12-29 RX ADMIN — POLYETHYLENE GLYCOL 3350 17 G: 17 POWDER, FOR SOLUTION ORAL at 08:36

## 2024-12-29 RX ADMIN — ATORVASTATIN CALCIUM 40 MG: 40 TABLET, FILM COATED ORAL at 22:03

## 2024-12-29 RX ADMIN — CARBAMAZEPINE 100 MG: 100 TABLET, EXTENDED RELEASE ORAL at 08:32

## 2024-12-29 RX ADMIN — CARBAMAZEPINE 400 MG: 400 TABLET, EXTENDED RELEASE ORAL at 08:34

## 2024-12-29 ASSESSMENT — COGNITIVE AND FUNCTIONAL STATUS - GENERAL
MOBILITY SCORE: 24
DAILY ACTIVITIY SCORE: 24
MOBILITY SCORE: 24
DAILY ACTIVITIY SCORE: 24

## 2024-12-29 ASSESSMENT — PAIN SCALES - GENERAL
PAINLEVEL_OUTOF10: 0 - NO PAIN
PAINLEVEL_OUTOF10: 0 - NO PAIN

## 2024-12-29 ASSESSMENT — PAIN - FUNCTIONAL ASSESSMENT: PAIN_FUNCTIONAL_ASSESSMENT: 0-10

## 2024-12-29 NOTE — CARE PLAN
The patient's goals for the shift include get reason for the weakness    The clinical goals for the shift include neuro intact    Over the shift, the patient did not make progress toward the following goals. Barriers to progression include pt treatment progress. Recommendations to address these barriers include clear communication and monitoring.

## 2024-12-29 NOTE — SIGNIFICANT EVENT
"Preliminary EEG Report     This vEEG is normal. No epileptiform discharges or lateralizing signs are seen.    This EEG was read from 1917 to 1940 on 12/28/24 .     The final impression will be available tomorrow under Chart Review in the Media tab. If the plan is to discontinue the video EEG, please place a \"Discontinue Continuous VEEG\" order in the EMR; otherwise the EEG tech will not receive the notification.      Cori Mcmahon MD  Adult Epilepsy Fellow  [facility] [place]     " [Follow-Up Visit] : a follow-up visit for [Other: _____] : [unfilled] [FreeTextEntry2] : Annual breast exam, breast cancer risk score = 43.6

## 2024-12-29 NOTE — CONSULTS
Consults    History Of Present Illness  Rhoda Pena is a 64 y.o. female with PMHX of lung adenocarcnioma s/p lobectomy 2024, history of seizures (on carbamazepine, follows with Dr. Mcdermott), osteoporosis, GERD, p/w RUE shaking followed by weakness.     Pt presented  after having sudden onset RUE shaking (not like her usual sz). No aura, tongue bite, BB incontinence, aphasia, or RLE involvement. Subsequently had R face numbness, RUE weakness. She presented to [facility] where CTH was -ve. S/p  and txf to  For further workup. MRI showed infarct in superior posterior LFL cortex anterior to precentral gyrus    Recent medical hx notable for lung adenoCA s/p lobectomy (2024) s/p chemo (last session 24) and recent initiation of ICI Atezolizumab (24) as first dose. No missed ASM doses in last 5d and last sz was >10y ago. Typical semiology of her sz includes aura (abnl feeling around R face) -> RUE hemibody shaking -> +/- GTC (see epilepsy note for additional info)       Last known well:   Had stroke symptoms resolved at time of presentation: No  Past Medical History  Past Medical History:   Diagnosis Date    Abdominal pain 2024    Achilles tendinitis, unspecified leg     Achilles tendinitis    Acute maxillary sinusitis, unspecified 2018    Acute maxillary sinusitis    Acute nasopharyngitis (common cold) 2016    Common cold    Adenocarcinoma of lung (Multi)     Age-related osteoporosis without current pathological fracture 2015    Encounter for ongoing osteoporosis therapy, bisphosphonates    Complete or unspecified spontaneous  without complication         Dyspnea, unspecified 2019    Paroxysmal dyspnea    Encounter for general adult medical examination without abnormal findings 2018    Annual physical exam    Encounter for health counseling related to travel 2018    Counseling about travel    Encounter for screening for lipoid  disorders 08/05/2019    Screening cholesterol level    Epigastric pain 08/26/2020    Dyspepsia    GERD (gastroesophageal reflux disease)     Influenza 12/06/2024    Irritable bowel syndrome     Left patella fracture 09/05/2023    Mastodynia 05/12/2021    Breast pain, right    Otalgia, right ear 03/07/2020    Otalgia, right    Other general symptoms and signs 01/18/2018    Flu-like symptoms    Other microscopic hematuria 09/19/2017    Hematuria, microscopic    Pain in right hand 03/04/2019    Pain of right hand    Pain in right knee 08/05/2019    Knee pain, right    Pain in unspecified shoulder     Shoulder pain    Personal history of (healed) traumatic fracture 10/30/2020    History of fracture of phalanx of toe    Personal history of other diseases of the digestive system 08/16/2018    History of gastroenteritis    Personal history of other diseases of the musculoskeletal system and connective tissue 12/27/2016    History of low back pain    Personal history of other diseases of the musculoskeletal system and connective tissue 10/19/2020    History of osteopenia    Personal history of other diseases of the nervous system and sense organs 08/05/2019    Personal history of seizure disorder    Personal history of other diseases of the respiratory system 05/13/2022    History of acute sinusitis    Personal history of other diseases of the respiratory system 10/30/2020    History of chronic rhinitis    Personal history of other diseases of the respiratory system 04/19/2017    History of influenza    Personal history of other diseases of the respiratory system 03/07/2020    History of viral pharyngitis    Personal history of other infectious and parasitic diseases     History of varicella    Personal history of other medical treatment 11/02/2018    History of screening mammography    Personal history of other specified conditions     History of headache    Personal history of other specified conditions 03/24/2020     History of abnormal mammogram    Personal history of other specified conditions 03/02/2018    History of fatigue    Personal history of other specified conditions 09/19/2017    History of dysuria    Personal history of other specified conditions 08/26/2020    History of diarrhea    Personal history of other specified conditions 03/03/2020    History of abdominal pain    Personal history of other specified conditions 01/11/2019    History of chest pain    Personal history of urinary (tract) infections 11/07/2016    History of urinary tract infection    Personal history of urinary (tract) infections 07/02/2018    History of urinary tract infection    Pregnancy with abortive outcome 12/06/2024    Pulmonary nodules     Bilateral    Seizure disorder (Multi)     Strain of muscle(s) and tendon(s) of peroneal muscle group at lower leg level, left leg, initial encounter 11/05/2020    Strain of peroneal tendon of left foot, initial encounter    Unspecified fracture of left toe(s), initial encounter for closed fracture 10/30/2020    Fracture of proximal phalanx of toe of left foot    Unspecified injury of left wrist, hand and finger(s), initial encounter 01/05/2021    Left wrist injury    Unspecified injury of unspecified foot, initial encounter     Foot injury    Urinary tract infection, site not specified 07/02/2018    Acute UTI    Urinary tract infection, site not specified 11/07/2016    Acute UTI     Surgical History  Past Surgical History:   Procedure Laterality Date    APPENDECTOMY  10/24/2013    Appendectomy    BREAST CYST ASPIRATION  1993    LAPAROSCOPY DIAGNOSTIC / BIOPSY / ASPIRATION / LYSIS  12/18/2014    Exploratory Laparoscopy    PELVIC LAPAROSCOPY      endometriosis    US GUIDED SOFT TISSUE BIOPSY  12/18/2023    US GUIDED SOFT TISSUE BIOPSY 12/18/2023 [facility] US     Social History  Social History     Tobacco Use    Smoking status: Former     Current packs/day: 0.00     Types: Cigarettes     Quit date: 7/1/1990      "Years since quittin.5     Passive exposure: Past    Smokeless tobacco: Never   Vaping Use    Vaping status: Never Used   Substance Use Topics    Alcohol use: Not Currently     Comment: social    Drug use: Never     Allergies  Phenytoin and Codeine  Home Medications  Medications Prior to Admission   Medication Sig Dispense Refill Last Dose/Taking    ascorbic acid (Vitamin C) 1,000 mg tablet Take 1 tablet (1,000 mg) by mouth once daily.   Taking    calcium carbonate (CALCIUM 600 ORAL) Take 1 tablet by mouth 2 times a day.   Taking    carBAMazepine XR (TEGretol XR) 100 mg 12 hr tablet Take 1 tablet (100 mg) by mouth 2 times a day. 180 tablet 3 Taking    cholecalciferol (Vitamin D-3) 50 MCG (2000 UT) tablet Take 1 tablet (50 mcg) by mouth once daily.   Taking    cyanocobalamin (Vitamin B-12) 100 mcg tablet Take 1 tablet (100 mcg) by mouth once daily.   Taking    PHENobarbitaL 64.8 mg tablet TAKE 1 TABLET BY MOUTH EVERY MORNING AND TAKE 2 TABLETS BY MOUTH EVERY EVENING 270 tablet 1 Taking    estradiol (Estrace) 0.01 % (0.1 mg/gram) vaginal cream Insert 0.25 Applicatorful (1 g) into the vagina 2 times a week. 42.5 g 3     Tegretol  mg 12 hr tablet Take 1 tablet (400 mg) by mouth 2 times a day. Do not crush, chew, or split. 180 tablet 3     zoledronic acid (Reclast) 5 mg/100 mL piggyback Infuse 100 mL (5 mg) at 400 mL/hr over 15 minutes into a venous catheter 1 time for 1 dose. 100 mL 0        Review of Systems  Neurological Exam  Physical Exam  Last Recorded Vitals  Blood pressure 105/64, pulse 69, temperature 36.1 °C (97 °F), resp. rate (!) 0, height 1.6 m (5' 2.99\"), weight 59.9 kg (132 lb), SpO2 97%.    MSE: awake. Alert. Language intact to conversation.   CN: VFF. EOM full range. Facial sens intact b/l. No FD. No dysarthria.   Motor: AG x4 w/o drift. No orbiting noted. Slower finger taps in RH.   Sens: intact in all 4 ext w/o asymmetry  Coordination: No ataxia on FNF.  Gait: nt    Relevant Results    "   Sidney Coma Scale  Best Eye Response: Spontaneous  Best Verbal Response: Oriented  Best Motor Response: Follows commands  Sidney Coma Scale Score: 15    MR brain w and wo IV contrast    Result Date: 12/28/2024  Interpreted By:  Sandeep Bergeron, STUDY: MR BRAIN W AND WO IV CONTRAST;  12/28/2024 1:22 pm   INDICATION: Signs/Symptoms:Concern for seizure (FCD in left frontal) vs stroke vs metastasis with history of lung cancer and recent immunotherapy.     COMPARISON: 12/27/2024 brain CT   ACCESSION NUMBER(S): [id number]   ORDERING CLINICIAN: ELIF PATEL   TECHNIQUE: Axial T2, FLAIR, DWI, gradient echo T2 and sagittal and coronal T1 weighted images of brain were acquired. Post contrast T1 weighted images were acquired after administration of 12 ML Dotarem gadolinium based intravenous contrast.   FINDINGS: CSF Spaces: The ventricles and sulci are normal, unchanged.   Parenchyma: The superior left frontal lobe cortex anterior to the precentral gyrus as ill-defined area of FLAIR hyperintensity with restricted diffusion. No mass or enhancement is noted.   The white matter has a few nonspecific punctate foci of FLAIR hyperintensity elsewhere with no associated diffusion abnormality or enhancement.   Otherwise no mass, hemorrhage or enhancement is noted.   Paranasal Sinuses and Mastoids: The right mastoid air cells have a small amount of fluid. The paranasal sinuses are normally aerated.       1. The superior posterior right frontal lobe cortex anterior to the precentral gyrus has an ill-defined area of FLAIR hyperintensity with diffusion restriction which may represent an area of acute ischemia, focus of seizure activity not excluded. No enhancement or mass to suggest metastatic disease in this region is noted.   2. No mass or enhancement elsewhere in the brain to suggest metastatic disease is noted. The additional focal FLAIR hyperintensities are nonspecific and may be secondary to migraine, hypertension or other  "etiologies.   MACRO: None   Signed by: Sandeep Bergeron 12/28/2024 1:38 PM Dictation workstation:   [id number]   CT head wo IV contrast    Result Date: 12/27/2024  Interpreted By:  Bryan Jimenez, STUDY: CT HEAD WO IV CONTRAST;  12/27/2024 4:23 pm   INDICATION: Signs/Symptoms:Tremors, history of cancer.  Concern for metastatic disease.   COMPARISON: None.   ACCESSION NUMBER(S): [id number]   ORDERING CLINICIAN: RODOLFO SIFUENTES   TECHNIQUE: Noncontrast axial CT scan of head was performed.   FINDINGS: Parenchyma: There is no intracranial hemorrhage. The grey-white differentiation is intact. There is no mass effect or midline shift.   CSF Spaces: The ventricles, sulci and basal cisterns are within normal limits for age.   Extra-Axial Fluid: There is no extraaxial fluid collection.   Calvarium: The calvarium is unremarkable.   Paranasal sinuses: Visualized paranasal sinuses are clear.   Mastoids: Clear.   Orbits: Normal.   Soft tissues: Unremarkable.       No acute intracranial hemorrhage, mass effect, or CT apparent acute infarct. There is concern for intracranial metastases, recommend MRI of the brain with and without contrast.   Signed by: Bryan Jimenez 12/27/2024 5:10 PM Dictation workstation:   [id number]   No echocardiogram results found for the past 14 days      Results from last 7 days   Lab Units 12/28/24  0644   HEMOGLOBIN A1C % 5.1     No results found for: \"BNP\"     I have personally reviewed the following imaging results EEG    Result Date: 12/29/2024  IMPRESSION Impression This is a normal awake and sleep vEEG. No epileptiform discharges or lateralizing signs are recorded. A full report will be scanned into the patient's chart at a later time. This report has been interpreted and electronically signed by    MR brain w and wo IV contrast    Result Date: 12/28/2024  Interpreted By:  Sandeep Bergeron, STUDY: MR BRAIN W AND WO IV CONTRAST;  12/28/2024 1:22 pm   INDICATION: Signs/Symptoms:Concern for seizure " (FCD in left frontal) vs stroke vs metastasis with history of lung cancer and recent immunotherapy.     COMPARISON: 12/27/2024 brain CT   ACCESSION NUMBER(S): [id number]   ORDERING CLINICIAN: ELIF PATEL   TECHNIQUE: Axial T2, FLAIR, DWI, gradient echo T2 and sagittal and coronal T1 weighted images of brain were acquired. Post contrast T1 weighted images were acquired after administration of 12 ML Dotarem gadolinium based intravenous contrast.   FINDINGS: CSF Spaces: The ventricles and sulci are normal, unchanged.   Parenchyma: The superior left frontal lobe cortex anterior to the precentral gyrus as ill-defined area of FLAIR hyperintensity with restricted diffusion. No mass or enhancement is noted.   The white matter has a few nonspecific punctate foci of FLAIR hyperintensity elsewhere with no associated diffusion abnormality or enhancement.   Otherwise no mass, hemorrhage or enhancement is noted.   Paranasal Sinuses and Mastoids: The right mastoid air cells have a small amount of fluid. The paranasal sinuses are normally aerated.       1. The superior posterior right frontal lobe cortex anterior to the precentral gyrus has an ill-defined area of FLAIR hyperintensity with diffusion restriction which may represent an area of acute ischemia, focus of seizure activity not excluded. No enhancement or mass to suggest metastatic disease in this region is noted.   2. No mass or enhancement elsewhere in the brain to suggest metastatic disease is noted. The additional focal FLAIR hyperintensities are nonspecific and may be secondary to migraine, hypertension or other etiologies.   MACRO: None   Signed by: Sandeep Bergeron 12/28/2024 1:38 PM Dictation workstation:   [id number]    CT head wo IV contrast    Result Date: 12/27/2024  Interpreted By:  Bryan Jimenez, STUDY: CT HEAD WO IV CONTRAST;  12/27/2024 4:23 pm   INDICATION: Signs/Symptoms:Tremors, history of cancer.  Concern for metastatic disease.   COMPARISON:  None.   ACCESSION NUMBER(S): [id number]   ORDERING CLINICIAN: RODOLFO SIFUENTES   TECHNIQUE: Noncontrast axial CT scan of head was performed.   FINDINGS: Parenchyma: There is no intracranial hemorrhage. The grey-white differentiation is intact. There is no mass effect or midline shift.   CSF Spaces: The ventricles, sulci and basal cisterns are within normal limits for age.   Extra-Axial Fluid: There is no extraaxial fluid collection.   Calvarium: The calvarium is unremarkable.   Paranasal sinuses: Visualized paranasal sinuses are clear.   Mastoids: Clear.   Orbits: Normal.   Soft tissues: Unremarkable.       No acute intracranial hemorrhage, mass effect, or CT apparent acute infarct. There is concern for intracranial metastases, recommend MRI of the brain with and without contrast.   Signed by: Bryan Jimenez 12/27/2024 5:10 PM Dictation workstation:   [id number]    CT chest wo IV contrast    Result Date: 12/23/2024  Interpreted By:  Fannie Casas, STUDY: CT CHEST WO IV CONTRAST;  12/20/2024 10:08 am   INDICATION: Signs/Symptoms:history of lung surgery.   COMPARISON: 09/27/2024, 06/25/2024, 03/04/2024, 01/08/2024 (oldest chest CT)   ACCESSION NUMBER(S): [id number]   ORDERING CLINICIAN: KASSIDY HAHN   TECHNIQUE: Helical data acquisition of the chest was obtained  without IV contrast material.  Images were reformatted in axial, coronal, and sagittal planes.   FINDINGS: LUNGS and AIRWAYS: Status post right lower lobectomy. There is a small loculated right pleural effusion in the right lower hemithorax medially. This is smaller in size compared to 09/27/2024. There are chronic interstitial changes of the lungs. However, there is an increase in the interstitial markings in the right lung, perhaps post radiation change. There is no focal dense area of consolidation. There is a 4 mm low-density nodule at the right lung apex on series 7 axial image 56/286, unchanged compared to oldest study from  01/08/2024.   ISAURO AND MEDIASTINUM: There is no hilar mediastinal adenopathy. There is no axillary adenopathy.     HEART and VESSELS: There is a right internal jugular port with the tip at the junction of the superior vena cava and right atrium. The thoracic aorta is of normal course and caliber without vascular calcifications.   Main pulmonary artery and its branches are normal in caliber.   No coronary artery calcifications are seen. The study is not optimized for evaluation of coronary arteries.   The cardiac chambers are not enlarged.   No evidence of pericardial effusion.   UPPER ABDOMEN: The visualized subdiaphragmatic structures demonstrate no remarkable findings.   CHEST WALL and OSSEOUS STRUCTURES: There are no suspicious osseous lesions. Multilevel degenerative changes are present.   The chest wall is unremarkable.   There is no soft tissue abnormality.       1. Status post right lower lobectomy. 2. Small loculated pleural right lower hemithorax medially, decreased size to 09/27/2024. 3. Chronic interstitial changes of the lungs. However, there is an increase in the interstitial markings in the right lung, perhaps post radiation change. 4. 4 mm low-density right upper lobe nodule, unchanged compared to oldest chest CT dated 01/08/2024   MACRO: None   Signed by: Fannie Casas 12/23/2024 9:06 AM Dictation workstation:   [id number]  .     Stroke Alert CT/MRI review: Actual date and time 12/29 AM      IV Thrombolysis IV Thrombolysis Checklist    IV Thrombolysis Given: No; Thrombolysis contraindication reason: Time from Last Known Well (or stroke onset) is >4.5 hours        Assessment/Plan   Assessment & Plan  Right hemiparesis (Multi)     64 y.o. 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 Mcdermott), osteoporosis, GERD, p/w RUE shaking followed by weakness. MRI shows DR in superior posterior LFL cortex anterior to precentral gyrus w/o  enhancement or GRE signal c/f stroke. Etiology likely at least in part hypercoag iso active malignancy; timing also suspiciously close w/ initiation of ICI. Per drug website, 0.4% were noted to have stroke.  Location is also c/f cardioembolic. Given the most likely etiologies in question, it would be reasonable to anticoagulate. As pt is on tegretol which reduces efficacy of DOACs, warfarin will likely be necessary, which can be titrated accordingly. Plan for hep gtt for bridge to warfarin. Will also need to complete remaining workup.     A1c 5.1    Type: Ischemic stroke  Subtype/etiology: c/f embolic vs hypercoag  Vessels involved: distal L MCA  Neurological manifestations: RH weakness +/- R facial numbness  NIHSS (worst at presentation): 0  Diagnostic evaluation: CTH, MRI  Antiplatelet/antithrombotic plan for stroke prevention: hep gtt  VTE prophylaxis: hep gtt  Vascular Risk Factor modification goals:  Blood pressure goals: avoid hypotension SBP <100 that could worsen cerebral perfusion, Ischemic stroke- early permissive hypertension SBP < 220 mmHg with cautious inpatient lowering  Lipid Goals: education on healthy diet and statin therapy to maintain or achieve goal LDL-cholesterol < 70mg  Glucose Goals: early treatment of hyperglycemia to goal glucose 140-180 mg/dl with long-term goal A1c < 7%   Smoking Cessation and Education  Assessment for Rehabilitation needs   Patient and family education on signs and symptoms of stroke, calling 911, healthy strategies for stroke prevention.       RECOMMENDATIONS:  - Heparin gtt   - Atorvastatin 40mg nightly  - CTA Head and Neck  - TTE  - Tele, likely need zio on dc  - PT/OT/SLP        Alexsandra Fuchs MD

## 2024-12-29 NOTE — HOSPITAL COURSE
Rhoda Pena is a 64-year-old female with a medical history of lung adenocarcinoma and epilepsy. She presents with shaking in her RUE which was followed by weakness. Following this episode, on 12/27 she visited [facility], CT head was unremarkable, 325 mg of Aspirin was given and subsequently transferred to [facility] for further evaluation.     Several differential diagnoses arose initially, including minor stroke, a relapse of previously well-controlled epilepsy, CNS metastasis, paraneoplastic encephalitis and immune checkpoint inhibitor induced neurotoxicity. On the MRI of the brain taken on 12/28, a poorly defined area of FLAIR hyperintensity was observed, along with diffusion restriction located in the cortex just anterior to the left precentral gyrus. This area exhibited diffusion restriction with ADC correlation. These findings signify an acute ischemic stroke. The resemblance of this finding compared to a lesion in a similar location seen on prior MRI may be explained by an area of acute ischemia overlapping with focal cortical dysplasia. Imaging has been negative for signs of metastasis of her lung malignancy.     12/28 cvEEG was unremarkable. Stroke team was consulted and pt received stroke work-up. CTA head and neck on 12/30 showed loss of gray-white differentiation in the left superior parasagittal frontal lobe is consistent with known subacute infarct. CTA Head and Neck on 12/30 was consistent with known subacute infarct. TTE 12/30 results revealed a moderate PFO. DVT US pending on 12/31. No other significant vascular abnormalities. With the recommendation of stroke team, transitioning patient to lovenox subcutaneous therapy 1 mg/kg BID from heparin gtt. Patient will need to be on this for long-term anticoagulation given recent stroke in the setting of hypercoagulable state due to lung cancer.  Pt on Carbamezapine which negatively interacts with DOACs and narrows choices for anticoagulation.  Intermittent, sharp pain in the right occipital region of patient's head that radiates to the front is likely secondary to muscle strain/pinched nerve following RUE clonic activity. Cannot rule out occipital neuralgia if symptoms persist given distribution and frequency. Ruled out vascular etiology with CTA Head and Neck results.     Patient ready for discharge pending DVT US 12/31 and first dose of Lovenox. Pt should continue her Carbamezapine and phenobarbital on discharge. She should also continue the atorvastatin 40 mg daily. Pt needs to follow up with Dr. Huber due to the stroke and Dr. Gupta from structural cardiology for PFO found on the TTE.

## 2024-12-29 NOTE — PROGRESS NOTES
Subjective   She reported persistent mild weakness and decreased dexterity in RUE, however, some improvement since 12/28. Otherwise, no change in symptoms. No headache or neck pain reported.     Objective     Vitals:    12/29/24 0800   BP:    Pulse: 60   Resp: 21   Temp:    SpO2:         General:   - No distress     Higher mental function:   - Oriented to time, place and person. Normal language. Normal memory.      Cranial nerves:   CN 2: Normal   CN 3, 4, 6: EOMs normal alignment, normal saccades, pursuit and convergence. No nystagmus.   CN 5: Facial sensation intact bilaterally, jaw movement normal, inter incisor gaps aligned between upper and lower dentition.  CN 7: No facial deviation. Nasolabial folds symmetric.   CN 8: Hearing intact to conversation.  CN 11: Limited by neck pain, but was moving neck normally.   CN 12: Tongue midline, no atrophy, no fasciculation, normal movement.      Motor:   - Muscle bulk and tone were normal in both upper and lower extremities.   - No fasciculations, tremor or other abnormal movements were present.   - Normal strength except for RUE muscles (4/5)  - No CST lesion signs     Sensory:   - In both upper and lower extremities, sensation was intact to light touch     Coordination:  - Normal finger-nose-finger without dysmetria or overshoot     Labs:    12/27 Phenobarbital             41  12/28 Carbamazepine 6.3    Results from last 7 days   Lab Units 12/28/24  0644 12/27/24  1437 12/26/24  0856   WBC AUTO x10*3/uL 2.8* 3.7* 3.0*   HEMOGLOBIN g/dL 10.6* 11.6* 10.5*   HEMATOCRIT % 31.5* 35.5* 32.0*   PLATELETS AUTO x10*3/uL 261 304 297     Results from last 7 days   Lab Units 12/28/24  0644 12/27/24  1437 12/26/24  0856   SODIUM mmol/L 140 136 139   POTASSIUM mmol/L 4.4 3.7 3.6   CHLORIDE mmol/L 103 100 102   CO2 mmol/L 29 28 29   ANION GAP mmol/L 12 12 12   BUN mg/dL 12 16 15   CREATININE mg/dL 0.60 0.58 0.54   GLUCOSE mg/dL 80 92 109*   CALCIUM mg/dL 9.0 9.7 9.1   MAGNESIUM  mg/dL 2.07  --   --      Imagin/26 MRI brain:     - No evidence of metastatic disease, or other acute intracranial  abnormality.    - Several tiny nonenhancing foci of hyperintense FLAIR and T2 signal  in the periventricular and subcortical white matter of bilateral  cerebral hemispheres are nonspecific, but likely represent early  changes of microvascular disease.         CT head:     - No acute intracranial hemorrhage, mass effect, or CT apparent acute  infarct.          MRI Brain    - The superior posterior right frontal lobe cortex anterior to the  precentral gyrus has an ill-defined area of FLAIR hyperintensity with  diffusion restriction which may represent an area of acute ischemia,  focus of seizure activity not excluded. No enhancement or mass to  suggest metastatic disease in this region is noted.      - No mass or enhancement elsewhere in the brain to suggest  metastatic disease is noted. The additional focal FLAIR  hyperintensities are nonspecific and may be secondary to migraine,  hypertension or other etiologies.        Assessment/Plan      Rhoda Pena is a 64-year-old female with a medical history of lung adenocarcinoma and epilepsy. She presents with shaking in her RUE which was followed by weakness. The shaking resembled her previous seizure semiology, characterized by feelings of hollowness and abnormal sensations on the right side of her face followed by RUE shaking and occasionally generalized tonic-clonic seizures. During this episode, she did not experience loss of awareness, any preceding aura, tongue biting, urinary incontinence, or aphasia, although she did report some heaviness and weakness in her RUE. She was diagnosed with epilepsy beginning at age 8, currently on Carbamazepine  mg twice daily and Phenobarbital at 64.8 mg in the morning and 129.6 mg at night. However, she has been seizure-free for the past ten years. For her lung adenocarcinoma she had lobectomy  done on August 2024, has been undergoing chemotherapy since October 2024, with her last session on December 5, 2024, and she recently received immune checkpoint inhibitor Atezolizumab on December 26, 2024. She endorses missing a few doses of her AEDs but none in the last five days. Following this episode, she visited [facility], CT head was unremarkable, 325 mg of Aspirin was given and subsequently transferred to [facility] for [facility] evaluation.    Several differential diagnoses arose initially, including minor stroke, a relapse of previously well-controlled epilepsy, CNS metastasis, paraneoplastic encephalitis and immune checkpoint inhibitor induced neurotoxicity. The transient nature of her symptoms, along with dyslipidemia, risk of hypercoagulability (cancer and cisplatin) and small vessel disease burden, suggested the possibility of a minor stroke. The recent diagnosis of lung cancer and the occurrence of seizure after being seizure free for > 10 years raised concerns for potential metastasis; however, the same semiology as past seizures and limited cortical involvement based on semiology, without additional features of intracranial mass effect, made this less likely. A breakthrough seizure in the context of previous established epilepsy was also a consideration, likely triggered by missed medications and systemic stress related to her cancer and its treatment.     On the MRI of the brain taken on 12/28, a poorly defined area of FLAIR hyperintensity was observed, along with diffusion restriction located in the cortex just anterior to the left precentral gyrus. This area exhibits diffusion restriction with ADC correlation, which could denote acute ischemia. Nevertheless, the lesion appears somehow similar with findings from the prior MRI, and the semiology aligns with the lesion, making it impossible to dismiss the possibility of epileptic foci. Furthermore, there is no indication of enhancement or any mass that  would suggest metastatic disease in this area. It is possible that ischemia, potentially linked to hypercoagulability due to malignancy and cisplatin, occurred in already existing epileptic foci, leading to a breakthrough seizure.      #Right arm clonic seizure    4D Classification  Event type: Epileptic paroxysmal event  Seizure semiology:  Right arm clonic   Epileptogenic Zone: Left hemisphere  Etiology Unknown  Co-morbidities: Adenocarcinoma of the lung, stroke      - cvEEG 12/28: No epileptiform discharges or lateralizing signs are seen     - Likely from ischemia, potentially linked to hypercoagulability due to malignancy and cisplatin, occurring in already existing epileptic foci, leading to a breakthrough seizure.    - Continue Carbamazepine  BID, Phenobarbitol 64.8 mg AM and 129.6 qhs     - 2mg IV Ativan PRN for GTC lasting > 3-5 minutes        #Acute ischemic stroke     - 12/28 MRI Brain: The superior posterior right frontal lobe cortex anterior to the precentral gyrus has an ill-defined area of FLAIR hyperintensity with  diffusion restriction which may represent an area of acute ischemia.    - Continue atorvastatin 40 mg     - Continue stroke workup; TTE, CTA head and neck     - Pending recommendations from stroke team         #Neck pain radiating to posterior head     - F/U MRI neck     - Tylenol q4 PRN for pain     - Improving        DVT Ppx: lovenox  Diet: Regular  Code: Full code(confirmed on admission).    Tan Smyth MD  PGY1 Neurology     -----------------------------------------------------------------  Senior Resident Addendum:  I examined & discussed the patient above. I have reviewed and agree with the excellent note above.     Stew Solares MD (Paul)  PGY-4 Neurology  Epilepsy Pager: [phone]

## 2024-12-29 NOTE — CARE PLAN
The patient's goals for the shift include r/o seizure on EEG.    The clinical goals for the shift include neuro intact  No arm movement reported by pt. Continue v-EEG. Neuro exam stable. Maintain fall and seizure precautions.

## 2024-12-30 ENCOUNTER — APPOINTMENT (OUTPATIENT)
Dept: RADIOLOGY | Facility: HOSPITAL | Age: 64
End: 2024-12-30
Payer: COMMERCIAL

## 2024-12-30 ENCOUNTER — TELEPHONE (OUTPATIENT)
Dept: HEMATOLOGY/ONCOLOGY | Facility: CLINIC | Age: 64
End: 2024-12-30

## 2024-12-30 ENCOUNTER — APPOINTMENT (OUTPATIENT)
Dept: CARDIOLOGY | Facility: HOSPITAL | Age: 64
End: 2024-12-30
Payer: COMMERCIAL

## 2024-12-30 VITALS
BODY MASS INDEX: 23.39 KG/M2 | WEIGHT: 132 LBS | SYSTOLIC BLOOD PRESSURE: 103 MMHG | RESPIRATION RATE: 15 BRPM | TEMPERATURE: 97.9 F | HEART RATE: 66 BPM | HEIGHT: 63 IN | OXYGEN SATURATION: 95 % | DIASTOLIC BLOOD PRESSURE: 62 MMHG

## 2024-12-30 LAB
AORTIC VALVE PEAK VELOCITY: 1.08 M/S
AV PEAK GRADIENT: 5 MMHG
AVA (PEAK VEL): 2.82 CM2
EJECTION FRACTION APICAL 4 CHAMBER: 60.3
EJECTION FRACTION: 62 %
HOLD SPECIMEN: NORMAL
LEFT ATRIUM VOLUME AREA LENGTH INDEX BSA: 19.2 ML/M2
LEFT VENTRICLE INTERNAL DIMENSION DIASTOLE: 4.32 CM (ref 3.5–6)
LEFT VENTRICULAR OUTFLOW TRACT DIAMETER: 1.91 CM
MITRAL VALVE E/A RATIO: 1.19
RIGHT VENTRICLE FREE WALL PEAK S': 9 CM/S
RIGHT VENTRICLE PEAK SYSTOLIC PRESSURE: 20.9 MMHG
UFH PPP CHRO-ACNC: 0.4 IU/ML
UFH PPP CHRO-ACNC: 0.5 IU/ML

## 2024-12-30 PROCEDURE — 2500000001 HC RX 250 WO HCPCS SELF ADMINISTERED DRUGS (ALT 637 FOR MEDICARE OP)

## 2024-12-30 PROCEDURE — 97165 OT EVAL LOW COMPLEX 30 MIN: CPT | Mod: GO

## 2024-12-30 PROCEDURE — 85520 HEPARIN ASSAY: CPT | Performed by: STUDENT IN AN ORGANIZED HEALTH CARE EDUCATION/TRAINING PROGRAM

## 2024-12-30 PROCEDURE — 2500000004 HC RX 250 GENERAL PHARMACY W/ HCPCS (ALT 636 FOR OP/ED): Performed by: STUDENT IN AN ORGANIZED HEALTH CARE EDUCATION/TRAINING PROGRAM

## 2024-12-30 PROCEDURE — 93306 TTE W/DOPPLER COMPLETE: CPT

## 2024-12-30 PROCEDURE — 99232 SBSQ HOSP IP/OBS MODERATE 35: CPT | Performed by: STUDENT IN AN ORGANIZED HEALTH CARE EDUCATION/TRAINING PROGRAM

## 2024-12-30 PROCEDURE — 93306 TTE W/DOPPLER COMPLETE: CPT | Performed by: INTERNAL MEDICINE

## 2024-12-30 PROCEDURE — 2550000001 HC RX 255 CONTRASTS: Performed by: PSYCHIATRY & NEUROLOGY

## 2024-12-30 PROCEDURE — 70498 CT ANGIOGRAPHY NECK: CPT | Performed by: RADIOLOGY

## 2024-12-30 PROCEDURE — 70498 CT ANGIOGRAPHY NECK: CPT

## 2024-12-30 PROCEDURE — 97161 PT EVAL LOW COMPLEX 20 MIN: CPT | Mod: GP

## 2024-12-30 PROCEDURE — 70496 CT ANGIOGRAPHY HEAD: CPT | Performed by: RADIOLOGY

## 2024-12-30 PROCEDURE — 2500000004 HC RX 250 GENERAL PHARMACY W/ HCPCS (ALT 636 FOR OP/ED)

## 2024-12-30 PROCEDURE — 1200000002 HC GENERAL ROOM WITH TELEMETRY DAILY

## 2024-12-30 RX ADMIN — IOHEXOL 90 ML: 350 INJECTION, SOLUTION INTRAVENOUS at 16:22

## 2024-12-30 RX ADMIN — CARBAMAZEPINE 400 MG: 400 TABLET, EXTENDED RELEASE ORAL at 22:12

## 2024-12-30 RX ADMIN — PHENOBARBITAL 64.8 MG: 32.4 TABLET ORAL at 08:51

## 2024-12-30 RX ADMIN — ATORVASTATIN CALCIUM 40 MG: 40 TABLET, FILM COATED ORAL at 22:13

## 2024-12-30 RX ADMIN — CARBAMAZEPINE 100 MG: 100 TABLET, EXTENDED RELEASE ORAL at 22:13

## 2024-12-30 RX ADMIN — CARBAMAZEPINE 100 MG: 100 TABLET, EXTENDED RELEASE ORAL at 08:51

## 2024-12-30 RX ADMIN — POLYETHYLENE GLYCOL 3350 17 G: 17 POWDER, FOR SOLUTION ORAL at 08:51

## 2024-12-30 RX ADMIN — CARBAMAZEPINE 400 MG: 400 TABLET, EXTENDED RELEASE ORAL at 08:50

## 2024-12-30 RX ADMIN — Medication 2000 UNITS: at 08:51

## 2024-12-30 RX ADMIN — HEPARIN SODIUM 8 UNITS/HR: 10000 INJECTION, SOLUTION INTRAVENOUS at 21:50

## 2024-12-30 RX ADMIN — PHENOBARBITAL 129.6 MG: 32.4 TABLET ORAL at 22:12

## 2024-12-30 ASSESSMENT — PAIN SCALES - GENERAL
PAINLEVEL_OUTOF10: 0 - NO PAIN

## 2024-12-30 ASSESSMENT — COGNITIVE AND FUNCTIONAL STATUS - GENERAL
DAILY ACTIVITIY SCORE: 24
MOBILITY SCORE: 24

## 2024-12-30 ASSESSMENT — ACTIVITIES OF DAILY LIVING (ADL): ADL_ASSISTANCE: INDEPENDENT

## 2024-12-30 ASSESSMENT — PAIN - FUNCTIONAL ASSESSMENT: PAIN_FUNCTIONAL_ASSESSMENT: 0-10

## 2024-12-30 NOTE — PROGRESS NOTES
Occupational Therapy    Evaluation    Patient Name: Rhoda Pena  MRN: [id number]  Today's Date: 12/30/2024  Time Calculation  Start Time: 1055  Stop Time: 1109  Time Calculation (min): 14 min    Assessment  IP OT Assessment  OT Assessment: Pt presents with R elbow to  4/5 strength. No active movement deficits noted at this time. Pt was independent with functional bed mobility and transfers.  Prognosis: Good  Barriers to Discharge Home: No anticipated barriers  Evaluation/Treatment Tolerance: Patient tolerated treatment well  Medical Staff Made Aware: Yes  End of Session Communication: Bedside nurse  End of Session Patient Position: Bed, 3 rail up, Alarm off, not on at start of session  Plan:  Treatment Interventions: UE strengthening/ROM  OT Discharge Recommendations: No further acute OT, Low intensity level of continued care  OT Recommended Transfer Status: Independent  OT - OK to Discharge: Yes    Subjective   Current Problem:  1. Right hemiparesis (Multi)  EEG    Transthoracic Echo (TTE) Complete    Transthoracic Echo (TTE) Complete        General:  Reason for Referral: presents with shaking in her RUE which was followed by weakness  Past Medical History Relevant to Rehab: lung adenocarcnioma s/p lobectomy 8/2024, history of seizures, osteoporosis, GERD  Co-Treatment: OT  Co-Treatment Reason: to maximize mobility and safety  Prior to Session Communication: Bedside nurse  Patient Position Received: Bed, 3 rail up, Alarm off, not on at start of session   Precautions:  Medical Precautions: Fall precautions    Pain:  Pain Assessment  Pain Assessment: 0-10  0-10 (Numeric) Pain Score: 0 - No pain        Objective   Cognition:  Overall Cognitive Status: Within Functional Limits  Orientation Level: Oriented X4  Insight: Within function limits           Home Living:  Type of Home: House  Lives With: Spouse  Home Layout: Laundry in basement  Home Access: Stairs to enter with rails  Entrance Stairs-Number of Steps:  3-4  Bathroom Shower/Tub: Tub/shower unit  Bathroom Equipment: Shower chair with back   Prior Function:  Level of Gleason: Independent with ADLs and functional transfers  Ambulatory Assistance: Independent  Vocational: Full time employment (works from home)       ADL:  Eating Assistance: Independent  Grooming Assistance: Independent  LE Dressing Assistance: Independent  LE Dressing Deficit:  (anticipate pt was able to sit in figure 4 position and reach R distal LE without any difficulties.)  Activity Tolerance:  Endurance: Endurance does not limit participation in activity  Balance:  Dynamic Sitting Balance  Dynamic Sitting-Balance Support: Feet supported  Dynamic Sitting-Level of Assistance: Independent  Dynamic Standing Balance  Dynamic Standing-Balance Support: No upper extremity supported  Dynamic Standing-Level of Assistance: Independent  Static Sitting Balance  Static Sitting-Balance Support: Feet supported  Static Sitting-Level of Assistance: Independent  Static Standing Balance  Static Standing-Balance Support: No upper extremity supported  Static Standing-Level of Assistance: Independent  Bed Mobility/Transfers: Bed Mobility  Bed Mobility: Yes  Bed Mobility 1  Bed Mobility 1: Supine to sitting, Sitting to supine  Level of Assistance 1: Independent   and Transfers  Transfer: Yes  Transfer 1  Transfer From 1: Bed to  Transfer to 1: Stand  Technique 1: Sit to stand, Stand to sit  Transfer Level of Assistance 1: Independent       Vision: Vision - Basic Assessment  Current Vision: Wears glasses all the time   and Vision - Complex Assessment  Ocular Range of Motion: Within Functional Limits  Sensation:  Light Touch: No apparent deficits    Perception:  Inattention/Neglect: Appears intact  Coordination:  Movements are Fluid and Coordinated: Yes   Hand Function:  Hand Function  Gross Grasp: Functional  Coordination: Functional  Extremities: RUE   RUE :  (R shoulder to digits AROM WFL. L elbow to  4/5.  Issued a theraputty and sponges to increase R hand flex/ext muscle strength. Pt was educated on exericses to complete and reported understanding.), LUE   LUE: Within Functional Limits,  , and      Outcome Measures: [facility] Daily Activity  Putting on and taking off regular lower body clothing: None  Bathing (including washing, rinsing, drying): None  Putting on and taking off regular upper body clothing: None  Toileting, which includes using toilet, bedpan or urinal: None  Taking care of personal grooming such as brushing teeth: None  Eating Meals: None  Daily Activity - Total Score: 24         ,     OT Adult Other Outcome Measures  4AT: negative    Education Documentation  Handouts, taught by Shawna Jennings OT at 12/30/2024  2:04 PM.  Learner: Patient  Readiness: Acceptance  Method: Explanation  Response: Verbalizes Understanding    Body Mechanics, taught by Shawna Jennings OT at 12/30/2024  2:04 PM.  Learner: Patient  Readiness: Acceptance  Method: Explanation  Response: Verbalizes Understanding    Precautions, taught by Shawna Jennings OT at 12/30/2024  2:04 PM.  Learner: Patient  Readiness: Acceptance  Method: Explanation  Response: Verbalizes Understanding    Home Exercise Program, taught by Shawna Jennings OT at 12/30/2024  2:04 PM.  Learner: Patient  Readiness: Acceptance  Method: Explanation  Response: Verbalizes Understanding    ADL Training, taught by Shawna Jennings OT at 12/30/2024  2:04 PM.  Learner: Patient  Readiness: Acceptance  Method: Explanation  Response: Verbalizes Understanding    Education Comments  No comments found.        12/30/24 at 2:05 PM   Shawna Jennings OTR/OTD  Rehab Office: [phone]

## 2024-12-30 NOTE — PROGRESS NOTES
Subjective   No acute events overnight. Patient doing well overall. She reported persistent mild weakness and decreased dexterity in RUE, similar to 12/29. However, this has not prevented the patient from opening bottles and carrying out other daily activities. She does endorse some intermittent pain behind her right ear which radiates to the front of her head. Pt unable to recall the onset of this pain. Also states some difficulty reading from her phone at times, but attributes this to needing a new glasses prescription. Pt denies nausea, vomiting, blurry or double vision, dizziness, lightheadedness, seizure activity, clonic activity of the RUE.     Objective       12/29/2024     8:00 PM 12/29/2024    11:44 PM 12/30/2024    12:00 AM 12/30/2024     3:58 AM 12/30/2024     4:00 AM 12/30/2024    12:00 PM 12/30/2024    12:43 PM   Vitals   Systolic 112 103  112   122   Diastolic 68 62  66   78   BP Location Left arm         Heart Rate 71  66  61 69    Temp 36.1 °C (97 °F) 36.6 °C (97.9 °F)  36.1 °C (97 °F)   36 °C (96.8 °F)   Resp 15  15  11 15          Neurological Exam:  MENTAL STATUS:  General appearance:  Awake, in no acute distress. Eating comfortably.  Orientation: Alert and oriented x 4.  Language: Intact.  Follows complex commands across midline  Thought processes: Logical, organized  Memory: Intact.  Concentration: Intact  Fund of knowledge: Appropriate  Judgment and Insight: Intact    CRANIAL NERVES:  - II/III: PERRL  - II:  Visual fields intact to confrontation bilaterally tested individually and together  - III, IV, VI: EOM full to pursuit without nystagmus  - V: V1-V3 sensation intact bilaterally  - VII: Face muscles symmetric with smile and eye closure. No droop.  - VIII: Intact to interview  - IX, X: Palate elevated symmetrically bilaterally, no hoarseness  - XI: 5/5 strength on shoulder shrugging bilaterally  - XII: Tongue midline without atrophy or fasciculation    MOTOR: Tone and bulk normal in all  extremities. No tremor, no abnormal movements.    STRENGTH: R L  Deltoid  5 5  Biceps  5 5  Triceps  5 5    4 5  Thumb Abd 4 5  Finger Abd 4 5  Hip flexion 5 5  DorsiFlex 5 5  PlantarFlex 5 5    No Babinski.    COORDINATION: Intact on finger to nose bl,   GAIT: Did not test.     Labs:      Heparin Assay, UFH:              0.4   Phenobarbital:             41   Carbamazepine:             6.3    Results from last 7 days   Lab Units 24  0644 24  1437 24  0856   WBC AUTO x10*3/uL 2.8* 3.7* 3.0*   HEMOGLOBIN g/dL 10.6* 11.6* 10.5*   HEMATOCRIT % 31.5* 35.5* 32.0*   PLATELETS AUTO x10*3/uL 261 304 297     Results from last 7 days   Lab Units 24  0644 24  1437 24  0856   SODIUM mmol/L 140 136 139   POTASSIUM mmol/L 4.4 3.7 3.6   CHLORIDE mmol/L 103 100 102   CO2 mmol/L 29 28 29   ANION GAP mmol/L 12 12 12   BUN mg/dL 12 16 15   CREATININE mg/dL 0.60 0.58 0.54   GLUCOSE mg/dL 80 92 109*   CALCIUM mg/dL 9.0 9.7 9.1   MAGNESIUM mg/dL 2.07  --   --      .    Imagin/26 MRI brain:     - No evidence of metastatic disease, or other acute intracranial  abnormality.    - Several tiny nonenhancing foci of hyperintense FLAIR and T2 signal  in the periventricular and subcortical white matter of bilateral  cerebral hemispheres are nonspecific, but likely represent early  changes of microvascular disease.         CT head:     - No acute intracranial hemorrhage, mass effect, or CT apparent acute  infarct.          MRI Brain    - The superior posterior right frontal lobe cortex anterior to the  precentral gyrus has an ill-defined area of FLAIR hyperintensity with  diffusion restriction which may represent an area of acute ischemia,  focus of seizure activity not excluded. No enhancement or mass to  suggest metastatic disease in this region is noted.      - No mass or enhancement elsewhere in the brain to suggest  metastatic disease is noted. The additional focal  FLAIR  hyperintensities are nonspecific and may be secondary to migraine,  hypertension or other etiologies.    12/28 MR Cervical Spine    - Degenerative changes as described.     - Spinal cord signal is somewhat heterogeneous in appearance on the T2  weighted sequences. Though there is some apparent signal increase in  the right half of the spinal cord at the C4-5 through C6-7 levels.  This is not definitely corroborated on the axial sequence and  therefore suspect this is artifact rather than true myelopathy.        Assessment/Plan      Rhoda Pena is a 64-year-old female with a medical history of lung adenocarcinoma (s/p lobectomy and chemo, currently on immunotherapy) and epilepsy. She was transferred from [facility] after presenting with shaking in her RUE which was followed by weakness. There CT head was unremarkable and she was given 325 mg of Aspirin. The semiology of her event is characterized by feelings of hollowness and abnormal sensations on the right side of her face followed by RUE shaking and occasionally generalized tonic-clonic seizures.  She reported some heaviness and weakness in her RUE.     She was diagnosed with epilepsy beginning at age 8, currently on Carbamazepine  mg twice daily and Phenobarbital at 64.8 mg in the morning and 129.6 mg at night.     Although patient has had seizures of similar semiology in the past, she has been seizure free for >10 years. The transient nature of her symptoms, along with dyslipidemia, risk of hypercoagulability (cancer and cisplatin) and small vessel disease burden, support a small ischemic stroke, which caused this provoked a seizure. This is supported by the MRI brain taken on 12/28 where a poorly defined area of FLAIR hyperintensity was observed, along with diffusion restriction located in the cortex just anterior to the left precentral gyrus. The resemblance of this finding compared to a lesion in a similar location seen on prior MRI may be  explained by an area of acute ischemia overlapping with focal cortical dysplasia. In addition, imaging has been negative for signs of metastasis of her lung malignancy.     Patient currently undergoing stroke work-up. Awaiting TTE results in order to assess cardio-embolic cause. CTA Head and Neck pending. With the recommendation of stroke team, will likely stop heparin gtt and transition patient to long-term lovenox therapy following no significant changes on CTA Head and Neck 12/30. Pt on Carbamezapine which negatively interacts with DOACs and narrows choices for anticoagulation. Unclear on onset, but patient also experiencing intermittent pain radiating from right occipital region to front of head. This may be secondary to muscle strain/pinched nerve following RUE clonic activity, but cannot rule out occipital neuralgia given distribution and frequency. Will rule out vascular etiology with CTA results.     #Right arm clonic seizure    4D Classification  Event type: Epileptic paroxysmal event  Seizure semiology:  Right arm clonic   Epileptogenic Zone: Left hemisphere  Etiology Unknown  Co-morbidities: Adenocarcinoma of the lung, stroke    - cvEEG 12/28: No epileptiform discharges or lateralizing signs are seen   - Likely from ischemia, potentially linked to hypercoagulability due to malignancy and cisplatin, occurring in already existing epileptic foci, leading to a breakthrough seizure.  - Continue Carbamazepine  mg BID, Phenobarbitol 64.8 mg AM and 129.6 mg nightly   - 2mg IV Ativan PRN for GTC lasting > 3-5 minutes      #Acute ischemic stroke   - 12/28 MRI Brain: The superior posterior left frontal lobe cortex anterior to the precentral gyrus has an ill-defined area of FLAIR hyperintensity with  diffusion restriction which may represent an area of acute ischemia.  -  A1c 5.1  - TTE results pending.  - Continue atorvastatin 40 mg   - Continue stroke workup; awaiting CTA head and neck.   - Pending  recommendations from stroke team. Will likely transition to Lovenox following CTA head and neck results.   - Passed bedside swallow, doing well on regular diet.   - PT/OT consulted.       #Right Posterior head Pain  - 12/28 MR Cervical spine: Degenerative changes as described. Spinal cord signal is somewhat heterogeneous in appearance on the T2  weighted sequences. Though there is some apparent signal increase in  the right half of the spinal cord at the C4-5 through C6-7 levels.  This is not definitely corroborated on the axial sequence and  therefore suspect this is artifact rather than true myelopathy.  - Likely due to muscle strain or nerve impingement resulting from recent clonic activity of the RUE. Location and intermittent nature may also support occipital neuralgia.    - Will follow-up on CTA head and neck  - Tylenol q4 PRN for pain    DVT Ppx: heparin gtt  Diet: Regular  Code: Full code(confirmed on admission).    CRISTOFER WOLFF M4     -----------------------------------------------------------------  Senior Resident Addendum:  I examined & discussed the patient above. I have reviewed and agree with the excellent note above.     Stew Page) MD Sujatha  PGY-4 Neurology  Epilepsy Pager: [phone]

## 2024-12-30 NOTE — DISCHARGE INSTRUCTIONS
Rhoda Pena,    You are being discharged from the hospital! You were originally admitted on 12/27/24 for shaking of your right arm. There was concern that you had a seizure, so you were admitted and seen by seizure specialists. An MRI was done to see if something else had caused your seizure since you had seen on the same seizure medications and been seizure free for many years. The MRI showed that you had a stroke. The location of the stroke made us concered that the srtoke could have been caused by a blood clot coming from your heart. We did an ultrasound of your heart and we found a patent foramen ovale (PFO). A PFO is a hole between chambers of your heart. This finding confirmed that you need to be on anticoagulation to prevent another stroke.    If you have any signs of stroke, such as arm or leg weakness, facial droop, persistent dizziness, or inability to speak, please come to the ED.    Seizure Precautions:  Do not to drive, use power tools or operate heavy machinery, and should not be on ladders. Use the shower and not the bath. Likewise, refrain from any activity which could result in injury to themselves or others if they had a seizure or lost consciousness. These restrictions should continue for the next 3 months since this seizure was provoked by a stroke.    Medication Changes:  - Start taking atorvastatin 40 mg daily.  - Start taking lovenox shots every 12 hours to prevent a blood clot from forming.    Follow Up:  - Follow up with Stroke Neurology (Billie) within the next 2 months.  - Follow up with Structural Cardiology (Dr. Gupta) for PFO   - Follow up with Oncology.      It was a pleasure to take care of you!    - Your  Neurology Team

## 2024-12-30 NOTE — PROGRESS NOTES
Physical Therapy    Physical Therapy Evaluation    Patient Name: Rhoda Pena  MRN: [id number]  Today's Date: 12/30/2024   Time Calculation  Start Time: 1055  Stop Time: 1108  Time Calculation (min): 13 min    Assessment/Plan   PT Assessment  Barriers to Discharge Home: No anticipated barriers  End of Session Communication: Bedside nurse  Assessment Comment: Pt mobilizing at baseline. Pt does not have any skilled PT needs  End of Session Patient Position: Bed, 3 rail up, Alarm off, not on at start of session  IP OR SWING BED PT PLAN  Inpatient or Swing Bed: Inpatient  PT Plan  PT Plan: PT Eval only  PT Eval Only Reason: At baseline function  PT Frequency: PT eval only  PT Discharge Recommendations: No further acute PT, No PT needed after discharge  PT Recommended Transfer Status: Independent  PT - OK to Discharge: Yes      Subjective   General Visit Information:  Reason for Referral: presents with shaking in her RUE which was followed by weakness  Past Medical History Relevant to Rehab: lung adenocarcnioma s/p lobectomy 8/2024, history of seizures, osteoporosis, GERD  Co-Treatment: OT  Co-Treatment Reason: to maximize mobility and safety  Prior to Session Communication: Bedside nurse  Patient Position Received: Bed, 3 rail up, Alarm off, not on at start of session   Home Living:  Home Living  Type of Home: House  Lives With: Spouse  Home Adaptive Equipment:  (shower chair)  Home Layout: Laundry in basement  Home Access: Stairs to enter with rails  Entrance Stairs-Number of Steps: 3-4  Prior Level of Function:  Prior Function Per Pt/Caregiver Report  ADL Assistance: Independent  Homemaking Assistance: Independent  Ambulatory Assistance: Independent  Vocational: Full time employment (works from home)  Precautions:  Precautions  Medical Precautions: Fall precautions         Objective     Pain:  Pain Assessment  0-10 (Numeric) Pain Score: 0 - No pain  Cognition:  Cognition  Overall Cognitive Status: Within Functional  Limits  Orientation Level: Oriented X4      Extremity/Trunk Assessments:  Strength:           RLE   RLE : Within Functional Limits  LLE   LLE : Within Functional Limits    General Assessments:            Sensation  Light Touch: No apparent deficits     Static Sitting Balance  Static Sitting-Level of Assistance: Independent  Dynamic Sitting Balance  Dynamic Sitting-Level of Assistance: Independent  Static Standing Balance  Static Standing-Level of Assistance: Independent  Dynamic Standing Balance  Dynamic Standing-Level of Assistance: Independent    Functional Assessments:  Bed Mobility  Bed Mobility: Yes  Bed Mobility 1  Bed Mobility 1: Supine to sitting, Sitting to supine  Level of Assistance 1: Independent  Transfers  Transfer: Yes  Transfer 1  Technique 1: Sit to stand, Stand to sit  Transfer Level of Assistance 1: Independent  Ambulation/Gait Training  Ambulation/Gait Training Performed: Yes  Ambulation/Gait Training 1  Assistance 1: Independent  Comments/Distance (ft) 1: 200 feet          Outcome Measures:  [facility] Basic Mobility  Turning from your back to your side while in a flat bed without using bedrails: None  Moving from lying on your back to sitting on the side of a flat bed without using bedrails: None  Moving to and from bed to chair (including a wheelchair): None  Standing up from a chair using your arms (e.g. wheelchair or bedside chair): None  To walk in hospital room: None  Climbing 3-5 steps with railing: None  Basic Mobility - Total Score: 24                                   12/30/24 at 12:57 PM   Deepa Rico, PT

## 2024-12-30 NOTE — SIGNIFICANT EVENT
Rhoda Pena is a 64-year-old female with a medical history of lung adenocarcinoma (s/p lobectomy and chemo, currently on immunotherapy) and epilepsy. She was transferred from [facility] after presenting with shaking in her RUE which was followed by weakness.     MRI brain shows diffusion restriction located in the cortex just anterior to the left precentral gyrus, suggesting a Lt frontal acute infarct.     Stroke etiology is most likely hypercoagulable state I/s/o lung Ca.     Recommendations:  - Patient will need AC, okay with therapeutic Lovenox I/s/o malignancy.  - Okay for discharge from a neuro-stroke standpoint.     Pt seen and discussed with attending Dr. Evangelina Moore MD  PGY-5 Vascular Neurology Fellow

## 2024-12-30 NOTE — PROGRESS NOTES
Patient lives at home with spouse and is independent in ADLs. Confirmed demographics in chart. Patient is recommended no needs from PT at discharge. Anticipate no other discharge needs. Explained role of TCC and stated team would reengage if discharge needs arose. Julisa Pavon RN, TCC

## 2024-12-31 ENCOUNTER — PHARMACY VISIT (OUTPATIENT)
Dept: PHARMACY | Facility: CLINIC | Age: 64
End: 2024-12-31
Payer: COMMERCIAL

## 2024-12-31 ENCOUNTER — APPOINTMENT (OUTPATIENT)
Dept: VASCULAR MEDICINE | Facility: HOSPITAL | Age: 64
End: 2024-12-31
Payer: COMMERCIAL

## 2024-12-31 VITALS
HEIGHT: 63 IN | BODY MASS INDEX: 23.39 KG/M2 | DIASTOLIC BLOOD PRESSURE: 81 MMHG | SYSTOLIC BLOOD PRESSURE: 136 MMHG | TEMPERATURE: 97.3 F | OXYGEN SATURATION: 96 % | RESPIRATION RATE: 18 BRPM | HEART RATE: 76 BPM | WEIGHT: 132 LBS

## 2024-12-31 PROBLEM — Q21.12 PFO (PATENT FORAMEN OVALE) (HHS-HCC): Chronic | Status: ACTIVE | Noted: 2024-12-31

## 2024-12-31 LAB
ERYTHROCYTE [DISTWIDTH] IN BLOOD BY AUTOMATED COUNT: 15.3 % (ref 11.5–14.5)
GLUCOSE BLD MANUAL STRIP-MCNC: 88 MG/DL (ref 74–99)
GLUCOSE BLD MANUAL STRIP-MCNC: 92 MG/DL (ref 74–99)
HCT VFR BLD AUTO: 35.4 % (ref 36–46)
HGB BLD-MCNC: 11.4 G/DL (ref 12–16)
MCH RBC QN AUTO: 33.4 PG (ref 26–34)
MCHC RBC AUTO-ENTMCNC: 32.2 G/DL (ref 32–36)
MCV RBC AUTO: 104 FL (ref 80–100)
NRBC BLD-RTO: 0 /100 WBCS (ref 0–0)
PLATELET # BLD AUTO: 194 X10*3/UL (ref 150–450)
RBC # BLD AUTO: 3.41 X10*6/UL (ref 4–5.2)
UFH PPP CHRO-ACNC: 0.4 IU/ML
WBC # BLD AUTO: 3.4 X10*3/UL (ref 4.4–11.3)

## 2024-12-31 PROCEDURE — 82947 ASSAY GLUCOSE BLOOD QUANT: CPT

## 2024-12-31 PROCEDURE — 85027 COMPLETE CBC AUTOMATED: CPT | Performed by: STUDENT IN AN ORGANIZED HEALTH CARE EDUCATION/TRAINING PROGRAM

## 2024-12-31 PROCEDURE — RXMED WILLOW AMBULATORY MEDICATION CHARGE

## 2024-12-31 PROCEDURE — 2500000004 HC RX 250 GENERAL PHARMACY W/ HCPCS (ALT 636 FOR OP/ED)

## 2024-12-31 PROCEDURE — 93970 EXTREMITY STUDY: CPT | Performed by: INTERNAL MEDICINE

## 2024-12-31 PROCEDURE — 99233 SBSQ HOSP IP/OBS HIGH 50: CPT | Performed by: STUDENT IN AN ORGANIZED HEALTH CARE EDUCATION/TRAINING PROGRAM

## 2024-12-31 PROCEDURE — 99238 HOSP IP/OBS DSCHRG MGMT 30/<: CPT

## 2024-12-31 PROCEDURE — 85520 HEPARIN ASSAY: CPT | Performed by: STUDENT IN AN ORGANIZED HEALTH CARE EDUCATION/TRAINING PROGRAM

## 2024-12-31 PROCEDURE — 2500000001 HC RX 250 WO HCPCS SELF ADMINISTERED DRUGS (ALT 637 FOR MEDICARE OP)

## 2024-12-31 PROCEDURE — 93970 EXTREMITY STUDY: CPT

## 2024-12-31 RX ORDER — ENOXAPARIN SODIUM 100 MG/ML
1 INJECTION SUBCUTANEOUS EVERY 12 HOURS
Qty: 72 ML | Refills: 1 | Status: SHIPPED | OUTPATIENT
Start: 2024-12-31

## 2024-12-31 RX ORDER — ENOXAPARIN SODIUM 100 MG/ML
1 INJECTION SUBCUTANEOUS EVERY 12 HOURS
Status: DISCONTINUED | OUTPATIENT
Start: 2024-12-31 | End: 2024-12-31 | Stop reason: HOSPADM

## 2024-12-31 RX ORDER — ATORVASTATIN CALCIUM 40 MG/1
40 TABLET, FILM COATED ORAL NIGHTLY
Qty: 90 TABLET | Refills: 1 | Status: SHIPPED | OUTPATIENT
Start: 2024-12-31

## 2024-12-31 RX ORDER — ENOXAPARIN SODIUM 100 MG/ML
1.5 INJECTION SUBCUTANEOUS EVERY 24 HOURS
Status: CANCELLED | OUTPATIENT
Start: 2024-12-31

## 2024-12-31 RX ORDER — ENOXAPARIN SODIUM 100 MG/ML
1 INJECTION SUBCUTANEOUS EVERY 12 HOURS
Status: DISCONTINUED | OUTPATIENT
Start: 2024-12-31 | End: 2024-12-31

## 2024-12-31 RX ORDER — HEPARIN SODIUM 10000 [USP'U]/100ML
0-4000 INJECTION, SOLUTION INTRAVENOUS CONTINUOUS
Status: ACTIVE | OUTPATIENT
Start: 2024-12-31 | End: 2024-12-31

## 2024-12-31 RX ADMIN — ENOXAPARIN SODIUM 60 MG: 60 INJECTION SUBCUTANEOUS at 17:56

## 2024-12-31 RX ADMIN — PHENOBARBITAL 64.8 MG: 32.4 TABLET ORAL at 08:26

## 2024-12-31 RX ADMIN — CARBAMAZEPINE 400 MG: 400 TABLET, EXTENDED RELEASE ORAL at 08:27

## 2024-12-31 RX ADMIN — POLYETHYLENE GLYCOL 3350 17 G: 17 POWDER, FOR SOLUTION ORAL at 08:38

## 2024-12-31 RX ADMIN — Medication 2000 UNITS: at 08:26

## 2024-12-31 RX ADMIN — CARBAMAZEPINE 100 MG: 100 TABLET, EXTENDED RELEASE ORAL at 08:27

## 2024-12-31 ASSESSMENT — PAIN SCALES - GENERAL: PAINLEVEL_OUTOF10: 0 - NO PAIN

## 2024-12-31 NOTE — PROGRESS NOTES
Subjective   No acute events overnight. Patient doing well overall. She reported improvement in her weakness and  dexterity in RUE, compared to 12/30. She continues to endorse some intermittent pain behind her right ear which radiates to the front of her head. It is sharp in nature. She stated this improved with application of a hot pack. Pt denies lower extremity pain or swelling, nausea, vomiting, dizziness, or lightheadedness, seizure. We discussed patient's TTE results, remaining work-up, and start of long-term lovenox therapy at home. Patient is agreeable and has no other concerns at this time.      Objective       12/30/2024     5:00 PM 12/30/2024     8:00 PM 12/30/2024     9:00 PM 12/31/2024    12:00 AM 12/31/2024     4:00 AM 12/31/2024     7:29 AM 12/31/2024     8:00 AM   Vitals   Systolic 110  103   95    Diastolic 66  63   60    BP Location   Left arm   Left arm    Heart Rate 72 79 73 74 57  62   Temp 36.6 °C (97.9 °F)  36.6 °C (97.9 °F)   36.4 °C (97.5 °F)    Resp 18 19 23 19 0  13       Physical Exam    Extremities: No edema, erythema, nor tenderness to palpation in bilateral lower extremities.    Neurological Exam:  MENTAL STATUS:  General appearance:  Awake, in no acute distress.  Orientation: Alert and oriented x 4.  Language: Intact.  Follows complex commands across midline  Thought processes: Logical, organized  Memory: Intact.  Concentration: Intact  Fund of knowledge: Appropriate  Judgment and Insight: Intact    CRANIAL NERVES:  - II/III: PERRL  - III, IV, VI: EOM full to pursuit without nystagmus  - V: V1-V3 sensation intact bilaterally  - VII: Face muscles symmetric with smile and eye closure. No droop.  - VIII: Intact to interview  - IX, X: Palate elevated symmetrically bilaterally, no hoarseness  - XI: 5/5 strength on shoulder shrugging bilaterally  - XII: Tongue midline without atrophy or fasciculation    MOTOR: Tone and bulk normal in all extremities. No tremor, no abnormal  movements.    STRENGTH: R L    5 5  Thumb Abd 5 5  Finger Abd 5 5  Hip flexion 5 5    COORDINATION: Intact on finger to nose bl,   GAIT: Did not test.     Labs:      Heparin Assay, UFH:              0.4   Heparin Assay, UFH:              0.4   Phenobarbital:             41   Carbamazepine:             6.3    Results from last 7 days   Lab Units 24  0850 24  0644 24  1437   WBC AUTO x10*3/uL 3.4* 2.8* 3.7*   HEMOGLOBIN g/dL 11.4* 10.6* 11.6*   HEMATOCRIT % 35.4* 31.5* 35.5*   PLATELETS AUTO x10*3/uL 194 261 304     Results from last 7 days   Lab Units 24  0644 24  1437 24  0856   SODIUM mmol/L 140 136 139   POTASSIUM mmol/L 4.4 3.7 3.6   CHLORIDE mmol/L 103 100 102   CO2 mmol/L 29 28 29   ANION GAP mmol/L 12 12 12   BUN mg/dL 12 16 15   CREATININE mg/dL 0.60 0.58 0.54   GLUCOSE mg/dL 80 92 109*   CALCIUM mg/dL 9.0 9.7 9.1   MAGNESIUM mg/dL 2.07  --   --      .    Imagin/26 MRI brain:     - No evidence of metastatic disease, or other acute intracranial  abnormality.    - Several tiny nonenhancing foci of hyperintense FLAIR and T2 signal  in the periventricular and subcortical white matter of bilateral  cerebral hemispheres are nonspecific, but likely represent early  changes of microvascular disease.         CT head:     - No acute intracranial hemorrhage, mass effect, or CT apparent acute  infarct.          MRI Brain    - The superior posterior right frontal lobe cortex anterior to the  precentral gyrus has an ill-defined area of FLAIR hyperintensity with  diffusion restriction which may represent an area of acute ischemia,  focus of seizure activity not excluded. No enhancement or mass to  suggest metastatic disease in this region is noted.      - No mass or enhancement elsewhere in the brain to suggest  metastatic disease is noted. The additional focal FLAIR  hyperintensities are nonspecific and may be secondary to  migraine,  hypertension or other etiologies.    12/28 MR Cervical Spine    - Degenerative changes as described.     - Spinal cord signal is somewhat heterogeneous in appearance on the T2  weighted sequences. Though there is some apparent signal increase in  the right half of the spinal cord at the C4-5 through C6-7 levels.  This is not definitely corroborated on the axial sequence and  therefore suspect this is artifact rather than true myelopathy.    12/30 TTE  CONCLUSIONS:   1. Left ventricular ejection fraction is normal, calculated by Durbin's biplane at 62%.   2. There is normal right ventricular global systolic function.   3. Right ventricular systolic pressure is within normal limits.   4. A bubble study using agitated saline was performed. Bubble study is positive. A moderate PFO (11-20 bubbles) was demonstrated.      12/30 CTA Head and Neck    1. Loss of gray-white differentiation in the left superior  parasagittal frontal lobe is consistent with known subacute infarct.  There is no hemorrhagic transformation or midline shift.  2. No evidence for significant stenosis or large branch vessel  cutoffs of the intracranial vessels.  3. No evidence for significant stenosis of the cervical vessels.  4. Scattered micro nodularity in the bilateral lung apices are better  evaluated prior CT chest from 12/20/2024.        Assessment/Plan      Rhoda Pena is a 64-year-old female with a medical history of lung adenocarcinoma (s/p lobectomy and chemo, currently on immunotherapy) and epilepsy. She was transferred from [facility] after presenting with shaking in her RUE which was followed by weakness. There CT head was unremarkable and she was given 325 mg of Aspirin. The semiology of her event is characterized by feelings of hollowness and abnormal sensations on the right side of her face followed by RUE shaking and occasionally generalized tonic-clonic seizures.  She reported some heaviness and weakness in her RUE.      She was diagnosed with epilepsy beginning at age 8, currently on Carbamazepine  mg twice daily and Phenobarbital at 64.8 mg in the morning and 129.6 mg at night.     Although patient has had seizures of similar semiology in the past, she has been seizure free for >10 years. The transient nature of her symptoms, along with dyslipidemia, risk of hypercoagulability (cancer and cisplatin) and small vessel disease burden, support a small ischemic stroke, which caused this provoked a seizure. This is supported by the MRI brain taken on 12/28 where a poorly defined area of FLAIR hyperintensity was observed, along with diffusion restriction located in the cortex just anterior to the left precentral gyrus. The resemblance of this finding compared to a lesion in a similar location seen on prior MRI may be explained by an area of acute ischemia overlapping with focal cortical dysplasia. In addition, imaging has been negative for signs of metastasis of her lung malignancy.     Patient received stroke work-up. CTA Head and Neck on 12/30 was consistent with known subacute infarct. TTE 12/30 results revealed a moderate PFO. DVT US pending on 12/31. No other significant vascular abnormalities. With the recommendation of stroke team, transitioning patient to lovenox subcutaneous therapy 1 mg/kg BID from heparin gtt. Patient will need to be on this for long-term anticoagulation given recent stroke in the setting of hypercoagulable state due to lung cancer.  Pt on Carbamezapine which negatively interacts with DOACs and narrows choices for anticoagulation. Intermittent, sharp pain in the right occipital region of patient's head that radiates to the front is likely secondary to muscle strain/pinched nerve following RUE clonic activity. Cannot rule out occipital neuralgia if symptoms persist given distribution and frequency. Ruled out vascular etiology with CTA Head and Neck results. Patient ready for discharge pending DVT US  12/31 and first dose of Lovenox.     #Right arm clonic seizure    4D Classification  Event type: Epileptic paroxysmal event  Seizure semiology:  Right arm clonic   Epileptogenic Zone: Left hemisphere  Etiology Unknown  Co-morbidities: Adenocarcinoma of the lung, stroke    - cvEEG 12/28: No epileptiform discharges or lateralizing signs are seen   - Likely from ischemia, potentially linked to hypercoagulability due to malignancy and cisplatin, occurring in already existing epileptic foci, leading to a breakthrough seizure.  - Continue Carbamazepine  mg BID, Phenobarbitol 64.8 mg AM and 129.6 mg nightly on discharge  - Recommend seizure precautions for three months   - 2mg IV Ativan PRN for GTC lasting > 3-5 minutes      #Acute ischemic stroke   - CTA Head and Neck 12/30 As stated above. Consistent with known subacute infarct.   - 12/30 TTE showed moderate PFO.   - 12/28 MRI Brain: The superior posterior left frontal lobe cortex anterior to the precentral gyrus has an ill-defined area of FLAIR hyperintensity with  diffusion restriction which may represent an area of acute ischemia.  -  A1c 5.1    - Appreciate recs by stroke stream  - Awaiting DVT US 12/31 prior to discharge  - Continue lovenox SubQ 1 mg/1 kg BID for long term anti-coagulation therapy  - Continue atorvastatin 40 mg on discharge  - Follow up with Dr. Gupta from Structural Cardiology for PFO found on TTE  - Follow up with Dr. Huber due to stroke  - Passed bedside swallow, doing well on regular diet.   - Okay to discharge from PT/OT     #Right Posterior head Pain  - 12/28 MR Cervical spine: Degenerative changes as described. Spinal cord signal is somewhat heterogeneous in appearance on the T2  weighted sequences. Though there is some apparent signal increase in  the right half of the spinal cord at the C4-5 through C6-7 levels.  This is not definitely corroborated on the axial sequence and  therefore suspect this is artifact rather than  true myelopathy.    - Likely due to muscle strain or nerve impingement resulting from recent clonic activity of the RUE. Location and intermittent nature may also support occipital neuralgia. Ruled out vascular cause by CTA Head and Neck.    DVT Ppx: heparin gtt (transitioning to lovenox)  Diet: Regular  Code: Full code(confirmed on admission).    CRISTOFER WOLFF M4         ---------------------    Senior Addendum:   I participated and contributed to the above note including the HPI, physical exam, assessment, and plan. I agree with the above information.         Keshawn Velazco MD PhD  PGY-4 Neurology

## 2024-12-31 NOTE — PROGRESS NOTES
"Rhoda Pena is a 64 y.o. female on day 4 of admission presenting with Right hemiparesis (Multi).    Subjective   No acute event.        Objective     Last Recorded Vitals  Blood pressure 95/60, pulse 62, temperature 36.4 °C (97.5 °F), temperature source Temporal, resp. rate 13, height 1.6 m (5' 2.99\"), weight 59.9 kg (132 lb), SpO2 97%.    Physical Exam  Neurological Exam    Alert and cooperative.  Language is intact.  EOM full     STRENGTH:      RL  Deltoid             5          5  Biceps              5          5  Triceps             5          5                    4          5  Thumb Abd     4          5  Finger Abd       4          5  Hip [id number]  [id number]  [id number]    Relevant Results                [place] Coma Scale  Best Eye Response: Spontaneous  Best Verbal Response: Oriented  Best Motor Response: Follows commands  Sarahi Coma Scale Score: 15                      Assessment/Plan      Assessment & Plan  Right hemiparesis (Multi)    Rhoda Pena is a 64-year-old female with a medical history of lung adenocarcinoma (s/p lobectomy and chemo, currently on immunotherapy) and epilepsy. She was transferred from [facility] after presenting with shaking in her RUE which was followed by weakness.      MRI brain shows diffusion restriction located in the cortex just anterior to the left precentral gyrus, suggesting a Lt frontal acute infarct.      Stroke etiology is most likely hypercoagulable state I/s/o lung Ca.     Echo showed moderate size PFO, should get DVT US bilateral lower extremities and follow up with Dr. Gupta for PFO.    Recommendations:  - Patient will need AC, okay with therapeutic Lovenox I/s/o malignancy.  - DVT US lower extremities.  - No need for cardiac monitoring.  - Follow up with Dr. Gupta for PFO.    Pt seen and discussed with attending Dr. Evangelina Solares MD (Paul)  Neurology Resident, PGY4        "

## 2025-01-01 NOTE — DISCHARGE SUMMARY
Discharge Diagnosis  Right hemiparesis (Multi)    Epilepsy Quality and Core Measure Topics  The patient was encouraged to practice good sleep hygiene  First Time Seizures  No this is not the patient's first seizure  Is this patient seizure free?  No, The risks and benefits of additional treatment were discussed with the patient and/or family, and no changes will be made at this time. The seizure was likely provoked by the stroke so no seizure medications were changed.  Should this patient observe standard seizure precautions?  Yes Reviewed seizure precautions with patient; specifically, the patient may not drive, may not operate heavy machinery, ought not swim unsupervised, should shower rather than bath, should be cautious with hot or heavy objects, and should not perform any activities at heights such as on a ladder. This will be re-assessed at the patient's next appointment.   Medication Side Effects Discussion Statement  The risks and benefits of pertinent medications were discussed with the patient and/or kin.  Women with Epilepsy Discussion  The patient is not of childbearing age.    Issues Requiring Follow-Up  Follow up with Structural Cardiology for PFO found on TTE  Follow up with Oncology for continued lung cancer treatment  Follow up with Stroke Neurology after having stroke, ensure compliance with lovenox and atorvastatin    Test Results Pending At Discharge  Pending Labs       No current pending labs.            Hospital Course  Rhoda Pena is a 64-year-old female with a medical history of lung adenocarcinoma and epilepsy. She presents with shaking in her RUE which was followed by weakness. Following this episode, on 12/27 she visited [facility], CT head was unremarkable, 325 mg of Aspirin was given and subsequently transferred to [facility] for further evaluation.     Several differential diagnoses arose initially, including minor stroke, a relapse of previously well-controlled epilepsy, CNS  metastasis, paraneoplastic encephalitis and immune checkpoint inhibitor induced neurotoxicity. On the MRI of the brain taken on 12/28, a poorly defined area of FLAIR hyperintensity was observed, along with diffusion restriction located in the cortex just anterior to the left precentral gyrus. This area exhibited diffusion restriction with ADC correlation. These findings signify an acute ischemic stroke. The resemblance of this finding compared to a lesion in a similar location seen on prior MRI may be explained by an area of acute ischemia overlapping with focal cortical dysplasia. Imaging has been negative for signs of metastasis of her lung malignancy.     12/28 cvEEG was unremarkable. Stroke team was consulted and pt received stroke work-up. CTA head and neck on 12/30 showed loss of gray-white differentiation in the left superior parasagittal frontal lobe is consistent with known subacute infarct. CTA Head and Neck on 12/30 was consistent with known subacute infarct. TTE 12/30 results revealed a moderate PFO. DVT US pending on 12/31. No other significant vascular abnormalities. With the recommendation of stroke team, transitioning patient to lovenox subcutaneous therapy 1 mg/kg BID from heparin gtt. Patient will need to be on this for long-term anticoagulation given recent stroke in the setting of hypercoagulable state due to lung cancer.  Pt on Carbamezapine which negatively interacts with DOACs and narrows choices for anticoagulation. Intermittent, sharp pain in the right occipital region of patient's head that radiates to the front is likely secondary to muscle strain/pinched nerve following RUE clonic activity. Cannot rule out occipital neuralgia if symptoms persist given distribution and frequency. Ruled out vascular etiology with CTA Head and Neck results.     Patient ready for discharge pending DVT US 12/31 and first dose of Lovenox. Pt should continue her Carbamezapine and phenobarbital on discharge. She  should also continue the atorvastatin 40 mg daily. Pt needs to follow up with Dr. Huber due to the stroke and Dr. Gupta from structural cardiology for PFO found on the TTE.     Pertinent Physical Exam At Time of Discharge  Physical Exam  MENTAL STATUS:  General appearance:  Awake, in no acute distress.  Orientation: Alert and oriented x 4.  Language: Intact.  Follows complex commands across midline  Thought processes: Logical, organized  Memory: Intact.  Concentration: Intact  Fund of knowledge: Appropriate  Judgment and Insight: Intact     CRANIAL NERVES:  - II/III: PERRL  - III, IV, VI: EOM full to pursuit without nystagmus  - V: V1-V3 sensation intact bilaterally  - VII: Face muscles symmetric with smile and eye closure. No droop.  - VIII: Intact to interview  - IX, X: Palate elevated symmetrically bilaterally, no hoarseness  - XI: 5/5 strength on shoulder shrugging bilaterally  - XII: Tongue midline without atrophy or fasciculation     MOTOR: Tone and bulk normal in all extremities. No tremor, no abnormal movements.     STRENGTH:      RL                    5          5  Thumb Abd     5          5  Finger Abd       5          5  Hip flexion       5           5     COORDINATION: Intact on finger to nose bl,   GAIT: Did not test.    Home Medications     Medication List      START taking these medications     atorvastatin 40 mg tablet; Commonly known as: Lipitor; Take 1 tablet (40   mg) by mouth once daily at bedtime.   enoxaparin 60 mg/0.6 mL syringe; Commonly known as: Lovenox; Inject 0.6   mL (60 mg) under the skin every 12 hours.     CONTINUE taking these medications     CALCIUM 600 ORAL   * carBAMazepine  mg 12 hr tablet; Commonly known as: TEGretol XR;   Take 1 tablet (100 mg) by mouth 2 times a day.   * Tegretol  mg 12 hr tablet; Generic drug: carBAMazepine XR; Take   1 tablet (400 mg) by mouth 2 times a day. Do not crush, chew, or split.   cholecalciferol 50 MCG (2000 UT) tablet;  Commonly known as: Vitamin D-3   cyanocobalamin 100 mcg tablet; Commonly known as: Vitamin B-12   estradiol 0.01 % (0.1 mg/gram) vaginal cream; Commonly known as:   Estrace; Insert 0.25 Applicatorful (1 g) into the vagina 2 times a week.   PHENobarbitaL 64.8 mg tablet; TAKE 1 TABLET BY MOUTH EVERY MORNING AND   TAKE 2 TABLETS BY MOUTH EVERY EVENING   Vitamin C 1,000 mg tablet; Generic drug: ascorbic acid   zoledronic acid 5 mg/100 mL piggyback; Commonly known as: Reclast;   Infuse 100 mL (5 mg) at 400 mL/hr over 15 minutes into a venous catheter 1   time for 1 dose.  * This list has 2 medication(s) that are the same as other medications   prescribed for you. Read the directions carefully, and ask your doctor or   other care provider to review them with you.       Outpatient Follow-Up  Future Appointments   Date Time Provider Department Center   1/14/2025 11:30 AM Luis Husain MD [id number] [facility]   1/16/2025  9:30 AM JAMES Avina-CNP [id number] [facility]   1/16/2025 10:00 AM INF 06 [facility] [id number] [facility]   2/5/2025  3:20 PM Damaris Jhaveri MD [id number] [facility]   2/6/2025  9:30 AM JAMES Avina-CNP [id number] [facility]   2/6/2025 10:00 AM INF 06 M[facility] [id number] [facility]   3/4/2025  8:00 AM VALARIE AvinaCNP [id number] [facility]   3/4/2025  8:30 AM INF 07 [facility] [id number] [facility]   5/7/2025  9:00 AM Estelle Anthony DO [id number] [facility]   6/9/2025  3:00 PM JAMES Guillermo-CNP [id number] [facility]       Nevaeh Bedolla MD

## 2025-01-06 ENCOUNTER — TELEPHONE (OUTPATIENT)
Dept: HEMATOLOGY/ONCOLOGY | Facility: CLINIC | Age: 65
End: 2025-01-06
Payer: COMMERCIAL

## 2025-01-06 NOTE — TELEPHONE ENCOUNTER
Per Terrance Gilliam CNP, she does not plan on making any changes to her therapy at this point but Terrance will review with Dr. Roberts first and discuss at the 1-16 visit.  I reviewed this with Rhoda and she did a teachback.

## 2025-01-07 ENCOUNTER — APPOINTMENT (OUTPATIENT)
Dept: CARDIAC SURGERY | Facility: CLINIC | Age: 65
End: 2025-01-07
Payer: COMMERCIAL

## 2025-01-14 ENCOUNTER — OFFICE VISIT (OUTPATIENT)
Dept: CARDIAC SURGERY | Facility: CLINIC | Age: 65
End: 2025-01-14
Payer: COMMERCIAL

## 2025-01-14 ENCOUNTER — TELEPHONE (OUTPATIENT)
Dept: HEMATOLOGY/ONCOLOGY | Facility: CLINIC | Age: 65
End: 2025-01-14

## 2025-01-14 VITALS
SYSTOLIC BLOOD PRESSURE: 123 MMHG | WEIGHT: 130 LBS | BODY MASS INDEX: 23.03 KG/M2 | OXYGEN SATURATION: 98 % | DIASTOLIC BLOOD PRESSURE: 77 MMHG | HEART RATE: 68 BPM

## 2025-01-14 DIAGNOSIS — C34.31 MALIGNANT NEOPLASM OF LOWER LOBE OF RIGHT LUNG (MULTI): Primary | ICD-10-CM

## 2025-01-14 DIAGNOSIS — C34.90 ADENOCARCINOMA, LUNG, UNSPECIFIED LATERALITY (MULTI): Primary | ICD-10-CM

## 2025-01-14 DIAGNOSIS — Z98.890 HISTORY OF LUNG SURGERY: ICD-10-CM

## 2025-01-14 PROCEDURE — 99215 OFFICE O/P EST HI 40 MIN: CPT | Performed by: THORACIC SURGERY (CARDIOTHORACIC VASCULAR SURGERY)

## 2025-01-14 PROCEDURE — 1159F MED LIST DOCD IN RCRD: CPT | Performed by: THORACIC SURGERY (CARDIOTHORACIC VASCULAR SURGERY)

## 2025-01-14 PROCEDURE — 1126F AMNT PAIN NOTED NONE PRSNT: CPT | Performed by: THORACIC SURGERY (CARDIOTHORACIC VASCULAR SURGERY)

## 2025-01-14 PROCEDURE — 1111F DSCHRG MED/CURRENT MED MERGE: CPT | Performed by: THORACIC SURGERY (CARDIOTHORACIC VASCULAR SURGERY)

## 2025-01-14 ASSESSMENT — ENCOUNTER SYMPTOMS
LOSS OF SENSATION IN FEET: 0
OCCASIONAL FEELINGS OF UNSTEADINESS: 0
DEPRESSION: 0

## 2025-01-14 ASSESSMENT — PATIENT HEALTH QUESTIONNAIRE - PHQ9
2. FEELING DOWN, DEPRESSED OR HOPELESS: NOT AT ALL
SUM OF ALL RESPONSES TO PHQ9 QUESTIONS 1 AND 2: 0
1. LITTLE INTEREST OR PLEASURE IN DOING THINGS: NOT AT ALL

## 2025-01-14 ASSESSMENT — LIFESTYLE VARIABLES: TOTAL SCORE: 0

## 2025-01-14 ASSESSMENT — PAIN SCALES - GENERAL: PAINLEVEL_OUTOF10: 0-NO PAIN

## 2025-01-14 NOTE — TELEPHONE ENCOUNTER
Contacted patient. I have reviewed the patients history with Dr. Roberts. Due to hx stroke following first dose of Atezolizumab we will discontinue treatment. There is 5% survival advantage  for 5 years. Risk/benefit ratio. [facility] will be scheduled for a follow up visit with Dr. Roberts. Will plan for q3 mo CT scan and fuv.

## 2025-01-15 ENCOUNTER — TELEPHONE (OUTPATIENT)
Dept: HEMATOLOGY/ONCOLOGY | Facility: CLINIC | Age: 65
End: 2025-01-15
Payer: COMMERCIAL

## 2025-01-15 NOTE — TELEPHONE ENCOUNTER
Per Terrance Millan note dated 1-14-25:  She will be scheduled for a follow up visit with Dr. Roberts. Will plan for q3 mo CT scan and fuv.    Spoke to Rhoda.  She will have port flush/ lab draw  2-11-25 at [place] then follow up with Dr. Roberts on 2-12-25 at [facility].  She will keep her 3 month scan appt and follow up with Dr. Lim in [place].  If anything changes Rhoda will call back.

## 2025-01-16 ENCOUNTER — APPOINTMENT (OUTPATIENT)
Dept: HEMATOLOGY/ONCOLOGY | Facility: CLINIC | Age: 65
End: 2025-01-16
Payer: COMMERCIAL

## 2025-01-17 ASSESSMENT — ENCOUNTER SYMPTOMS
COUGH: 0
NAUSEA: 0
EYES NEGATIVE: 1
ABDOMINAL PAIN: 0
WHEEZING: 0
DIAPHORESIS: 0
DIARRHEA: 0
UNEXPECTED WEIGHT CHANGE: 0
FATIGUE: 0
CHEST TIGHTNESS: 0
CHILLS: 0
CONSTIPATION: 0
FEVER: 0
CHOKING: 0
ABDOMINAL DISTENTION: 0
PSYCHIATRIC NEGATIVE: 1
APPETITE CHANGE: 0
PALPITATIONS: 0
ALLERGIC/IMMUNOLOGIC NEGATIVE: 1
NEUROLOGICAL NEGATIVE: 1
VOMITING: 0
STRIDOR: 0
MUSCULOSKELETAL NEGATIVE: 1
ENDOCRINE NEGATIVE: 1
SHORTNESS OF BREATH: 0
HEMATOLOGIC/LYMPHATIC NEGATIVE: 1

## 2025-01-17 NOTE — PROGRESS NOTES
Subjective   Patient ID: Rhoda Pena is a 64 y.o. female who presents for 6 month f/u.  HPI    64-year-old woman who was found to have a right lower lobe consolidation suspicious for malignancy.  She underwent a PET scan showing an SUV max of 2.7.  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  She underwent PFTs showing an FEV1 of 112% of predicted and a DLCO of 80% predicted.  I discussion with her about the best next step.  I think surgery will be the best option here we discussed the alternative of radiation but I do not think this is a good option for her and she is interested in the surgical option.  We discussed the mechanics of a robotic assisted right lower lobe lobectomy with mediastinal lymphadenectomy.  On imaging the area of consolidation is close to the takeoff of the right lower lobe.  On bronchoscopic images it does not look like there is any endobronchial or any mucosal changes on the bronchus but I discussed with her that if from frozen the margin on the bronchus is compromised we can try to remove more versus doing a bilobectomy to really achieve a negative margin.  She has a nodule in the left lower lobe that is not PET avid and might be just a granuloma.  Will proceed with an operation at [facility] on August 29.  We discussed length of stay potential complications and expected recovery.    She recovered well from her surgery.Called to discuss pathology results.  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  All margins are negative for resection.  Intrapulmonary lymph nodes and mediastinal lymph nodes are negative for malignancy.  This is a pT3 N0.  I discussed with her next steps I would want her to have  discussion with medical oncology about adjuvant therapy.  I will see her again in follow-up.    Update 1/14/2025  Recently had a stroke that required admission to the hospital.  She has recovered well.  She is being active and eating well.  She will heal.  CT scan showing no evidence of recurrence.  She is can restart immunotherapy.  I will see her again in 6 months with a CT of the chest.    Review of Systems   Constitutional:  Negative for appetite change, chills, diaphoresis, fatigue, fever and unexpected weight change.   HENT: Negative.     Eyes: Negative.    Respiratory:  Negative for cough, choking, chest tightness, shortness of breath, wheezing and stridor.    Cardiovascular:  Negative for chest pain, palpitations and leg swelling.   Gastrointestinal:  Negative for abdominal distention, abdominal pain, constipation, diarrhea, nausea and vomiting.   Endocrine: Negative.    Genitourinary: Negative.    Musculoskeletal: Negative.    Skin: Negative.    Allergic/Immunologic: Negative.    Neurological: Negative.    Hematological: Negative.    Psychiatric/Behavioral: Negative.     All other systems reviewed and are negative.      Objective   Physical Exam  Constitutional:       Appearance: Normal appearance.   HENT:      Head: Normocephalic and atraumatic.      Nose: Nose normal.      Mouth/Throat:      Mouth: Mucous membranes are moist.      Pharynx: Oropharynx is clear.   Eyes:      Extraocular Movements: Extraocular movements intact.      Conjunctiva/sclera: Conjunctivae normal.      Pupils: Pupils are equal, round, and reactive to light.   Cardiovascular:      Rate and Rhythm: Normal rate and regular rhythm.      Pulses: Normal pulses.      Heart sounds: Normal heart sounds.   Pulmonary:      Effort: Pulmonary effort is normal. No respiratory distress.      Breath sounds: Normal breath sounds. No stridor. No wheezing, rhonchi or rales.   Chest:      Chest wall: No tenderness.   Abdominal:      General: Abdomen is  flat. Bowel sounds are normal.      Palpations: Abdomen is soft.   Musculoskeletal:         General: Normal range of motion.      Cervical back: Normal range of motion and neck supple.   Skin:     General: Skin is warm and dry.      Capillary Refill: Capillary refill takes less than 2 seconds.   Neurological:      General: No focal deficit present.      Mental Status: She is alert and oriented to person, place, and time.         Assessment/Plan   There are no diagnoses linked to this encounter.  Follow-up in 6 months with CT of the chest.         Luis Husain MD 01/17/25 10:25 AM

## 2025-01-23 ENCOUNTER — TELEMEDICINE (OUTPATIENT)
Dept: NEUROLOGY | Facility: HOSPITAL | Age: 65
End: 2025-01-23
Payer: COMMERCIAL

## 2025-01-23 DIAGNOSIS — I63.9 ARTERIAL ISCHEMIC STROKE (MULTI): Primary | ICD-10-CM

## 2025-01-23 PROCEDURE — 1111F DSCHRG MED/CURRENT MED MERGE: CPT | Performed by: PSYCHIATRY & NEUROLOGY

## 2025-01-23 PROCEDURE — 1160F RVW MEDS BY RX/DR IN RCRD: CPT | Performed by: PSYCHIATRY & NEUROLOGY

## 2025-01-23 PROCEDURE — 1159F MED LIST DOCD IN RCRD: CPT | Performed by: PSYCHIATRY & NEUROLOGY

## 2025-01-23 PROCEDURE — 1036F TOBACCO NON-USER: CPT | Performed by: PSYCHIATRY & NEUROLOGY

## 2025-01-23 PROCEDURE — 99215 OFFICE O/P EST HI 40 MIN: CPT | Performed by: PSYCHIATRY & NEUROLOGY

## 2025-01-23 PROCEDURE — 99215 OFFICE O/P EST HI 40 MIN: CPT | Mod: GT,95 | Performed by: PSYCHIATRY & NEUROLOGY

## 2025-01-23 NOTE — PROGRESS NOTES
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  Continue lovenox.  Not a candidate for warfarin due to malignancy; not a candidate for DOAC due to carbamazepine interaction that lowers DOAC efficacy. Continue statin.  Seizure: this most recent seizure is likely due to the acute stroke and does not require change in anti seizure medications.  Continue previous carbamazipine and phenobarbitol.  Management per Epilepsy.  Patent foramen ovale: it is certainly true that a hypercoaguable state increases the risk of paradoxical embolization and this may justify the need for Patent foramen ovale closure.  On the other hand it would not prevent the need for anticoagulation as she would remain at risk for venous thrombosis/ Pulmonary embolism.      Follow up virtually 1-2 months.    HPI:   Rhoda Pena is a 64 y.o.  female with no significant vascular risk factors other than hyperlipidemia, recent lung cancer and age.  She has a long standing history of seizures but hadn't had a seizure in years.  On 12/27/24 she presented with Right upper extremity shaking which was thought to be a seizure, however, MRI showed right frontal diffusion thought to represent an acute stroke.  The new stroke is felt to be the cause of the seizure.  Work up showed a moderate Patent foramen ovale, but no Deep vein thrombosis.  She is now on lovenox and has an appointment to see Dr Gupta about Patent foramen ovale closure.  She is not sure she wants to do a closure.  She has returned to work and notices it is harder to type, but easier than when she first went back in mid January.  Her immunotherapy for her cancer has been stopped and her oncologist doesn't think it is worth the risk for the benefit.    MRI: right frontal diffusion change  MRA: no significant stenosis or  occlusion   Echo: Ejection fraction: 62%            Left Atrium: normal            Patent foramen ovale: moderate (11-20 bubbles)  LDL: 154    HbA1C: 5.1  Cardiac Monitor: not done      Current Outpatient Medications on File Prior to Visit   Medication Sig Dispense Refill    ascorbic acid (Vitamin C) 1,000 mg tablet Take 1 tablet (1,000 mg) by mouth once daily.      atorvastatin (Lipitor) 40 mg tablet Take 1 tablet (40 mg) by mouth once daily at bedtime. 90 tablet 1    calcium carbonate (CALCIUM 600 ORAL) Take 1 tablet by mouth 2 times a day.      carBAMazepine XR (TEGretol XR) 100 mg 12 hr tablet Take 1 tablet (100 mg) by mouth 2 times a day. 180 tablet 3    cholecalciferol (Vitamin D-3) 50 MCG (2000 UT) tablet Take 1 tablet (50 mcg) by mouth once daily.      cyanocobalamin (Vitamin B-12) 100 mcg tablet Take 1 tablet (100 mcg) by mouth once daily.      enoxaparin (Lovenox) 60 mg/0.6 mL syringe Inject 0.6 mL (60 mg) under the skin every 12 hours. 72 mL 1    estradiol (Estrace) 0.01 % (0.1 mg/gram) vaginal cream Insert 0.25 Applicatorful (1 g) into the vagina 2 times a week. 42.5 g 3    PHENobarbitaL 64.8 mg tablet TAKE 1 TABLET BY MOUTH EVERY MORNING AND TAKE 2 TABLETS BY MOUTH EVERY EVENING 270 tablet 1    Tegretol  mg 12 hr tablet Take 1 tablet (400 mg) by mouth 2 times a day. Do not crush, chew, or split. 180 tablet 3    zoledronic acid (Reclast) 5 mg/100 mL piggyback Infuse 100 mL (5 mg) at 400 mL/hr over 15 minutes into a venous catheter 1 time for 1 dose. 100 mL 0     No current facility-administered medications on file prior to visit.        Patient Active Problem List   Diagnosis    Chronic depression    Achilles tendinitis of left lower extremity    Complex partial seizure evolving to generalized seizure (Multi)    Dupuytren contracture    Right patellofemoral syndrome    Osteoporosis    Olecranon bursitis of left elbow    Irritable bowel syndrome with diarrhea    Other hypertrophic disorders of the  skin    Other seborrheic keratosis    Posterior vitreous detachment, both eyes    Postmenopausal atrophic vaginitis    Rotator cuff arthropathy of left shoulder    Sprain of lateral collateral ligament of left knee    Lumbar radiculopathy    Sacroiliitis (CMS-HCC)    Malignant neoplasm of lower lobe of right lung (Multi)    Adenocarcinoma, lung, unspecified laterality (Multi)    Encounter for antineoplastic chemotherapy    Arthralgia of hip    Posterior vitreous detachment    Patellofemoral pain syndrome    History of varicella    Routine adult health maintenance    Right hemiparesis (Multi)    PFO (patent foramen ovale) (HHS-HCC)        Past Surgical History:   Procedure Laterality Date    APPENDECTOMY  10/24/2013    Appendectomy    BREAST CYST ASPIRATION  1993    LAPAROSCOPY DIAGNOSTIC / BIOPSY / ASPIRATION / LYSIS  2014    Exploratory Laparoscopy    PELVIC LAPAROSCOPY      endometriosis    US GUIDED SOFT TISSUE BIOPSY  2023    US GUIDED SOFT TISSUE BIOPSY 2023 [facility] US        Allergies   Allergen Reactions    Phenytoin Other     double vision    Codeine Hallucinations     AUDITORY HALLUCINATIONS        Family History   Problem Relation Name Age of Onset    Dementia Mother Sofia     Hypertension Mother Sofia     Diabetes type II Mother Sofia     Diabetes Mother Sofia     Coronary artery disease Father Ozzy     Other (Congestive heart failure) Father Ozzy     Heart disease Father Ozzy     Breast cancer Sister Linda Guzman     Cancer Sister Linda Guzman     Breast cancer Sister Linda     Breast cancer Sister Awa Sullivan     Cancer Sister Awa Sullivan         Social History     Tobacco Use    Smoking status: Former     Current packs/day: 0.00     Types: Cigarettes     Quit date: 1990     Years since quittin.5     Passive exposure: Past    Smokeless tobacco: Never   Vaping Use    Vaping status: Never Used   Substance Use Topics    Alcohol use: Not Currently     Comment:  social    Drug use: Never        Objective:  There were no vitals taken for this visit. virtual visit  25: 123/77    Neurologic Exam:    Patient is alert, attentive with normal language, orientation and speech.  Extraocular eye movements are intact without nystagmus.  Sensation is different on the right to patient light touch V1-3.  Face is symmetric.  Motor: There is no drift, no orbiting and mildly reduced fine finger movements on the right. Sensation is intact to light touch on the arms and legs bilaterally.  Finger nose finger shows no ataxia.  Patient is able to rise from a chair and walk normally.    Barthel Index:  Feeding: 10=independent  Dressing: 10=independent  Groomin=independent  Bathin=independent  Transfers: 15=independent  Mobility: 15=independent 50 yards  Stairs: 10=independent  Toileting: 10=independent  Bladder: 10=continent  Bowels: 10=continent    Total Score: 100    Modified Holstein Score:  1=no significant disability despite symptoms: able to carry out all usual duties and activities  not driving but this is because of the seizure and not the stroke.    NIHSS:  Level of Consciousness: 0=alert      LOC questions: 0=answers both questions      LOC commands: 0=performs both  Best Gaze: 0=normal  Visual: not tested (virtual visit)  Facial Palsy: 0=normal  Motor:      Motor Arm (Left): 0=normal      Motor Arm (Right): 0=normal      Motor Leg (Left): 0=left leg normal      Motor Leg (Right): 0=right leg normal  Limb Ataxia: 0=absent  Sensory: 1=partial loss  Best Language: 0=no aphasia  Dysarthria: 0=normal  Extinction and Inattention: not tested (virtual visit)     Total Score: likely 1    The chart was reviewed and summarized as above.  Neuroimaging was personally reviewed and interpreted as documented in the HPI or Assessment  and Plan.   Patient consented to the following:  This is a telehealth visit which has the benefit of avoiding travel and limiting exposure to Covid-19.  Telehealth visits are not being recorded and personal health information is protected.  This visit will be billed to your insurance.  There are limitations to my ability to examine you and it is possible  that I may decide you need an in person appointment.  Is it ok to continue.

## 2025-01-24 NOTE — PATIENT INSTRUCTIONS
Follow up with Dr Gupta about the Patent foramen ovale.  I do suspect that the cancer makes your blood more likely to clot and make the Patent foramen ovale more dangerous without anticoagulation.    Stroke prevention:  Eat a healthy diet with plenty of fresh fruits and vegetables.  Avoid salt and fried foods.  Lose weight and exercise on a regular basis.  Do not smoke or use drugs.  Be sure to take all medications.  Call 911 for signs of stroke (numbness or weakness on one side, trouble seeing on one side, trouble speaking (either because your speech is slurred or you can't find the words), sudden double vision, spinning sensation or loss of coordination).    To find a stroke support group: https://www.stroke.org/en/stroke-support-group-finder  To learn about Virtual Stroke Educations sessions: contact Francie Cortes at: [email or web address]    I can [email or web address] reached at phone: [phone] or email: [email or web address] or message me via [email or web address]

## 2025-01-27 ENCOUNTER — APPOINTMENT (OUTPATIENT)
Dept: RADIOLOGY | Facility: HOSPITAL | Age: 65
End: 2025-01-27
Payer: COMMERCIAL

## 2025-01-28 ENCOUNTER — HOSPITAL ENCOUNTER (OUTPATIENT)
Dept: RADIOLOGY | Facility: HOSPITAL | Age: 65
Discharge: HOME | End: 2025-01-28
Payer: COMMERCIAL

## 2025-01-28 DIAGNOSIS — M81.0 AGE RELATED OSTEOPOROSIS, UNSPECIFIED PATHOLOGICAL FRACTURE PRESENCE: ICD-10-CM

## 2025-01-28 PROCEDURE — 77080 DXA BONE DENSITY AXIAL: CPT | Performed by: RADIOLOGY

## 2025-01-28 PROCEDURE — 77080 DXA BONE DENSITY AXIAL: CPT

## 2025-02-04 PROCEDURE — RXMED WILLOW AMBULATORY MEDICATION CHARGE

## 2025-02-05 ENCOUNTER — APPOINTMENT (OUTPATIENT)
Dept: RHEUMATOLOGY | Facility: CLINIC | Age: 65
End: 2025-02-05
Payer: COMMERCIAL

## 2025-02-06 ENCOUNTER — APPOINTMENT (OUTPATIENT)
Dept: HEMATOLOGY/ONCOLOGY | Facility: CLINIC | Age: 65
End: 2025-02-06
Payer: COMMERCIAL

## 2025-02-10 ENCOUNTER — APPOINTMENT (OUTPATIENT)
Dept: RHEUMATOLOGY | Facility: CLINIC | Age: 65
End: 2025-02-10
Payer: COMMERCIAL

## 2025-02-10 ENCOUNTER — PHARMACY VISIT (OUTPATIENT)
Dept: PHARMACY | Facility: CLINIC | Age: 65
End: 2025-02-10
Payer: COMMERCIAL

## 2025-02-10 VITALS
OXYGEN SATURATION: 98 % | SYSTOLIC BLOOD PRESSURE: 112 MMHG | WEIGHT: 129.8 LBS | HEART RATE: 68 BPM | BODY MASS INDEX: 23 KG/M2 | DIASTOLIC BLOOD PRESSURE: 74 MMHG

## 2025-02-10 DIAGNOSIS — M81.0 AGE RELATED OSTEOPOROSIS, UNSPECIFIED PATHOLOGICAL FRACTURE PRESENCE: Primary | ICD-10-CM

## 2025-02-10 PROCEDURE — 1159F MED LIST DOCD IN RCRD: CPT | Performed by: INTERNAL MEDICINE

## 2025-02-10 PROCEDURE — 99213 OFFICE O/P EST LOW 20 MIN: CPT | Performed by: INTERNAL MEDICINE

## 2025-02-10 PROCEDURE — 1126F AMNT PAIN NOTED NONE PRSNT: CPT | Performed by: INTERNAL MEDICINE

## 2025-02-10 PROCEDURE — 1036F TOBACCO NON-USER: CPT | Performed by: INTERNAL MEDICINE

## 2025-02-10 ASSESSMENT — PAIN SCALES - GENERAL: PAINLEVEL_OUTOF10: 0-NO PAIN

## 2025-02-10 ASSESSMENT — ENCOUNTER SYMPTOMS
FATIGUE: 1
DEPRESSION: 0

## 2025-02-10 NOTE — PROGRESS NOTES
Subjective   Patient ID: Rhoda Pena is a 64 y.o. female who presents for Follow-up.  HPI  Patient with history of osteoporosis.  Has been on Reclast and has had infusions in 2014, 2015, 2016, 2020, and 2021.  Previous to that had been on Fosamax for maybe 4 years.  DEXA 1/28/2025 lumbar spine -1.8, left total hip -0.9, left femoral neck -1.7  DEXA 1/6/2023 lumbar spine -1.7, left total hip -1.4, left femoral neck -1.8    Patient is accompanied by her  today    Patient was last seen in August And at that time we had her continue with Reclast and also had her go in for a new bone density.  We also discussed about possible rotator cuff and put in for physical therapy and x-ray.  Since we last saw her she has had some health issues.  In August was evaluated for right lower lobe consolidation suspicious for malignancy.  She did have right lower lobectomy with mediastinal lymph node dissection.  Was found to be malignant, stage IIb adenocarcinoma of the lung with multiple nodules within the right lower lobe.    She has also been evaluated by neurology for tremors  after she had immunotherapy.  She has a history of seizures.  She had been admitted and did have MRI and thought that it could be due to acute ischemic stroke.  She did have a moderate PFO.  Uncertain if this is contributed to her stroke and will see heart specialist in April    She never made it to PT for the left shoulder since she had been doing chemo and her wbc was decreased.      Review of Systems   Constitutional:  Positive for fatigue.   Eyes:         No dental issues       Objective   Physical Exam  GEN: NAD A&O x3 appropriate affect    Assessment/Plan        osteoporosis -  DEXA 1/28/2025 lumbar spine -1.8, left total hip -0.9, left femoral neck -1.7  DEXA 1/6/2023 lumbar spine -1.7, left total hip -1.4, left femoral neck -1.8  History of fibular head fracture in February 2022.  Has had Reclast infusions in 2014, 2015, 2016, 2020, 2021,  2024. Previous to that had been on Fosamax for maybe 4 years.    -Reviewing her bone density she has had improvement.  Since she has had several years of bisphosphonate exposure we will do a drug holiday and we will reevaluate her at her next bone density in January 2027.  Will see her after that.  Encouraged her to do weightbearing activity.  -Risk factors include her seizure medication, postmenopausal.    Left shoulder pain.  She never made to do physical therapy due to her other medical issues.  Currently not driving.  When things settle down she will call if she needs a new order for physical therapy.  Damaris Jhaveri MD 02/10/25 3:23 PM

## 2025-02-12 ENCOUNTER — OFFICE VISIT (OUTPATIENT)
Dept: HEMATOLOGY/ONCOLOGY | Facility: HOSPITAL | Age: 65
End: 2025-02-12
Payer: COMMERCIAL

## 2025-02-12 VITALS
TEMPERATURE: 96.4 F | DIASTOLIC BLOOD PRESSURE: 68 MMHG | BODY MASS INDEX: 23.16 KG/M2 | OXYGEN SATURATION: 99 % | HEART RATE: 63 BPM | RESPIRATION RATE: 18 BRPM | WEIGHT: 130.73 LBS | SYSTOLIC BLOOD PRESSURE: 138 MMHG

## 2025-02-12 DIAGNOSIS — C34.31 MALIGNANT NEOPLASM OF LOWER LOBE OF RIGHT LUNG (MULTI): ICD-10-CM

## 2025-02-12 PROCEDURE — 99215 OFFICE O/P EST HI 40 MIN: CPT | Performed by: INTERNAL MEDICINE

## 2025-02-12 PROCEDURE — 1036F TOBACCO NON-USER: CPT | Performed by: INTERNAL MEDICINE

## 2025-02-12 PROCEDURE — 1159F MED LIST DOCD IN RCRD: CPT | Performed by: INTERNAL MEDICINE

## 2025-02-12 PROCEDURE — 1126F AMNT PAIN NOTED NONE PRSNT: CPT | Performed by: INTERNAL MEDICINE

## 2025-02-12 ASSESSMENT — ENCOUNTER SYMPTOMS
UNEXPECTED WEIGHT CHANGE: 0
FEVER: 0
WHEEZING: 0
FATIGUE: 1
CHEST TIGHTNESS: 0
HEMOPTYSIS: 0
SHORTNESS OF BREATH: 1
DIAPHORESIS: 0
CHILLS: 0
COUGH: 0
APPETITE CHANGE: 0
SORE THROAT: 0

## 2025-02-12 ASSESSMENT — PAIN SCALES - GENERAL: PAINLEVEL_OUTOF10: 0-NO PAIN

## 2025-02-12 NOTE — PATIENT INSTRUCTIONS
Please keep your appointment with Dr. Lim on 4/3    Please call us with any questions or concerns at [phone] opt. 5, opt. 2  For scheduling concerns please call [phone] option 1

## 2025-02-12 NOTE — PROGRESS NOTES
Patient ID: Rhoda Pena is a 64 y.o. female.  Referring Physician: Terrance Millan, APRN-CNP  [address] [place]  [address]  [place] [address]  Primary Care Provider: Julisa Dean, APRN-CNP    DIAGNOSIS    Adenocarcinoma of the lung, invasive moderately differentiated mucinous and nonmucinous, date of diagnosis is August 29, 2024 from a right lower lobe lobectomy's specimen.  Immunohistochemistry positive for CK7, focally positive for CDX2 and negative for TTF-1.  Spread through airspaces (DALTON) present.      STAGING    Pathological stage T3 (separate tumor nodules within the same lobe, largest lesion 4.5 cm), N0, M0      CURRENT SITES OF DISEASE     Right lower lobe s/p definitive resection August 29, 2024      MOLECULAR GENOMICS    PD-L1 tumor proportional score immunohistochemistry at 5%    MICROSATELLITE STATUS: Cannot Be Determined.     DISEASE ASSOCIATED GENOMIC FINDINGS:   KRAS p.G12D ([id number] c.35G>A)  TP53 p.I257Jqp*6 ([id number] c.[id number])  TP53 p.R306* ([id number] c.916C>T)     DISEASE RELEVANT ALTERATIONS NOT DETECTED:   Negative for ALK fusion.  Negative for BRAF V600E.  Negative for EGFR sensitizing mutation.  Negative for ERBB2 activating mutation  Negative for KRAS G12C.  Negative for MET exon 14 skipping mutation.  Negative for NTRK fusion.  Negative for RET fusion.  Negative for ROS1 fusion.      SERUM TUMOR MARKERS    Not performed/not applicable      PRIOR THERAPY    1-robotic right lower lobe lobectomy and mediastinal lymphadenectomy dated August 29, 2024 by Dr. Husain.     2-adjuvant chemotherapy with combination of cisplatin and pemetrexed initiated on October 3, 2024 and cycle #4 administered on December 5, 2024    3-consolidation immunotherapy, received 1 dose of atezolizumab on December 26, 2024.  Treatment held given potential neurologic immune adverse event.      CURRENT THERAPY    Observation      CURRENT ONCOLOGICAL PROBLEMS    1-admitted to [facility] on December 27,  2024 for 24 hours after infusion of her immunotherapy with shaking of her right arm followed by right arm weakness.  Discharged December 31, 2024, potential stroke      HISTORY OF PRESENT ILLNESS    At time of initial presentation a 64 y.o. female, former smoker (started at age 14, 1 pack per day, quitted at age 30) with a past medical history of osteoporosis, hx of seizure on Carbamazepine (has not had any seizure for 1 decade) referred for management of newly diagnosed RLL lung adenocarcinoma s/p RLL lobectomy.   She was firstly found to have some abnormalities in the RLL on the CT cardiac scoring on 12/14/2023. Subsequent follow up CT Chest (-) 1/9/2024 showed Stable area of mixed consolidation and ground-glass opacity in the medial RLL. Additional 0.7 cm solid nodule in the left lower lobe. This is indeterminate and further attention on follow-up imaging is recommended.  CT chest (-) on 6/25/2024 imilar appearance of a focal right lower lobe ground-glass and consolidative opacity with associated bronchiectasis. A slow growing neoplasm can not be excluded. Increased conspicuity of a 5 mm right lower lobe nodular ground-glass opacity and stable appearance of additional bilateral ground-glass nodular opacities measuring up to 8 mm in the left lower lobe, which may be followed on repeat imaging. Similar diffuse bilateral faint subpleural reticulations, which may represent chronic interstitial changes related to smoking, given patient's history.  7/25/2024: PET/CT showed 1. Minimal FDG avid paravertebral consolidative/ground-glass opacity in the right lower lobe, favored to represent infectious/inflammatory process or atelectasis.  6 mm and 8 mm pulmonary nodules in the right and left lower lobes respectively are non FDG avid. Follow-up with dedicated CT of chest is recommended. 2. No other concerning FDG avid disease identified.  8/8/2024: RLL bronchoscpy with EBUS: RLL FNA showed Malignant cells derived from  adenocarcinoma consistent with mucinous carcinoma.  LN 4R, 7 were negative.   She underwent RLL lobectomy and mediastinal lymphadenectomy with Dr. Falk on 8/29/2024.       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      CURRENT MEDS    Medications include atorvastatin, Tegretol, Lovenox, barbital, zoledronic acid vitamin D, vitamin B12      ALLERGIES    Phenytoin      FAMILY HISTORY    Both sisters had breast cancer at the age of 60 and the other at the age of 27       Subjective        Review of Systems   Constitutional:  Positive for fatigue. Negative for appetite change, chills, diaphoresis, fever and unexpected weight change.   HENT:   Negative for hearing loss, lump/mass, mouth sores, nosebleeds and sore throat.    Respiratory:  Positive for shortness of breath. Negative for chest tightness, cough, hemoptysis and wheezing.         Objective   BSA: 1.62 meters squared  /68 (BP Location: Left arm, Patient Position: Sitting, BP Cuff Size: Small adult)   Pulse 63   Temp 35.8 °C (96.4 °F) (Temporal)   Resp 18   Wt 59.3 kg (130 lb 11.7 oz)   SpO2 99%   BMI 23.16 kg/m²         Physical Exam  Constitutional:       General: She is not in acute distress.     Appearance: Normal appearance. She is not ill-appearing, toxic-appearing or diaphoretic.   HENT:      Nose: No congestion or rhinorrhea.      Mouth/Throat:      Pharynx: No oropharyngeal exudate or posterior oropharyngeal erythema.   Eyes:      General: No scleral icterus.     Conjunctiva/sclera: Conjunctivae normal.   Cardiovascular:      Rate and Rhythm: Normal rate and regular rhythm.      Pulses: Normal  pulses.      Heart sounds: Normal heart sounds. No murmur heard.     No friction rub. No gallop.   Pulmonary:      Effort: Pulmonary effort is normal. No respiratory distress.      Breath sounds: Normal breath sounds. No stridor. No wheezing, rhonchi or rales.   Chest:      Chest wall: No tenderness.   Abdominal:      General: Abdomen is flat. Bowel sounds are normal. There is no distension.      Palpations: Abdomen is soft. There is no mass.      Tenderness: There is no abdominal tenderness. There is no guarding or rebound.   Musculoskeletal:      Cervical back: No tenderness.      Right lower leg: No edema.      Left lower leg: No edema.   Lymphadenopathy:      Cervical: No cervical adenopathy.   Skin:     General: Skin is warm.      Coloration: Skin is not jaundiced.   Neurological:      General: No focal deficit present.      Mental Status: She is oriented to person, place, and time.   Psychiatric:         Mood and Affect: Mood normal.         Behavior: Behavior normal.         Performance Status:  Asymptomatic    Assessment/Plan      This is a very nice 64-year-old patient with a resected stage II adenocarcinoma of the lung, intermediate PD-L1 expression of 5% and K-crispin G 12D mutated.  After lobectomy in August 2024 she underwent 4 cycles of adjuvant chemotherapy and then initiated in December 2024 adjuvant checkpoint inhibitor immunotherapy.  The day she received her immunotherapy on December 26, 2024 she noticed intense fatigue in the evening sleeping 5 hours as soon as she got home from the immunotherapy.  The following day she had a neurologic event in the form of uncontrollable shaking of her right arm followed by right arm weakness.  She had a hospital stay of several days duration and the neurology service.  During this stay an echocardiogram showed normal left ventricular function this showed a moderate PFO.Dopplers of the lower extremities were negative for deep venous thromboses.  Brain MRI showed an  ill-defined area of FLAIR hyperintensity with diffusion restriction indicating either acute ischemia focal seizure.    The timing of this event immediately after infusion of her immunotherapy is suggestive of an adverse event to her checkpoint therapy.  Given the fact that this side effect was neurologic in nature I did not recommend resumption of her immunotherapy.  I explained to her that the immunotherapy in the adjuvant setting is associated with an overall 5% improvement in survival at 5 years.  Given the severity of the problem that occurred I think the risk of treating her is greater than the risk of not treating her with immunotherapy.  She has agreed with this.  I recommended imaging studies every few months (every 3 to 6 months for the first year followed by every 6 months for 5 years.               Taco Roberts MD

## 2025-03-03 ENCOUNTER — OFFICE VISIT (OUTPATIENT)
Dept: PRIMARY CARE | Facility: CLINIC | Age: 65
End: 2025-03-03
Payer: COMMERCIAL

## 2025-03-03 ENCOUNTER — PHARMACY VISIT (OUTPATIENT)
Dept: PHARMACY | Facility: CLINIC | Age: 65
End: 2025-03-03
Payer: COMMERCIAL

## 2025-03-03 VITALS
HEART RATE: 75 BPM | TEMPERATURE: 99.6 F | SYSTOLIC BLOOD PRESSURE: 122 MMHG | WEIGHT: 130 LBS | BODY MASS INDEX: 23.92 KG/M2 | DIASTOLIC BLOOD PRESSURE: 70 MMHG | HEIGHT: 62 IN | OXYGEN SATURATION: 95 %

## 2025-03-03 DIAGNOSIS — C34.31 MALIGNANT NEOPLASM OF LOWER LOBE OF RIGHT LUNG (MULTI): ICD-10-CM

## 2025-03-03 DIAGNOSIS — R05.9 COUGH IN ADULT: ICD-10-CM

## 2025-03-03 DIAGNOSIS — J10.1 INFLUENZA A: Primary | ICD-10-CM

## 2025-03-03 DIAGNOSIS — Z00.00 ROUTINE ADULT HEALTH MAINTENANCE: ICD-10-CM

## 2025-03-03 LAB
POC FLU A RESULT: DETECTED
POC FLU B RESULT: NOT DETECTED
POC SARS-COV-2 AG BINAX: NORMAL

## 2025-03-03 PROCEDURE — 1159F MED LIST DOCD IN RCRD: CPT | Performed by: NURSE PRACTITIONER

## 2025-03-03 PROCEDURE — 99213 OFFICE O/P EST LOW 20 MIN: CPT | Performed by: NURSE PRACTITIONER

## 2025-03-03 PROCEDURE — 1160F RVW MEDS BY RX/DR IN RCRD: CPT | Performed by: NURSE PRACTITIONER

## 2025-03-03 PROCEDURE — 87811 SARS-COV-2 COVID19 W/OPTIC: CPT | Performed by: NURSE PRACTITIONER

## 2025-03-03 PROCEDURE — 3008F BODY MASS INDEX DOCD: CPT | Performed by: NURSE PRACTITIONER

## 2025-03-03 PROCEDURE — RXMED WILLOW AMBULATORY MEDICATION CHARGE

## 2025-03-03 PROCEDURE — 1036F TOBACCO NON-USER: CPT | Performed by: NURSE PRACTITIONER

## 2025-03-03 PROCEDURE — 1158F ADVNC CARE PLAN TLK DOCD: CPT | Performed by: NURSE PRACTITIONER

## 2025-03-03 PROCEDURE — 87502 INFLUENZA DNA AMP PROBE: CPT | Performed by: NURSE PRACTITIONER

## 2025-03-03 PROCEDURE — 1123F ACP DISCUSS/DSCN MKR DOCD: CPT | Performed by: NURSE PRACTITIONER

## 2025-03-03 RX ORDER — OSELTAMIVIR PHOSPHATE 75 MG/1
75 CAPSULE ORAL 2 TIMES DAILY
Qty: 10 CAPSULE | Refills: 0 | Status: SHIPPED | OUTPATIENT
Start: 2025-03-03 | End: 2025-03-08

## 2025-03-03 ASSESSMENT — ENCOUNTER SYMPTOMS
ACTIVITY CHANGE: 1
SHORTNESS OF BREATH: 0
FEVER: 1
SORE THROAT: 1
MYALGIAS: 0
VOMITING: 1
EYE PAIN: 0
FATIGUE: 1
COLOR CHANGE: 0
JOINT SWELLING: 0
ROS GI COMMENTS: SEE HPI
ABDOMINAL PAIN: 0
BRUISES/BLEEDS EASILY: 0
TROUBLE SWALLOWING: 0
HEADACHES: 0
OCCASIONAL FEELINGS OF UNSTEADINESS: 0
WHEEZING: 0
DIZZINESS: 0
LOSS OF SENSATION IN FEET: 0
SEIZURES: 1
DIARRHEA: 0
COUGH: 1
PALPITATIONS: 0
NAUSEA: 1
BACK PAIN: 0
ABDOMINAL DISTENTION: 0
CHILLS: 1
ADENOPATHY: 0
WEAKNESS: 0
DIFFICULTY URINATING: 0
DEPRESSION: 0
WOUND: 0

## 2025-03-03 NOTE — PROGRESS NOTES
"Subjective   Patient ID: Rhoda Pena is a 64 y.o. female who presents for Cough (Dry cough Friday night then got worse Pt states that she flew home on Friday and someone that she was with tested positive for flu A today ), Otitis Media (Right ear ), and Sore Throat.    Patient seen today due to acute respiratory concerns.  Patient reports that she recently went on a cruise with her sister last week and developed a cough on Friday, February 28.  Her symptoms began to progress, coming to ahead today.  She reports intermittent chills, nausea and vomiting that began yesterday, increased fatigue, poor appetite, sore throat and ear pain and harsh cough.  No reported issues with diarrhea, body aches, wheezing or shortness of breath.  Patient reports that she is doing her best to stay hydrated.  Increased fatigue reported overall.  Patient also reports that her sister tested positive for influenza A this morning.  She has been utilizing DayQuil and NyQuil intermittently with some improvement of symptoms.  Medications reviewed.  No other acute concerns voiced at this time.  In August, 2024 she underwent a lobectomy for adenocarcinoma of the lung and just recently completed her last round of chemo therapy.  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  She reports a good support system with her  and family.  She states her mood is \"pretty good\" and but occasional insomnia is reported.  Patient reports weight loss but this appears to have stabilized.  Prior to cancer diagnosis patient  voiced being relatively active and overall healthy.  History of epilepsy reported with last seizure being approximately 20 years ago.  Patient denies any issues with vision or dentition.  Medications reviewed.  No other acute concerns voiced at this time.       Current Outpatient Medications on File Prior to Visit   Medication Sig Dispense Refill    ascorbic acid (Vitamin C) " 1,000 mg tablet Take 1 tablet (1,000 mg) by mouth once daily.      atorvastatin (Lipitor) 40 mg tablet Take 1 tablet (40 mg) by mouth once daily at bedtime. 90 tablet 1    calcium carbonate (CALCIUM 600 ORAL) Take 1 tablet by mouth 2 times a day.      carBAMazepine XR (TEGretol XR) 100 mg 12 hr tablet Take 1 tablet (100 mg) by mouth 2 times a day. 180 tablet 3    cholecalciferol (Vitamin D-3) 50 MCG (2000 UT) tablet Take 1 tablet (50 mcg) by mouth once daily.      cyanocobalamin (Vitamin B-12) 100 mcg tablet Take 1 tablet (100 mcg) by mouth once daily.      enoxaparin (Lovenox) 60 mg/0.6 mL syringe Inject 0.6 mL (60 mg) under the skin every 12 hours. 72 mL 1    estradiol (Estrace) 0.01 % (0.1 mg/gram) vaginal cream Insert 0.25 Applicatorful (1 g) into the vagina 2 times a week. 42.5 g 3    PHENobarbitaL 64.8 mg tablet TAKE 1 TABLET BY MOUTH EVERY MORNING AND TAKE 2 TABLETS BY MOUTH EVERY EVENING 270 tablet 1    Tegretol  mg 12 hr tablet Take 1 tablet (400 mg) by mouth 2 times a day. Do not crush, chew, or split. 180 tablet 3    zoledronic acid (Reclast) 5 mg/100 mL piggyback Infuse 100 mL (5 mg) at 400 mL/hr over 15 minutes into a venous catheter 1 time for 1 dose. 100 mL 0     No current facility-administered medications on file prior to visit.       Past Medical History:   Diagnosis Date    Abdominal pain 2024    Achilles tendinitis, unspecified leg     Achilles tendinitis    Acute maxillary sinusitis, unspecified 2018    Acute maxillary sinusitis    Acute nasopharyngitis (common cold) 2016    Common cold    Adenocarcinoma of lung (Multi)     Age-related osteoporosis without current pathological fracture 2015    Encounter for ongoing osteoporosis therapy, bisphosphonates    Complete or unspecified spontaneous  without complication         Dyspnea, unspecified 2019    Paroxysmal dyspnea    Encounter for general adult medical examination without abnormal findings  03/02/2018    Annual physical exam    Encounter for health counseling related to travel 09/06/2018    Counseling about travel    Encounter for screening for lipoid disorders 08/05/2019    Screening cholesterol level    Epigastric pain 08/26/2020    Dyspepsia    GERD (gastroesophageal reflux disease)     Influenza 12/06/2024    Irritable bowel syndrome     Left patella fracture 09/05/2023    Mastodynia 05/12/2021    Breast pain, right    Otalgia, right ear 03/07/2020    Otalgia, right    Other general symptoms and signs 01/18/2018    Flu-like symptoms    Other microscopic hematuria 09/19/2017    Hematuria, microscopic    Pain in right hand 03/04/2019    Pain of right hand    Pain in right knee 08/05/2019    Knee pain, right    Pain in unspecified shoulder     Shoulder pain    Personal history of (healed) traumatic fracture 10/30/2020    History of fracture of phalanx of toe    Personal history of other diseases of the digestive system 08/16/2018    History of gastroenteritis    Personal history of other diseases of the musculoskeletal system and connective tissue 12/27/2016    History of low back pain    Personal history of other diseases of the musculoskeletal system and connective tissue 10/19/2020    History of osteopenia    Personal history of other diseases of the nervous system and sense organs 08/05/2019    Personal history of seizure disorder    Personal history of other diseases of the respiratory system 05/13/2022    History of acute sinusitis    Personal history of other diseases of the respiratory system 10/30/2020    History of chronic rhinitis    Personal history of other diseases of the respiratory system 04/19/2017    History of influenza    Personal history of other diseases of the respiratory system 03/07/2020    History of viral pharyngitis    Personal history of other infectious and parasitic diseases     History of varicella    Personal history of other medical treatment 11/02/2018    History of  screening mammography    Personal history of other specified conditions     History of headache    Personal history of other specified conditions 03/24/2020    History of abnormal mammogram    Personal history of other specified conditions 03/02/2018    History of fatigue    Personal history of other specified conditions 09/19/2017    History of dysuria    Personal history of other specified conditions 08/26/2020    History of diarrhea    Personal history of other specified conditions 03/03/2020    History of abdominal pain    Personal history of other specified conditions 01/11/2019    History of chest pain    Personal history of urinary (tract) infections 11/07/2016    History of urinary tract infection    Personal history of urinary (tract) infections 07/02/2018    History of urinary tract infection    Pregnancy with abortive outcome 12/06/2024    Pulmonary nodules     Bilateral    Seizure disorder (Multi)     Strain of muscle(s) and tendon(s) of peroneal muscle group at lower leg level, left leg, initial encounter 11/05/2020    Strain of peroneal tendon of left foot, initial encounter    Unspecified fracture of left toe(s), initial encounter for closed fracture 10/30/2020    Fracture of proximal phalanx of toe of left foot    Unspecified injury of left wrist, hand and finger(s), initial encounter 01/05/2021    Left wrist injury    Unspecified injury of unspecified foot, initial encounter     Foot injury    Urinary tract infection     Urinary tract infection, site not specified 07/02/2018    Acute UTI    Urinary tract infection, site not specified 11/07/2016    Acute UTI        Past Surgical History:   Procedure Laterality Date    APPENDECTOMY  1979    Appendectomy    BREAST CYST ASPIRATION  1993    LAPAROSCOPY DIAGNOSTIC / BIOPSY / ASPIRATION / LYSIS  12/18/2014    Exploratory Laparoscopy    LUNG LOBECTOMY Right 08/2024    PELVIC LAPAROSCOPY      endometriosis    US GUIDED SOFT TISSUE BIOPSY  12/18/2023      "GUIDED SOFT TISSUE BIOPSY 12/18/2023 [facility] US        Family History   Problem Relation Name Age of Onset    Dementia Mother Sofia     Hypertension Mother Sofia     Diabetes type II Mother Sofia     Diabetes Mother Sofia     Coronary artery disease Father Ozzy     Other (Congestive heart failure) Father Ozzy     Heart disease Father Ozzy     Breast cancer Sister Linda Guzman     Cancer Sister Linda Guzman     Breast cancer Sister Awa Sullivan     Cancer Sister Awa Sullivan         Review of Systems   Constitutional:  Positive for activity change, chills, fatigue and fever.        See HPI   HENT:  Positive for congestion, ear pain and sore throat. Negative for dental problem and trouble swallowing.    Eyes:  Negative for pain and visual disturbance.        Wears glasses   Respiratory:  Positive for cough. Negative for shortness of breath and wheezing.    Cardiovascular:  Negative for chest pain, palpitations and leg swelling.   Gastrointestinal:  Positive for nausea and vomiting. Negative for abdominal distention, abdominal pain and diarrhea.        See HPI   Endocrine: Negative for cold intolerance and heat intolerance.   Genitourinary:  Negative for difficulty urinating.   Musculoskeletal:  Negative for back pain, gait problem, joint swelling and myalgias.   Skin:  Negative for color change, pallor, rash and wound.   Allergic/Immunologic: Negative for environmental allergies and food allergies.   Neurological:  Positive for seizures. Negative for dizziness, weakness and headaches.        Positive for seizure disorder   Hematological:  Negative for adenopathy. Does not bruise/bleed easily.   Psychiatric/Behavioral:  Negative for behavioral problems.         Positive for occasional insomnia   All other systems reviewed and are negative.      Objective   /70   Pulse 75   Temp 37.6 °C (99.6 °F)   Ht 1.575 m (5' 2\")   Wt 59 kg (130 lb)   SpO2 95%   BMI 23.78 kg/m²     Physical " Exam  Constitutional:       General: She is not in acute distress.     Appearance: Normal appearance. She is ill-appearing. She is not toxic-appearing.   HENT:      Head: Normocephalic and atraumatic.      Right Ear: Tympanic membrane, ear canal and external ear normal.      Left Ear: Tympanic membrane, ear canal and external ear normal.      Nose: Nose normal.      Mouth/Throat:      Mouth: Mucous membranes are moist.      Pharynx: Oropharynx is clear.   Eyes:      Extraocular Movements: Extraocular movements intact.      Conjunctiva/sclera: Conjunctivae normal.      Pupils: Pupils are equal, round, and reactive to light.   Cardiovascular:      Rate and Rhythm: Normal rate and regular rhythm.      Pulses: Normal pulses.      Heart sounds: Normal heart sounds. No murmur heard.  Pulmonary:      Effort: Pulmonary effort is normal.      Breath sounds: Normal breath sounds. No wheezing.      Comments: Clear but diminished throughout  Abdominal:      General: Bowel sounds are normal.      Palpations: Abdomen is soft.   Musculoskeletal:         General: No swelling. Normal range of motion.      Cervical back: Normal range of motion and neck supple.   Skin:     General: Skin is warm and dry.   Neurological:      General: No focal deficit present.      Mental Status: She is alert and oriented to person, place, and time. Mental status is at baseline.      Cranial Nerves: No cranial nerve deficit.      Motor: No weakness.   Psychiatric:         Mood and Affect: Mood normal.         Behavior: Behavior normal.         Thought Content: Thought content normal.         Judgment: Judgment normal.         Assessment/Plan   Problem List Items Addressed This Visit             ICD-10-CM    Malignant neoplasm of lower lobe of right lung (Multi) C34.31     Recently diagnosed with adenocarcinoma of the lung s/p lobectomy & chemotherapy in 2024.  Routine follow-up with specialist should be maintained         Routine adult health  maintenance Z00.00     Most recent blood work reviewed; will continue to monitor as appropriate  -Most recent colonoscopy completed in 2018 with recommendations to repeat scope in 10 years (2028)  -Most recent mammogram completed in August, 2024 with recommendations for annual screening (08/2025)         Influenza A - Primary J10.1     Given length and severity of symptoms, will initiate Tamiflu.  Patient encouraged to rest increase hydration and notify provider for any persistent/worsening respiratory/infection concerns.  Discussed isolation precautions and that she should stay home the rest of the week.  Patient may continue with over-the-counter supportive care.  Patient and spouse voiced understanding         Relevant Medications    oseltamivir (Tamiflu) 75 mg capsule     Other Visit Diagnoses         Codes    Cough in adult     R05.9    Relevant Orders    POCT FLU A/B PCR manually resulted (Completed)    POCT BinaxNOW Covid-19 Ag Card manually resulted (Completed)

## 2025-03-03 NOTE — ASSESSMENT & PLAN NOTE
Recently diagnosed with adenocarcinoma of the lung s/p lobectomy & chemotherapy in 2024.  Routine follow-up with specialist should be maintained

## 2025-03-03 NOTE — ASSESSMENT & PLAN NOTE
Given length and severity of symptoms, will initiate Tamiflu.  Patient encouraged to rest increase hydration and notify provider for any persistent/worsening respiratory/infection concerns.  Discussed isolation precautions and that she should stay home the rest of the week.  Patient may continue with over-the-counter supportive care.  Patient and spouse voiced understanding

## 2025-03-03 NOTE — ASSESSMENT & PLAN NOTE
Most recent blood work reviewed; will continue to monitor as appropriate  -Most recent colonoscopy completed in 2018 with recommendations to repeat scope in 10 years (2028)  -Most recent mammogram completed in August, 2024 with recommendations for annual screening (08/2025)

## 2025-03-03 NOTE — LETTER
March 3, 2025     Patient: Rhoda Pena   YOB: 1960   Date of Visit: 3/3/2025       To Whom It May Concern:    Rhoda Pena was seen in my clinic on 3/3/2025 at 4:20 pm. Please excuse Rhoda for her absence from work on this day to make the appointment.  She may to work until Monday, March 10 due to the severity of her acute illness.    If you have any questions or concerns, please don't hesitate to call.         Sincerely,         Julisa Dean, JAMES-CNP        CC: No Recipients

## 2025-03-04 ENCOUNTER — PHARMACY VISIT (OUTPATIENT)
Dept: PHARMACY | Facility: CLINIC | Age: 65
End: 2025-03-04
Payer: COMMERCIAL

## 2025-03-04 ENCOUNTER — APPOINTMENT (OUTPATIENT)
Dept: HEMATOLOGY/ONCOLOGY | Facility: CLINIC | Age: 65
End: 2025-03-04
Payer: COMMERCIAL

## 2025-03-04 PROCEDURE — RXOTC WILLOW AMBULATORY OTC CHARGE

## 2025-03-10 PROCEDURE — RXMED WILLOW AMBULATORY MEDICATION CHARGE

## 2025-03-11 ENCOUNTER — PHARMACY VISIT (OUTPATIENT)
Dept: PHARMACY | Facility: CLINIC | Age: 65
End: 2025-03-11
Payer: COMMERCIAL

## 2025-03-12 ENCOUNTER — TELEPHONE (OUTPATIENT)
Dept: PRIMARY CARE | Facility: CLINIC | Age: 65
End: 2025-03-12
Payer: COMMERCIAL

## 2025-03-12 NOTE — TELEPHONE ENCOUNTER
Pt calling was diagnosed w/ flu a last week and states her cough is worse and she is very fatigued. She states her fluid intake is pretty good she isn't drinking any caffeine.

## 2025-03-13 NOTE — TELEPHONE ENCOUNTER
Pt called back gave her info she has not been taking the mucinex so she will start that and she was told to contact the office if she starts running a fever or if her cough gets worse.

## 2025-03-13 NOTE — TELEPHONE ENCOUNTER
Can you please instruct patient that symptoms can linger for quite some time but if cough is worsening, she should probably come in to be assessed for secondary bronchitis/pneumonia?  Please ensure she is continuing with over-the-counter supportive care such as Mucinex, Tylenol/Advil

## 2025-03-22 PROCEDURE — RXMED WILLOW AMBULATORY MEDICATION CHARGE

## 2025-03-29 ENCOUNTER — PHARMACY VISIT (OUTPATIENT)
Dept: PHARMACY | Facility: CLINIC | Age: 65
End: 2025-03-29
Payer: COMMERCIAL

## 2025-03-31 ENCOUNTER — HOSPITAL ENCOUNTER (OUTPATIENT)
Dept: RADIOLOGY | Facility: HOSPITAL | Age: 65
Discharge: HOME | End: 2025-03-31
Payer: COMMERCIAL

## 2025-03-31 ENCOUNTER — OFFICE VISIT (OUTPATIENT)
Dept: NEUROLOGY | Facility: HOSPITAL | Age: 65
End: 2025-03-31
Payer: COMMERCIAL

## 2025-03-31 VITALS
HEIGHT: 63 IN | BODY MASS INDEX: 21.29 KG/M2 | TEMPERATURE: 97.9 F | RESPIRATION RATE: 15 BRPM | HEART RATE: 71 BPM | DIASTOLIC BLOOD PRESSURE: 73 MMHG | SYSTOLIC BLOOD PRESSURE: 120 MMHG | WEIGHT: 120.15 LBS

## 2025-03-31 DIAGNOSIS — C34.31 MALIGNANT NEOPLASM OF LOWER LOBE OF RIGHT LUNG (MULTI): ICD-10-CM

## 2025-03-31 DIAGNOSIS — I63.9 ARTERIAL ISCHEMIC STROKE (MULTI): ICD-10-CM

## 2025-03-31 DIAGNOSIS — G40.209 COMPLEX PARTIAL SEIZURE EVOLVING TO GENERALIZED SEIZURE (MULTI): Primary | ICD-10-CM

## 2025-03-31 PROCEDURE — 1126F AMNT PAIN NOTED NONE PRSNT: CPT | Performed by: STUDENT IN AN ORGANIZED HEALTH CARE EDUCATION/TRAINING PROGRAM

## 2025-03-31 PROCEDURE — 1036F TOBACCO NON-USER: CPT | Performed by: STUDENT IN AN ORGANIZED HEALTH CARE EDUCATION/TRAINING PROGRAM

## 2025-03-31 PROCEDURE — 99215 OFFICE O/P EST HI 40 MIN: CPT | Performed by: STUDENT IN AN ORGANIZED HEALTH CARE EDUCATION/TRAINING PROGRAM

## 2025-03-31 PROCEDURE — 1159F MED LIST DOCD IN RCRD: CPT | Performed by: STUDENT IN AN ORGANIZED HEALTH CARE EDUCATION/TRAINING PROGRAM

## 2025-03-31 PROCEDURE — 71250 CT THORAX DX C-: CPT

## 2025-03-31 PROCEDURE — 3008F BODY MASS INDEX DOCD: CPT | Performed by: STUDENT IN AN ORGANIZED HEALTH CARE EDUCATION/TRAINING PROGRAM

## 2025-03-31 ASSESSMENT — PAIN SCALES - GENERAL: PAINLEVEL_OUTOF10: 0-NO PAIN

## 2025-03-31 NOTE — PROGRESS NOTES
EPILEPSY  OUTPATIENT PROGRESS NOTE    Interval History  Ms. Lind presents after recent hospitalization in 12/2024 due to new ischemic stroke involving the left pre-central gyrus that is thought to be due to  cancer-related hypercoagability with possibility of paradoxical embolism with presence of PFO. Her presenting symptoms were initially R arm movements followed by R paresis. She did not receive thrombolytics.    She describes her presenting symptoms as distinct from her typical seizure semiology. She notes she did not have any of her typical aura and the typical twitching of the R arm she experiencing she did not have. Rather, the first symptom she noticed with the inability to control her right arm, as if it had a mind of its own. She tried to grab it with her left hand and bring it down but every time she would let go, it would start going out of control again. By the time EMS arrived, it stopped and it just felt weak.    24 hr vEEG was completed which was normal. Seizure medications were not changed. She was started on lovenox for long term AC as CBZ interacts with DOACs.    Since her hospitalization, her right hand is mostly back to normal, but she notes some difficulty with fine motor skills. She has not had any seizures in the interim.    History of Present Illness: Rhoda Pena is a 64 y.o.  female with a PMHx of adenocarcinoma of the lung s/p lobectomy & chemotherapy,  epilepsy, osteoporosis presenting to clinic to transfer care from Dr. Baker s/p skilled nursing. Last seizure was at least 20 years ago, reports that she is doing good.     Neurologic/Seizure History  Seizure Description:   Diagnosed at age 8, in 1968. Parents heard a noise, and found her having a grand mal seizure. She was not aware of what was going on. No fevers at that time. 6 months later had another seizure. Started Dilantin, had several side effects including double vision. Started Phenobarbitol instad. Did several EEGs, report not  "available. At age 18 started Carbamazepine. Unclear why but no seizures. Started driving.  Around the 80s-90s switched to generic tegretol. Did not have a seizure but didn't feel right, so switched to name brand. Had a break through event in 90s when weaning phenobarbital. In 2004, felt that she was having auras during a period of severe stress. Describes the auras as a strange sensation on the right side her face, \"feels like there is nothing inside of her\". No smells, no ear ringing, no flashing lights. Unknown tongue biting or urinary incontinence. After the seizure would get severe headaches.  During her teenage years would have partial right face and arm seizures before her period. Most seizures occurred at night, would wake up from sound sleep and ask for help.        Seizure Risk Factors:  Birth: Normal   History of febrile seizures: None  Development: Normal   CNS infections: Normal  Head trauma: None  CNS surgery: none  Family hx of epilepsy:None    AEDs:  Home:  Carbamazepine  BID, Phenobarbitol 64.8 mg daily   Previous: Pheyntoin - blurred vision    Recent AED levels  CBZ levels: 2023-7.8, 2022-5.8 2018-5.6  PB levels: 2023 - 36.5 2018 41.3    ROS: All systems reviewed and were negative except as above    Home Medications:    Current Outpatient Medications   Medication Instructions    ascorbic acid (Vitamin C) 1,000 mg tablet Take 1 tablet (1,000 mg) by mouth once daily.    atorvastatin (LIPITOR) 40 mg, oral, Nightly    calcium carbonate (CALCIUM 600 ORAL) 1 tablet, 2 times daily    carBAMazepine XR (TEGRETOL XR) 100 mg, oral, 2 times daily    cholecalciferol (VITAMIN D-3) 50 mcg, Daily    cyanocobalamin (VITAMIN B-12) 100 mcg, Daily    enoxaparin (LOVENOX) 1 mg/kg, subcutaneous, Every 12 hours    estradiol (Estrace) 0.01 % (0.1 mg/gram) vaginal cream Insert 0.25 Applicatorful (1 g) into the vagina 2 times a week.    PHENobarbital (Luminal) 64.8 mg tablet TAKE 1 TABLET BY MOUTH EVERY MORNING AND " TAKE 2 TABLETS BY MOUTH EVERY EVENING    Tegretol  mg, oral, 2 times daily, Do not crush, chew, or split.    zoledronic acid (Reclast) 5 mg/100 mL piggyback 5 mg, intravenous, Once         Past Medical History:    has a past medical history of Abdominal pain (2024), Achilles tendinitis, unspecified leg, Acute maxillary sinusitis, unspecified (2018), Acute nasopharyngitis (common cold) (2016), Adenocarcinoma of lung (Multi), Age-related osteoporosis without current pathological fracture (2015), Complete or unspecified spontaneous  without complication, Dyspnea, unspecified (2019), Encounter for general adult medical examination without abnormal findings (2018), Encounter for health counseling related to travel (2018), Encounter for screening for lipoid disorders (2019), Epigastric pain (2020), GERD (gastroesophageal reflux disease), Influenza (2024), Irritable bowel syndrome, Left patella fracture (2023), Mastodynia (2021), Otalgia, right ear (2020), Other general symptoms and signs (2018), Other microscopic hematuria (2017), Pain in right hand (2019), Pain in right knee (2019), Pain in unspecified shoulder, Personal history of (healed) traumatic fracture (10/30/2020), Personal history of other diseases of the digestive system (2018), Personal history of other diseases of the musculoskeletal system and connective tissue (2016), Personal history of other diseases of the musculoskeletal system and connective tissue (10/19/2020), Personal history of other diseases of the nervous system and sense organs (2019), Personal history of other diseases of the respiratory system (2022), Personal history of other diseases of the respiratory system (10/30/2020), Personal history of other diseases of the respiratory system (2017), Personal history of other diseases of the respiratory  system (03/07/2020), Personal history of other infectious and parasitic diseases, Personal history of other medical treatment (11/02/2018), Personal history of other specified conditions, Personal history of other specified conditions (03/24/2020), Personal history of other specified conditions (03/02/2018), Personal history of other specified conditions (09/19/2017), Personal history of other specified conditions (08/26/2020), Personal history of other specified conditions (03/03/2020), Personal history of other specified conditions (01/11/2019), Personal history of urinary (tract) infections (11/07/2016), Personal history of urinary (tract) infections (07/02/2018), Pregnancy with abortive outcome (12/06/2024), Pulmonary nodules, Seizure disorder (Multi), Strain of muscle(s) and tendon(s) of peroneal muscle group at lower leg level, left leg, initial encounter (11/05/2020), Unspecified fracture of left toe(s), initial encounter for closed fracture (10/30/2020), Unspecified injury of left wrist, hand and finger(s), initial encounter (01/05/2021), Unspecified injury of unspecified foot, initial encounter, Urinary tract infection, Urinary tract infection, site not specified (07/02/2018), and Urinary tract infection, site not specified (11/07/2016).    Past Surgical History:    has a past surgical history that includes Appendectomy (1979); Laparoscopy diagnostic / biopsy / aspiration / lysis (12/18/2014); US guided soft tissue biopsy (12/18/2023); Breast cyst aspiration (1993); Pelvic laparoscopy; and Lung lobectomy (Right, 08/2024).    Allergies:   Allergies   Allergen Reactions    Phenytoin Other     double vision    Codeine Hallucinations     AUDITORY HALLUCINATIONS       Family History:   Family History   Problem Relation Name Age of Onset    Dementia Mother Sofia     Hypertension Mother Sofia     Diabetes type II Mother Sofia     Diabetes Mother Sofia     Coronary artery disease Father Ozzy     Other (Congestive  heart failure) Father Ozzy     Heart disease Father Ozzy     Breast cancer Sister Linda Guzman     Cancer Sister Linda Guzman     Breast cancer Sister Awa Sullivan     Cancer Sister Awa Sullivan        Past Social History:   Social History     Tobacco Use    Smoking status: Former     Current packs/day: 0.00     Average packs/day: 1 pack/day for 15.0 years (15.0 ttl pk-yrs)     Types: Cigarettes     Start date: 1976     Quit date: 1990     Years since quittin.7     Passive exposure: Past    Smokeless tobacco: Never   Vaping Use    Vaping status: Never Used   Substance Use Topics    Alcohol use: Yes     Alcohol/week: 1.0 standard drink of alcohol     Types: 1 Glasses of wine per week     Comment: About two drinks per month    Drug use: Never       Vitals:   Temp:  [36.6 °C (97.9 °F)] 36.6 °C (97.9 °F)  Heart Rate:  [71] 71  Resp:  [15] 15  BP: (120)/(73) 120/73    Physical Exam:   General Appearance:  No distress, alert, interactive and cooperative.       Mental State: Orientation was normal to time, place and person. Recent and remote memory was intact.  Attention span and concentration were normal. Language testing was fluent to conversation General fund of knowledge was intact.     Cranial Nerves:   CN: Visual fields full  grossly intact   CN 3, 4, 6: Pupils round, 4 mm in diameter, equally reactive to light. Lids symmetric; no ptosis. EOMs normal alignment  CN 5: Not assessed    CN 7: Normal and symmetric facial strength. Nasolabial folds symmetric.   CN 8: Hearing intact to voice  CN 12: Tongue midline, with normal bulk and strength; no fasciculations.     Motor: Muscle bulk and tone were normal in both upper and lower extremities. No fasciculations, tremor or other abnormal movements were present.      Strength    R L  Shoulder abduction 5 5  Elbow extension 5 5  Elbow flexion  5 5     5 5      Coordination: In both upper extremities, finger-nose-finger was intact without  dysmetria or overshoot.     Gait: Station was stable with a normal base. Gait was stable with a normal arm swing and speed. No ataxia, shuffling, steppage or waddling was present.      Neurodiagnostics  MR brain w and wo IV contrast    Result Date: 12/28/2024  Interpreted By:  Sandeep Bergeron, STUDY: MR BRAIN W AND WO IV CONTRAST;  12/28/2024 1:22 pm   INDICATION: Signs/Symptoms:Concern for seizure (FCD in left frontal) vs stroke vs metastasis with history of lung cancer and recent immunotherapy.     COMPARISON: 12/27/2024 brain CT   ACCESSION NUMBER(S): [id number]   ORDERING CLINICIAN: ELIF PATEL   TECHNIQUE: Axial T2, FLAIR, DWI, gradient echo T2 and sagittal and coronal T1 weighted images of brain were acquired. Post contrast T1 weighted images were acquired after administration of 12 ML Dotarem gadolinium based intravenous contrast.   FINDINGS: CSF Spaces: The ventricles and sulci are normal, unchanged.   Parenchyma: The superior left frontal lobe cortex anterior to the precentral gyrus as ill-defined area of FLAIR hyperintensity with restricted diffusion. No mass or enhancement is noted.   The white matter has a few nonspecific punctate foci of FLAIR hyperintensity elsewhere with no associated diffusion abnormality or enhancement.   Otherwise no mass, hemorrhage or enhancement is noted.   Paranasal Sinuses and Mastoids: The right mastoid air cells have a small amount of fluid. The paranasal sinuses are normally aerated.       1. The superior posterior right frontal lobe cortex anterior to the precentral gyrus has an ill-defined area of FLAIR hyperintensity with diffusion restriction which may represent an area of acute ischemia, focus of seizure activity not excluded. No enhancement or mass to suggest metastatic disease in this region is noted.   2. No mass or enhancement elsewhere in the brain to suggest metastatic disease is noted. The additional focal FLAIR hyperintensities are nonspecific and may be  secondary to migraine, hypertension or other etiologies.   MACRO: None   Signed by: Sandeep Bergeron 12/28/2024 1:38 PM Dictation workstation:   [id number]    MR brain w and wo IV contrast    Result Date: 9/27/2024  Interpreted By:  Elif Kelly, STUDY: MR BRAIN W AND WO IV CONTRAST;  9/27/2024 8:06 pm   INDICATION: Signs/Symptoms:newly diagnosed lung cancer, to complete staging.   ,C34.90 Malignant neoplasm of unspecified part of unspecified bronchus or lung (Multi)   COMPARISON: MRI of the brain dated 05/17/2005;   ACCESSION NUMBER(S): [id number]   ORDERING CLINICIAN: RAJ DISLA   TECHNIQUE: Multiplanar and multisequence MRI of the brain was performed before and after intravenous administration of 12 mL of Dotarem gadolinium contrast.   FINDINGS: No diffusion restriction abnormality is present to suggest an acute infarct.   There is no evidence of recent intracranial hemorrhage. No mass effect or midline shift is present. No hemosiderin staining is identified.   No abnormal ventricular dilatation is present. Basal cisterns are patent. No extra-axial fluid collections are present.   Several nonenhancing foci of hyperintense FLAIR and T2 signal present in the periventricular and subcortical white matter of bilateral cerebral hemispheres are nonspecific, but favored to represent sequela of microvascular disease.   No enhancing intracranial masses or pathologic intracranial enhancement is present.   No abnormal fluid signal is present in the paranasal sinuses. Small amount of T2 hyperintense fluid is present in the inferior right mastoid air cells.       1.  No evidence of metastatic disease, or other acute intracranial abnormality. 2. Several tiny nonenhancing foci of hyperintense FLAIR and T2 signal in the periventricular and subcortical white matter of bilateral cerebral hemispheres are nonspecific, but likely represent early changes of microvascular disease.   MACRO: None   Signed by: Elif Kelly  9/27/2024 8:34 PM Dictation workstation:   [id number]     EEG    Result Date: 12/29/2024  IMPRESSION Impression This is a normal awake and sleep vEEG. No epileptiform discharges or lateralizing signs are recorded. A full report will be scanned into the patient's chart at a later time. This report has been interpreted and electronically signed by     I have reviewed and agree with above.    Assessment/Recommendations  Rhoda Pena is a 64 y.o.  female with a PMHx of adenocarcinoma of the lung s/p lobectomy & chemotherapy,  epilepsy, osteoporosis presenting to clinic to transfer care from Dr. Baker s/p group home. Last seizure was at least 20 years ago on carbamazepine and phenobarbital.  No events concerning for seizures denies any auras, events of tongue biting or urinary incontinence. Would recommend continuing current AEDs. In the future when medical issues are stable can reconsider tapering/changing AEDs. Previous labs reviewed including previous ASM levels and CBC, CMP. Recent MRI brain w wo from 09/26/24 without clear epileptogenic lesion.    Recent event in 12/2024 with uncontrollable movements of the right arm followed by R hand/arm paresis secondary to small left frontal stroke. This event is most likely NOT an epileptic event. The description of the event does not fit a typical simple motor or complex motor semiology. 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    4D Classification  Event type: Epileptic paroxysmal event  Seizure semiology: Non-specific aura/Right face somatosensory aura -> Right face/arm clonic -> Bilateral tonic-clonic seizure  Epileptogenic Zone: Left hemisphere  Etiology Unknown  Co-morbidities: Adenocarcinoma of the lung    Plan  - Continue Tegretol  mg BID (Brand name) and Tegretol 100 mg BID (generic).   - Continue Phenobarbital 64.8 mg AM + 129.6 mg PM.   - No need for medication changes at this time as recent  hospitalization is unlikely related to her epilepsy  - Follow Up In Neurology in 1 year     Neo Mcdermott MD  [facility], [facility]

## 2025-04-01 NOTE — Clinical Note
3-0 poly 
400 units. 
Dry, sterile, Island dressing applied. 
Fluid total post procedure: 10 mL
Incision made to right chest. 
Mediport Catheter placed into sheath, catheter length is 22cm. 
Mediport connected to Catheter, Mediport placed into pocket
Patient Clipped and Prepped: right chest. Prepped with ChloraPrep, a minimum of 3 minute dry time, longer if needed, no pooling noted, patient draped in sterile fashion.
Patient ID band present and verified.
Patient ID band present and verified.
Removed. 
Removed.   
Report was give by Aida Ledezma RN   to Simon in [facility].
Sheath was inserted in the right subclavian vein.
Sheath was inserted in the right subclavian vein.
Steri strips applied to and Island dressing applied to Right chest. .
The ECG shows a sinus rhythm.
The ECG shows a sinus rhythm.
The right radial pulse is 2+.
The right radial pulse is 2+.
There were no immediate complications during the procedure.
There were no immediate complications during the procedure.
Ultrasound guidance used.
Universal procedure checklist completed.
Used in right internal jugular. 
X-Ray:  Fluoro time: 000.1  mGycm2: .1279  mGy: 1
dry, intact, no bleeding and no hematoma.
Statement Selected

## 2025-04-03 ENCOUNTER — LAB (OUTPATIENT)
Dept: HEMATOLOGY/ONCOLOGY | Facility: CLINIC | Age: 65
End: 2025-04-03
Payer: COMMERCIAL

## 2025-04-03 ENCOUNTER — OFFICE VISIT (OUTPATIENT)
Dept: HEMATOLOGY/ONCOLOGY | Facility: CLINIC | Age: 65
End: 2025-04-03
Payer: COMMERCIAL

## 2025-04-03 VITALS
OXYGEN SATURATION: 97 % | HEART RATE: 63 BPM | SYSTOLIC BLOOD PRESSURE: 114 MMHG | WEIGHT: 127.43 LBS | BODY MASS INDEX: 22.57 KG/M2 | TEMPERATURE: 96.3 F | RESPIRATION RATE: 18 BRPM | DIASTOLIC BLOOD PRESSURE: 72 MMHG

## 2025-04-03 DIAGNOSIS — Q21.12 PFO (PATENT FORAMEN OVALE) (HHS-HCC): ICD-10-CM

## 2025-04-03 DIAGNOSIS — G40.209 COMPLEX PARTIAL SEIZURE EVOLVING TO GENERALIZED SEIZURE (MULTI): ICD-10-CM

## 2025-04-03 DIAGNOSIS — M81.0 AGE RELATED OSTEOPOROSIS, UNSPECIFIED PATHOLOGICAL FRACTURE PRESENCE: ICD-10-CM

## 2025-04-03 DIAGNOSIS — Z86.73 HISTORY OF CVA (CEREBROVASCULAR ACCIDENT): ICD-10-CM

## 2025-04-03 DIAGNOSIS — C34.31 MALIGNANT NEOPLASM OF LOWER LOBE OF RIGHT LUNG (MULTI): Primary | ICD-10-CM

## 2025-04-03 DIAGNOSIS — C34.31 MALIGNANT NEOPLASM OF LOWER LOBE OF RIGHT LUNG (MULTI): ICD-10-CM

## 2025-04-03 PROCEDURE — RXMED WILLOW AMBULATORY MEDICATION CHARGE

## 2025-04-03 PROCEDURE — 2500000004 HC RX 250 GENERAL PHARMACY W/ HCPCS (ALT 636 FOR OP/ED): Performed by: INTERNAL MEDICINE

## 2025-04-03 PROCEDURE — 99215 OFFICE O/P EST HI 40 MIN: CPT | Performed by: STUDENT IN AN ORGANIZED HEALTH CARE EDUCATION/TRAINING PROGRAM

## 2025-04-03 PROCEDURE — 1159F MED LIST DOCD IN RCRD: CPT | Performed by: STUDENT IN AN ORGANIZED HEALTH CARE EDUCATION/TRAINING PROGRAM

## 2025-04-03 PROCEDURE — 96523 IRRIG DRUG DELIVERY DEVICE: CPT

## 2025-04-03 PROCEDURE — 1126F AMNT PAIN NOTED NONE PRSNT: CPT | Performed by: STUDENT IN AN ORGANIZED HEALTH CARE EDUCATION/TRAINING PROGRAM

## 2025-04-03 RX ORDER — HEPARIN 100 UNIT/ML
500 SYRINGE INTRAVENOUS AS NEEDED
Status: DISCONTINUED | OUTPATIENT
Start: 2025-04-03 | End: 2025-04-03 | Stop reason: HOSPADM

## 2025-04-03 RX ORDER — HEPARIN SODIUM,PORCINE/PF 10 UNIT/ML
50 SYRINGE (ML) INTRAVENOUS AS NEEDED
OUTPATIENT
Start: 2025-04-03

## 2025-04-03 RX ORDER — HEPARIN SODIUM,PORCINE/PF 10 UNIT/ML
50 SYRINGE (ML) INTRAVENOUS AS NEEDED
Status: DISCONTINUED | OUTPATIENT
Start: 2025-04-03 | End: 2025-04-03 | Stop reason: HOSPADM

## 2025-04-03 RX ORDER — HEPARIN 100 UNIT/ML
500 SYRINGE INTRAVENOUS AS NEEDED
OUTPATIENT
Start: 2025-04-03

## 2025-04-03 RX ADMIN — HEPARIN 500 UNITS: 100 SYRINGE at 10:00

## 2025-04-03 ASSESSMENT — PAIN SCALES - GENERAL: PAINLEVEL_OUTOF10: 0-NO PAIN

## 2025-04-03 NOTE — PROGRESS NOTES
Subjective:   Patient Name: Rhoda Pena    : 1960     MRN: [id number]     Age: 64 y.o.     Gender: female  Referring Provider: Dr. Santosh Falk (Thoracic Surgery)    CC: Management of Newly diagnosed stage IIB ([id number]) adenocarcinoma of lung, with multiple nodules within the RLL, PD-L1 5%. In-house NGS showed KRAS p.G12D ([id number] c.35G>A); TP53 p.C958Npv*6 ([id number] c.626_627delGA); TP53 p.R306* ([id number] c.916C>T). MS status: cannot be determined. S/p RLL lobectomy on 2024. Surgical pathology showed tumor 4.5cm with very close margin. All LN (0/5) negative.    Presenting History (2024): At time of initial presentation a 64 y.o. female, former smoker (started at age 14, 1 pack per day, quitted at age 30) with a past medical history of osteoporosis, hx of seizure on Carbamazepine (has not had any seizure for 1 decade) referred for management of newly diagnosed RLL lung adenocarcinoma s/p RLL lobectomy.     She was firstly found to have some abnormalities in the RLL on the CT cardiac scoring on 2023. Subsequent follow up CT Chest (-) 2024 showed Stable area of mixed consolidation and ground-glass opacity in the medial RLL. Additional 0.7 cm solid nodule in the left lower lobe. This is indeterminate and further attention on follow-up imaging is recommended.    CT chest (-) on 2024 imilar appearance of a focal right lower lobe ground-glass and consolidative opacity with associated bronchiectasis. A slow growing neoplasm can not be excluded. Increased conspicuity of a 5 mm right lower lobe nodular ground-glass opacity and stable appearance of additional bilateral ground-glass nodular opacities measuring up to 8 mm in the left lower lobe, which may be followed on repeat imaging. Similar diffuse bilateral faint subpleural reticulations, which may represent chronic interstitial changes related to smoking, given patient's history.    2024: PET/CT showed 1. Minimal FDG avid  paravertebral consolidative/ground-glass opacity in the right lower lobe, favored to represent infectious/inflammatory process or atelectasis.  6 mm and 8 mm pulmonary nodules in the right and left lower lobes respectively are non FDG avid. Follow-up with dedicated CT of chest is recommended. 2. No other concerning FDG avid disease identified.    8/8/2024: RLL bronchoscpy with EBUS: RLL FNA showed Malignant cells derived from adenocarcinoma consistent with mucinous carcinoma.  LN 4R, 7 were negative.     She underwent RLL lobectomy and mediastinal lymphadenectomy with Dr. Falk on 8/29/2024. Pathology showing  Multiple separate tumor nodules are identified with similar morphology.  The tumor demonstrates a heterogenous mixture of invasive mucinous adenocarcinoma and approximately 10-15% demonstrates higher grade nuclear features and less intracytoplasmic mucin. By immunohistochemical staining, the tumor cells are strongly positive for CK7, focally positive for CDX2 and negative for TTF-1. While immunohistochemical staining is not entirely specific, then the findings are compatible with adenocarcinoma of pulmonary origin if other primary sites of origin have been excluded clinically. involving lung parenchyma, largest nodule measuring 4.5cm, all margins negative, adenocarcinoma is 1mm to nearest parenchymal resection margin. 0/5 LN negative (4R, 10R, 11R, 12R and 7). No VPI nor LVI. PD-L1 5%. In-house NGS showed KRAS p.G12D ([id number] c.35G>A); TP53 p.M568Tre*6 ([id number] c.626_627delGA); TP53 p.R306* ([id number] c.916C>T). MS status: cannot be determined.    She presents for a consultation accompanied by her sister. She continues to have pain/discomfort from the surgical site but able to manage it at home. Otherwise denies any N/V. No fever or chills. No CP or SOB. Reports R ear pain.    Shx: no other drugs or EtOH. Works as [employer]. Lives with . She has 2 siblings.   Fhx: both sisters have breast cancer  (at age of 60, the other sister at 27). Mother side - uncle has unknown cancer.   Surgical history: appendectomy in 1979; lap hysterectomy 1990. L Wrist surgery in 2021.     Treatment Summary:  - 08/29/2024: Robot-assisted RLL lobectomy and mediastinal lymphadenectomy (Dr. Falk)  -10/03/2024:  C1 Cisplatin 60mg/m2 and Pemetrexed 450mg/m2 IV  -10/24/2024:  C2 Cisplatin 60mg/m2 and Pemetrexed 450mg/m2 IV  -11/14/2024:  C3 Cisplatin 60mg/m2 and Pemetrexed 450mg/m2 IV  -12/05/2024:  C4 Cisplatin 60mg/m2 and Pemetrexed 450mg/m2 IV  -12/26/2024:  C1 Atezolizumab 1200mg IV (discontinued after C2 due to stroke), on surveillance since    Interval History (4/3/2025):    Patient presents for a follow up visit. She was admitted at end of December due to a stroke. MRI brain 12/28/2024 showed  The superior posterior right frontal lobe cortex anterior to the precentral gyrus has an ill-defined area of FLAIR hyperintensity with diffusion restriction which may represent an area of acute ischemia, focus of seizure activity not excluded. EEG performed inpatient which was negative for seizure. TTE 12/30/2024 showed a moderate PFO. Doppler of LE was negative for DVT. She has been on surveillance since. Overall feeling well today. No CP or SOB. She is working full time right now. Eating and voiding well.     Review of Systems:  A review of systems has been completed and are negative for complaints except what is stated in the HPI and/or past medical history      Medical History:   Past Medical History:   Diagnosis Date    Achilles tendinitis, unspecified leg     Achilles tendinitis    Acute maxillary sinusitis, unspecified 01/18/2018    Acute maxillary sinusitis    Acute nasopharyngitis (common cold) 12/02/2016    Common cold    Adenocarcinoma of lung (Multi)     Age-related osteoporosis without current pathological fracture 12/04/2015    Encounter for ongoing osteoporosis therapy, bisphosphonates    Complete or unspecified spontaneous   without complication         Dyspnea, unspecified 2019    Paroxysmal dyspnea    Encounter for general adult medical examination without abnormal findings 2018    Annual physical exam    Encounter for health counseling related to travel 2018    Counseling about travel    Encounter for screening for lipoid disorders 2019    Screening cholesterol level    Epigastric pain 2020    Dyspepsia    GERD (gastroesophageal reflux disease)     Irritable bowel syndrome     Left patella fracture 2023    Mastodynia 2021    Breast pain, right    Otalgia, right ear 2020    Otalgia, right    Other general symptoms and signs 2018    Flu-like symptoms    Other microscopic hematuria 2017    Hematuria, microscopic    Pain in right hand 2019    Pain of right hand    Pain in right knee 2019    Knee pain, right    Pain in unspecified shoulder     Shoulder pain    Personal history of (healed) traumatic fracture 10/30/2020    History of fracture of phalanx of toe    Personal history of other diseases of the digestive system 2018    History of gastroenteritis    Personal history of other diseases of the musculoskeletal system and connective tissue 2016    History of low back pain    Personal history of other diseases of the musculoskeletal system and connective tissue 10/19/2020    History of osteopenia    Personal history of other diseases of the nervous system and sense organs 2019    Personal history of seizure disorder    Personal history of other diseases of the respiratory system 2022    History of acute sinusitis    Personal history of other diseases of the respiratory system 10/30/2020    History of chronic rhinitis    Personal history of other diseases of the respiratory system 2017    History of influenza    Personal history of other diseases of the respiratory system 2020    History of viral pharyngitis     Personal history of other infectious and parasitic diseases     History of varicella    Personal history of other medical treatment 11/02/2018    History of screening mammography    Personal history of other specified conditions     History of headache    Personal history of other specified conditions 03/24/2020    History of abnormal mammogram    Personal history of other specified conditions 03/02/2018    History of fatigue    Personal history of other specified conditions 09/19/2017    History of dysuria    Personal history of other specified conditions 08/26/2020    History of diarrhea    Personal history of other specified conditions 03/03/2020    History of abdominal pain    Personal history of other specified conditions 01/11/2019    History of chest pain    Personal history of urinary (tract) infections 11/07/2016    History of urinary tract infection    Personal history of urinary (tract) infections 07/02/2018    History of urinary tract infection    Pulmonary nodules     Bilateral    Seizure disorder (Multi)     Strain of muscle(s) and tendon(s) of peroneal muscle group at lower leg level, left leg, initial encounter 11/05/2020    Strain of peroneal tendon of left foot, initial encounter    Unspecified fracture of left toe(s), initial encounter for closed fracture 10/30/2020    Fracture of proximal phalanx of toe of left foot    Unspecified injury of left wrist, hand and finger(s), initial encounter 01/05/2021    Left wrist injury    Unspecified injury of unspecified foot, initial encounter     Foot injury    Urinary tract infection, site not specified 07/02/2018    Acute UTI    Urinary tract infection, site not specified 11/07/2016    Acute UTI       Surgical History:   Past Surgical History:   Procedure Laterality Date    APPENDECTOMY  10/24/2013    Appendectomy    BREAST CYST ASPIRATION  1993    LAPAROSCOPY DIAGNOSTIC / BIOPSY / ASPIRATION / LYSIS  12/18/2014    Exploratory Laparoscopy    PELVIC  LAPAROSCOPY      endometriosis    US GUIDED SOFT TISSUE BIOPSY  12/18/2023    US GUIDED SOFT TISSUE BIOPSY 12/18/2023 [facility] US       Family History:  Family History   Problem Relation Name Age of Onset    Dementia Mother      Hypertension Mother      Diabetes type II Mother      Coronary artery disease Father      Other (Congestive heart failure) Father      Breast cancer Sister Linda Guzman     Breast cancer Sister Linda     Breast cancer Sister Analilia        Allergies:  Allergies   Allergen Reactions    Phenytoin Other     double vision    Codeine Hallucinations     AUDITORY HALLUCINATIONS       Meds (Current):    Current Outpatient Medications:     ascorbic acid (Vitamin C) 1,000 mg tablet, Take 1 tablet (1,000 mg) by mouth once daily., Disp: , Rfl:     atorvastatin (Lipitor) 40 mg tablet, Take 1 tablet (40 mg) by mouth once daily at bedtime., Disp: 90 tablet, Rfl: 1    calcium carbonate (CALCIUM 600 ORAL), Take 1 tablet by mouth 2 times a day., Disp: , Rfl:     carBAMazepine XR (TEGretol XR) 100 mg 12 hr tablet, Take 1 tablet (100 mg) by mouth 2 times a day., Disp: 180 tablet, Rfl: 3    cholecalciferol (Vitamin D-3) 50 MCG (2000 UT) tablet, Take 1 tablet (50 mcg) by mouth once daily., Disp: , Rfl:     cyanocobalamin (Vitamin B-12) 100 mcg tablet, Take 1 tablet (100 mcg) by mouth once daily., Disp: , Rfl:     enoxaparin (Lovenox) 60 mg/0.6 mL syringe, Inject 0.6 mL (60 mg) under the skin every 12 hours., Disp: 72 mL, Rfl: 1    estradiol (Estrace) 0.01 % (0.1 mg/gram) vaginal cream, Insert 0.25 Applicatorful (1 g) into the vagina 2 times a week., Disp: 42.5 g, Rfl: 3    PHENobarbital (Luminal) 64.8 mg tablet, TAKE 1 TABLET BY MOUTH EVERY MORNING AND TAKE 2 TABLETS BY MOUTH EVERY EVENING, Disp: 270 tablet, Rfl: 1    Tegretol  mg 12 hr tablet, Take 1 tablet (400 mg) by mouth 2 times a day. Do not crush, chew, or split., Disp: 180 tablet, Rfl: 3  No current facility-administered medications for this  visit.    Facility-Administered Medications Ordered in Other Visits:     alteplase (Cathflo Activase) injection 2 mg, 2 mg, intra-catheter, PRN, Damaris Jhaveri MD    heparin flush 10 unit/mL syringe 50 Units, 50 Units, intra-catheter, PRN, Damaris Jhaveri MD    heparin flush 100 unit/mL syringe 500 Units, 500 Units, intra-catheter, PRN, Damaris Jhaveri MD, 500 Units at 25 1000      Performance Status (ECOG): 1    ECOG Definition  0 Fully active; no performance restrictions.  1 Strenuous physical activity restricted; fully ambulatory and able to carry out light work.  2 Capable of all self-care but unable to carry out any work activities. Up and about >50% of waking hours.  3 Capable of only limited self-care; confined to bed or chair >50% of waking hours.  4 Completely disabled; cannot carry out any self-care; totally confined to bed or chair.    Exam:  /72   Pulse 63   Temp 35.7 °C (96.3 °F) (Temporal)   Resp 18   Wt 57.8 kg (127 lb 6.8 oz)   SpO2 97%   BMI 22.57 kg/m²     Wt Readings from Last 5 Encounters:   25 57.8 kg (127 lb 6.8 oz)   25 54.5 kg (120 lb 2.4 oz)   25 59 kg (130 lb)   25 59.3 kg (130 lb 11.7 oz)   02/10/25 58.9 kg (129 lb 12.8 oz)     Physical Exam:  ECO  Pain: chronic left shoulder pain. Right chest wall post op pain improving  Constitutional: Well developed, awake/alert/oriented x3, no distress, alert and cooperative  Eyes: PER. sclera anicteric  ENMT: Oral mucosa moist, no lesions or thrush identified. Right cheek healing dry scab.   Respiratory/Thorax: Breathing is non-labored. Lungs are clear to auscultation bilaterally. No adventitious breath sounds. Hx RLL lobectomy. Mediport in place.  Cardiovascular: S1-S2. Regular rate and rhythm. No murmurs, rubs, or gallops appreciated  Gastrointestinal: Abdomen soft nontender, nondistended, normal active bowel sounds.  Musculoskeletal: ROM intact, no joint swelling, normal strength  Extremities:  normal extremities, no cyanosis, no edema, no clubbing  Lymphatics: no palpable lymphadenopathy   Skin: no rash  Neurologic: alert and oriented x3. Nonfocal exam. No myoclonus  Psychological: Pleasant, appropriate and easily engaged     Labs:               Imaging:  Reviewed as below.     Assessment/Plan:   64 y.o. female with past medical history as stated referred for management of newly diagnosed lung cancer discussing adjuvant treatment.     The patient's records and imaging have been reviewed in detail.  MD discussed with the patient the natural history of their disease at length.  The following has also been discussed with the patient:    # Cancer:  stage IIB ([id number]) adenocarcinoma of lung, with multiple nodules within the RLL s/p RLL lobectomy, tumor measuring 4.5cm. All LN negative. PD-L1 5%. In-house NGS showed KRAS p.G12D ([id number] c.35G>A); TP53 p.W093Sbk*6 ([id number] c.626_627delGA); TP53 p.R306* (NM_[id number] c.916C>T). MS status: cannot be determined. Tumor cells are strongly positive for CK7, focally positive for CDX2 and negative for TTF-1. While immunohistochemical staining is not entirely specific, then the findings are compatible with adenocarcinoma of pulmonary origin if other primary sites of origin have been excluded clinically. S/p RLL lobectomy on 8/24/2024. She was started on adjuvant chemotherapy carboplatin/paclitaxel, followed by Atezolizumab. However developed acute stroke the day after C1 atezolizumab. Atezolizumab has been held since.      - Imaging: CT chest (-) 3/31/2025: 1.  Stable postoperative changes of right lower lobectomy without evidence of locoregional disease recurrence. No new sites of metastatic disease within the chest. 2. Interval decrease in size of a trace right pleural effusion. No new acute process within the chest. 3. Unchanged appearance of a small sliding type 1 hiatal hernia with fluid in the esophagus, correlate with symptoms of gastroesophageal reflux  disease. 4. Remaining chronic and incidental findings as described above.    # Stroke: admitted at end of December 2024 due to a stroke. MRI brain 12/28/2024 showed  The superior posterior right frontal lobe cortex anterior to the precentral gyrus has an ill-defined area of FLAIR hyperintensity with diffusion restriction which may represent an area of acute ischemia, focus of seizure activity not excluded. EEG performed inpatient which was negative for seizure. TTE 12/30/2024 showed a moderate PFO. Doppler of LE was negative for DVT. She continues to follow up with neurology and cardiac surgeon. Per patient she is not interested in pursuing surgery to close the PFO    # Seizure disorder: firstly found when she was in her teens, and she has been on both Carbamazepine and Phenobarbital. Following with Dr. Baker who is going to retire soon. However she is hesitant to switch to keppra.      # Clinical Trials:  None currently.     # Access: Mediport placed by IR in 10/2024. Patient would like to keep it for another year.    # Pain: Surgical related pain. Well controlled.     # Bone Health: No signs of bony disease.     # Psychosocial: Coping well, no acute issues.  Good support from family & friends.  Social work support available.    # GI/CINV: No acute issues.  Eating and voiding well.  Nutrition consult available.    # Smoking: N/A, former smoker.    # Fertility: N/A.        # Heme/ESAs:  Normocytic anemia likely 2/2 to chemo. Continue to monitor.     # GOC: Patient is aware of curative intent goals of care.    # Language: English.    # Interdisciplinary Patient/Family Education Record:  Learner:  Patient.  Learning Needs Reviewed:  Treatments, Medications, Disease Process, Diagnostic Tests.  Barriers: None.  Teaching Method: Discussion, Handout(s).  Comprehension:  Verbalized Understanding.  Follow-up Plan:  Return to office as per MD.    # Dispo:  RTC in 3 months after repeat CT chest

## 2025-04-03 NOTE — PROGRESS NOTES
Patient here for follow up visit adenocarcinoma    No concerns or complaints noted at this time.   Recently recovered from the flu had for about a month.   Patient here with  Chris.  No longer taking reclast at this time.       Last infusion ended up having a seizure and stroke hospital 12/27-12/31/24. Treatment on hold at this home.     Medications and Allergies reviewed and reconciled this visit.    Follow up per MD request.    Patient reports availability and use of mychart, Reviewed this is a good place to communicate with the team as well as review labs and upcoming orders.      No barriers to education noted, patient agrees to current plan and verbalized understanding using teach back method.

## 2025-04-04 ENCOUNTER — PHARMACY VISIT (OUTPATIENT)
Dept: PHARMACY | Facility: CLINIC | Age: 65
End: 2025-04-04
Payer: COMMERCIAL

## 2025-04-04 DIAGNOSIS — I63.9 CEREBRAL INFARCTION, UNSPECIFIED: ICD-10-CM

## 2025-04-04 RX ORDER — ATORVASTATIN CALCIUM 40 MG/1
40 TABLET, FILM COATED ORAL NIGHTLY
Qty: 90 TABLET | Refills: 3 | Status: SHIPPED | OUTPATIENT
Start: 2025-04-04 | End: 2026-04-04

## 2025-04-04 NOTE — TELEPHONE ENCOUNTER
atorvastatin (Lipitor) 40 mg tablet refill    The pt was given this when seen in the ED, she is scheduled to see you 4-11-25, the pt will be out tomorrow and needs to  new Rx tomorrow before she goes out of town    Can you please refill her    Thank you

## 2025-04-11 ENCOUNTER — OFFICE VISIT (OUTPATIENT)
Dept: NEUROLOGY | Facility: HOSPITAL | Age: 65
End: 2025-04-11
Payer: COMMERCIAL

## 2025-04-11 VITALS
HEIGHT: 62 IN | BODY MASS INDEX: 23.37 KG/M2 | HEART RATE: 59 BPM | DIASTOLIC BLOOD PRESSURE: 77 MMHG | RESPIRATION RATE: 17 BRPM | WEIGHT: 127 LBS | SYSTOLIC BLOOD PRESSURE: 126 MMHG

## 2025-04-11 DIAGNOSIS — H93.13 TINNITUS OF BOTH EARS: Primary | ICD-10-CM

## 2025-04-11 PROCEDURE — 99214 OFFICE O/P EST MOD 30 MIN: CPT | Mod: GC | Performed by: PSYCHIATRY & NEUROLOGY

## 2025-04-11 PROCEDURE — 1159F MED LIST DOCD IN RCRD: CPT | Performed by: PSYCHIATRY & NEUROLOGY

## 2025-04-11 PROCEDURE — 3008F BODY MASS INDEX DOCD: CPT | Performed by: PSYCHIATRY & NEUROLOGY

## 2025-04-11 PROCEDURE — 99214 OFFICE O/P EST MOD 30 MIN: CPT | Performed by: PSYCHIATRY & NEUROLOGY

## 2025-04-11 NOTE — PROGRESS NOTES
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  Continue therapeutic lovenox.  Not a candidate for warfarin due to malignancy; not a candidate for DOAC due to carbamazepine interaction that lowers DOAC efficacy. Continue statin.  Seizure: this most recent seizure is likely due to the acute stroke and does not require change in anti seizure medications.  Continue previous carbamazipine and phenobarbitol.  Management per Epilepsy (follows with Dr. Mcdermott).  Patent foramen ovale: it is certainly true that a hypercoaguable state increases the risk of paradoxical embolization and this may justify the need for Patent foramen ovale closure.  On the other hand it would not prevent the need for anticoagulation as she would remain at risk for venous thrombosis/ Pulmonary embolism. Agree with seeing Dr. Gupta and obtaining his input before making decision on PFO closure.  Tinnitus: referral to ENT for audiogram  Blurry vision: start with optometry vision testing, if normal, can consider Ophthalmology referral       HPI:   Rhoda Pena is a 64 y.o.  female with no significant vascular risk factors other than hyperlipidemia, recent lung cancer and age.  She has a long standing history of seizures but hadn't had a seizure in years.  On 12/27/24 she presented with Right upper extremity shaking which was thought to be a seizure, however, MRI showed right frontal diffusion thought to represent an acute stroke.  The new stroke is felt to be the cause of the seizure.  Work up showed a moderate Patent foramen ovale, but no Deep vein thrombosis.  She is now on lovenox and has an appointment to see Dr Gupta about Patent foramen ovale closure.  She is not sure she wants to do a closure.  She has returned to work and notices it is harder to type, but easier than when she first  "went back in mid January.  Her immunotherapy for her cancer has been stopped and her oncologist doesn't think it is worth the risk for the benefit.    Pertinent Stroke workup:  MRI: right frontal diffusion change  MRA: no significant stenosis or occlusion   Echo: Ejection fraction: 62%            Left Atrium: normal            Patent foramen ovale: moderate (11-20 bubbles)  LDL: 154    HbA1C: 5.1  Cardiac Monitor: not done    Interval History:  Since the last visit, pt reports overall feeling well. She denied any recurrent seizures or new transient or persistent focal neuro deficits c/f new strokes, namely weakness, numbness, speech/swallow difficulties, vision changes. She notes two episodes of transient dizziness, one of them potentially triggered by being at a conference and tired. Both episodes only lasted minutes and improved with hydration and rest. Has not recurred since. With regards to the PFO, she is still leaning towards no intervention for closure as \"she is not convinced by the evidence for PFO closure\" but has upcoming appointment with cardiology (Dr. Gupta) to get his input as well before making the final decision. Pt reports compliance with all of her meds, particularly Tegretol, Phenobarb, Atorvastatin and Lovenox, without any missed doses or side-effects.     Pt does report some tinnitus with some questionable hearing loss. She also notes some blurry vision with both eyes open, with gaze in all directions. She notes that she hasn't had her vision checked in over a year so will start with checking that first.     Current Outpatient Medications on File Prior to Visit   Medication Sig Dispense Refill    ascorbic acid (Vitamin C) 1,000 mg tablet Take 1 tablet (1,000 mg) by mouth once daily.      atorvastatin (Lipitor) 40 mg tablet Take 1 tablet (40 mg) by mouth once daily at bedtime. 90 tablet 3    calcium carbonate (CALCIUM 600 ORAL) Take 1 tablet by mouth 2 times a day.      cholecalciferol (Vitamin " D-3) 50 MCG (2000 UT) tablet Take 1 tablet (50 mcg) by mouth once daily.      cyanocobalamin (Vitamin B-12) 100 mcg tablet Take 1 tablet (100 mcg) by mouth once daily.      enoxaparin (Lovenox) 60 mg/0.6 mL syringe Inject 0.6 mL (60 mg) under the skin every 12 hours. 72 mL 1    estradiol (Estrace) 0.01 % (0.1 mg/gram) vaginal cream Insert 0.25 Applicatorful (1 g) into the vagina 2 times a week. 42.5 g 3    PHENobarbital (Luminal) 64.8 mg tablet TAKE 1 TABLET BY MOUTH EVERY MORNING AND TAKE 2 TABLETS BY MOUTH EVERY EVENING 270 tablet 1    Tegretol  mg 12 hr tablet Take 1 tablet (400 mg) by mouth 2 times a day. Do not crush, chew, or split. 180 tablet 3    carBAMazepine XR (TEGretol XR) 100 mg 12 hr tablet Take 1 tablet (100 mg) by mouth 2 times a day. 180 tablet 3     No current facility-administered medications on file prior to visit.        Patient Active Problem List   Diagnosis    Chronic depression    Achilles tendinitis of left lower extremity    Complex partial seizure evolving to generalized seizure (Multi)    Dupuytren contracture    Right patellofemoral syndrome    Osteoporosis    Olecranon bursitis of left elbow    Irritable bowel syndrome with diarrhea    Other hypertrophic disorders of the skin    Other seborrheic keratosis    Posterior vitreous detachment, both eyes    Postmenopausal atrophic vaginitis    Rotator cuff arthropathy of left shoulder    Sprain of lateral collateral ligament of left knee    Lumbar radiculopathy    Sacroiliitis (CMS-HCC)    Malignant neoplasm of lower lobe of right lung (Multi)    Adenocarcinoma, lung, unspecified laterality (Multi)    Encounter for antineoplastic chemotherapy    Arthralgia of hip    Posterior vitreous detachment    Patellofemoral pain syndrome    History of varicella    Routine adult health maintenance    Right hemiparesis (Multi)    PFO (patent foramen ovale) ([facility])    Influenza A        Past Surgical History:   Procedure Laterality Date     APPENDECTOMY  1979    Appendectomy    BREAST CYST ASPIRATION  1993    LAPAROSCOPY DIAGNOSTIC / BIOPSY / ASPIRATION / LYSIS  2014    Exploratory Laparoscopy    LUNG LOBECTOMY Right 2024    PELVIC LAPAROSCOPY      endometriosis    US GUIDED SOFT TISSUE BIOPSY  2023    US GUIDED SOFT TISSUE BIOPSY 2023 [facility] US        Allergies   Allergen Reactions    Phenytoin Other     double vision    Codeine Hallucinations     AUDITORY HALLUCINATIONS        Family History   Problem Relation Name Age of Onset    Dementia Mother Sofia     Hypertension Mother Sofia     Diabetes type II Mother Sofia     Diabetes Mother Sofia     Coronary artery disease Father Ozzy     Other (Congestive heart failure) Father Ozzy     Heart disease Father Ozzy     Breast cancer Sister Linda Guzman     Cancer Sister Linda Guzman     Breast cancer Sister Awa Sullivan     Cancer Sister Awa Sullivan         Social History     Tobacco Use    Smoking status: Former     Current packs/day: 0.00     Average packs/day: 1 pack/day for 15.0 years (15.0 ttl pk-yrs)     Types: Cigarettes     Start date: 1976     Quit date: 1990     Years since quittin.7     Passive exposure: Past    Smokeless tobacco: Never   Vaping Use    Vaping status: Never Used   Substance Use Topics    Alcohol use: Yes     Alcohol/week: 1.0 standard drink of alcohol     Types: 1 Glasses of wine per week     Comment: About two drinks per month    Drug use: Never        Objective:  Visit Vitals  /77   Pulse 59   Resp 17    virtual visit  25: 123/77    Neurologic Exam:    GENERAL APPEARANCE:  No distress, alert, interactive and cooperative.     CARDIOVASCULAR: Regular rate and rhythm. Radial pulses +2 and equal. No swelling, varicosities, edema, or tenderness to palpation.      MENTAL STATE:   Orientation was normal to time, place and person. Recent and remote memory was intact.  Attention span and concentration were normal. Language  testing was normal for comprehension, repetition, expression, and naming. The patient could correctly interpret a picture. General fund of knowledge was intact.     OPHTHALMOSCOPIC:   The ophthalmoscopic exam was grossly normal. The fundi were well visualized with normal disc margins, clear vessels and vascular pulsations. No disc edema. The cup/disk ratio was not enlarged. No hemorrhages or exudates were present in the posterior segments that were visualized.     CRANIAL NERVES:   CN 2   Visual fields full to confrontation.   CN 3, 4, 6   Pupils round, 4 mm in diameter, equally reactive to light. Lids symmetric; no ptosis. EOMs normal alignment, full range with normal saccades, pursuit and convergence.   No nystagmus.   CN 5   Facial sensation intact bilaterally.   CN 7   Normal and symmetric facial strength. Nasolabial folds symmetric.   CN 8   Hearing intact to conversation.  CN 9/10  Palate elevates symmetrically.   CN 11   Normal strength of shoulder shrug and neck turning.   CN 12   Tongue midline, with normal bulk and strength; no fasciculations.     MOTOR:   Muscle bulk and tone were normal in both upper and lower extremities.   No fasciculations, tremor or other abnormal movements were present.   No orbiting. Mildly impaired fine motor control in R hand.                                 R          L  Shoulder abd       5-         5-      *pain-limited  Elbow flex.            5          5  Elbow ext.            5          5                       4+        5    Hip flex.              5          5  Knee flex.            5          5  Knee ext.             5          5  Dorsiflex             5          5  Plantarflex         5          5    REFLEXES:                       R          L  Biceps:         2          2  Triceps:        2          2  Knee:           2          2  Ankle:          2          2    Babinski: toes downgoing to plantar stimulation. No clonus or other pathologic reflexes present.      SENSORY:   In both upper and lower extremities, sensation was intact to light touch    COORDINATION:    In both upper extremities, finger-nose-finger was intact without dysmetria or overshoot.   In both lower extremities, heel-to-shin was intact.      GAIT:   Station was stable with a normal base. Gait was stable with a normal arm swing and speed. No ataxia, shuffling, steppage or waddling was present. No circumduction was present. Tandem gait was intact. No Romberg sign was present.    Barthel Index:  Feeding: 10=independent  Dressing: 10=independent  Groomin=independent  Bathin=independent  Transfers: 15=independent  Mobility: 15=independent 50 yards  Stairs: 10=independent  Toileting: 10=independent  Bladder: 10=continent  Bowels: 10=continent    Total Score: 100    Modified Jean Marie Score:  1=no significant disability despite symptoms: able to carry out all usual duties and activities  not driving but this is because of the seizure and not the stroke.    NIHSS:  Level of Consciousness: 0=alert      LOC questions: 0=answers both questions      LOC commands: 0=performs both  Best Gaze: 0=normal  Visual: not tested (virtual visit)  Facial Palsy: 0=normal  Motor:      Motor Arm (Left): 0=normal      Motor Arm (Right): 0=normal      Motor Leg (Left): 0=left leg normal      Motor Leg (Right): 0=right leg normal  Limb Ataxia: 0=absent  Sensory: 0=no sensory loss  Best Language: 0=no aphasia  Dysarthria: 0=normal  Extinction and Inattention: not tested (virtual visit)     Total Score: 0    The chart was reviewed and summarized as above.  Neuroimaging was personally reviewed and interpreted as documented in the HPI or Assessment  and Plan.

## 2025-04-12 NOTE — PATIENT INSTRUCTIONS
Stroke prevention:  Eat a healthy diet with plenty of fresh fruits and vegetables.  Avoid salt and fried foods.  Lose weight and exercise on a regular basis.  Do not smoke or use drugs.  Be sure to take all medications.  Call 911 for signs of stroke (numbness or weakness on one side, trouble seeing on one side, trouble speaking (either because your speech is slurred or you can't find the words), sudden double vision, spinning sensation or loss of coordination).    To find a stroke support group: https://[email or web address].org/en/stroke-support-group-finder  To learn about Virtual Stroke Educations sessions: contact Francie Cortes at: [email or web address]    I can be reached at phone: [phone] or email: [email or web address] or message me via "[email or web address]

## 2025-04-30 ENCOUNTER — HOSPITAL ENCOUNTER (OUTPATIENT)
Dept: CARDIOLOGY | Facility: CLINIC | Age: 65
Discharge: HOME | End: 2025-04-30
Payer: COMMERCIAL

## 2025-04-30 ENCOUNTER — OFFICE VISIT (OUTPATIENT)
Dept: CARDIOLOGY | Facility: CLINIC | Age: 65
End: 2025-04-30
Payer: COMMERCIAL

## 2025-04-30 VITALS
BODY MASS INDEX: 23.53 KG/M2 | DIASTOLIC BLOOD PRESSURE: 77 MMHG | HEART RATE: 60 BPM | WEIGHT: 132.8 LBS | SYSTOLIC BLOOD PRESSURE: 134 MMHG | HEIGHT: 63 IN | OXYGEN SATURATION: 98 %

## 2025-04-30 DIAGNOSIS — Q21.12 PFO (PATENT FORAMEN OVALE) (HHS-HCC): ICD-10-CM

## 2025-04-30 LAB — BODY SURFACE AREA: 1.64 M2

## 2025-04-30 PROCEDURE — 3008F BODY MASS INDEX DOCD: CPT | Performed by: INTERNAL MEDICINE

## 2025-04-30 PROCEDURE — 1159F MED LIST DOCD IN RCRD: CPT | Performed by: INTERNAL MEDICINE

## 2025-04-30 PROCEDURE — 93270 REMOTE 30 DAY ECG REV/REPORT: CPT

## 2025-04-30 PROCEDURE — 1036F TOBACCO NON-USER: CPT | Performed by: INTERNAL MEDICINE

## 2025-04-30 PROCEDURE — 99214 OFFICE O/P EST MOD 30 MIN: CPT | Performed by: INTERNAL MEDICINE

## 2025-04-30 PROCEDURE — 99204 OFFICE O/P NEW MOD 45 MIN: CPT | Performed by: INTERNAL MEDICINE

## 2025-04-30 ASSESSMENT — ENCOUNTER SYMPTOMS
OCCASIONAL FEELINGS OF UNSTEADINESS: 0
LOSS OF SENSATION IN FEET: 0
DEPRESSION: 0

## 2025-04-30 ASSESSMENT — PATIENT HEALTH QUESTIONNAIRE - PHQ9
2. FEELING DOWN, DEPRESSED OR HOPELESS: NOT AT ALL
1. LITTLE INTEREST OR PLEASURE IN DOING THINGS: NOT AT ALL
SUM OF ALL RESPONSES TO PHQ9 QUESTIONS 1 AND 2: 0

## 2025-04-30 NOTE — PROGRESS NOTES
"    PCP: Dr. Lim  Cardio: Dr. Huber    I was asked by Dr. Huber to evaluate this patient in consultation for consideration of PFO closure.    Rhoda Pena is a 64 y.o., female, with PMH of former smoker (started at age 14, 1 pack per day, quitted at age 30) with a past medical history of osteoporosis, hx of seizure on Carbamazepine (has not had any seizure for 1 decade) newly diagnosed RLL lung adenocarcinoma s/p RLL lobectomy. She developed acute stroke after immunotherapy.  Her workup was notable for a moderate-sized PFO.  She is thus referred to evaluate for possible PFO closure.    \"S/p RLL lobectomy on 8/24/2024. She was started on adjuvant chemotherapy carboplatin/paclitaxel, followed by Atezolizumab. However developed acute stroke the day after C1 atezolizumab. Atezolizumab has been held since. \"     She is currently on enoxaparin.    Patient's work-up as summarized below:   EKG: Normal sinus rhythm    TTE:    1. Left ventricular ejection fraction is normal, calculated by Durbin's biplane at 62%.   2. There is normal right ventricular global systolic function.   3. Right ventricular systolic pressure is within normal limits.   4. A bubble study using agitated saline was performed. Bubble study is positive. A moderate PFO (11-20 bubbles) was demonstrated.  MARLENE: Pending  Venous US:  Right Lower Venous: No evidence of acute deep vein thrombus visualized in the right lower extremity. Additional Findings; Lymph nodes noted in groin.  Left Lower Venous: No evidence of acute deep vein thrombus visualized in the left lower extremity.  Hypercoagulation work : Pending  30 day Heart monitor: Pending  US carotid/CTA neck: Pending    Given PFO noted on echo, and without other obvious etiology for his neurologic event, the patient is referred for consideration of PFO closure.    Neurology:  M3, likely embolic Left middle cerebral artery infarction with seizure at onset: suspect due to cancer related " hypercoaguability with possible paradoxical embolization via Patent foramen ovale.  Continue therapeutic lovenox.  Not a candidate for warfarin due to malignancy; not a candidate for DOAC due to carbamazepine interaction that lowers DOAC efficacy. Continue statin.  Seizure: this most recent seizure is likely due to the acute stroke and does not require change in anti seizure medications.  Continue previous carbamazipine and phenobarbitol.  Management per Epilepsy (follows with Dr. Mcdermott).  Patent foramen ovale: it is certainly true that a hypercoaguable state increases the risk of paradoxical embolization and this may justify the need for Patent foramen ovale closure.  On the other hand it would not prevent the need for anticoagulation as she would remain at risk for venous thrombosis/ Pulmonary embolism.    ROS:     Constitutional: not feeling tired, not feeling poorly, no fever and no chills.   Eyes: no eyesight problems, no blurred vision, no diplopia, no eye pain, no purulent discharge from the eyes, eyes not red, no dryness of the eyes and no itching of the eyes.   ENT: no nosebleeds, no hearing loss, no tinnitus, no earache, no sore throat, no hoarseness, no swollen glands in the neck and no nasal discharge.   Cardiovascular: no intermittent leg claudication, no chest pain, no tightness or heavy pressure, no shortness of breath, no palpitations, no lower extremity edema, the heart rate was not slow, the heart rate was not fast and as noted in HPI.   Respiratory: no chronic cough, not coughing up sputum,  no wheezing that is consistent with asthma, no asthma, no orthopnea and no postural nocturnal dyspnea.   Gastrointestinal: no change in bowel habits, no blood in stools, no diarrhea, no constipation, no nausea, no vomiting, no abdominal pain, no signs and symptoms of ulcer disease, no pete colored stools and no intolerance to fatty foods.   Genitourinary: no hematuria,  no urinary frequency, no dysuria, no  "incontinence, no burning sensation during urination, no urinary hesitancy, no nocturia, no genital lesion, no testicular pain, urinary stream is not smaller and urinary stream does not start and stop.   Musculoskeletal: no arthralgias, no myalgias, no joint swelling, no joint stiffness, no muscle weakness, no back pain, no limb pain, no limb swelling and no difficulty walking.   Skin: no skin rashes, no change in skin color and pigmentation, no skin lesions and no skin lumps.   Neurological: no seizures, no frequent falls, no headaches, no dizziness, no tingling, no fainting and no limb weakness.   Psychiatric: no depression, not suicidal, no confusion, no memory lapses or loss, no anxiety, no personality change and no emotional problems.   Endocrine: no goiter, no thyroid disorder, no diabetes mellitus, no excessive thirst, no dry skin, no cold intolerance, no heat intolerance, no erectile dysfunction, no increased urinary frequency, no proptosis and no deepening of the voice.   Hematologic/Lymphatic: no bleeding issues.   All other systems have been reviewed and are negative for complaint.     Physical Exam:    Visit Vitals  /77 (BP Location: Left arm, Patient Position: Sitting, BP Cuff Size: Adult)   Pulse 60   Ht 1.6 m (5' 3\")   Wt 60.2 kg (132 lb 12.8 oz)   SpO2 98%   BMI 23.52 kg/m²   OB Status Postmenopausal   Smoking Status Former   BSA 1.64 m²        Constitutional: alert and in no acute distress.   Eyes: no erythema, swelling or discharge from the eye .   Ears, Nose, Mouth, and Throat: external inspection of ears and nose is normal , lips, teeth, and gums are normal with good dentition  and oropharynx normal with no erythema, edema, exudate or lesions .   Neck: neck is supple, symmetric, trachea midline, no masses  and no thyromegaly .   Pulmonary: no increased work of breathing or signs of respiratory distress , lungs clear to auscultation. , normal percussion of chest  and chest palpation normal . "   Cardiovascular: RRR, no murmur,  no leg edema, non-displaced PMI, no S3 or S4  Abdomen: abdomen non-tender, no masses  and no hepatomegaly .           Skin:  no skin lesions          Neurologic: non-focal neurologic examination.      Psychiatric judgment and insight is normal , oriented to person, place and time , normal mood and affect .       Labs:  Results for orders placed or performed in visit on 03/03/25   POCT FLU A/B PCR manually resulted    Collection Time: 03/03/25  4:36 PM   Result Value Ref Range    POC Flu A Result Detected (A) Not Detected    POC Flu B Result Not Detected Not Detected   POCT BinaxNOW Covid-19 Ag Card manually resulted    Collection Time: 03/03/25  4:36 PM   Result Value Ref Range    POC MARY-COV-2 AG  Presumptive negative test for SARS-CoV-2 (no antigen detected)     Presumptive negative test for SARS-CoV-2 (no antigen detected)          Meds:    Current Outpatient Medications   Medication Instructions    ascorbic acid (Vitamin C) 1,000 mg tablet Take 1 tablet (1,000 mg) by mouth once daily.    atorvastatin (LIPITOR) 40 mg, oral, Nightly    calcium carbonate (CALCIUM 600 ORAL) 1 tablet, 2 times daily    carBAMazepine XR (TEGRETOL XR) 100 mg, oral, 2 times daily    cholecalciferol (VITAMIN D-3) 50 mcg, Daily    cyanocobalamin (VITAMIN B-12) 100 mcg, Daily    enoxaparin (LOVENOX) 1 mg/kg, subcutaneous, Every 12 hours    estradiol (Estrace) 0.01 % (0.1 mg/gram) vaginal cream Insert 0.25 Applicatorful (1 g) into the vagina 2 times a week.    PHENobarbital (Luminal) 64.8 mg tablet TAKE 1 TABLET BY MOUTH EVERY MORNING AND TAKE 2 TABLETS BY MOUTH EVERY EVENING    Tegretol  mg, oral, 2 times daily, Do not crush, chew, or split.          Assessment/ Plan:    Rhoda Pena is a 64 y.o., female, with hx of seizure on Carbamazepine referred for management of newly diagnosed with embolic Left middle cerebral artery infarction suspect due to cancer related hypercoaguability with possible  paradoxical embolization via Patent foramen ovale.  Patient has been on enoxaparin since the event.    ROPE Score 5    Today we discussed the PFO closure procedure. The patient was given written educational handout materials. All risks, benefits and alternative were discussed. The risks discussed included but were not limited to vascular access complications, infection, sedation related complications, MI, CVA, pericardial tamponade and death. The patient verbalized understanding.     She will be seen by her oncologist in July/ 2025 to address the treatment and remission of the lung cancer.    As we discussed with the patient, if she is intended to stay on Lovenox long-term, there would be questionable benefit of PFO closure.  There is little data to support PFO closure on top of oral anticoagulant therapy.  If on the other hand, she is intended to stop Lovenox, then given the size of her PFO and her CVA, closure would be reasonable.     We will continue her workup before make a decision about the PFO closure procedure. We will order a 30 day event monitor and patient will be seen for 6-week follow-up.    Thank you, Dr. Huber, for this consultation and for allowing me to participate in the care of this patient.    Raul Gupta MD  , Interventional Cardiology Fellowship Program   [facility]   [facility] [employer]  Office Phone Number: [phone]

## 2025-05-02 DIAGNOSIS — I63.9 CEREBRAL INFARCTION, UNSPECIFIED: ICD-10-CM

## 2025-05-04 PROCEDURE — RXMED WILLOW AMBULATORY MEDICATION CHARGE

## 2025-05-04 RX ORDER — ENOXAPARIN SODIUM 100 MG/ML
1 INJECTION SUBCUTANEOUS EVERY 12 HOURS
Qty: 60 EACH | Refills: 5 | Status: SHIPPED | OUTPATIENT
Start: 2025-05-04 | End: 2025-10-31

## 2025-05-05 ENCOUNTER — PHARMACY VISIT (OUTPATIENT)
Dept: PHARMACY | Facility: CLINIC | Age: 65
End: 2025-05-05
Payer: COMMERCIAL

## 2025-05-07 ENCOUNTER — APPOINTMENT (OUTPATIENT)
Dept: PRIMARY CARE | Facility: CLINIC | Age: 65
End: 2025-05-07
Payer: COMMERCIAL

## 2025-05-07 VITALS
OXYGEN SATURATION: 99 % | SYSTOLIC BLOOD PRESSURE: 104 MMHG | BODY MASS INDEX: 23.25 KG/M2 | HEIGHT: 63 IN | HEART RATE: 67 BPM | DIASTOLIC BLOOD PRESSURE: 60 MMHG | WEIGHT: 131.2 LBS

## 2025-05-07 DIAGNOSIS — M81.0 AGE-RELATED OSTEOPOROSIS WITHOUT CURRENT PATHOLOGICAL FRACTURE: ICD-10-CM

## 2025-05-07 DIAGNOSIS — C34.90 ADENOCARCINOMA, LUNG, UNSPECIFIED LATERALITY (MULTI): ICD-10-CM

## 2025-05-07 DIAGNOSIS — C34.31 MALIGNANT NEOPLASM OF LOWER LOBE OF RIGHT LUNG (MULTI): Primary | ICD-10-CM

## 2025-05-07 DIAGNOSIS — Q21.12 PFO (PATENT FORAMEN OVALE) (HHS-HCC): Chronic | ICD-10-CM

## 2025-05-07 PROBLEM — J10.1 INFLUENZA A: Status: RESOLVED | Noted: 2025-03-03 | Resolved: 2025-05-07

## 2025-05-07 PROCEDURE — 99214 OFFICE O/P EST MOD 30 MIN: CPT | Performed by: STUDENT IN AN ORGANIZED HEALTH CARE EDUCATION/TRAINING PROGRAM

## 2025-05-07 PROCEDURE — 1036F TOBACCO NON-USER: CPT | Performed by: STUDENT IN AN ORGANIZED HEALTH CARE EDUCATION/TRAINING PROGRAM

## 2025-05-07 PROCEDURE — 1160F RVW MEDS BY RX/DR IN RCRD: CPT | Performed by: STUDENT IN AN ORGANIZED HEALTH CARE EDUCATION/TRAINING PROGRAM

## 2025-05-07 PROCEDURE — 1159F MED LIST DOCD IN RCRD: CPT | Performed by: STUDENT IN AN ORGANIZED HEALTH CARE EDUCATION/TRAINING PROGRAM

## 2025-05-07 PROCEDURE — 3008F BODY MASS INDEX DOCD: CPT | Performed by: STUDENT IN AN ORGANIZED HEALTH CARE EDUCATION/TRAINING PROGRAM

## 2025-05-07 NOTE — PROGRESS NOTES
History Of Present Illness  Rhoda Pena is a 64 y.o. female presents for establishment of care.       Patient presents to the office today for establishment of care.  History of a patent PFO of which she is working with cardiology for possible closure.  Pending heart monitor for 30 days     Seizure disorder  Current medication of carbamazepine 100 mg twice daily, phenobarbital 64.8 mg twice daily  Last seizure was over 10 years ago    History of right lower lobe adenocarcinoma of the lung  Status post right lower lobe lobectomy on 2024  Pathology: Multifocal mixed invasive mucinous and non-mucinous adenocarcinoma, no lymph node involvement  Status postchemotherapy had a complication of a CVA, 2024 due to new ischemic stroke involving the left pre-central gyrus after 1 cycle of Azezolizumab   Due to hypercoagulability of carcinoma patient is currently on Lovenox   Pt reports weight loss and fatigue since chemotherapy  Constipation- improved with sennacot   vEEG was wnl     GYN:   PAP: 21  -/-  Mammogram 24  Dexa/Scan  osteopenia,  improved from 2013 osteoporosis   Colonoscopy:  10 year program: 2018    What is the recurrence rate for her particular cancer?     Genetics- sisters all had cancers of different type breast cancer  Great aunt and uncle who also  of cancer     Past Medical History  She has a past medical history of Abdominal pain (2024), Achilles tendinitis, unspecified leg, Acute maxillary sinusitis, unspecified (2018), Acute nasopharyngitis (common cold) (2016), Adenocarcinoma of lung (Multi), Age-related osteoporosis without current pathological fracture (2015), Complete or unspecified spontaneous  without complication, Dyspnea, unspecified (2019), Encounter for general adult medical examination without abnormal findings (2018), Encounter for health counseling related to travel (2018), Encounter for screening for  lipoid disorders (08/05/2019), Epigastric pain (08/26/2020), GERD (gastroesophageal reflux disease), Influenza (12/06/2024), Irritable bowel syndrome, Left patella fracture (09/05/2023), Mastodynia (05/12/2021), Otalgia, right ear (03/07/2020), Other general symptoms and signs (01/18/2018), Other microscopic hematuria (09/19/2017), Pain in right hand (03/04/2019), Pain in right knee (08/05/2019), Pain in unspecified shoulder, Personal history of (healed) traumatic fracture (10/30/2020), Personal history of other diseases of the digestive system (08/16/2018), Personal history of other diseases of the musculoskeletal system and connective tissue (12/27/2016), Personal history of other diseases of the musculoskeletal system and connective tissue (10/19/2020), Personal history of other diseases of the nervous system and sense organs (08/05/2019), Personal history of other diseases of the respiratory system (05/13/2022), Personal history of other diseases of the respiratory system (10/30/2020), Personal history of other diseases of the respiratory system (04/19/2017), Personal history of other diseases of the respiratory system (03/07/2020), Personal history of other infectious and parasitic diseases, Personal history of other medical treatment (11/02/2018), Personal history of other specified conditions, Personal history of other specified conditions (03/24/2020), Personal history of other specified conditions (03/02/2018), Personal history of other specified conditions (09/19/2017), Personal history of other specified conditions (08/26/2020), Personal history of other specified conditions (03/03/2020), Personal history of other specified conditions (01/11/2019), Personal history of urinary (tract) infections (11/07/2016), Personal history of urinary (tract) infections (07/02/2018), Pregnancy with abortive outcome (12/06/2024), Pulmonary nodules, Seizure disorder (Multi), Strain of muscle(s) and tendon(s) of peroneal  muscle group at lower leg level, left leg, initial encounter (11/05/2020), Unspecified fracture of left toe(s), initial encounter for closed fracture (10/30/2020), Unspecified injury of left wrist, hand and finger(s), initial encounter (01/05/2021), Unspecified injury of unspecified foot, initial encounter, Urinary tract infection, Urinary tract infection, site not specified (07/02/2018), and Urinary tract infection, site not specified (11/07/2016).    Surgical History  She has a past surgical history that includes Appendectomy (1979); Laparoscopy diagnostic / biopsy / aspiration / lysis (12/18/2014); US guided soft tissue biopsy (12/18/2023); Breast cyst aspiration (1993); Pelvic laparoscopy; and Lung lobectomy (Right, 08/2024).     Social History  She reports that she quit smoking about 34 years ago. Her smoking use included cigarettes. She started smoking about 49 years ago. She has a 15 pack-year smoking history. She has been exposed to tobacco smoke. She has never used smokeless tobacco. She reports current alcohol use of about 1.0 standard drink of alcohol per week. She reports that she does not use drugs.    Family History  Family History[1]     Allergies  Phenytoin and Codeine       Physical Exam  Constitutional:       General: She is not in acute distress.     Appearance: She is not ill-appearing.   HENT:      Right Ear: Tympanic membrane and ear canal normal.      Left Ear: Tympanic membrane and ear canal normal.      Mouth/Throat:      Mouth: Mucous membranes are moist.      Pharynx: Oropharynx is clear. No oropharyngeal exudate or posterior oropharyngeal erythema.   Eyes:      Extraocular Movements: Extraocular movements intact.      Conjunctiva/sclera: Conjunctivae normal.      Pupils: Pupils are equal, round, and reactive to light.   Neck:      Vascular: No carotid bruit.   Cardiovascular:      Rate and Rhythm: Normal rate and regular rhythm.      Heart sounds: No murmur heard.     No gallop.    Pulmonary:      Effort: Pulmonary effort is normal.      Breath sounds: Normal breath sounds. No wheezing, rhonchi or rales.   Abdominal:      General: Abdomen is flat. Bowel sounds are normal.      Palpations: Abdomen is soft.      Tenderness: There is no abdominal tenderness.   Musculoskeletal:      Cervical back: Neck supple.      Left lower leg: No edema.   Skin:     General: Skin is warm and dry.      Findings: No rash.   Neurological:      General: No focal deficit present.      Mental Status: She is alert and oriented to person, place, and time.      Motor: Weakness (right upper extremity 4/5) present.      Gait: Gait normal.   Psychiatric:         Mood and Affect: Mood normal.         Behavior: Behavior normal.          Last Recorded Vitals  /60   Pulse 67   Wt 59.5 kg (131 lb 3.2 oz)   SpO2 99%     Relevant Results  Current Medications[2]       Assessment/Plan   Diagnoses and all orders for this visit:  Malignant neoplasm of lower lobe of right lung (Multi)  -     Lipid Panel; Future  Adenocarcinoma, lung, unspecified laterality (Multi)  -     Lipid Panel; Future      Patient presents to the office today for establishment of care.  History of a patent PFO of which she is working with cardiology for possible closure.  Pending heart monitor for 30 days     Seizure disorder  Current medication of carbamazepine 100 mg twice daily, phenobarbital 64.8 mg twice daily  Last seizure was over 10 years ago  Continue current medications     History of right lower lobe adenocarcinoma of the lung  Status post right lower lobe lobectomy on 8/24/2024  Pathology: Multifocal mixed invasive mucinous and non-mucinous adenocarcinoma, no lymph node involvement  Status postchemotherapy had a complication of a CVA, 12/2024 due to new ischemic stroke involving the left pre-central gyrus after 1 cycle of Azezolizumab   Due to hypercoagulability of carcinoma patient is currently on Lovenox   Pt reports weight loss and  fatigue since chemotherapy  Constipation- improved with sennacot   vEEG was wnl     Health Maintenance   PAP: 5/12/21  -/-  Mammogram 8/16/24  Dexa/Scan 1/25 osteopenia,  improved from 11/19/2013 osteoporosis   Hx of fibular head fracture in February 2022, s/p bisphosphonate therapy (reclast)  currently under drug holiday   Colonoscopy:  10 year program: 7/23/2018  Lipid panel: ordered       Estelle Anthony DO         [1]   Family History  Problem Relation Name Age of Onset    Dementia Mother Sofia     Hypertension Mother Sofia     Diabetes type II Mother Sofia     Diabetes Mother Sofia     Coronary artery disease Father Ozzy     Other (Congestive heart failure) Father Ozzy     Heart disease Father Ozzy     Breast cancer Sister Linda Guzman     Cancer Sister Linda Guzman     Breast cancer Sister Analilia     Cancer Sister Analiila     Breast cancer Sister Linda    [2]   Current Outpatient Medications:     ascorbic acid (Vitamin C) 1,000 mg tablet, Take 1 tablet (1,000 mg) by mouth once daily., Disp: , Rfl:     atorvastatin (Lipitor) 40 mg tablet, Take 1 tablet (40 mg) by mouth once daily at bedtime., Disp: 90 tablet, Rfl: 3    calcium carbonate (CALCIUM 600 ORAL), Take 1 tablet by mouth 2 times a day., Disp: , Rfl:     carBAMazepine XR (TEGretol XR) 100 mg 12 hr tablet, Take 1 tablet (100 mg) by mouth 2 times a day., Disp: 180 tablet, Rfl: 3    cholecalciferol (Vitamin D-3) 50 MCG (2000 UT) tablet, Take 1 tablet (50 mcg) by mouth once daily., Disp: , Rfl:     cyanocobalamin (Vitamin B-12) 100 mcg tablet, Take 1 tablet (100 mcg) by mouth once daily., Disp: , Rfl:     enoxaparin (Lovenox) 60 mg/0.6 mL syringe, Inject 0.6 mL (60 mg) under the skin every 12 hours., Disp: 60 each, Rfl: 5    estradiol (Estrace) 0.01 % (0.1 mg/gram) vaginal cream, Insert 0.25 Applicatorful (1 g) into the vagina 2 times a week., Disp: 42.5 g, Rfl: 3    PHENobarbital (Luminal) 64.8 mg tablet, TAKE 1 TABLET BY MOUTH EVERY MORNING AND TAKE 2  TABLETS BY MOUTH EVERY EVENING, Disp: 270 tablet, Rfl: 1    Tegretol  mg 12 hr tablet, Take 1 tablet (400 mg) by mouth 2 times a day. Do not crush, chew, or split., Disp: 180 tablet, Rfl: 3

## 2025-05-16 ENCOUNTER — PHARMACY VISIT (OUTPATIENT)
Dept: PHARMACY | Facility: CLINIC | Age: 65
End: 2025-05-16
Payer: COMMERCIAL

## 2025-05-16 DIAGNOSIS — S00.06XA TICK BITE OF SCALP, INITIAL ENCOUNTER: Primary | ICD-10-CM
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
## 2025-05-16 PROCEDURE — RXMED WILLOW AMBULATORY MEDICATION CHARGE

## 2025-05-16 RX ORDER — DOXYCYCLINE 100 MG/1
200 CAPSULE ORAL ONCE
Qty: 2 CAPSULE | Refills: 0 | Status: SHIPPED | OUTPATIENT
Start: 2025-05-16 | End: 2025-05-17

## 2025-05-21 ENCOUNTER — INFUSION (OUTPATIENT)
Dept: HEMATOLOGY/ONCOLOGY | Facility: CLINIC | Age: 65
End: 2025-05-21
Payer: COMMERCIAL

## 2025-05-21 DIAGNOSIS — C34.31 MALIGNANT NEOPLASM OF LOWER LOBE OF RIGHT LUNG (MULTI): ICD-10-CM

## 2025-05-21 DIAGNOSIS — M81.0 AGE RELATED OSTEOPOROSIS, UNSPECIFIED PATHOLOGICAL FRACTURE PRESENCE: ICD-10-CM

## 2025-05-21 PROCEDURE — 96523 IRRIG DRUG DELIVERY DEVICE: CPT

## 2025-05-21 PROCEDURE — 2500000004 HC RX 250 GENERAL PHARMACY W/ HCPCS (ALT 636 FOR OP/ED): Mod: JZ | Performed by: INTERNAL MEDICINE

## 2025-05-21 RX ORDER — HEPARIN SODIUM,PORCINE/PF 10 UNIT/ML
50 SYRINGE (ML) INTRAVENOUS AS NEEDED
OUTPATIENT
Start: 2025-05-21

## 2025-05-21 RX ORDER — HEPARIN 100 UNIT/ML
500 SYRINGE INTRAVENOUS AS NEEDED
OUTPATIENT
Start: 2025-05-21

## 2025-05-21 RX ORDER — HEPARIN 100 UNIT/ML
500 SYRINGE INTRAVENOUS AS NEEDED
Status: DISCONTINUED | OUTPATIENT
Start: 2025-05-21 | End: 2025-05-21 | Stop reason: HOSPADM

## 2025-05-21 RX ORDER — HEPARIN SODIUM,PORCINE/PF 10 UNIT/ML
50 SYRINGE (ML) INTRAVENOUS AS NEEDED
Status: DISCONTINUED | OUTPATIENT
Start: 2025-05-21 | End: 2025-05-21 | Stop reason: HOSPADM

## 2025-05-21 RX ADMIN — HEPARIN 500 UNITS: 100 SYRINGE at 08:21

## 2025-06-02 LAB — BODY SURFACE AREA: 1.64 M2

## 2025-06-03 PROCEDURE — RXMED WILLOW AMBULATORY MEDICATION CHARGE

## 2025-06-04 ENCOUNTER — PHARMACY VISIT (OUTPATIENT)
Dept: PHARMACY | Facility: CLINIC | Age: 65
End: 2025-06-04
Payer: COMMERCIAL

## 2025-06-09 ENCOUNTER — APPOINTMENT (OUTPATIENT)
Dept: PRIMARY CARE | Facility: CLINIC | Age: 65
End: 2025-06-09
Payer: COMMERCIAL

## 2025-06-18 ENCOUNTER — APPOINTMENT (OUTPATIENT)
Dept: CARDIOLOGY | Facility: CLINIC | Age: 65
End: 2025-06-18
Payer: COMMERCIAL

## 2025-06-24 DIAGNOSIS — G40.209 COMPLEX PARTIAL SEIZURE EVOLVING TO GENERALIZED SEIZURE (MULTI): ICD-10-CM

## 2025-06-24 PROCEDURE — RXMED WILLOW AMBULATORY MEDICATION CHARGE

## 2025-06-24 RX ORDER — PHENOBARBITAL 64.8 MG/1
TABLET ORAL 2 TIMES DAILY
Qty: 270 TABLET | Refills: 1 | Status: SHIPPED | OUTPATIENT
Start: 2025-06-24 | End: 2025-12-21

## 2025-06-25 ENCOUNTER — TELEMEDICINE (OUTPATIENT)
Dept: CARDIOLOGY | Facility: CLINIC | Age: 65
End: 2025-06-25
Payer: COMMERCIAL

## 2025-06-25 ENCOUNTER — PHARMACY VISIT (OUTPATIENT)
Dept: PHARMACY | Facility: CLINIC | Age: 65
End: 2025-06-25
Payer: COMMERCIAL

## 2025-06-25 DIAGNOSIS — Q21.12 PFO (PATENT FORAMEN OVALE) (HHS-HCC): Primary | ICD-10-CM

## 2025-06-25 PROCEDURE — 1036F TOBACCO NON-USER: CPT | Performed by: INTERNAL MEDICINE

## 2025-06-25 PROCEDURE — 99213 OFFICE O/P EST LOW 20 MIN: CPT | Performed by: INTERNAL MEDICINE

## 2025-06-25 NOTE — PROGRESS NOTES
"  Virtual visit : Total time spent 61 minutes including chart preparation and face to face encounter.    PCP: Dr. Lim  Cardio: Dr. Huber     I was asked by Dr. Huber to evaluate this patient in consultation for consideration of PFO closure.     From chart review:  \"Rhoda Pena is a 64 y.o., female, with PMH of former smoker (started at age 14, 1 pack per day, quitted at age 30) with a past medical history of osteoporosis, hx of seizure on Carbamazepine (has not had any seizure for 1 decade) newly diagnosed RLL lung adenocarcinoma s/p RLL lobectomy. She developed acute stroke after immunotherapy.  Her workup was notable for a moderate-sized PFO.  She is thus referred to evaluate for possible PFO closure.     S/p RLL lobectomy on 8/24/2024. She was started on adjuvant chemotherapy carboplatin/paclitaxel, followed by Atezolizumab. However developed acute stroke the day after C1 atezolizumab. Atezolizumab has been held since.\"      She is currently on enoxaparin.    Patient's work-up as summarized below:   EKG: Normal sinus rhythm    TTE:    1. Left ventricular ejection fraction is normal, calculated by Durbin's biplane at 62%.   2. There is normal right ventricular global systolic function.   3. Right ventricular systolic pressure is within normal limits.   4. A bubble study using agitated saline was performed. Bubble study is positive. A moderate PFO (11-20 bubbles) was demonstrated.  MARLENE: Pending  Venous US:  Right Lower Venous: No evidence of acute deep vein thrombus visualized in the right lower extremity. Additional Findings; Lymph nodes noted in groin.  Left Lower Venous: No evidence of acute deep vein thrombus visualized in the left lower extremity.  Hypercoagulation work : Pending ( on blood thinners)  30 day Heart monitor: no evident for atrial fibrillation.  US carotid/CTA neck:  No evidence for significant stenosis or large branch vessel  cutoffs of the intracranial vessels. No evidence " for significant stenosis of the cervical vessels    ROS:     Constitutional: not feeling tired, not feeling poorly, no fever and no chills.   Eyes: no eyesight problems, no blurred vision, no diplopia, no eye pain, no purulent discharge from the eyes, eyes not red, no dryness of the eyes and no itching of the eyes.   ENT: no nosebleeds, no hearing loss, no tinnitus, no earache, no sore throat, no hoarseness, no swollen glands in the neck and no nasal discharge.   Cardiovascular: no intermittent leg claudication, no chest pain, no tightness or heavy pressure, no shortness of breath, no palpitations, no lower extremity edema, the heart rate was not slow, the heart rate was not fast and as noted in HPI.   Respiratory: no chronic cough, not coughing up sputum,  no wheezing that is consistent with asthma, no asthma, no orthopnea and no postural nocturnal dyspnea.   Gastrointestinal: no change in bowel habits, no blood in stools, no diarrhea, no constipation, no nausea, no vomiting, no abdominal pain, no signs and symptoms of ulcer disease, no pete colored stools and no intolerance to fatty foods.   Genitourinary: no hematuria,  no urinary frequency, no dysuria, no incontinence, no burning sensation during urination, no urinary hesitancy, no nocturia, no genital lesion, no testicular pain, urinary stream is not smaller and urinary stream does not start and stop.   Musculoskeletal: no arthralgias, no myalgias, no joint swelling, no joint stiffness, no muscle weakness, no back pain, no limb pain, no limb swelling and no difficulty walking.   Skin: no skin rashes, no change in skin color and pigmentation, no skin lesions and no skin lumps.   Neurological: no seizures, no frequent falls, no headaches, no dizziness, no tingling, no fainting and no limb weakness.   Psychiatric: no depression, not suicidal, no confusion, no memory lapses or loss, no anxiety, no personality change and no emotional problems.   Endocrine: no  goiter, no thyroid disorder, no diabetes mellitus, no excessive thirst, no dry skin, no cold intolerance, no heat intolerance, no erectile dysfunction, no increased urinary frequency, no proptosis and no deepening of the voice.   Hematologic/Lymphatic: no bleeding issues.   All other systems have been reviewed and are negative for complaint.     Physical Exam: virtual encounter    Labs:  Results for orders placed or performed during the hospital encounter of 04/30/25   Holter Or Event Cardiac Monitor    Collection Time: 04/30/25 10:17 AM   Result Value Ref Range    BSA 1.64 m2     *Note: Due to a large number of results and/or encounters for the requested time period, some results have not been displayed. A complete set of results can be found in Results Review.          Meds:    Current Outpatient Medications   Medication Instructions    ascorbic acid (Vitamin C) 1,000 mg tablet Take 1 tablet (1,000 mg) by mouth once daily.    atorvastatin (LIPITOR) 40 mg, oral, Nightly    calcium carbonate (CALCIUM 600 ORAL) 1 tablet, 2 times daily    carBAMazepine XR (TEGRETOL XR) 100 mg, oral, 2 times daily    cholecalciferol (VITAMIN D-3) 50 mcg, Daily    cyanocobalamin (VITAMIN B-12) 100 mcg, Daily    enoxaparin (LOVENOX) 1 mg/kg, subcutaneous, Every 12 hours    estradiol (Estrace) 0.01 % (0.1 mg/gram) vaginal cream Insert 0.25 Applicatorful (1 g) into the vagina 2 times a week.    PHENobarbital (Luminal) 64.8 mg tablet TAKE 1 TABLET BY MOUTH EVERY MORNING AND TAKE 2 TABLETS BY MOUTH EVERY EVENING    Tegretol  mg, oral, 2 times daily, Do not crush, chew, or split.          Assessment/Plan:     Rhoda Pena is a 64 y.o., female, with hx of seizure on Carbamazepine referred for management of newly diagnosed with embolic Left middle cerebral artery infarction suspect due to cancer/ immunotherapy related hypercoaguability with possible paradoxical embolization via Patent foramen ovale.  Patient has been on enoxaparin since  the event.     ROPE Score 5    She will be seen by her oncologist in July/ 2025 to address the treatment and remission of the lung cancer.     As we discussed with the patient, if she is intended to stay on Lovenox long-term, there would be questionable benefit of PFO closure.  There is little data to support PFO closure on top of anticoagulant therapy.  If on the other hand, she is intended to stop Lovenox, then given the size of her PFO and her CVA, closure would be reasonable. She had stopped immunotherapy since last meeting.     Given the size of her PFO and the low risk of this procedure, is she is candidate to stop OAC, then I would offer PFO closure. If she is to remain on Lovenox, then I would not. Patient remains uncertain. I will discuss with the stroke neurologist and her oncologist possible options of continuing OAC with no PFO closure vs. stopping anticoagulation and PFO closure.     I saw and evaluated the patient. I personally obtained the key and critical portions of the history and physical exam or was physically present for key and critical portions performed by the resident/fellow. I reviewed the resident/fellow's documentation and discussed the patient with the resident/fellow. I agree with the resident/fellow's medical decision making as documented in the note.    MD Raul Tompkins MD  , Interventional Cardiology Fellowship Program   [facility]   [facility] [employer]  Office Phone Number: [phone]

## 2025-06-26 ENCOUNTER — TELEPHONE (OUTPATIENT)
Dept: CARDIOLOGY | Facility: HOSPITAL | Age: 65
End: 2025-06-26
Payer: COMMERCIAL

## 2025-06-26 NOTE — TELEPHONE ENCOUNTER
RN called and left voicemail for pt indicating that the group consensus after the multidisciplinary team discussion regarding Lovenox and PFO closure was for pt to remain on Lovenox. Pt encouraged to call with any additional questions.

## 2025-06-27 ENCOUNTER — APPOINTMENT (OUTPATIENT)
Dept: NEUROLOGY | Facility: HOSPITAL | Age: 65
End: 2025-06-27
Payer: COMMERCIAL

## 2025-06-29 LAB
CHOLEST SERPL-MCNC: 173 MG/DL
CHOLEST/HDLC SERPL: 1.8 (CALC)
HDLC SERPL-MCNC: 94 MG/DL
LDLC SERPL CALC-MCNC: 66 MG/DL (CALC)
NONHDLC SERPL-MCNC: 79 MG/DL (CALC)
TRIGL SERPL-MCNC: 57 MG/DL

## 2025-07-07 ENCOUNTER — HOSPITAL ENCOUNTER (OUTPATIENT)
Dept: RADIOLOGY | Facility: HOSPITAL | Age: 65
Discharge: HOME | End: 2025-07-07
Payer: COMMERCIAL

## 2025-07-07 DIAGNOSIS — Z98.890 HISTORY OF LUNG SURGERY: ICD-10-CM

## 2025-07-07 PROCEDURE — 71250 CT THORAX DX C-: CPT | Performed by: RADIOLOGY

## 2025-07-07 PROCEDURE — 71250 CT THORAX DX C-: CPT

## 2025-07-10 ENCOUNTER — APPOINTMENT (OUTPATIENT)
Dept: HEMATOLOGY/ONCOLOGY | Facility: CLINIC | Age: 65
End: 2025-07-10
Payer: COMMERCIAL

## 2025-07-10 ENCOUNTER — OFFICE VISIT (OUTPATIENT)
Dept: HEMATOLOGY/ONCOLOGY | Facility: CLINIC | Age: 65
End: 2025-07-10
Payer: COMMERCIAL

## 2025-07-10 ENCOUNTER — INFUSION (OUTPATIENT)
Dept: HEMATOLOGY/ONCOLOGY | Facility: CLINIC | Age: 65
End: 2025-07-10
Payer: COMMERCIAL

## 2025-07-10 VITALS
TEMPERATURE: 96.6 F | RESPIRATION RATE: 18 BRPM | SYSTOLIC BLOOD PRESSURE: 120 MMHG | WEIGHT: 132.83 LBS | HEART RATE: 57 BPM | OXYGEN SATURATION: 99 % | BODY MASS INDEX: 23.53 KG/M2 | DIASTOLIC BLOOD PRESSURE: 71 MMHG

## 2025-07-10 DIAGNOSIS — M81.0 AGE RELATED OSTEOPOROSIS, UNSPECIFIED PATHOLOGICAL FRACTURE PRESENCE: ICD-10-CM

## 2025-07-10 DIAGNOSIS — C34.31 MALIGNANT NEOPLASM OF LOWER LOBE OF RIGHT LUNG (MULTI): ICD-10-CM

## 2025-07-10 DIAGNOSIS — C34.90 ADENOCARCINOMA, LUNG, UNSPECIFIED LATERALITY (MULTI): ICD-10-CM

## 2025-07-10 DIAGNOSIS — C34.31 MALIGNANT NEOPLASM OF LOWER LOBE OF RIGHT LUNG (MULTI): Primary | ICD-10-CM

## 2025-07-10 LAB
ALBUMIN SERPL BCP-MCNC: 4.3 G/DL (ref 3.4–5)
ALP SERPL-CCNC: 42 U/L (ref 33–136)
ALT SERPL W P-5'-P-CCNC: 17 U/L (ref 7–45)
ANION GAP SERPL CALC-SCNC: 12 MMOL/L (ref 10–20)
AST SERPL W P-5'-P-CCNC: 15 U/L (ref 9–39)
BASOPHILS # BLD AUTO: 0.02 X10*3/UL (ref 0–0.1)
BASOPHILS NFR BLD AUTO: 0.6 %
BILIRUB SERPL-MCNC: 0.4 MG/DL (ref 0–1.2)
BUN SERPL-MCNC: 11 MG/DL (ref 6–23)
CALCIUM SERPL-MCNC: 9.2 MG/DL (ref 8.6–10.3)
CHLORIDE SERPL-SCNC: 99 MMOL/L (ref 98–107)
CO2 SERPL-SCNC: 28 MMOL/L (ref 21–32)
CREAT SERPL-MCNC: 0.54 MG/DL (ref 0.5–1.05)
EGFRCR SERPLBLD CKD-EPI 2021: >90 ML/MIN/1.73M*2
EOSINOPHIL # BLD AUTO: 0.02 X10*3/UL (ref 0–0.7)
EOSINOPHIL NFR BLD AUTO: 0.6 %
ERYTHROCYTE [DISTWIDTH] IN BLOOD BY AUTOMATED COUNT: 12.6 % (ref 11.5–14.5)
GLUCOSE SERPL-MCNC: 89 MG/DL (ref 74–99)
HCT VFR BLD AUTO: 34.9 % (ref 36–46)
HGB BLD-MCNC: 11.8 G/DL (ref 12–16)
IMM GRANULOCYTES # BLD AUTO: 0.01 X10*3/UL (ref 0–0.7)
IMM GRANULOCYTES NFR BLD AUTO: 0.3 % (ref 0–0.9)
LYMPHOCYTES # BLD AUTO: 1.16 X10*3/UL (ref 1.2–4.8)
LYMPHOCYTES NFR BLD AUTO: 33.4 %
MCH RBC QN AUTO: 33.1 PG (ref 26–34)
MCHC RBC AUTO-ENTMCNC: 33.8 G/DL (ref 32–36)
MCV RBC AUTO: 98 FL (ref 80–100)
MONOCYTES # BLD AUTO: 0.35 X10*3/UL (ref 0.1–1)
MONOCYTES NFR BLD AUTO: 10.1 %
NEUTROPHILS # BLD AUTO: 1.91 X10*3/UL (ref 1.2–7.7)
NEUTROPHILS NFR BLD AUTO: 55 %
NRBC BLD-RTO: 0 /100 WBCS (ref 0–0)
PLATELET # BLD AUTO: 215 X10*3/UL (ref 150–450)
POTASSIUM SERPL-SCNC: 4 MMOL/L (ref 3.5–5.3)
PROT SERPL-MCNC: 6.4 G/DL (ref 6.4–8.2)
RBC # BLD AUTO: 3.57 X10*6/UL (ref 4–5.2)
SODIUM SERPL-SCNC: 135 MMOL/L (ref 136–145)
WBC # BLD AUTO: 3.5 X10*3/UL (ref 4.4–11.3)

## 2025-07-10 PROCEDURE — 1126F AMNT PAIN NOTED NONE PRSNT: CPT | Performed by: NURSE PRACTITIONER

## 2025-07-10 PROCEDURE — 99214 OFFICE O/P EST MOD 30 MIN: CPT | Performed by: NURSE PRACTITIONER

## 2025-07-10 PROCEDURE — 85025 COMPLETE CBC W/AUTO DIFF WBC: CPT

## 2025-07-10 PROCEDURE — 1159F MED LIST DOCD IN RCRD: CPT | Performed by: NURSE PRACTITIONER

## 2025-07-10 PROCEDURE — 80053 COMPREHEN METABOLIC PANEL: CPT

## 2025-07-10 PROCEDURE — 2500000004 HC RX 250 GENERAL PHARMACY W/ HCPCS (ALT 636 FOR OP/ED): Performed by: INTERNAL MEDICINE

## 2025-07-10 PROCEDURE — 36591 DRAW BLOOD OFF VENOUS DEVICE: CPT

## 2025-07-10 RX ORDER — HEPARIN 100 UNIT/ML
500 SYRINGE INTRAVENOUS AS NEEDED
OUTPATIENT
Start: 2025-07-10

## 2025-07-10 RX ORDER — HEPARIN 100 UNIT/ML
500 SYRINGE INTRAVENOUS AS NEEDED
Status: DISCONTINUED | OUTPATIENT
Start: 2025-07-10 | End: 2025-07-10 | Stop reason: HOSPADM

## 2025-07-10 RX ORDER — HEPARIN SODIUM,PORCINE/PF 10 UNIT/ML
50 SYRINGE (ML) INTRAVENOUS AS NEEDED
OUTPATIENT
Start: 2025-07-10

## 2025-07-10 RX ADMIN — HEPARIN 500 UNITS: 100 SYRINGE at 09:32

## 2025-07-10 ASSESSMENT — PAIN SCALES - GENERAL: PAINLEVEL_OUTOF10: 0-NO PAIN

## 2025-07-10 NOTE — PROGRESS NOTES
Subjective:   Patient Name: Rhoda Pena    : 1960     MRN: [id number]     Age: 64 y.o.     Gender: female  Referring Provider: Dr. Santosh Falk (Thoracic Surgery)    CC: Management of Newly diagnosed stage IIB ([id number]) adenocarcinoma of lung, with multiple nodules within the RLL, PD-L1 5%. In-house NGS showed KRAS p.G12D ([id number] c.35G>A); TP53 p.H627Yph*6 ([id number] c.626_627delGA); TP53 p.R306* ([id number] c.916C>T). MS status: cannot be determined. S/p RLL lobectomy on 2024. Surgical pathology showed tumor 4.5cm with very close margin. All LN (0/5) negative.    Presenting History (2024): At time of initial presentation a 64 y.o. female, former smoker (started at age 14, 1 pack per day, quitted at age 30) with a past medical history of osteoporosis, hx of seizure on Carbamazepine (has not had any seizure for 1 decade) referred for management of newly diagnosed RLL lung adenocarcinoma s/p RLL lobectomy.     She was firstly found to have some abnormalities in the RLL on the CT cardiac scoring on 2023. Subsequent follow up CT Chest (-) 2024 showed Stable area of mixed consolidation and ground-glass opacity in the medial RLL. Additional 0.7 cm solid nodule in the left lower lobe. This is indeterminate and further attention on follow-up imaging is recommended.    CT chest (-) on 2024 imilar appearance of a focal right lower lobe ground-glass and consolidative opacity with associated bronchiectasis. A slow growing neoplasm can not be excluded. Increased conspicuity of a 5 mm right lower lobe nodular ground-glass opacity and stable appearance of additional bilateral ground-glass nodular opacities measuring up to 8 mm in the left lower lobe, which may be followed on repeat imaging. Similar diffuse bilateral faint subpleural reticulations, which may represent chronic interstitial changes related to smoking, given patient's history.    2024: PET/CT showed 1. Minimal FDG avid  paravertebral consolidative/ground-glass opacity in the right lower lobe, favored to represent infectious/inflammatory process or atelectasis.  6 mm and 8 mm pulmonary nodules in the right and left lower lobes respectively are non FDG avid. Follow-up with dedicated CT of chest is recommended. 2. No other concerning FDG avid disease identified.    8/8/2024: RLL bronchoscpy with EBUS: RLL FNA showed Malignant cells derived from adenocarcinoma consistent with mucinous carcinoma.  LN 4R, 7 were negative.     She underwent RLL lobectomy and mediastinal lymphadenectomy with Dr. Falk on 8/29/2024. Pathology showing  Multiple separate tumor nodules are identified with similar morphology.  The tumor demonstrates a heterogenous mixture of invasive mucinous adenocarcinoma and approximately 10-15% demonstrates higher grade nuclear features and less intracytoplasmic mucin. By immunohistochemical staining, the tumor cells are strongly positive for CK7, focally positive for CDX2 and negative for TTF-1. While immunohistochemical staining is not entirely specific, then the findings are compatible with adenocarcinoma of pulmonary origin if other primary sites of origin have been excluded clinically. involving lung parenchyma, largest nodule measuring 4.5cm, all margins negative, adenocarcinoma is 1mm to nearest parenchymal resection margin. 0/5 LN negative (4R, 10R, 11R, 12R and 7). No VPI nor LVI. PD-L1 5%. In-house NGS showed KRAS p.G12D ([id number] c.35G>A); TP53 p.A070Csb*6 ([id number] c.626_627delGA); TP53 p.R306* ([id number] c.916C>T). MS status: cannot be determined.    She presents for a consultation accompanied by her sister. She continues to have pain/discomfort from the surgical site but able to manage it at home. Otherwise denies any N/V. No fever or chills. No CP or SOB. Reports R ear pain.    Shx: no other drugs or EtOH. Works as [employer]. Lives with . She has 2 siblings.   Fhx: both sisters have breast cancer  (at age of 60, the other sister at 27). Mother side - uncle has unknown cancer.   Surgical history: appendectomy in 1979; lap hysterectomy 1990. L Wrist surgery in 2021.     Treatment Summary:  - 08/29/2024: Robot-assisted RLL lobectomy and mediastinal lymphadenectomy (Dr. Falk)  -10/03/2024:  C1 Cisplatin 60mg/m2 and Pemetrexed 450mg/m2 IV  -10/24/2024:  C2 Cisplatin 60mg/m2 and Pemetrexed 450mg/m2 IV  -11/14/2024:  C3 Cisplatin 60mg/m2 and Pemetrexed 450mg/m2 IV  -12/05/2024:  C4 Cisplatin 60mg/m2 and Pemetrexed 450mg/m2 IV  -12/26/2024:  C1 Atezolizumab 1200mg IV (discontinued after C2 due to stroke), on surveillance since    Interval History (4/3/2025):    Patient presents for a follow up visit. She was admitted at end of December due to a stroke. MRI brain 12/28/2024 showed  The superior posterior right frontal lobe cortex anterior to the precentral gyrus has an ill-defined area of FLAIR hyperintensity with diffusion restriction which may represent an area of acute ischemia, focus of seizure activity not excluded. EEG performed inpatient which was negative for seizure. TTE 12/30/2024 showed a moderate PFO. Doppler of LE was negative for DVT. She has been on surveillance since. Overall feeling well today. No CP or SOB. She is working full time right now. Eating and voiding well.     7/10/2025  Patient presents for routine follow-up evaluation. She has recovered from stroke without significant side effect. She continues to work full time. As per neurology she has continued on Lovenox BID. She has met with cardiology for consideration of PFO closure. It is her understanding this will not change her risk of another CVA. She is struggling to decide if she should undergo surgery. She does not feel like her medical teams are giving her adequate guidance. We (Dr. Lim and myself) discussed with her today that from a cancer standpoint she is disease free at this time. Thus hypercoagulability due to active disease is  not a factor. If the stroke was caused by immunotherapy, she is longer on immunotherapy with no plans to resume. Her history of cancer should not be factored into this decisions.      Review of Systems:  A review of systems has been completed and are negative for complaints except what is stated in the HPI and/or past medical history      Medical History:   Past Medical History:   Diagnosis Date    Achilles tendinitis, unspecified leg     Achilles tendinitis    Acute maxillary sinusitis, unspecified 2018    Acute maxillary sinusitis    Acute nasopharyngitis (common cold) 2016    Common cold    Adenocarcinoma of lung (Multi)     Age-related osteoporosis without current pathological fracture 2015    Encounter for ongoing osteoporosis therapy, bisphosphonates    Complete or unspecified spontaneous  without complication         Dyspnea, unspecified 2019    Paroxysmal dyspnea    Encounter for general adult medical examination without abnormal findings 2018    Annual physical exam    Encounter for health counseling related to travel 2018    Counseling about travel    Encounter for screening for lipoid disorders 2019    Screening cholesterol level    Epigastric pain 2020    Dyspepsia    GERD (gastroesophageal reflux disease)     Irritable bowel syndrome     Left patella fracture 2023    Mastodynia 2021    Breast pain, right    Otalgia, right ear 2020    Otalgia, right    Other general symptoms and signs 2018    Flu-like symptoms    Other microscopic hematuria 2017    Hematuria, microscopic    Pain in right hand 2019    Pain of right hand    Pain in right knee 2019    Knee pain, right    Pain in unspecified shoulder     Shoulder pain    Personal history of (healed) traumatic fracture 10/30/2020    History of fracture of phalanx of toe    Personal history of other diseases of the digestive system 2018    History of  gastroenteritis    Personal history of other diseases of the musculoskeletal system and connective tissue 12/27/2016    History of low back pain    Personal history of other diseases of the musculoskeletal system and connective tissue 10/19/2020    History of osteopenia    Personal history of other diseases of the nervous system and sense organs 08/05/2019    Personal history of seizure disorder    Personal history of other diseases of the respiratory system 05/13/2022    History of acute sinusitis    Personal history of other diseases of the respiratory system 10/30/2020    History of chronic rhinitis    Personal history of other diseases of the respiratory system 04/19/2017    History of influenza    Personal history of other diseases of the respiratory system 03/07/2020    History of viral pharyngitis    Personal history of other infectious and parasitic diseases     History of varicella    Personal history of other medical treatment 11/02/2018    History of screening mammography    Personal history of other specified conditions     History of headache    Personal history of other specified conditions 03/24/2020    History of abnormal mammogram    Personal history of other specified conditions 03/02/2018    History of fatigue    Personal history of other specified conditions 09/19/2017    History of dysuria    Personal history of other specified conditions 08/26/2020    History of diarrhea    Personal history of other specified conditions 03/03/2020    History of abdominal pain    Personal history of other specified conditions 01/11/2019    History of chest pain    Personal history of urinary (tract) infections 11/07/2016    History of urinary tract infection    Personal history of urinary (tract) infections 07/02/2018    History of urinary tract infection    Pulmonary nodules     Bilateral    Seizure disorder (Multi)     Strain of muscle(s) and tendon(s) of peroneal muscle group at lower leg level, left leg,  initial encounter 11/05/2020    Strain of peroneal tendon of left foot, initial encounter    Unspecified fracture of left toe(s), initial encounter for closed fracture 10/30/2020    Fracture of proximal phalanx of toe of left foot    Unspecified injury of left wrist, hand and finger(s), initial encounter 01/05/2021    Left wrist injury    Unspecified injury of unspecified foot, initial encounter     Foot injury    Urinary tract infection, site not specified 07/02/2018    Acute UTI    Urinary tract infection, site not specified 11/07/2016    Acute UTI       Surgical History:   Past Surgical History:   Procedure Laterality Date    APPENDECTOMY  10/24/2013    Appendectomy    BREAST CYST ASPIRATION  1993    LAPAROSCOPY DIAGNOSTIC / BIOPSY / ASPIRATION / LYSIS  12/18/2014    Exploratory Laparoscopy    PELVIC LAPAROSCOPY      endometriosis    US GUIDED SOFT TISSUE BIOPSY  12/18/2023    US GUIDED SOFT TISSUE BIOPSY 12/18/2023 [facility] US       Family History:  Family History   Problem Relation Name Age of Onset    Dementia Mother      Hypertension Mother      Diabetes type II Mother      Coronary artery disease Father      Other (Congestive heart failure) Father      Breast cancer Sister Linda Guzman     Breast cancer Sister Linda     Breast cancer Sister [facility]        Allergies:  Allergies   Allergen Reactions    Phenytoin Other     double vision    Codeine Hallucinations     AUDITORY HALLUCINATIONS       Meds (Current):  Current Medications[1]        Performance Status (ECOG): 1    ECOG Definition  0 Fully active; no performance restrictions.  1 Strenuous physical activity restricted; fully ambulatory and able to carry out light work.  2 Capable of all self-care but unable to carry out any work activities. Up and about >50% of waking hours.  3 Capable of only limited self-care; confined to bed or chair >50% of waking hours.  4 Completely disabled; cannot carry out any self-care; totally confined to bed or chair.    Exam:  BP  120/71 (BP Location: Left arm, Patient Position: Sitting, BP Cuff Size: Adult)   Pulse 57   Temp 35.9 °C (96.6 °F) (Temporal)   Resp 18   Wt 60.2 kg (132 lb 13.2 oz)   SpO2 99%   BMI 23.53 kg/m²     Wt Readings from Last 5 Encounters:   07/10/25 60.2 kg (132 lb 13.2 oz)   25 59.5 kg (131 lb 3.2 oz)   25 60.2 kg (132 lb 12.8 oz)   25 57.6 kg (127 lb)   25 57.8 kg (127 lb 6.8 oz)       Physical Exam:  ECO  Constitutional: Well developed, awake/alert/oriented x3, no distress, alert and cooperative  Eyes: PER. sclera anicteric  ENMT: Oral mucosa moist, no lesions or thrush identified. Right cheek healing dry scab.   Respiratory/Thorax: Breathing is non-labored. No adventitious breath sounds. Hx RLL lobectomy. Mediport in place.  Gastrointestinal: Abdomen soft nontender, nondistended, normal active bowel sounds.  Musculoskeletal: ROM intact, no joint swelling, normal strength  Extremities: normal extremities, no cyanosis, no edema, no clubbing  Skin: no rash  Neurologic:  Nonfocal exam. No myoclonus  Psychological: Pleasant, appropriate and easily engaged     Labs:     Lab Results   Component Value Date    WBC 3.5 (L) 07/10/2025    HGB 11.8 (L) 07/10/2025    HCT 34.9 (L) 07/10/2025    MCV 98 07/10/2025     07/10/2025      Lab Results   Component Value Date    NEUTROABS 1.91 07/10/2025      Lab Results   Component Value Date    GLUCOSE 89 07/10/2025    CALCIUM 9.2 07/10/2025     (L) 07/10/2025    K 4.0 07/10/2025    CO2 28 07/10/2025    CL 99 07/10/2025    BUN 11 07/10/2025    CREATININE 0.54 07/10/2025    MG 2.07 2024     Lab Results   Component Value Date    ALT 17 07/10/2025    AST 15 07/10/2025    ALKPHOS 42 07/10/2025    BILITOT 0.4 07/10/2025      Lab Results   Component Value Date    ACTH 11.3 2024    CORTISOL 10.3 2024    TSH 1.50 2024    FREET4 0.82 2023           Imaging:  Reviewed as below. - Reviewed      Assessment/Plan:   64 y.o.  female with past medical history as stated referred for management of newly diagnosed lung cancer discussing adjuvant treatment.     The patient's records and imaging have been reviewed in detail.  MD discussed with the patient the natural history of their disease at length.  The following has also been discussed with the patient:    # Cancer:  stage IIB ([id number]) adenocarcinoma of lung, with multiple nodules within the RLL s/p RLL lobectomy, tumor measuring 4.5cm. All LN negative. PD-L1 5%. In-house NGS showed KRAS p.G12D ([id number] c.35G>A); TP53 p.S127Iqx*6 ([id number] c.626_627delGA); TP53 p.R306* (NM_000546 c.916C>T). MS status: cannot be determined. Tumor cells are strongly positive for CK7, focally positive for CDX2 and negative for TTF-1. While immunohistochemical staining is not entirely specific, then the findings are compatible with adenocarcinoma of pulmonary origin if other primary sites of origin have been excluded clinically. S/p RLL lobectomy on 8/24/2024. She was started on adjuvant chemotherapy carboplatin/paclitaxel, followed by Atezolizumab. However developed acute stroke the day after C1 atezolizumab. Atezolizumab has been held since.      - Imaging: CT chest (-) 7/8/2025: 1.  Postoperative changes status post right lower lobectomy without  evidence of disease recurrence in the surgical bed. No suspicious lung nodules seen. Continued follow-up with CT advised. 2. Chronic lung disease with subpleural reticulation and fibrosis similar to the prior.    Currently no evidence of disease. Continue with every 3 month CT chest.   As above, Dr. Lim and myself discussed with her today that from a cancer standpoint she is disease free at this time. Thus hypercoagulability due to active disease is not a factor. If the stroke was caused by immunotherapy, she is longer on immunotherapy with no plans to resume. Her history of cancer should not be factored into decisions to close PFO    # Stroke: admitted at  end of December 2024 due to a stroke. MRI brain 12/28/2024 showed  The superior posterior right frontal lobe cortex anterior to the precentral gyrus has an ill-defined area of FLAIR hyperintensity with diffusion restriction which may represent an area of acute ischemia, focus of seizure activity not excluded. EEG performed inpatient which was negative for seizure. TTE 12/30/2024 showed a moderate PFO. Doppler of LE was negative for DVT. She continues to follow up with neurology and cardiac surgeon. Per patient she is not interested in pursuing surgery to close the PFO    # Seizure disorder: firstly found when she was in her teens, and she has been on both Carbamazepine and Phenobarbital. Following with Dr. Baker who is going to retire soon. However she is hesitant to switch to keppra.    # Clinical Trials:  None currently.     # Access: Mediport placed by IR in 10/2024. Patient would like to keep it for another year.  -Request referral for removal, 7/10/25 referral placed    # Pain: Surgical related pain. Well controlled.     # Bone Health: No signs of bony disease.     # Psychosocial: Coping well, no acute issues.  Good support from family & friends.  Social work support available.    # GI/CINV: No acute issues.  Eating and voiding well.  Nutrition consult available.    # Smoking: N/A, former smoker.    # Fertility: N/A.        # Heme/ESAs:  Normocytic anemia likely 2/2 to chemo. Continue to monitor.     # GOC: Patient is aware of curative intent goals of care.    # Language: English.    # Interdisciplinary Patient/Family Education Record:  Learner:  Patient.  Learning Needs Reviewed:  Treatments, Medications, Disease Process, Diagnostic Tests.  Barriers: None.  Teaching Method: Discussion, Handout(s).  Comprehension:  Verbalized Understanding.  Follow-up Plan:  Return to office as per MD.    # Dispo:  RTC in 3 months after repeat CT chest       [1]   Current Outpatient Medications:     ascorbic acid (Vitamin C)  1,000 mg tablet, Take 1 tablet (1,000 mg) by mouth once daily., Disp: , Rfl:     atorvastatin (Lipitor) 40 mg tablet, Take 1 tablet (40 mg) by mouth once daily at bedtime., Disp: 90 tablet, Rfl: 3    carBAMazepine XR (TEGretol XR) 100 mg 12 hr tablet, Take 1 tablet (100 mg) by mouth 2 times a day., Disp: 180 tablet, Rfl: 3    cholecalciferol (Vitamin D-3) 50 MCG (2000 UT) tablet, Take 1 tablet (50 mcg) by mouth once daily., Disp: , Rfl:     cyanocobalamin (Vitamin B-12) 100 mcg tablet, Take 1 tablet (100 mcg) by mouth once daily., Disp: , Rfl:     enoxaparin (Lovenox) 60 mg/0.6 mL syringe, Inject 0.6 mL (60 mg) under the skin every 12 hours., Disp: 60 each, Rfl: 5    estradiol (Estrace) 0.01 % (0.1 mg/gram) vaginal cream, Insert 0.25 Applicatorful (1 g) into the vagina 2 times a week., Disp: 42.5 g, Rfl: 3    PHENobarbital (Luminal) 64.8 mg tablet, TAKE 1 TABLET BY MOUTH EVERY MORNING AND TAKE 2 TABLETS BY MOUTH EVERY EVENING, Disp: 270 tablet, Rfl: 1    Tegretol  mg 12 hr tablet, Take 1 tablet (400 mg) by mouth 2 times a day. Do not crush, chew, or split., Disp: 180 tablet, Rfl: 3  No current facility-administered medications for this visit.

## 2025-07-10 NOTE — PROGRESS NOTES
pt tolerated today's Port Draw without difficulty. pt to call with any questions and/or concerns.

## 2025-07-10 NOTE — PROGRESS NOTES
Patient here for 3 month follow up visit adenocarcinoma RLL s/p lobectomy, CVA after Atezolizumab.     She has a lot of questions re if she should have cardiac surgery.     Needs an order to remove her port.   Patient here with  Chris    Medications and Allergies reviewed and reconciled this visit.    Follow up per MD request.    Patient reports availability and use of mychart, Reviewed this is a good place to communicate with the team as well as review labs and upcoming orders.      No barriers to education noted, patient agrees to current plan and verbalized understanding using teach back method.

## 2025-07-23 ENCOUNTER — APPOINTMENT (OUTPATIENT)
Dept: RADIOLOGY | Facility: HOSPITAL | Age: 65
End: 2025-07-23
Payer: COMMERCIAL

## 2025-07-28 PROCEDURE — RXMED WILLOW AMBULATORY MEDICATION CHARGE

## 2025-07-29 ENCOUNTER — OFFICE VISIT (OUTPATIENT)
Facility: CLINIC | Age: 65
End: 2025-07-29
Payer: COMMERCIAL

## 2025-07-29 VITALS
HEIGHT: 63 IN | SYSTOLIC BLOOD PRESSURE: 128 MMHG | RESPIRATION RATE: 18 BRPM | DIASTOLIC BLOOD PRESSURE: 68 MMHG | HEART RATE: 61 BPM | WEIGHT: 131.8 LBS | BODY MASS INDEX: 23.35 KG/M2

## 2025-07-29 DIAGNOSIS — C34.31 MALIGNANT NEOPLASM OF LOWER LOBE OF RIGHT LUNG (MULTI): Primary | ICD-10-CM

## 2025-07-29 DIAGNOSIS — Z98.890 HISTORY OF LUNG SURGERY: ICD-10-CM

## 2025-07-29 PROCEDURE — 1159F MED LIST DOCD IN RCRD: CPT | Performed by: THORACIC SURGERY (CARDIOTHORACIC VASCULAR SURGERY)

## 2025-07-29 PROCEDURE — 99215 OFFICE O/P EST HI 40 MIN: CPT | Performed by: THORACIC SURGERY (CARDIOTHORACIC VASCULAR SURGERY)

## 2025-07-29 PROCEDURE — 1126F AMNT PAIN NOTED NONE PRSNT: CPT | Performed by: THORACIC SURGERY (CARDIOTHORACIC VASCULAR SURGERY)

## 2025-07-29 PROCEDURE — 3008F BODY MASS INDEX DOCD: CPT | Performed by: THORACIC SURGERY (CARDIOTHORACIC VASCULAR SURGERY)

## 2025-07-29 ASSESSMENT — ENCOUNTER SYMPTOMS
PSYCHIATRIC NEGATIVE: 1
MUSCULOSKELETAL NEGATIVE: 1
LOSS OF SENSATION IN FEET: 0
ABDOMINAL DISTENTION: 0
HEMATOLOGIC/LYMPHATIC NEGATIVE: 1
NEUROLOGICAL NEGATIVE: 1
WHEEZING: 0
COUGH: 0
ENDOCRINE NEGATIVE: 1
FATIGUE: 0
OCCASIONAL FEELINGS OF UNSTEADINESS: 0
VOMITING: 0
DIAPHORESIS: 0
SHORTNESS OF BREATH: 0
ABDOMINAL PAIN: 0
DIARRHEA: 0
CONSTIPATION: 0
CHILLS: 0
DEPRESSION: 0
PALPITATIONS: 0
APPETITE CHANGE: 0
NAUSEA: 0
UNEXPECTED WEIGHT CHANGE: 0
FEVER: 0
EYES NEGATIVE: 1
CHOKING: 0
STRIDOR: 0
ALLERGIC/IMMUNOLOGIC NEGATIVE: 1
CHEST TIGHTNESS: 0

## 2025-07-29 ASSESSMENT — PATIENT HEALTH QUESTIONNAIRE - PHQ9
1. LITTLE INTEREST OR PLEASURE IN DOING THINGS: NOT AT ALL
2. FEELING DOWN, DEPRESSED OR HOPELESS: NOT AT ALL
SUM OF ALL RESPONSES TO PHQ9 QUESTIONS 1 AND 2: 0

## 2025-07-29 ASSESSMENT — PAIN SCALES - GENERAL: PAINLEVEL_OUTOF10: 0-NO PAIN

## 2025-07-29 NOTE — PROGRESS NOTES
Subjective   Patient ID: Rhoda Pena is a 65 y.o. female who presents for Follow-up (6 months ct).  HPI    64-year-old woman who was found to have a right lower lobe consolidation suspicious for malignancy.  She underwent a PET scan showing an SUV max of 2.7.  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  She underwent PFTs showing an FEV1 of 112% of predicted and a DLCO of 80% predicted.  I discussion with her about the best next step.  I think surgery will be the best option here we discussed the alternative of radiation but I do not think this is a good option for her and she is interested in the surgical option.  We discussed the mechanics of a robotic assisted right lower lobe lobectomy with mediastinal lymphadenectomy.  On imaging the area of consolidation is close to the takeoff of the right lower lobe.  On bronchoscopic images it does not look like there is any endobronchial or any mucosal changes on the bronchus but I discussed with her that if from frozen the margin on the bronchus is compromised we can try to remove more versus doing a bilobectomy to really achieve a negative margin.  She has a nodule in the left lower lobe that is not PET avid and might be just a granuloma.  Will proceed with an operation at [facility] on August 29.  We discussed length of stay potential complications and expected recovery.    She recovered well from her surgery.Called to discuss pathology results.  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  All margins are negative for resection.  Intrapulmonary lymph nodes and mediastinal lymph nodes are negative for malignancy.  This is a pT3 N0.  I discussed with her next steps I would want her to  have discussion with medical oncology about adjuvant therapy.  I will see her again in follow-up.    Update 1/14/2025  Recently had a stroke that required admission to the hospital.  She has recovered well.  She is being active and eating well.  She will heal.  CT scan showing no evidence of recurrence.  She is can restart immunotherapy.  I will see her again in 6 months with a CT of the chest.    Update 7/29/2025  She has been doing well.  Unfortunately she developed a stroke while on immunotherapy so has been stopped.  CT scan not showing any evidence of recurrence.  Will see her again in 6 months with a CT of the chest.  I will obtain Signatera now and Signatera in 6 months.    Review of Systems   Constitutional:  Negative for appetite change, chills, diaphoresis, fatigue, fever and unexpected weight change.   HENT: Negative.     Eyes: Negative.    Respiratory:  Negative for cough, choking, chest tightness, shortness of breath, wheezing and stridor.    Cardiovascular:  Negative for chest pain, palpitations and leg swelling.   Gastrointestinal:  Negative for abdominal distention, abdominal pain, constipation, diarrhea, nausea and vomiting.   Endocrine: Negative.    Genitourinary: Negative.    Musculoskeletal: Negative.    Skin: Negative.    Allergic/Immunologic: Negative.    Neurological: Negative.    Hematological: Negative.    Psychiatric/Behavioral: Negative.     All other systems reviewed and are negative.      Objective   Physical Exam  Constitutional:       Appearance: Normal appearance.   HENT:      Head: Normocephalic and atraumatic.      Nose: Nose normal.      Mouth/Throat:      Mouth: Mucous membranes are moist.      Pharynx: Oropharynx is clear.     Eyes:      Extraocular Movements: Extraocular movements intact.      Conjunctiva/sclera: Conjunctivae normal.      Pupils: Pupils are equal, round, and reactive to light.       Cardiovascular:      Rate and Rhythm: Normal rate and regular rhythm.       Pulses: Normal pulses.      Heart sounds: Normal heart sounds.   Pulmonary:      Effort: Pulmonary effort is normal. No respiratory distress.      Breath sounds: Normal breath sounds. No stridor. No wheezing, rhonchi or rales.   Chest:      Chest wall: No tenderness.   Abdominal:      General: Abdomen is flat. Bowel sounds are normal.      Palpations: Abdomen is soft.     Musculoskeletal:         General: Normal range of motion.      Cervical back: Normal range of motion and neck supple.     Skin:     General: Skin is warm and dry.      Capillary Refill: Capillary refill takes less than 2 seconds.     Neurological:      General: No focal deficit present.      Mental Status: She is alert and oriented to person, place, and time.         Assessment/Plan   Diagnoses and all orders for this visit:  Malignant neoplasm of lower lobe of right lung (Multi)    Follow-up in 6 months with CT of the chest.  Alan Husain MD 07/29/25 12:10 PM

## 2025-07-30 ENCOUNTER — PHARMACY VISIT (OUTPATIENT)
Dept: PHARMACY | Facility: CLINIC | Age: 65
End: 2025-07-30
Payer: COMMERCIAL

## 2025-07-30 DIAGNOSIS — C34.90 MALIGNANT NEOPLASM OF LUNG, UNSPECIFIED LATERALITY, UNSPECIFIED PART OF LUNG (MULTI): ICD-10-CM

## 2025-08-05 ENCOUNTER — OFFICE VISIT (OUTPATIENT)
Dept: PULMONOLOGY | Facility: HOSPITAL | Age: 65
End: 2025-08-05
Payer: COMMERCIAL

## 2025-08-05 VITALS
OXYGEN SATURATION: 96 % | BODY MASS INDEX: 23.38 KG/M2 | TEMPERATURE: 97.6 F | WEIGHT: 132 LBS | SYSTOLIC BLOOD PRESSURE: 125 MMHG | DIASTOLIC BLOOD PRESSURE: 78 MMHG | HEART RATE: 58 BPM

## 2025-08-05 DIAGNOSIS — R93.89 ABNORMAL HIGH RESOLUTION COMPUTED TOMOGRAPHY OF CHEST: Primary | ICD-10-CM

## 2025-08-05 DIAGNOSIS — M25.511 RIGHT SHOULDER PAIN, UNSPECIFIED CHRONICITY: ICD-10-CM

## 2025-08-05 DIAGNOSIS — J84.9 ILD (INTERSTITIAL LUNG DISEASE) (MULTI): ICD-10-CM

## 2025-08-05 PROCEDURE — 1126F AMNT PAIN NOTED NONE PRSNT: CPT | Performed by: INTERNAL MEDICINE

## 2025-08-05 PROCEDURE — 1159F MED LIST DOCD IN RCRD: CPT | Performed by: INTERNAL MEDICINE

## 2025-08-05 PROCEDURE — 99212 OFFICE O/P EST SF 10 MIN: CPT

## 2025-08-05 PROCEDURE — RXMED WILLOW AMBULATORY MEDICATION CHARGE

## 2025-08-05 PROCEDURE — 99213 OFFICE O/P EST LOW 20 MIN: CPT | Performed by: INTERNAL MEDICINE

## 2025-08-05 ASSESSMENT — LIFESTYLE VARIABLES
HOW OFTEN DO YOU HAVE A DRINK CONTAINING ALCOHOL: MONTHLY OR LESS
HOW MANY STANDARD DRINKS CONTAINING ALCOHOL DO YOU HAVE ON A TYPICAL DAY: 1 OR 2
SKIP TO QUESTIONS 9-10: 0
AUDIT-C TOTAL SCORE: 2
HOW OFTEN DO YOU HAVE SIX OR MORE DRINKS ON ONE OCCASION: LESS THAN MONTHLY

## 2025-08-05 ASSESSMENT — PAIN SCALES - GENERAL: PAINLEVEL_OUTOF10: 0-NO PAIN

## 2025-08-05 NOTE — PROGRESS NOTES
Subjective   Patient ID: Rhoda Pena is a 65 y.o. female who presents for pt fuv    HPI  Last visit in this clinic was 07/02/24    Rhoda Pena is a 65 y.o. female patient with a history of right lower lobe adenocarcinoma of the lung, seizures, and abnormal chest CT who presents today for follow-up    She is being followed by oncology for resected right lower lobe adenocarcinoma of the lung. She underwent robotic assisted right lower lobe lobectomy in 08/2024. She saw Dr. Falk who ordered Signatera testing.     She had a stroke in 12/2024 after her first dose of immunotherapy. She was admitted to the ED for this. She started to have right shoulder pain that radiates down to her elbow and hand after this. She also notes general weakness of her right arm.    Today, the patient feels good overall. She tries to stay active. She was a previous smoker. She has mild shortness of breath when using stairs or walking long distances.     Review of Systems  Review of Pertinent Systems:  Constitutional: no chills, no fever, no fatigue.  Eyes: no blurred vision and no dryness of the eyes.   ENT: no nasal congestion, no hoarseness, no sore throat, no postnasal drip.  Cardiovascular: as per HPI   Respiratory: as per HPI  Gastrointestinal: no nausea and no vomiting. No diarrhea or constipation. No reflux.  Integumentary: no rashes.      Objective   Physical Exam  Vitals:    08/05/25 1101   BP: 125/78   Pulse: 58   Temp: 36.4 °C (97.6 °F)   SpO2: 96%     GENERAL: Normal appearance. Well nourished. No respiratory distress   HEAD/SINUSES: No sinus tenderness.   OROPHARYNX: Moist mucosa, no thrush or lesions - Mallampati ***   NECK: No JVD, midline trachea without stridor.   LYMPH NODES: None felt in the cervical, submandibular, or supraclavicular regions.   LUNGS: Symmetric chest. Normal inflation. Good excursion. Equal breath sounds bilaterally. Clear lung fields anteriorly and posteriorly with no wheeze, crackles, or rhonchi.    CARDIAC: Normal S1 and S2; no gallops, rubs or murmurs. Regular rate and rhythm.   ABDOMEN: Abdomen soft, non-tender. Non-distended. BS normal.   EXTREMITIES: No edema.   NEURO: Grossly normal mental status, CNs, reflexes, and motor strength.   SKIN: Skin turgor normal. No rashes or lesions.    PSYCH: Normal affect.       AVAILABLE DATA - this represents my personal interpretation.   CT Chest 07/2025  IMPRESSION:  1.  Postoperative changes status post right lower lobectomy without  evidence of disease recurrence in the surgical bed. No suspicious  lung nodules seen. Continued follow-up with CT advised.  2. Chronic lung disease with subpleural reticulation and fibrosis  similar to the prior.    Assessment/Plan   Diagnoses and all orders for this visit:  Abnormal high resolution computed tomography of chest  -     Complete Pulmonary Function Test (Spirometry/DLCO/Lung Volumes); Future  -     Pulmonary Stress Test (6 Min. Walk); Future  -     CT chest high resolution; Future  -     Follow Up In Pulmonology; Future  ILD (interstitial lung disease) (Multi)  -     Complete Pulmonary Function Test (Spirometry/DLCO/Lung Volumes); Future  -     Pulmonary Stress Test (6 Min. Walk); Future  -     CT chest high resolution; Future  -     Follow Up In Pulmonology; Future  Right shoulder pain, unspecified chronicity  -     MR arthrogram shoulder right; Future          Scribe Attestation:  By signing my name below, I, Shahana Casarez attest that this documentation has been prepared under the direction and in the presence of Jimbo Griffith MD.    Provider Attestation - Scribe documentation:  All medical record entries made by Vanessa Castorena were at my direction and personally dictated by me, Jimbo Griffith MD. I have reviewed the chart and agree that the record is accurate and I confirmed that it reflects my personal performance of the history, physical exam, discussion, and plan.    mucosa, no thrush or lesions - Mallampati II (hard and soft palate, upper portion of tonsils and uvula visible)  NECK: no JVD, midline trachea without stridor. Thyroid not enlarged  LYMPH NODES : none felt in the cervical, submandibular or supraclavicular regions  LUNGS: Clear lung fields anteriorly and posteriorly.  As expected smaller right lung field  CARDIAC: normal S1 and S2; no gallops, rubs or murmurs. Regular rate and rhythm  EXTREMITIES: No edema, no varicose veins.  No point tenderness on the shoulder with any movement passive or active pain is induced  NEURO: grossly normal mental status, CN reflexes and motor strength.   SKIN: Skin turgor normal. No rashes or lesions.   PSYCH: Normal affect  ========================================================================   Available Data -I personally reviewed the CT scans of the chest January 2024, December 2024, March 2025 and July 2025.    In summary:  Postoperative changes in the right base have significantly improved on the current film from July in comparison to the December film  Interstitial changes in the right lung worsened in December 2024 then improved on the current film but still more prominent than January 2024  On the left side the interstitial abnormalities are more prominent in all lung fields.  They are subpleural reticular without any honeycombing.  There is subtle areas of bronchiolarectasis in the base.  Lower lobe is more affected than the upper lobe  The small left lower lobe nodule is practically unchanged in size accounting for the film detected        Current Medications:  Current Outpatient Medications   Medication Instructions    ascorbic acid (Vitamin C) 1,000 mg tablet Take 1 tablet (1,000 mg) by mouth once daily.    atorvastatin (LIPITOR) 40 mg, oral, Nightly    carBAMazepine XR (TEGRETOL XR) 100 mg, oral, 2 times daily    cholecalciferol (VITAMIN D-3) 50 mcg, Daily    cyanocobalamin (VITAMIN B-12) 100 mcg, Daily    enoxaparin  (LOVENOX) 1 mg/kg, subcutaneous, Every 12 hours    estradiol (Estrace) 0.01 % (0.1 mg/gram) vaginal cream Insert 0.25 Applicatorful (1 g) into the vagina 2 times a week.    PHENobarbital (Luminal) 64.8 mg tablet TAKE 1 TABLET BY MOUTH EVERY MORNING AND TAKE 2 TABLETS BY MOUTH EVERY EVENING    Tegretol  mg, oral, 2 times daily, Do not crush, chew, or split.      ========================================================================  Drug Allergies/Intolerances:  RX Allergies[1]   Immunization History   Administered Date(s) Administered    Flu vaccine, quadrivalent, no egg protein, age 6 month or greater (FLUCELVAX) 10/19/2018, 10/02/2020, 09/17/2022    Flu vaccine, trivalent, preservative free, HIGH-DOSE, age 65y+ (Fluzone) 09/28/2024    Influenza, Unspecified 12/13/2010    Moderna COVID-19 vaccine, bivalent, blue cap/gray label *Check age/dose* 10/29/2022    Moderna SARS-CoV-2 Vaccination 01/22/2021, 02/19/2021, 11/19/2021    Pfizer COVID-19 vaccine, 12 years and older, (30mcg/0.3mL) (Comirnaty) 10/18/2023, 09/28/2024    Pneumococcal conjugate vaccine, 20-valent (PREVNAR 20) 09/28/2024    Td vaccine, age 7 years and older (TDVAX) 01/01/2007     ========================================================================  IMPRESSION and RECOMMENDATIONS  Ms. Pena is a 65 y.o. female with the following respiratory problems  Abnormal high resolution computed tomography of chest (Primary) related to ILD  etiology of which is to be determined  Incidental. Subpleural predominately in the lower lobes.  No acute normalities were found in January 2024.  Since then more prominent reticulations are present on the last CT. the pattern is still not classifiable.  Interestingly, worsening changes occurred in the right lung few months post lobectomy that had improved on the most recent scan.  Clinically, symptoms are very subtle.  Functional status is good, however, it is worse than previous stroke and previous lobectomy  status.  The evaluation is important  The pathology report on the lobectomy is not very descriptive of the interstitial changes, however, it seems to be in the vicinity of the tumor not where the interstitial changes have been occurring.  Therefore one cannot reach a conclusion of the pathologic diagnosis of this interstitial lung disease  She is a remote smoker.  RB ILD is less likely.  The single dose of ICI is difficult to relate to these changes.  It is very important to not use any medication that has pulmonary toxicity is in its side effect profile  Lung cancer -underwent lobectomy and 4 cycles of neoadjuvant chemotherapy.  Currently disease-free.  Being monitored  Lung nodule - in the left lower lobe. Stable. 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  PFO closure is being entertained.  She is considering this therapy but thinking it through.  In the meantime she is to be maintained on enoxaparin  Right shoulder pain, subacute.  Not tender by exam, however there is significant limitation that warrants an evaluation with an MRI and probably Ortho  Management plans   Check PFT  Check 6 minute walk  Check HRCT of chest as planned in October  Check MRI of right shoulder  I will contact pathology to re-look at the non-malignant portion of the resected lobe  Follow up in this clinic in October after the scan  ========================================================================  Disclosure: Parts of this note may have been scribed or generated using voice dictation software, Dragon.  Homophonic errors may exist.  Please contact me directly if clarification is needed  Jimbo Griffith MD  08/05/2025         [1]   Allergies  Allergen Reactions    Phenytoin Other     double vision    Codeine Hallucinations     AUDITORY HALLUCINATIONS

## 2025-08-06 ENCOUNTER — LAB (OUTPATIENT)
Dept: LAB | Facility: CLINIC | Age: 65
End: 2025-08-06
Payer: COMMERCIAL

## 2025-08-12 ENCOUNTER — PHARMACY VISIT (OUTPATIENT)
Dept: PHARMACY | Facility: CLINIC | Age: 65
End: 2025-08-12
Payer: COMMERCIAL

## 2025-08-12 ENCOUNTER — APPOINTMENT (OUTPATIENT)
Dept: CARDIOLOGY | Facility: HOSPITAL | Age: 65
End: 2025-08-12
Payer: COMMERCIAL

## 2025-08-19 ENCOUNTER — HOSPITAL ENCOUNTER (OUTPATIENT)
Dept: CARDIOLOGY | Facility: HOSPITAL | Age: 65
Setting detail: OUTPATIENT SURGERY
Discharge: HOME | End: 2025-08-19
Payer: COMMERCIAL

## 2025-08-19 VITALS
HEART RATE: 46 BPM | DIASTOLIC BLOOD PRESSURE: 60 MMHG | WEIGHT: 132 LBS | RESPIRATION RATE: 12 BRPM | SYSTOLIC BLOOD PRESSURE: 143 MMHG | OXYGEN SATURATION: 100 % | BODY MASS INDEX: 23.38 KG/M2

## 2025-08-19 DIAGNOSIS — C34.31 MALIGNANT NEOPLASM OF LOWER LOBE OF RIGHT LUNG (MULTI): ICD-10-CM

## 2025-08-19 PROCEDURE — 77001 FLUOROGUIDE FOR VEIN DEVICE: CPT

## 2025-08-19 PROCEDURE — 7100000001 HC RECOVERY ROOM TIME - INITIAL BASE CHARGE

## 2025-08-19 PROCEDURE — 7100000002 HC RECOVERY ROOM TIME - EACH INCREMENTAL 1 MINUTE

## 2025-08-19 PROCEDURE — 36590 REMOVAL TUNNELED CV CATH: CPT | Performed by: RADIOLOGY

## 2025-08-19 PROCEDURE — 76000 FLUOROSCOPY <1 HR PHYS/QHP: CPT | Mod: 59

## 2025-08-19 PROCEDURE — 2500000004 HC RX 250 GENERAL PHARMACY W/ HCPCS (ALT 636 FOR OP/ED): Performed by: RADIOLOGY

## 2025-08-19 RX ORDER — FENTANYL CITRATE 50 UG/ML
INJECTION, SOLUTION INTRAMUSCULAR; INTRAVENOUS AS NEEDED
Status: DISCONTINUED | OUTPATIENT
Start: 2025-08-19 | End: 2025-08-19 | Stop reason: HOSPADM

## 2025-08-19 RX ORDER — LIDOCAINE HYDROCHLORIDE AND EPINEPHRINE 10; 10 UG/ML; MG/ML
INJECTION, SOLUTION INFILTRATION; PERINEURAL AS NEEDED
Status: DISCONTINUED | OUTPATIENT
Start: 2025-08-19 | End: 2025-08-19 | Stop reason: HOSPADM

## 2025-08-19 RX ORDER — MIDAZOLAM HYDROCHLORIDE 1 MG/ML
INJECTION, SOLUTION INTRAMUSCULAR; INTRAVENOUS AS NEEDED
Status: DISCONTINUED | OUTPATIENT
Start: 2025-08-19 | End: 2025-08-19 | Stop reason: HOSPADM

## 2025-08-19 RX ADMIN — MIDAZOLAM 1 MG: 1 INJECTION INTRAMUSCULAR; INTRAVENOUS at 08:31

## 2025-08-19 RX ADMIN — FENTANYL CITRATE 100 MCG: 50 INJECTION, SOLUTION INTRAMUSCULAR; INTRAVENOUS at 08:32

## 2025-08-19 RX ADMIN — LIDOCAINE HYDROCHLORIDE,EPINEPHRINE BITARTRATE 10 ML: 10; .01 INJECTION, SOLUTION INFILTRATION; PERINEURAL at 08:32

## 2025-08-19 ASSESSMENT — PAIN - FUNCTIONAL ASSESSMENT
PAIN_FUNCTIONAL_ASSESSMENT: 0-10

## 2025-08-19 ASSESSMENT — PAIN SCALES - GENERAL
PAINLEVEL_OUTOF10: 0 - NO PAIN
PAINLEVEL_OUTOF10: 7

## 2025-09-02 PROCEDURE — RXMED WILLOW AMBULATORY MEDICATION CHARGE

## 2025-09-04 ENCOUNTER — PHARMACY VISIT (OUTPATIENT)
Dept: PHARMACY | Facility: CLINIC | Age: 65
End: 2025-09-04
Payer: COMMERCIAL

## 2025-09-07 LAB
NTRA SIGNATERA MTM READOUT: 0 MTM/ML
NTRA SIGNATERA TEST RESULT: NEGATIVE

## 2025-10-07 ENCOUNTER — APPOINTMENT (OUTPATIENT)
Dept: RESPIRATORY THERAPY | Facility: HOSPITAL | Age: 65
End: 2025-10-07
Payer: COMMERCIAL

## 2025-10-07 ENCOUNTER — APPOINTMENT (OUTPATIENT)
Dept: RADIOLOGY | Facility: HOSPITAL | Age: 65
End: 2025-10-07
Payer: COMMERCIAL

## 2025-10-08 ENCOUNTER — APPOINTMENT (OUTPATIENT)
Dept: RADIOLOGY | Facility: HOSPITAL | Age: 65
End: 2025-10-08
Payer: COMMERCIAL

## 2025-11-05 ENCOUNTER — APPOINTMENT (OUTPATIENT)
Dept: PRIMARY CARE | Facility: CLINIC | Age: 65
End: 2025-11-05
Payer: COMMERCIAL

## 2025-11-12 ENCOUNTER — APPOINTMENT (OUTPATIENT)
Dept: PRIMARY CARE | Facility: CLINIC | Age: 65
End: 2025-11-12
Payer: COMMERCIAL

## (undated) DEVICE — SUTURE, ETHIBOND, XTRA, 30 IN, 0, CT-1, GREEN

## (undated) DEVICE — COVER, CART, 45 X 27 X 48 IN, CLEAR

## (undated) DEVICE — SPONGE, HEMOSTATIC, CELLULOSE, SURGICEL, 2 X 14 IN

## (undated) DEVICE — DRAPE, COLUMN, DAVINCI XI

## (undated) DEVICE — GRASPER, LONG, BIPOLAR, DAVINCI XI

## (undated) DEVICE — DRAPE, SHEET, ENDOSCOPY, GENERAL, FENESTRATED, ARMBOARD COVER, 98 X 123.5 IN, DISPOSABLE, LF, STERILE

## (undated) DEVICE — SEAL, UNIVERSAL 5-8MM  XI

## (undated) DEVICE — DRAPE, INCISE, ANTIMICROBIAL, IOBAN 2, LARGE, 17 X 23 IN, DISPOSABLE, STERILE

## (undated) DEVICE — ACCESS PORT, 12MM, 100MM LENGTH, LOW PROFILE W/BLADELESS OPTICAL TIP

## (undated) DEVICE — CATHETER TRAY, SURESTEP, 16FR, URINE METER W/STATLOCK

## (undated) DEVICE — Device

## (undated) DEVICE — TUBING, SUCTION, CONNECTING, STERILE 0.25 X 120 IN., LF

## (undated) DEVICE — ELECTRODE, ELECTROSURGICAL, BLADE, INSULATED, ENT/IMA, STERILE

## (undated) DEVICE — SPONGE GAUZE, XRAY SC+RFID, 4X4 16 PLY, STERILE

## (undated) DEVICE — BAG, TISSUE RETRIEVAL, 15MM, ANCHOR

## (undated) DEVICE — CANNULA REDUCER, DAVINCI XI

## (undated) DEVICE — RELOAD, SUREFORM 45, 2.5 WHITE

## (undated) DEVICE — SUTURE, VICRYL, 2-0, 36 IN, CT-1, UNDYED

## (undated) DEVICE — STRIP, SKIN CLOSURE, STERI STRIP, REINFORCED, 0.5 X 4 IN

## (undated) DEVICE — SPONGE, LAP, XRAY DECT, 18IN X 18IN, W/LOOP, STERILE

## (undated) DEVICE — STAPLER, SUREFORM 45, DAVINCI XI

## (undated) DEVICE — TOWEL, SURGICAL, NEURO, O/R, 16 X 26, BLUE, STERILE

## (undated) DEVICE — GRASPER, FENESTRATED TIP-UP XI

## (undated) DEVICE — MANIFOLD, 4 PORT NEPTUNE STANDARD

## (undated) DEVICE — GAUZE, KITTNER ROLL, STERILE

## (undated) DEVICE — COLLECTION UNIT, DRAINAGE, THORACIC, SINGLE TUBE, DRY SUCTION, ATS COMPATIBLE, OASIS 3600, LF

## (undated) DEVICE — CANNULA SEAL, STAPLER, DAVINCI XI

## (undated) DEVICE — SUTURE, VICRYL, 4-0, 18 IN, PS2, UNDYED

## (undated) DEVICE — TUBING SET, TRI-LUMEN, FILTERED, F/AIRSEAL

## (undated) DEVICE — FORCEPS, CADIERE, DAVINCI XI

## (undated) DEVICE — DRESSING, ADHESIVE, ISLAND, TELFA, 2 X 3.75 IN, LF

## (undated) DEVICE — KIT, ROBOTIC, CUSTOM UHC

## (undated) DEVICE — DRAPE, ARM XI

## (undated) DEVICE — OBTURATOR, BLADELESS , SU

## (undated) DEVICE — DRESSING, ISLAND, TELFA, 4 X 5 IN

## (undated) DEVICE — DRAPE, SHEET, FAN FOLDED, HALF, 44 X 58 IN, DISPOSABLE, LF, STERILE

## (undated) DEVICE — CATHETER, THORACIC, STRAIGHT, ADULT, 28 FR, PVC

## (undated) DEVICE — RELOAD, SUREFORM 45, 4.6 BLACK

## (undated) DEVICE — GOWN, ASTOUND, XL

## (undated) DEVICE — LOOP, VESSEL, MAXI, RED